# Patient Record
Sex: FEMALE | Race: WHITE | Employment: OTHER | URBAN - METROPOLITAN AREA
[De-identification: names, ages, dates, MRNs, and addresses within clinical notes are randomized per-mention and may not be internally consistent; named-entity substitution may affect disease eponyms.]

---

## 2017-02-08 ENCOUNTER — HOSPITAL ENCOUNTER (OUTPATIENT)
Dept: WOMENS IMAGING | Age: 67
Discharge: HOME | End: 2017-02-08
Attending: FAMILY MEDICINE | Admitting: FAMILY MEDICINE

## 2017-02-08 DIAGNOSIS — N95.1 POSTMENOPAUSAL DISORDER: ICD-10-CM

## 2017-02-08 DIAGNOSIS — Z12.31 SCREENING MAMMOGRAM, ENCOUNTER FOR: ICD-10-CM

## 2017-02-08 DIAGNOSIS — N95.9 MENOPAUSAL AND PERIMENOPAUSAL DISORDER: ICD-10-CM

## 2018-02-14 ENCOUNTER — HOSPITAL ENCOUNTER (OUTPATIENT)
Dept: WOMENS IMAGING | Age: 68
Discharge: HOME | End: 2018-02-14
Attending: FAMILY MEDICINE | Admitting: FAMILY MEDICINE

## 2018-02-14 DIAGNOSIS — Z12.31 VISIT FOR SCREENING MAMMOGRAM: ICD-10-CM

## 2018-04-26 PROBLEM — J18.9 PNEUMONIA: Status: ACTIVE | Noted: 2018-04-26

## 2018-05-14 ENCOUNTER — HOSPITAL ENCOUNTER (OUTPATIENT)
Dept: GENERAL RADIOLOGY | Age: 68
Discharge: HOME | End: 2018-05-14
Attending: FAMILY MEDICINE | Admitting: FAMILY MEDICINE

## 2018-05-14 DIAGNOSIS — J18.9 PNEUMONIA SYMPTOMS: ICD-10-CM

## 2018-10-03 RX ORDER — CHLORAL HYDRATE 500 MG
3000 CAPSULE ORAL 2 TIMES DAILY
COMMUNITY

## 2018-10-03 RX ORDER — M-VIT,TX,IRON,MINS/CALC/FOLIC 27MG-0.4MG
1 TABLET ORAL EVERY MORNING
COMMUNITY

## 2018-10-03 NOTE — PROGRESS NOTES
copy.             11 Please bring picture ID,  insurance card, paperwork from the doctors office    (H & P, Consent, & card for implantable devices).

## 2018-10-05 ENCOUNTER — HOSPITAL ENCOUNTER (OUTPATIENT)
Age: 68
Setting detail: OUTPATIENT SURGERY
Discharge: HOME | End: 2018-10-05
Attending: SPECIALIST | Admitting: SPECIALIST
Payer: MEDICARE

## 2018-10-05 ENCOUNTER — ANESTHESIA EVENT (OUTPATIENT)
Dept: OPERATING ROOM | Age: 68
End: 2018-10-05
Payer: MEDICARE

## 2018-10-05 ENCOUNTER — ANESTHESIA (OUTPATIENT)
Dept: OPERATING ROOM | Age: 68
End: 2018-10-05
Payer: MEDICARE

## 2018-10-05 VITALS
DIASTOLIC BLOOD PRESSURE: 58 MMHG | BODY MASS INDEX: 31.76 KG/M2 | RESPIRATION RATE: 16 BRPM | SYSTOLIC BLOOD PRESSURE: 119 MMHG | OXYGEN SATURATION: 98 % | HEART RATE: 67 BPM | WEIGHT: 186 LBS | TEMPERATURE: 97 F | HEIGHT: 64 IN

## 2018-10-05 VITALS — SYSTOLIC BLOOD PRESSURE: 102 MMHG | DIASTOLIC BLOOD PRESSURE: 51 MMHG | OXYGEN SATURATION: 96 %

## 2018-10-05 PROCEDURE — 7100000010 HC PHASE II RECOVERY - FIRST 15 MIN: Performed by: SPECIALIST

## 2018-10-05 PROCEDURE — 2580000003 HC RX 258: Performed by: SPECIALIST

## 2018-10-05 PROCEDURE — 6360000002 HC RX W HCPCS: Performed by: NURSE ANESTHETIST, CERTIFIED REGISTERED

## 2018-10-05 PROCEDURE — 2500000003 HC RX 250 WO HCPCS: Performed by: NURSE ANESTHETIST, CERTIFIED REGISTERED

## 2018-10-05 PROCEDURE — 7100000011 HC PHASE II RECOVERY - ADDTL 15 MIN: Performed by: SPECIALIST

## 2018-10-05 PROCEDURE — 3700000001 HC ADD 15 MINUTES (ANESTHESIA): Performed by: SPECIALIST

## 2018-10-05 PROCEDURE — 3700000000 HC ANESTHESIA ATTENDED CARE: Performed by: SPECIALIST

## 2018-10-05 PROCEDURE — 2709999900 HC NON-CHARGEABLE SUPPLY: Performed by: SPECIALIST

## 2018-10-05 PROCEDURE — 3609009500 HC COLONOSCOPY DIAGNOSTIC OR SCREENING: Performed by: SPECIALIST

## 2018-10-05 RX ORDER — SODIUM CHLORIDE, SODIUM LACTATE, POTASSIUM CHLORIDE, CALCIUM CHLORIDE 600; 310; 30; 20 MG/100ML; MG/100ML; MG/100ML; MG/100ML
INJECTION, SOLUTION INTRAVENOUS CONTINUOUS
Status: DISCONTINUED | OUTPATIENT
Start: 2018-10-05 | End: 2018-10-05 | Stop reason: HOSPADM

## 2018-10-05 RX ORDER — DEXAMETHASONE SODIUM PHOSPHATE 4 MG/ML
INJECTION, SOLUTION INTRA-ARTICULAR; INTRALESIONAL; INTRAMUSCULAR; INTRAVENOUS; SOFT TISSUE PRN
Status: DISCONTINUED | OUTPATIENT
Start: 2018-10-05 | End: 2018-10-05 | Stop reason: SDUPTHER

## 2018-10-05 RX ORDER — ONDANSETRON 2 MG/ML
INJECTION INTRAMUSCULAR; INTRAVENOUS PRN
Status: DISCONTINUED | OUTPATIENT
Start: 2018-10-05 | End: 2018-10-05 | Stop reason: SDUPTHER

## 2018-10-05 RX ORDER — LIDOCAINE HYDROCHLORIDE 20 MG/ML
INJECTION, SOLUTION INFILTRATION; PERINEURAL PRN
Status: DISCONTINUED | OUTPATIENT
Start: 2018-10-05 | End: 2018-10-05 | Stop reason: SDUPTHER

## 2018-10-05 RX ORDER — PROPOFOL 10 MG/ML
INJECTION, EMULSION INTRAVENOUS PRN
Status: DISCONTINUED | OUTPATIENT
Start: 2018-10-05 | End: 2018-10-05 | Stop reason: SDUPTHER

## 2018-10-05 RX ADMIN — PROPOFOL 50 MG: 10 INJECTION, EMULSION INTRAVENOUS at 11:53

## 2018-10-05 RX ADMIN — PROPOFOL 50 MG: 10 INJECTION, EMULSION INTRAVENOUS at 11:55

## 2018-10-05 RX ADMIN — LIDOCAINE HYDROCHLORIDE 100 MG: 20 INJECTION, SOLUTION INFILTRATION; PERINEURAL at 11:52

## 2018-10-05 RX ADMIN — PROPOFOL 20 MG: 10 INJECTION, EMULSION INTRAVENOUS at 12:01

## 2018-10-05 RX ADMIN — PROPOFOL 50 MG: 10 INJECTION, EMULSION INTRAVENOUS at 11:52

## 2018-10-05 RX ADMIN — SODIUM CHLORIDE, POTASSIUM CHLORIDE, SODIUM LACTATE AND CALCIUM CHLORIDE: 600; 310; 30; 20 INJECTION, SOLUTION INTRAVENOUS at 10:46

## 2018-10-05 RX ADMIN — PROPOFOL 20 MG: 10 INJECTION, EMULSION INTRAVENOUS at 11:59

## 2018-10-05 RX ADMIN — Medication 4 MG: at 11:53

## 2018-10-05 RX ADMIN — PROPOFOL 50 MG: 10 INJECTION, EMULSION INTRAVENOUS at 11:57

## 2018-10-05 RX ADMIN — DEXAMETHASONE SODIUM PHOSPHATE 8 MG: 4 INJECTION, SOLUTION INTRAMUSCULAR; INTRAVENOUS at 11:53

## 2018-10-05 ASSESSMENT — PAIN SCALES - GENERAL
PAINLEVEL_OUTOF10: 0
PAINLEVEL_OUTOF10: 0

## 2018-10-05 ASSESSMENT — PAIN - FUNCTIONAL ASSESSMENT: PAIN_FUNCTIONAL_ASSESSMENT: 0-10

## 2018-10-05 NOTE — ANESTHESIA PRE PROCEDURE
Weight: 186 lb (84.4 kg) 186 lb (84.4 kg)   Height: 5' 4\" (1.626 m) 5' 4\" (1.626 m)                                              BP Readings from Last 3 Encounters:   10/05/18 (!) 149/65   04/29/18 (!) 152/70   03/12/15 160/66       NPO Status: Time of last liquid consumption: 0800                        Time of last solid consumption: 0600 (pudding & yogurt)                        Date of last liquid consumption: 10/05/18                        Date of last solid food consumption: 08/25/18    BMI:   Wt Readings from Last 3 Encounters:   10/05/18 186 lb (84.4 kg)   04/28/18 210 lb 1.6 oz (95.3 kg)   03/12/15 177 lb (80.3 kg)     Body mass index is 31.93 kg/m². CBC:   Lab Results   Component Value Date    WBC 7.6 04/27/2018    RBC 2.54 04/27/2018    HGB 8.4 04/27/2018    HCT 27.3 04/27/2018    .5 04/27/2018    RDW 14.9 04/27/2018     04/27/2018       CMP:   Lab Results   Component Value Date     04/27/2018    K 3.9 04/27/2018     04/27/2018    CO2 20 04/27/2018    BUN 11 04/27/2018    CREATININE 0.5 04/27/2018    GFRAA >60 04/27/2018    LABGLOM >60 04/27/2018    GLUCOSE 134 04/27/2018    PROT 8.2 04/27/2018    PROT 6.7 04/11/2011    CALCIUM 7.4 04/27/2018    BILITOT 0.6 04/27/2018    ALKPHOS 144 04/27/2018    AST 59 04/27/2018    ALT 63 04/27/2018       POC Tests: No results for input(s): POCGLU, POCNA, POCK, POCCL, POCBUN, POCHEMO, POCHCT in the last 72 hours.     Coags:   Lab Results   Component Value Date    PROTIME 12.7 04/27/2018    INR 1.12 04/27/2018       HCG (If Applicable): No results found for: PREGTESTUR, PREGSERUM, HCG, HCGQUANT     ABGs: No results found for: PHART, PO2ART, SIW9AKN, BVG2BJX, BEART, W6JEGCOG     Type & Screen (If Applicable):  No results found for: [facility]    Anesthesia Evaluation  Patient summary reviewed  Airway: Mallampati: II  TM distance: >3 FB   Neck ROM: full  Mouth opening: > = 3 FB Dental: normal exam         Pulmonary:Negative Pulmonary ROS and normal exam                               Cardiovascular:  Exercise tolerance: good (>4 METS),   (+) hypertension:,         Rhythm: regular  Rate: normal           Beta Blocker:  Not on Beta Blocker         Neuro/Psych:   (+) psychiatric history:            GI/Hepatic/Renal:   (+) GERD:, bowel prep,           Endo/Other: Negative Endo/Other ROS                    Abdominal:           Vascular: negative vascular ROS. Anesthesia Plan      MAC and TIVA     ASA 2       Induction: intravenous. Anesthetic plan and risks discussed with patient. SHANNON Salter CRNA   10/5/2018      Pre Anesthesia Evaluation complete. Anesthesia plan, risks, benefits, alternatives, and personnel discussed with patient and/or legal guardian. Patient and/or legal guardian verbalized an understanding and agreed to proceed. Anesthesia plan discussed with care team members and agreed upon.   SHANNON Salter CRNA  10/5/2018

## 2018-11-21 ENCOUNTER — HOSPITAL ENCOUNTER (OUTPATIENT)
Dept: GENERAL RADIOLOGY | Age: 68
Discharge: HOME | End: 2018-11-21
Payer: MEDICARE

## 2018-11-21 ENCOUNTER — HOSPITAL ENCOUNTER (OUTPATIENT)
Age: 68
Discharge: HOME | End: 2018-11-21
Payer: MEDICARE

## 2018-11-21 DIAGNOSIS — R06.02 SOB (SHORTNESS OF BREATH): ICD-10-CM

## 2018-11-21 DIAGNOSIS — R06.09 DOE (DYSPNEA ON EXERTION): ICD-10-CM

## 2018-11-21 PROCEDURE — 71046 X-RAY EXAM CHEST 2 VIEWS: CPT

## 2018-12-17 ENCOUNTER — HOSPITAL ENCOUNTER (OUTPATIENT)
Dept: CARDIAC REHAB | Age: 68
Discharge: HOME | End: 2018-12-17
Payer: MEDICARE

## 2018-12-17 PROCEDURE — 94727 GAS DIL/WSHOT DETER LNG VOL: CPT

## 2018-12-17 PROCEDURE — 6360000002 HC RX W HCPCS

## 2018-12-17 PROCEDURE — 94060 EVALUATION OF WHEEZING: CPT

## 2018-12-17 PROCEDURE — 94729 DIFFUSING CAPACITY: CPT

## 2018-12-17 PROCEDURE — 94640 AIRWAY INHALATION TREATMENT: CPT

## 2018-12-21 ENCOUNTER — APPOINTMENT (OUTPATIENT)
Dept: CT IMAGING | Age: 68
DRG: 844 | End: 2018-12-21
Payer: MEDICARE

## 2018-12-21 ENCOUNTER — HOSPITAL ENCOUNTER (INPATIENT)
Age: 68
LOS: 2 days | Discharge: HOME | DRG: 844 | End: 2018-12-23
Attending: EMERGENCY MEDICINE | Admitting: INTERNAL MEDICINE
Payer: MEDICARE

## 2018-12-21 DIAGNOSIS — D64.9 ANEMIA, UNSPECIFIED TYPE: Primary | ICD-10-CM

## 2018-12-21 DIAGNOSIS — R06.02 SHORTNESS OF BREATH: ICD-10-CM

## 2018-12-21 DIAGNOSIS — J90 PLEURAL EFFUSION: ICD-10-CM

## 2018-12-21 LAB
ALBUMIN SERPL-MCNC: 2.2 GM/DL (ref 3.4–5)
ALP BLD-CCNC: 82 IU/L (ref 40–129)
ALT SERPL-CCNC: 36 U/L (ref 10–40)
ANION GAP SERPL CALCULATED.3IONS-SCNC: 13 MMOL/L (ref 4–16)
ANISOCYTOSIS: ABNORMAL
APTT: 39 SECONDS (ref 21.2–33)
AST SERPL-CCNC: 45 IU/L (ref 15–37)
AVERAGE GLUCOSE: NORMAL
BANDED NEUTROPHILS ABSOLUTE COUNT: 0.21 K/CU MM
BANDED NEUTROPHILS RELATIVE PERCENT: 7 % (ref 5–11)
BILIRUB SERPL-MCNC: 0.6 MG/DL (ref 0–1)
BUN BLDV-MCNC: 25 MG/DL (ref 6–23)
CALCIUM SERPL-MCNC: 8.9 MG/DL (ref 8.3–10.6)
CHLORIDE BLD-SCNC: 103 MMOL/L (ref 99–110)
CO2: 20 MMOL/L (ref 21–32)
CREAT SERPL-MCNC: 0.7 MG/DL (ref 0.6–1.1)
DIFFERENTIAL TYPE: ABNORMAL
EOSINOPHILS ABSOLUTE: 0.1 K/CU MM
EOSINOPHILS RELATIVE PERCENT: 3 % (ref 0–3)
GFR AFRICAN AMERICAN: >60 ML/MIN/1.73M2
GFR NON-AFRICAN AMERICAN: >60 ML/MIN/1.73M2
GLUCOSE BLD-MCNC: 102 MG/DL (ref 70–99)
HBA1C MFR BLD: 5.8 %
HCT VFR BLD CALC: 19.5 % (ref 37–47)
HEMOGLOBIN: 5.7 GM/DL (ref 12.5–16)
INR BLD: 1.31 INDEX
LYMPHOCYTES ABSOLUTE: 1.2 K/CU MM
LYMPHOCYTES RELATIVE PERCENT: 40 % (ref 24–44)
MCH RBC QN AUTO: 33.1 PG (ref 27–31)
MCHC RBC AUTO-ENTMCNC: 29.2 % (ref 32–36)
MCV RBC AUTO: 113.4 FL (ref 78–100)
MONOCYTES ABSOLUTE: 0.2 K/CU MM
MONOCYTES RELATIVE PERCENT: 7 % (ref 0–4)
MYELOCYTE PERCENT: 2 %
MYELOCYTES ABSOLUTE COUNT: 0.06 K/CU MM
NUCLEATED RED BLOOD CELLS: 1
PDW BLD-RTO: 17.1 % (ref 11.7–14.9)
PLATELET # BLD: 132 K/CU MM (ref 140–440)
PMV BLD AUTO: 9.1 FL (ref 7.5–11.1)
POTASSIUM SERPL-SCNC: 4 MMOL/L (ref 3.5–5.1)
PRO-BNP: 1347 PG/ML
PROTHROMBIN TIME: 14.9 SECONDS (ref 9.12–12.5)
RBC # BLD: 1.72 M/CU MM (ref 4.2–5.4)
RBC # BLD: ABNORMAL 10*6/UL
SEGMENTED NEUTROPHILS ABSOLUTE COUNT: 1.2 K/CU MM
SEGMENTED NEUTROPHILS RELATIVE PERCENT: 40 % (ref 36–66)
SODIUM BLD-SCNC: 136 MMOL/L (ref 135–145)
TOTAL PROTEIN: 11.9 GM/DL (ref 6.4–8.2)
UNDIFFERENTIATED CELL: 1 %
WBC # BLD: 3 K/CU MM (ref 4–10.5)
WBC # BLD: ABNORMAL 10*3/UL

## 2018-12-21 PROCEDURE — 6370000000 HC RX 637 (ALT 250 FOR IP): Performed by: INTERNAL MEDICINE

## 2018-12-21 PROCEDURE — 80053 COMPREHEN METABOLIC PANEL: CPT

## 2018-12-21 PROCEDURE — 83880 ASSAY OF NATRIURETIC PEPTIDE: CPT

## 2018-12-21 PROCEDURE — 1200000000 HC SEMI PRIVATE

## 2018-12-21 PROCEDURE — 86922 COMPATIBILITY TEST ANTIGLOB: CPT

## 2018-12-21 PROCEDURE — 99285 EMERGENCY DEPT VISIT HI MDM: CPT

## 2018-12-21 PROCEDURE — 36430 TRANSFUSION BLD/BLD COMPNT: CPT

## 2018-12-21 PROCEDURE — 85730 THROMBOPLASTIN TIME PARTIAL: CPT

## 2018-12-21 PROCEDURE — 85027 COMPLETE CBC AUTOMATED: CPT

## 2018-12-21 PROCEDURE — 85007 BL SMEAR W/DIFF WBC COUNT: CPT

## 2018-12-21 PROCEDURE — 2580000003 HC RX 258: Performed by: EMERGENCY MEDICINE

## 2018-12-21 PROCEDURE — 6360000004 HC RX CONTRAST MEDICATION: Performed by: EMERGENCY MEDICINE

## 2018-12-21 PROCEDURE — 71275 CT ANGIOGRAPHY CHEST: CPT

## 2018-12-21 PROCEDURE — 85610 PROTHROMBIN TIME: CPT

## 2018-12-21 PROCEDURE — P9016 RBC LEUKOCYTES REDUCED: HCPCS

## 2018-12-21 PROCEDURE — 93005 ELECTROCARDIOGRAM TRACING: CPT | Performed by: EMERGENCY MEDICINE

## 2018-12-21 PROCEDURE — 86850 RBC ANTIBODY SCREEN: CPT

## 2018-12-21 PROCEDURE — 36415 COLL VENOUS BLD VENIPUNCTURE: CPT

## 2018-12-21 PROCEDURE — 86901 BLOOD TYPING SEROLOGIC RH(D): CPT

## 2018-12-21 PROCEDURE — 86900 BLOOD TYPING SEROLOGIC ABO: CPT

## 2018-12-21 RX ORDER — AMLODIPINE BESYLATE 5 MG/1
5 TABLET ORAL EVERY MORNING
Status: DISCONTINUED | OUTPATIENT
Start: 2018-12-22 | End: 2018-12-23 | Stop reason: HOSPADM

## 2018-12-21 RX ORDER — POTASSIUM CHLORIDE 20MEQ/15ML
40 LIQUID (ML) ORAL PRN
Status: DISCONTINUED | OUTPATIENT
Start: 2018-12-21 | End: 2018-12-23 | Stop reason: HOSPADM

## 2018-12-21 RX ORDER — SODIUM CHLORIDE 0.9 % (FLUSH) 0.9 %
10 SYRINGE (ML) INJECTION EVERY 12 HOURS SCHEDULED
Status: DISCONTINUED | OUTPATIENT
Start: 2018-12-21 | End: 2018-12-23 | Stop reason: HOSPADM

## 2018-12-21 RX ORDER — ALENDRONATE SODIUM 70 MG/1
70 TABLET ORAL WEEKLY
Status: DISCONTINUED | OUTPATIENT
Start: 2018-12-21 | End: 2018-12-21 | Stop reason: RX

## 2018-12-21 RX ORDER — ONDANSETRON 2 MG/ML
4 INJECTION INTRAMUSCULAR; INTRAVENOUS EVERY 6 HOURS PRN
Status: DISCONTINUED | OUTPATIENT
Start: 2018-12-21 | End: 2018-12-23 | Stop reason: HOSPADM

## 2018-12-21 RX ORDER — LORATADINE 10 MG/1
10 TABLET ORAL DAILY
COMMUNITY
End: 2019-10-24

## 2018-12-21 RX ORDER — POTASSIUM CHLORIDE 7.45 MG/ML
10 INJECTION INTRAVENOUS PRN
Status: DISCONTINUED | OUTPATIENT
Start: 2018-12-21 | End: 2018-12-23 | Stop reason: HOSPADM

## 2018-12-21 RX ORDER — 0.9 % SODIUM CHLORIDE 0.9 %
10 VIAL (ML) INJECTION
Status: COMPLETED | OUTPATIENT
Start: 2018-12-21 | End: 2018-12-21

## 2018-12-21 RX ORDER — ALBUTEROL SULFATE 90 UG/1
2 AEROSOL, METERED RESPIRATORY (INHALATION) EVERY 6 HOURS PRN
Status: DISCONTINUED | OUTPATIENT
Start: 2018-12-21 | End: 2018-12-23 | Stop reason: HOSPADM

## 2018-12-21 RX ORDER — POTASSIUM CHLORIDE 20 MEQ/1
40 TABLET, EXTENDED RELEASE ORAL PRN
Status: DISCONTINUED | OUTPATIENT
Start: 2018-12-21 | End: 2018-12-23 | Stop reason: HOSPADM

## 2018-12-21 RX ORDER — 0.9 % SODIUM CHLORIDE 0.9 %
250 INTRAVENOUS SOLUTION INTRAVENOUS ONCE
Status: COMPLETED | OUTPATIENT
Start: 2018-12-21 | End: 2018-12-22

## 2018-12-21 RX ORDER — SODIUM CHLORIDE 0.9 % (FLUSH) 0.9 %
10 SYRINGE (ML) INJECTION PRN
Status: DISCONTINUED | OUTPATIENT
Start: 2018-12-21 | End: 2018-12-23 | Stop reason: HOSPADM

## 2018-12-21 RX ORDER — LOSARTAN POTASSIUM 50 MG/1
50 TABLET ORAL EVERY MORNING
Status: DISCONTINUED | OUTPATIENT
Start: 2018-12-22 | End: 2018-12-23 | Stop reason: HOSPADM

## 2018-12-21 RX ORDER — FAMOTIDINE 20 MG/1
20 TABLET, FILM COATED ORAL 2 TIMES DAILY
Status: DISCONTINUED | OUTPATIENT
Start: 2018-12-21 | End: 2018-12-23 | Stop reason: HOSPADM

## 2018-12-21 RX ORDER — ALBUTEROL SULFATE 90 UG/1
2 AEROSOL, METERED RESPIRATORY (INHALATION) EVERY 6 HOURS PRN
COMMUNITY
End: 2020-09-15

## 2018-12-21 RX ORDER — OMEGA-3-ACID ETHYL ESTERS 1 G/1
2000 CAPSULE, LIQUID FILLED ORAL 2 TIMES DAILY
Status: DISCONTINUED | OUTPATIENT
Start: 2018-12-21 | End: 2018-12-23 | Stop reason: HOSPADM

## 2018-12-21 RX ADMIN — SODIUM CHLORIDE, PRESERVATIVE FREE 10 ML: 5 INJECTION INTRAVENOUS at 12:12

## 2018-12-21 RX ADMIN — IOPAMIDOL 75 ML: 755 INJECTION, SOLUTION INTRAVENOUS at 12:12

## 2018-12-21 RX ADMIN — OMEGA-3-ACID ETHYL ESTERS 2000 MG: 1 CAPSULE, LIQUID FILLED ORAL at 19:51

## 2018-12-21 RX ADMIN — SODIUM CHLORIDE 250 ML: 9 INJECTION, SOLUTION INTRAVENOUS at 13:57

## 2018-12-21 RX ADMIN — FAMOTIDINE 20 MG: 20 TABLET ORAL at 19:51

## 2018-12-21 ASSESSMENT — PAIN SCALES - GENERAL: PAINLEVEL_OUTOF10: 0

## 2018-12-21 NOTE — H&P
[facility]    History and Physical      Name:  Diann Carmichael /Age/Sex: 1950  (79 y.o. female)   MRN & CSN:  [id number] & [id number] Admission Date/Time: 2018 10:37 AM   Location:  ED22/ED-22 PCP: Lashae Pina MD       Hospital Day: 1    Assessment and Plan:   Diann Carmichael is a 79 y.o.  female with past medical history of Hypertension, depression, anemia who presents with low hemoglobin level based on the test performed in her PMD office    · Severe anemia-symptomatic, presented with fatigue, palpitations, shortness of breath- this is likely aggravated by recent epistaxis on her existing anemia, Hg baseline 8.4 -9.4 was 13 in 2017 on arrival 5.7, transfusion started in ER, iron studies were not performed before transfusion- we'll consult GI and hematology    · Pancytopenia-consulted hematology    · Epistaxis- bleeding from the right nostril, which stopped on arrival to ED - do not remove packing to avoid further bleeding, consult ENT    · Hypertension -continue home medication     Diet     DVT Prophylaxis [] Lovenox, []  Heparin, [x] SCDs, [] No VTE prophylaxis, patient ambulating   GI Prophylaxis [] PPI, [x] H2 Blocker, [] No GI prophylaxis, patient is receiving diet/Tube Feeds   Code Status Prior   Disposition Patient requires continued admission due to Severe anemia    MDM [] Low, [x] Moderate,[]  High  Patient's risk as above due to      History of Present Illness:     Chief Complaint: Hemoglobin, shortness of breath    Diann Carmichael is a 79 y.o.  female  with past medical history of Hypertension, depression, anemia who presents with low hemoglobin level based on the test performed in her PMD office. Patient went to her PMD office 2 days ago due to shortness of breath, fatigue, and lack of energy. She was called and told to go to ER this morning because of low blood hemoglobin level. She has recently started to have epistaxis.   This has been going on intermittently for the last 3 5-24-13); eye surgery (Left, 1-17-14); [place] tooth extraction (Early 1970's); Dental surgery; Endoscopy, colon, diagnostic (\"Once\" 1990's); Hysterectomy, vaginal (1985); bladder suspension (1985); Tonsillectomy (1970's); lymph node biopsy (Last Done In 2000); Breast surgery (Right, 1980's); Foot surgery (Left, [facility]); Cholecystectomy, laparoscopic ([id number]); Hysterectomy; Colonoscopy (\"Two\" Last Done 2000's); Colonoscopy (10/05/2018); and pr colonoscopy flx dx w/collj spec when pfrmd (N/A, 10/5/2018). Allergies: Allergies   Allergen Reactions    Other      \"Allergic To Poinsetta Holley Causing Nasal Congestion\"    Prinzide [Lisinopril-Hydrochlorothiazide] Swelling    Sulfa Antibiotics      \"Flu Like Symptoms\"    Tape [Adhesive Tape]      \"Tears Skin , Paper Tape Is OK To Use\"       FAM HX: family history includes Arthritis in her brother and mother; Cancer in her brother and brother; Dementia in her father; Depression in her brother; Diabetes in her brother and mother; Early Death (age of onset: 61) in her brother; Heart Disease in her mother; High Blood Pressure in her mother; Kidney Disease in her son; Other in her son. Soc HX:   Social History     Social History    Marital status:      Spouse name: N/A    Number of children: N/A    Years of education: N/A     Social History Main Topics    Smoking status: Never Smoker    Smokeless tobacco: Never Used    Alcohol use Yes      Comment: \"Occ. Maybe Once A Week\"    Drug use: No    Sexual activity: Yes     Partners: Male     Other Topics Concern    None     Social History Narrative    None       Medications:   Medications:    sodium chloride  250 mL Intravenous Once      Infusions:   PRN Meds:    Current home medications   Prior to Admission medications    Medication Sig Start Date End Date Taking?  Authorizing Provider   albuterol sulfate  (90 Base) MCG/ACT inhaler Inhale 2 puffs into the lungs every 6 hours as needed for

## 2018-12-21 NOTE — ED PROVIDER NOTES
eMERGENCY dEPARTMENT eNCOUnter      CHIEF COMPLAINT:   Anemia  Shortness of breath    CRITICAL CARE NOTE:  There was a high probability of clinically significant life-threatening deterioration of the patient's condition requiring my urgent intervention. Total critical care time is 37 minutes  This includes vital sign monitoring, pulse oximetry monitoring, telemetry monitoring, clinical response to the IV medications, reviewing the nursing notes, consultation time, dictation/documetation time. (This time excludes time spent performing procedures). HPI: Tessy Jordan is a 79 y.o. female who presents to the emergency department for evaluation of anemia. The patient states that she has had shortness of breath for the past 4 months. The shortness of breath is constant. It is worse with exertion and better with rest, however it does not resolve with rest. She states that she saw her primary care physician earlier this week and outpatient labs were done. She was called today and told that her hemoglobin was 6.2 and that she needed to come to the emergency department. The patient denies a known history of anemia. She has been having frequent nosebleeds that can last up to 2 hours at a time. She denies other bleeding. She denies hematemesis. She denies bloody stools. She is not on blood thinners. She denies any associated cough or chest pain. She has had bilateral leg swelling. She has felt fatigued. She denies fevers, chills, abdominal pain, nausea, vomiting, numbness, tingling, weakness or any other complaints.     REVIEW OF SYSTEMS:   Constitutional:  Denies fever or chills, complains of fatigue  Eyes:  Denies change in visual acuity  HENT:  Denies nasal congestion or sore throat, complains of nose bleeds  Respiratory:  Denies cough, complains of shortness of breath  Cardiovascular:  Denies chest pain or edema  GI:  Denies abdominal pain, nausea, vomiting, bloody stools or diarrhea  :  Denies dysuria  Musculoskeletal:  Denies back pain or joint pain, complains of leg swelling  Integument:  Denies rash  Neurologic:  Denies headache, focal weakness or sensory changes  \"Remaining review of systems reviewed and negative. I have reviewed the nursing triage documentation and agree unless otherwise noted below. \"      PAST MEDICAL HISTORY:   Past Medical History:   Diagnosis Date    Anemia     \"With Childbirth\"    Arthritis     \"Hands, Knees And Hips\"    CCC (chronic calculous cholecystitis)     s/p cholecystectomy 2015    Colitis Dx 2000's    Depression     \"In Mid 1990's\"    GERD (gastroesophageal reflux disease)     Hypertension     Motion sickness     LILIAN (stress urinary incontinence, female)     Thyroid disease 1990's    \"Took Part Of Thyroid Out \"       CURRENT MEDICATIONS:   Home medications reviewed.     SURGICAL HISTORY:   Past Surgical History:   Procedure Laterality Date    BLADDER SUSPENSION  1985    Done During Vaginal Hysterectomy    BREAST SURGERY Right 1980's    Benign Area Removed Nipple Area Right Breast   Luis Langston  [id number]    COLONOSCOPY  \"Two\" Last Done 2000's    COLONOSCOPY  10/05/2018    Sigmoid diverticulosis, Grade 1 Internal hemorrhoids    DENTAL SURGERY      Teeth Extracted In Past    ENDOSCOPY, COLON, DIAGNOSTIC  \"Once\" 1990's    EYE SURGERY Right 5-24-13    Cataract With Lens Implant    EYE SURGERY Left 1-17-14    Cataract With Lens Implant    FOOT SURGERY Left 1962 Or 1963    I & D Left Foot After Stepping On A Nail    HYSTERECTOMY      HYSTERECTOMY, VAGINAL  1985    Bladder Suspension Also Done    LYMPH NODE BIOPSY  Last Done In 2000    \"Had Five Lymph Node Biopsies Done, Arm Pit Areas\", Benign    IA COLONOSCOPY FLX DX W/COLLJ SPEC WHEN PFRMD N/A 10/5/2018    COLONOSCOPY DIAGNOSTIC OR SCREENING performed by Cj Beckman MD at [facility]  1990's    \"Took Part Of The Thyroid Out\"    TONSILLECTOMY  1970's    WISDOM TOOTH EXTRACTION  Early 18's    All Four [place] Teeth Extracted       FAMILY HISTORY:   Family History   Problem Relation Age of Onset    Heart Disease Mother     Arthritis Mother     Diabetes Mother     High Blood Pressure Mother     Dementia Father     Cancer Brother         Prostate Cancer    Arthritis Brother     Early Death Brother 61        Bladder And Brain Cancer    Diabetes Brother     Depression Brother     Cancer Brother         Bladder And Brain Cancer    Kidney Disease Son         Kidney Stones    Other Son         \"Charcot Swathi Tooth, Neuromuscular Disease\"       SOCIAL HISTORY:   Social History     Social History    Marital status:      Spouse name: N/A    Number of children: N/A    Years of education: N/A     Occupational History    Not on file. Social History Main Topics    Smoking status: Never Smoker    Smokeless tobacco: Never Used    Alcohol use Yes      Comment: \"Occ. Maybe Once A Week\"    Drug use: No    Sexual activity: Yes     Partners: Male     Other Topics Concern    Not on file     Social History Narrative    No narrative on file       ALLERGIES: Other; Prinzide [lisinopril-hydrochlorothiazide]; Sulfa antibiotics; and Tape [adhesive tape]    PHYSICAL EXAM:  VITAL SIGNS:   ED Triage Vitals [12/21/18 1037]   Enc Vitals Group      BP (!) 145/61      Pulse 89      Resp 16      Temp 97.9 °F (36.6 °C)      Temp Source Oral      SpO2 100 %      Weight 186 lb (84.4 kg)      Height 5' 4\" (1.626 m)      Head Circumference       Peak Flow       Pain Score       Pain Loc       Pain Edu? Excl. in HOS[facility]? Constitutional:  Non-toxic appearance, Pale  HENT: Normocephalic, Atraumatic, Bilateral external ears normal, Oropharynx moist, No oral exudates, Nose normal.  Eyes:  PERRL, EOMI, Conjunctiva normal, No discharge. Neck: Normal range of motion, No tenderness, Supple, No stridor, No lymphadenopathy.   Cardiovascular:  Normal heart rate, Normal rhythm  Pulmonary/Chest:  Normal breath sounds, No respiratory distress, No wheezing, Dyspneic  Abdomen: Bowel sounds normal, Soft, No tenderness, No masses, No pulsatile masses  Back:  No tenderness, No CVA tenderness  Extremities:  Normal range of motion, Intact distal pulses, No edema, No tenderness  Neurologic:  Alert & oriented x 3, Normal motor function, Sensation intact to light touch throughout, No focal deficits  Skin:  Warm, Dry, No erythema, No rash      EKG Interpretation  Interpreted by me  Compared to 4/26/18  Rhythm: normal sinus  Rate: normal 76  Axis: normal  Ectopy: none  Conduction: normal  ST Segments: no acute change  T Waves: no acute change  Q Waves: none  Clinical Impression: normal sinus rhythm, no acute changes    Cardiac Monitor Strip Interpretation  Interpreted by me  Monitor strip interpreted for greater than 10 seconds  Rhythm: normal sinus  Rate: normal  Ectopy: none  ST Segments: normal      Radiology / Procedures:  CTA PULMONARY W CONTRAST (Final result)   Result time 12/21/18 12:34:32   Final result by Olga Dixon MD (12/21/18 12:34:32)                Impression:    1. Exam is negative for acute pulmonary embolism  2. Small right pleural effusion  3. Otherwise, no acute abnormalities seen in the chest.  Previously noted  bilateral airspace opacification has resolved            Narrative:    EXAMINATION:  CTA OF THE CHEST 12/21/2018 12:21 pm    TECHNIQUE:  CTA of the chest was performed after the administration of intravenous  contrast.  Multiplanar reformatted images are provided for review.  MIP  images are provided for review. Dose modulation, iterative reconstruction,  and/or weight based adjustment of the mA/kV was utilized to reduce the  radiation dose to as low as reasonably achievable.     COMPARISON:  04/26/2018    HISTORY:  ORDERING SYSTEM PROVIDED HISTORY: Shortness of breath  TECHNOLOGIST PROVIDED HISTORY:  Ordering Physician Provided Reason for Exam: shortness of breath  Acuity: Acute  Type of Exam: Initial  Additional signs and symptoms: 75ml isovue 370  Relevant Medical/Surgical History: hx thyroid surg, hyster, breast surg, david    FINDINGS:  Pulmonary Arteries: Pulmonary arteries are adequately opacified for  evaluation.  No evidence of intraluminal filling defect to suggest pulmonary  embolism.  Main pulmonary artery is normal in caliber. Mediastinum: No evidence of mediastinal lymphadenopathy.  The heart and  pericardium demonstrate no acute abnormality.  There is no acute abnormality  of the thoracic aorta. Lungs/pleura: The lungs are without acute process.  No focal consolidation or  pulmonary edema.  No pneumothorax.  Right-sided pleural effusion. Upper Abdomen: Limited images of the upper abdomen are unremarkable. Soft Tissues/Bones: No acute bone or soft tissue abnormality. Labs Reviewed   CBC WITH AUTO DIFFERENTIAL - Abnormal; Notable for the following:        Result Value    WBC 3.0 (*)     RBC 1.72 (*)     Hemoglobin 5.7 (*)     Hematocrit 19.5 (*)     .4 (*)     MCH 33.1 (*)     MCHC 29.2 (*)     RDW 17.1 (*)     Platelets 348 (*)     UNDIFFERENTIATED CELL 1.0 (*)     Myelocytes Relative 2 (*)     Monocytes % 7.0 (*)     All other components within normal limits    Narrative:     CALLED TO DR MOREAU RESULTS READ BACK @ [address]  POLYCHROMASIA  ATYPICAL LYMPHS   COMPREHENSIVE METABOLIC PANEL - Abnormal; Notable for the following:     CO2 20 (*)     BUN 25 (*)     Glucose 102 (*)     Alb 2.2 (*)     Total Protein 11.9 (*)     AST 45 (*)     All other components within normal limits   PROTIME/INR & PTT - Abnormal; Notable for the following:     Protime 14.9 (*)     aPTT 39.0 (*)     All other components within normal limits    Narrative:     Protime seconds can vary  due to reagent sensitivity. Please use INR to monitor  oral anticoagulants.   (NOTE)  Therapeutic Range                                              Indications: INR  Most (DVT, PE, Atrial Fibrillation, Bioprosthetic         2.0-3.0  valve, [place] bicuspid aortic valve)    Most mechanical valves, recurrent thrombosis              2.5-3.5    EFFECTIVE 07/07/2017  THERAPEUTIC RANGE FOR ROUTINE  HEPARIN USE IS 52.0-85.0 SECONDS. BRAIN NATRIURETIC PEPTIDE - Abnormal; Notable for the following:     Pro-BNP 1,347 (*)     All other components within normal limits    Narrative:     (NOTE)  WE HAVE CONVERTED FROM BNP TO NT-proBNP. Our reference range to \"RULE OUT\" acute CHF is <300pg/ml. To \"RULE IN\" acute CHF please use the following reference ranges  derived from the PRIDE study. <50 yrs  >450pg/ml  50-75 yrs    >900pg/ml  >75 yrs  >1800pg/ml     CBC WITH AUTO DIFFERENTIAL   TYPE AND SCREEN    Narrative:     ABO/RH(D)                 O  POSITIVE  ANTIBODY SCREEN           NEGATIVE  UNIT NUMBER               [id number]  BLOOD COMPONENT TYPE      LEUKO-POOR RED CELLS  UNIT DIVISION             00  STATUS OF UNIT            ISSUED  TRANSFUSION STATUS        OK TO TRANSFUSE  CROSSMATCH RESULT         COMPATIBLE  UNIT NUMBER               [id number]  BLOOD COMPONENT TYPE      LEUKO-POOR RED CELLS  UNIT DIVISION             00  STATUS OF UNIT            ISSUED  TRANSFUSION STATUS        OK TO TRANSFUSE  CROSSMATCH RESULT         COMPATIBLE    Performed at [address] [facility], [facility] [address], [facility], [address] [facility]  Medical Director: Ivin Cheadle MD   [facility])       ED COURSE & MEDICAL DECISION MAKING:  Pertinent Labs & Imaging studies reviewed. (See chart for details)  On exam, the patient is afebrile and nontoxic appearing. She is hemodynamically stable and neurologically intact. EKG shows a normal sinus rhythm with no ST elevation or depression. Labs are obtained and are significant for leukopenia, anemia and thrombocytopenia.  CT of the chest was obtained and is negative for acute

## 2018-12-21 NOTE — PLAN OF CARE
Problem: Falls - Risk of:  Goal: Will remain free from falls  Will remain free from falls  Outcome: Ongoing    Goal: Absence of physical injury  Absence of physical injury  Outcome: Ongoing      Problem: Bleeding:  Goal: Will show no signs and symptoms of excessive bleeding  Will show no signs and symptoms of excessive bleeding  Outcome: Ongoing

## 2018-12-21 NOTE — ED NOTES
Report called to NATE Salazar on 4N. Nurse will be down in approximately 10 min to transport patient to room 4120. No changes noted to previous patient assessment.  No distress noted     Maria Egrayson Matta RN  12/21/18 [id number]

## 2018-12-22 LAB
ANISOCYTOSIS: ABNORMAL
BANDED NEUTROPHILS ABSOLUTE COUNT: 0.43 K/CU MM
BANDED NEUTROPHILS RELATIVE PERCENT: 12 % (ref 5–11)
BASOPHILS ABSOLUTE: 0 K/CU MM
BASOPHILS RELATIVE PERCENT: 1 % (ref 0–1)
DIFFERENTIAL TYPE: ABNORMAL
EOSINOPHILS ABSOLUTE: 0 K/CU MM
EOSINOPHILS RELATIVE PERCENT: 1 % (ref 0–3)
ERYTHROCYTE SEDIMENTATION RATE: >120 MM/HR (ref 0–30)
FERRITIN: 357 NG/ML (ref 15–150)
FOLATE: >20 NG/ML (ref 3.1–17.5)
HCT VFR BLD CALC: 23.9 % (ref 37–47)
HEMOGLOBIN: 7.6 GM/DL (ref 12.5–16)
HIGH SENSITIVE C-REACTIVE PROTEIN: 3 MG/L
IRON: 47 UG/DL (ref 37–145)
LACTATE DEHYDROGENASE: 249 IU/L (ref 120–246)
LYMPHOCYTES ABSOLUTE: 0.9 K/CU MM
LYMPHOCYTES RELATIVE PERCENT: 26 % (ref 24–44)
MCH RBC QN AUTO: 31.7 PG (ref 27–31)
MCHC RBC AUTO-ENTMCNC: 31.8 % (ref 32–36)
MCV RBC AUTO: 99.6 FL (ref 78–100)
METAMYELOCYTES ABSOLUTE COUNT: 0.07 K/CU MM
METAMYELOCYTES PERCENT: 2 %
MONOCYTES ABSOLUTE: 0.3 K/CU MM
MONOCYTES RELATIVE PERCENT: 8 % (ref 0–4)
OTHER CELL MORPHOLOGY: ABNORMAL
PCT TRANSFERRIN: 24 % (ref 10–44)
PDW BLD-RTO: 22.5 % (ref 11.7–14.9)
PLATELET # BLD: 132 K/CU MM (ref 140–440)
PMV BLD AUTO: 9.2 FL (ref 7.5–11.1)
RBC # BLD: 2.4 M/CU MM (ref 4.2–5.4)
RBC # BLD: ABNORMAL 10*6/UL
RETICULOCYTE COUNT PCT: 1.4 % (ref 0.2–2.2)
SEGMENTED NEUTROPHILS ABSOLUTE COUNT: 1.8 K/CU MM
SEGMENTED NEUTROPHILS RELATIVE PERCENT: 49 % (ref 36–66)
TOTAL IRON BINDING CAPACITY: 193 UG/DL (ref 250–450)
TSH HIGH SENSITIVITY: 4.54 UIU/ML (ref 0.27–4.2)
UNDIFFERENTIATED CELL: 1 %
UNSATURATED IRON BINDING CAPACITY: 146 UG/DL (ref 110–370)
VITAMIN B-12: 954.6 PG/ML (ref 211–911)
WBC # BLD: 3.6 K/CU MM (ref 4–10.5)

## 2018-12-22 PROCEDURE — 83540 ASSAY OF IRON: CPT

## 2018-12-22 PROCEDURE — 85007 BL SMEAR W/DIFF WBC COUNT: CPT

## 2018-12-22 PROCEDURE — 88184 FLOWCYTOMETRY/ TC 1 MARKER: CPT

## 2018-12-22 PROCEDURE — 84443 ASSAY THYROID STIM HORMONE: CPT

## 2018-12-22 PROCEDURE — 86320 SERUM IMMUNOELECTROPHORESIS: CPT

## 2018-12-22 PROCEDURE — 85027 COMPLETE CBC AUTOMATED: CPT

## 2018-12-22 PROCEDURE — 83883 ASSAY NEPHELOMETRY NOT SPEC: CPT

## 2018-12-22 PROCEDURE — 86141 C-REACTIVE PROTEIN HS: CPT

## 2018-12-22 PROCEDURE — 84165 PROTEIN E-PHORESIS SERUM: CPT

## 2018-12-22 PROCEDURE — 83615 LACTATE (LD) (LDH) ENZYME: CPT

## 2018-12-22 PROCEDURE — 1200000000 HC SEMI PRIVATE

## 2018-12-22 PROCEDURE — 85045 AUTOMATED RETICULOCYTE COUNT: CPT

## 2018-12-22 PROCEDURE — 82607 VITAMIN B-12: CPT

## 2018-12-22 PROCEDURE — 82728 ASSAY OF FERRITIN: CPT

## 2018-12-22 PROCEDURE — 88185 FLOWCYTOMETRY/TC ADD-ON: CPT

## 2018-12-22 PROCEDURE — 82232 ASSAY OF BETA-2 PROTEIN: CPT

## 2018-12-22 PROCEDURE — 83550 IRON BINDING TEST: CPT

## 2018-12-22 PROCEDURE — 88182 CELL MARKER STUDY: CPT

## 2018-12-22 PROCEDURE — 36415 COLL VENOUS BLD VENIPUNCTURE: CPT

## 2018-12-22 PROCEDURE — 82746 ASSAY OF FOLIC ACID SERUM: CPT

## 2018-12-22 PROCEDURE — 85652 RBC SED RATE AUTOMATED: CPT

## 2018-12-22 PROCEDURE — 6370000000 HC RX 637 (ALT 250 FOR IP): Performed by: INTERNAL MEDICINE

## 2018-12-22 PROCEDURE — 83010 ASSAY OF HAPTOGLOBIN QUANT: CPT

## 2018-12-22 PROCEDURE — 6370000000 HC RX 637 (ALT 250 FOR IP): Performed by: NURSE PRACTITIONER

## 2018-12-22 PROCEDURE — 2580000003 HC RX 258: Performed by: INTERNAL MEDICINE

## 2018-12-22 PROCEDURE — 93010 ELECTROCARDIOGRAM REPORT: CPT | Performed by: INTERNAL MEDICINE

## 2018-12-22 RX ORDER — LANOLIN ALCOHOL/MO/W.PET/CERES
3 CREAM (GRAM) TOPICAL NIGHTLY PRN
Status: DISCONTINUED | OUTPATIENT
Start: 2018-12-22 | End: 2018-12-23 | Stop reason: HOSPADM

## 2018-12-22 RX ADMIN — OMEGA-3-ACID ETHYL ESTERS 2000 MG: 1 CAPSULE, LIQUID FILLED ORAL at 20:57

## 2018-12-22 RX ADMIN — LOSARTAN POTASSIUM 50 MG: 50 TABLET, FILM COATED ORAL at 08:45

## 2018-12-22 RX ADMIN — SODIUM CHLORIDE, PRESERVATIVE FREE 10 ML: 5 INJECTION INTRAVENOUS at 11:07

## 2018-12-22 RX ADMIN — FAMOTIDINE 20 MG: 20 TABLET ORAL at 20:57

## 2018-12-22 RX ADMIN — AMLODIPINE BESYLATE 5 MG: 5 TABLET ORAL at 08:45

## 2018-12-22 RX ADMIN — FAMOTIDINE 20 MG: 20 TABLET ORAL at 08:45

## 2018-12-22 RX ADMIN — MELATONIN TAB 3 MG 3 MG: 3 TAB at 20:57

## 2018-12-22 RX ADMIN — OMEGA-3-ACID ETHYL ESTERS 2000 MG: 1 CAPSULE, LIQUID FILLED ORAL at 08:45

## 2018-12-22 NOTE — CONSULTS
Left 1-17-14    Cataract With Lens Implant    FOOT SURGERY Left 1962 Or 1963    I & D Left Foot After Stepping On A Nail    HYSTERECTOMY      HYSTERECTOMY, VAGINAL  1985    Bladder Suspension Also Done    LYMPH NODE BIOPSY  Last Done In 2000    \"Had Five Lymph Node Biopsies Done, Arm Pit Areas\", Benign    NM COLONOSCOPY FLX DX W/COLLJ SPEC WHEN PFRMD N/A 10/5/2018    COLONOSCOPY DIAGNOSTIC OR SCREENING performed by Eleni Patterson MD at [address]  1990's    \"Took Part Of The Thyroid Out\"    TONSILLECTOMY  1970's    WISDOM TOOTH EXTRACTION  Early 18's    All Four [place] Teeth Extracted       FAMILY HISTORY    Family History   Problem Relation Age of Onset    Heart Disease Mother     Arthritis Mother     Diabetes Mother     High Blood Pressure Mother     Dementia Father     Cancer Brother         Prostate Cancer    Arthritis Brother     Early Death Brother 61        Bladder And Brain Cancer    Diabetes Brother     Depression Brother     Cancer Brother         Bladder And Brain Cancer    Kidney Disease Son         Kidney Stones    Other Son         \"Charcot Swathi Tooth, Neuromuscular Disease\"       SOCIAL HISTORY    Social History     Social History    Marital status:      Spouse name: N/A    Number of children: N/A    Years of education: N/A     Social History Main Topics    Smoking status: Never Smoker    Smokeless tobacco: Never Used    Alcohol use Yes      Comment: \"Occ. Maybe Once A Week\"    Drug use: No    Sexual activity: Yes     Partners: Male     Other Topics Concern    None     Social History Narrative    None       REVIEW OF SYSTEMS    Constitutional:  Denies fever, chills, loss of appetite, weight loss.  Has tiredness, fatigue and mild weakness   HEENT:  Denies swelling of neck glands  Respiratory:  Denies cough, shortness of breath or hemoptysis  Cardiovascular:  Denies chest pain, palpitations or swelling   GI:  Denies abdominal pain, nausea,

## 2018-12-22 NOTE — CONSULTS
Consults   Department of Internal Medicine  Gastroenterology Consult Note  Presley Dance. Yelitza LEON      Reason for Consult:  anemia    Primary Care Physician:  Pilar Briseno MD    History Obtained From:  patient, spouse    HISTORY OF PRESENT ILLNESS:              The patient is a 79 y.o.  female known to me from a negative screening colonoscopy 2 months ago. She is admitted now with fatigue and severe anemia with pancytopenia and marked hyperglobulinemia (>9 grams).  She had a recent nosebleed but denies melena, hematochezia, abd pain, nausea, vomiting, diarrhea, constipation  Past Medical History:        Diagnosis Date    Anemia     \"With Childbirth\"    Arthritis     \"Hands, Knees And Hips\"    CCC (chronic calculous cholecystitis)     s/p cholecystectomy 2015    Colitis Dx 2000's    Depression     \"In Mid 1990's\"    GERD (gastroesophageal reflux disease)     Hypertension     Motion sickness     LILIAN (stress urinary incontinence, female)     Thyroid disease 1990's    \"Took Part Of Thyroid Out \"       Past Surgical History:        Procedure Laterality Date    BLADDER SUSPENSION  1985    Done During Vaginal Hysterectomy    BREAST SURGERY Right 1980's    Benign Area Removed Nipple Area Right Breast   [address] [place], LAPAROSCOPIC  [id number]    COLONOSCOPY  \"Two\" Last Done 2000's    COLONOSCOPY  10/05/2018    Sigmoid diverticulosis, Grade 1 Internal hemorrhoids    DENTAL SURGERY      Teeth Extracted In Past    ENDOSCOPY, COLON, DIAGNOSTIC  \"Once\" 1990's    EYE SURGERY Right 5-24-13    Cataract With Lens Implant    EYE SURGERY Left 1-17-14    Cataract With Lens Implant    FOOT SURGERY Left 1962 Or 1963    I & D Left Foot After Stepping On A Nail    HYSTERECTOMY      HYSTERECTOMY, VAGINAL  1985    Bladder Suspension Also Done    LYMPH NODE BIOPSY  Last Done In 2000    \"Had Five Lymph Node Biopsies Done, Arm Pit Areas\", Benign    SC COLONOSCOPY FLX DX W/COLLJ SPEC WHEN PFRMD N/A 10/5/2018 Heart Disease Mother     Arthritis Mother     Diabetes Mother     High Blood Pressure Mother     Dementia Father     Cancer Brother         Prostate Cancer    Arthritis Brother     Early Death Brother 61        Bladder And Brain Cancer    Diabetes Brother     Depression Brother     Cancer Brother         Bladder And Brain Cancer    Kidney Disease Son         Kidney Stones    Other Son         \"Charcot Swathi Tooth, Neuromuscular Disease\"       REVIEW OF SYSTEMS: (POSITIVES WILL BE UNDERLINED)  CONSTITUTIONAL:    Weight change,fatigue, fever, chills  EYES:  Diplopia, change in vision  EARS:  hearing loss, tinnitus, vertigo  NOSE:   epistaxis  MOUTH/THROAT:     hoarseness, sore throat. RESPIRATORY:  SOB,  cough, sputum, hemoptysis  CARDIOVASCULAR : chest pain,palpitations, dyspnea exertion, orthopnea, paroxysmal nocturnal dyspnea, pedal edema. GASTROINTESTINAL:  See HPI  GENITOURINARY:   dysuria, hematuria, . HEMATOLOGIC/LYMPHATIC:   Anemia, bleeding tendencies. MUSCULOSKELETAL:    myalgias,  joint pain  NEUROLOGICAL:   Loss of Consciousness, paresthesias, anesthesias, focal weakness  SKIN :   History of dermatitis, rashes  PSYCHIATRIC:  depression, , anxiety past psychosis  ENDOCRINE:  History of diabetes, thyroid disease  ALL/IMM : allergies, rashes    PHYSICAL EXAM:    Vitals:  BP (!) 121/59   Pulse 69   Temp 97.7 °F (36.5 °C) (Oral)   Resp 16   Ht 5' 4\" (1.626 m)   Wt 191 lb 14.4 oz (87 kg)   SpO2 98%   BMI 32.94 kg/m²   CONSTITUTIONAL: alert, cooperative, no apparent distress,   EYES:  pupils equal, round and reactive to light and sclera clear  ENT:  normocepalic, without obvious abnormality  NECK:  supple, symmetrical, trachea midline  HEMATOLOGIC/LYMPHATICS:  no cervical lymphadenopathy and no supraclavicular lymphadenopathy  LUNGS:  clear to auscultation  CARDIOVASCULAR:  regular rate and rhythm and no murmur noted  ABDOMEN:  Soft, non-tender with normal bowel sounds.  No organomegaly or

## 2018-12-23 VITALS
OXYGEN SATURATION: 96 % | HEIGHT: 64 IN | DIASTOLIC BLOOD PRESSURE: 63 MMHG | RESPIRATION RATE: 18 BRPM | TEMPERATURE: 97.8 F | HEART RATE: 70 BPM | SYSTOLIC BLOOD PRESSURE: 138 MMHG | BODY MASS INDEX: 32.76 KG/M2 | WEIGHT: 191.9 LBS

## 2018-12-23 LAB
BANDED NEUTROPHILS ABSOLUTE COUNT: 0.07 K/CU MM
BANDED NEUTROPHILS RELATIVE PERCENT: 2 % (ref 5–11)
DIFFERENTIAL TYPE: ABNORMAL
EOSINOPHILS ABSOLUTE: 0.1 K/CU MM
EOSINOPHILS RELATIVE PERCENT: 2 % (ref 0–3)
HCT VFR BLD CALC: 24.5 % (ref 37–47)
HEMOGLOBIN: 7.6 GM/DL (ref 12.5–16)
LYMPHOCYTES ABSOLUTE: 1.2 K/CU MM
LYMPHOCYTES RELATIVE PERCENT: 32 % (ref 24–44)
MACROCYTES: ABNORMAL
MCH RBC QN AUTO: 31.3 PG (ref 27–31)
MCHC RBC AUTO-ENTMCNC: 31 % (ref 32–36)
MCV RBC AUTO: 100.8 FL (ref 78–100)
MONOCYTES ABSOLUTE: 0.1 K/CU MM
MONOCYTES RELATIVE PERCENT: 3 % (ref 0–4)
MYELOCYTE PERCENT: 1 %
MYELOCYTES ABSOLUTE COUNT: 0.04 K/CU MM
PDW BLD-RTO: 21.4 % (ref 11.7–14.9)
PLATELET # BLD: 143 K/CU MM (ref 140–440)
PLT MORPHOLOGY: ABNORMAL
PMV BLD AUTO: 9.6 FL (ref 7.5–11.1)
POLYCHROMASIA: ABNORMAL
RBC # BLD: 2.43 M/CU MM (ref 4.2–5.4)
ROULEAUX: PRESENT
SEGMENTED NEUTROPHILS ABSOLUTE COUNT: 2.2 K/CU MM
SEGMENTED NEUTROPHILS RELATIVE PERCENT: 60 % (ref 36–66)
WBC # BLD: 3.7 K/CU MM (ref 4–10.5)

## 2018-12-23 PROCEDURE — 85027 COMPLETE CBC AUTOMATED: CPT

## 2018-12-23 PROCEDURE — 85007 BL SMEAR W/DIFF WBC COUNT: CPT

## 2018-12-23 PROCEDURE — 36415 COLL VENOUS BLD VENIPUNCTURE: CPT

## 2018-12-23 PROCEDURE — 6370000000 HC RX 637 (ALT 250 FOR IP): Performed by: NURSE PRACTITIONER

## 2018-12-23 PROCEDURE — 2580000003 HC RX 258: Performed by: INTERNAL MEDICINE

## 2018-12-23 PROCEDURE — 6370000000 HC RX 637 (ALT 250 FOR IP): Performed by: INTERNAL MEDICINE

## 2018-12-23 RX ORDER — OXYMETAZOLINE HYDROCHLORIDE 0.05 G/100ML
1 SPRAY NASAL 2 TIMES DAILY
Qty: 1 BOTTLE | Refills: 1 | Status: SHIPPED | OUTPATIENT
Start: 2018-12-23 | End: 2019-01-07

## 2018-12-23 RX ADMIN — MELATONIN TAB 3 MG 3 MG: 3 TAB at 08:45

## 2018-12-23 RX ADMIN — LOSARTAN POTASSIUM 50 MG: 50 TABLET, FILM COATED ORAL at 08:44

## 2018-12-23 RX ADMIN — OMEGA-3-ACID ETHYL ESTERS 2000 MG: 1 CAPSULE, LIQUID FILLED ORAL at 08:44

## 2018-12-23 RX ADMIN — POTASSIUM CHLORIDE 40 MEQ: 40 SOLUTION ORAL at 08:44

## 2018-12-23 RX ADMIN — AMLODIPINE BESYLATE 5 MG: 5 TABLET ORAL at 08:44

## 2018-12-23 RX ADMIN — SODIUM CHLORIDE, PRESERVATIVE FREE 10 ML: 5 INJECTION INTRAVENOUS at 08:45

## 2018-12-23 RX ADMIN — FAMOTIDINE 20 MG: 20 TABLET ORAL at 08:45

## 2018-12-24 LAB
ALBUMIN ELP: 3 GM/DL (ref 3.2–5.6)
ALPHA-1-GLOBULIN: 0.2 GM/DL (ref 0.1–0.4)
ALPHA-2-GLOBULIN: 0.5 GM/DL (ref 0.4–1.2)
BETA GLOBULIN: 8.1 GM/DL (ref 0.5–1.3)
GAMMA GLOBULIN: 0.1 GM/DL (ref 0.5–1.6)
TOTAL PROTEIN: 11.9 GM/DL (ref 6.4–8.2)

## 2018-12-26 LAB
BETA-2 MICROGLOBULIN: 7.5
HAPTOGLOBIN: 58

## 2018-12-27 ENCOUNTER — HOSPITAL ENCOUNTER (OUTPATIENT)
Age: 68
Setting detail: SPECIMEN
Discharge: HOME | End: 2018-12-27
Payer: MEDICARE

## 2018-12-27 LAB
ALBUMIN SERPL-MCNC: 2.4 GM/DL (ref 3.4–5)
ALP BLD-CCNC: 71 IU/L (ref 40–129)
ALT SERPL-CCNC: 21 U/L (ref 10–40)
ANION GAP SERPL CALCULATED.3IONS-SCNC: 10 MMOL/L (ref 4–16)
AST SERPL-CCNC: 26 IU/L (ref 15–37)
BANDED NEUTROPHILS ABSOLUTE COUNT: 0.09 K/CU MM
BANDED NEUTROPHILS RELATIVE PERCENT: 2 % (ref 5–11)
BILIRUB SERPL-MCNC: 0.7 MG/DL (ref 0–1)
BUN BLDV-MCNC: 18 MG/DL (ref 6–23)
CALCIUM SERPL-MCNC: 8.9 MG/DL (ref 8.3–10.6)
CHLORIDE BLD-SCNC: 99 MMOL/L (ref 99–110)
CO2: 25 MMOL/L (ref 21–32)
CREAT SERPL-MCNC: 0.7 MG/DL (ref 0.6–1.1)
DIFFERENTIAL TYPE: ABNORMAL
EOSINOPHILS ABSOLUTE: 0.1 K/CU MM
EOSINOPHILS RELATIVE PERCENT: 2 % (ref 0–3)
GFR AFRICAN AMERICAN: >60 ML/MIN/1.73M2
GFR NON-AFRICAN AMERICAN: >60 ML/MIN/1.73M2
GLUCOSE BLD-MCNC: 97 MG/DL (ref 70–99)
HCT VFR BLD CALC: 27.4 % (ref 37–47)
HEMOGLOBIN: 8.6 GM/DL (ref 12.5–16)
KAPPA QUANT FREE LIGHT CHAINS: 9.34
KAPPA/LAMBDA FREE LIGHT CHAIN RATIO: 49.16
LAMBDA FREE LIGHT CHAINS URINE/ VOL: 0.19
LYMPHOCYTES ABSOLUTE: 0.9 K/CU MM
LYMPHOCYTES RELATIVE PERCENT: 22 % (ref 24–44)
MCH RBC QN AUTO: 31.2 PG (ref 27–31)
MCHC RBC AUTO-ENTMCNC: 31.4 % (ref 32–36)
MCV RBC AUTO: 99.3 FL (ref 78–100)
MONOCYTES ABSOLUTE: 0.2 K/CU MM
MONOCYTES RELATIVE PERCENT: 4 % (ref 0–4)
PDW BLD-RTO: 18.3 % (ref 11.7–14.9)
PLATELET # BLD: 163 K/CU MM (ref 140–440)
PMV BLD AUTO: 10.4 FL (ref 7.5–11.1)
POTASSIUM SERPL-SCNC: 4.4 MMOL/L (ref 3.5–5.1)
RBC # BLD: 2.76 M/CU MM (ref 4.2–5.4)
RETICULOCYTE COUNT PCT: 1.5 % (ref 0.2–2.2)
ROULEAUX: PRESENT
SEGMENTED NEUTROPHILS ABSOLUTE COUNT: 3 K/CU MM
SEGMENTED NEUTROPHILS RELATIVE PERCENT: 70 % (ref 36–66)
SODIUM BLD-SCNC: 134 MMOL/L (ref 135–145)
TOTAL PROTEIN: 13.8 GM/DL (ref 6.4–8.2)
WBC # BLD: 4.3 K/CU MM (ref 4–10.5)

## 2018-12-27 PROCEDURE — 88342 IMHCHEM/IMCYTCHM 1ST ANTB: CPT

## 2018-12-27 PROCEDURE — 88311 DECALCIFY TISSUE: CPT

## 2018-12-27 PROCEDURE — 80053 COMPREHEN METABOLIC PANEL: CPT

## 2018-12-27 PROCEDURE — 85007 BL SMEAR W/DIFF WBC COUNT: CPT

## 2018-12-27 PROCEDURE — 85045 AUTOMATED RETICULOCYTE COUNT: CPT

## 2018-12-27 PROCEDURE — 88182 CELL MARKER STUDY: CPT

## 2018-12-27 PROCEDURE — 88237 TISSUE CULTURE BONE MARROW: CPT

## 2018-12-27 PROCEDURE — 85027 COMPLETE CBC AUTOMATED: CPT

## 2018-12-27 PROCEDURE — 88275 CYTOGENETICS 100-300: CPT

## 2018-12-27 PROCEDURE — 88271 CYTOGENETICS DNA PROBE: CPT

## 2018-12-27 PROCEDURE — 88184 FLOWCYTOMETRY/ TC 1 MARKER: CPT

## 2018-12-27 PROCEDURE — 88313 SPECIAL STAINS GROUP 2: CPT

## 2018-12-27 PROCEDURE — 88305 TISSUE EXAM BY PATHOLOGIST: CPT

## 2018-12-27 PROCEDURE — 88185 FLOWCYTOMETRY/TC ADD-ON: CPT

## 2018-12-27 PROCEDURE — 88364 INSITU HYBRIDIZATION (FISH): CPT

## 2018-12-27 PROCEDURE — 88365 INSITU HYBRIDIZATION (FISH): CPT

## 2018-12-27 PROCEDURE — 88262 CHROMOSOME ANALYSIS 15-20: CPT

## 2018-12-28 LAB
EKG ATRIAL RATE: 76 BPM
EKG DIAGNOSIS: NORMAL
EKG P AXIS: 66 DEGREES
EKG P-R INTERVAL: 130 MS
EKG Q-T INTERVAL: 408 MS
EKG QRS DURATION: 84 MS
EKG QTC CALCULATION (BAZETT): 459 MS
EKG R AXIS: 17 DEGREES
EKG T AXIS: 26 DEGREES
EKG VENTRICULAR RATE: 76 BPM

## 2019-01-07 LAB
CHROMOSOME, BONE MARROW: NORMAL
GDT REPLACEMENT: NORMAL

## 2019-01-08 ENCOUNTER — HOSPITAL ENCOUNTER (OUTPATIENT)
Age: 69
Discharge: HOME | End: 2019-01-08
Payer: MEDICARE

## 2019-01-08 ENCOUNTER — HOSPITAL ENCOUNTER (OUTPATIENT)
Dept: GENERAL RADIOLOGY | Age: 69
Discharge: HOME | End: 2019-01-08
Payer: MEDICARE

## 2019-01-08 ENCOUNTER — HOSPITAL ENCOUNTER (OUTPATIENT)
Age: 69
Setting detail: SPECIMEN
Discharge: HOME | End: 2019-01-08
Payer: MEDICARE

## 2019-01-08 DIAGNOSIS — D47.2 MONOCLONAL GAMMOPATHY: ICD-10-CM

## 2019-01-08 LAB
ALBUMIN SERPL-MCNC: 2.7 GM/DL (ref 3.4–5)
ALP BLD-CCNC: 137 IU/L (ref 40–129)
ALT SERPL-CCNC: 20 U/L (ref 10–40)
ANION GAP SERPL CALCULATED.3IONS-SCNC: 9 MMOL/L (ref 4–16)
AST SERPL-CCNC: 16 IU/L (ref 15–37)
BILIRUB SERPL-MCNC: 0.5 MG/DL (ref 0–1)
BUN BLDV-MCNC: 19 MG/DL (ref 6–23)
CALCIUM SERPL-MCNC: 8.6 MG/DL (ref 8.3–10.6)
CHLORIDE BLD-SCNC: 102 MMOL/L (ref 99–110)
CO2: 28 MMOL/L (ref 21–32)
CREAT SERPL-MCNC: 0.7 MG/DL (ref 0.6–1.1)
ERYTHROCYTE SEDIMENTATION RATE: 88 MM/HR (ref 0–30)
GFR AFRICAN AMERICAN: >60 ML/MIN/1.73M2
GFR NON-AFRICAN AMERICAN: >60 ML/MIN/1.73M2
GLUCOSE BLD-MCNC: 93 MG/DL (ref 70–99)
LACTATE DEHYDROGENASE: 173 IU/L (ref 120–246)
POTASSIUM SERPL-SCNC: 5.4 MMOL/L (ref 3.5–5.1)
SODIUM BLD-SCNC: 139 MMOL/L (ref 135–145)
TOTAL PROTEIN: 9.4 GM/DL (ref 6.4–8.2)

## 2019-01-08 PROCEDURE — 85652 RBC SED RATE AUTOMATED: CPT

## 2019-01-08 PROCEDURE — 77075 RADEX OSSEOUS SURVEY COMPL: CPT

## 2019-01-08 PROCEDURE — 80053 COMPREHEN METABOLIC PANEL: CPT

## 2019-01-08 PROCEDURE — 83615 LACTATE (LD) (LDH) ENZYME: CPT

## 2019-01-16 ENCOUNTER — APPOINTMENT (OUTPATIENT)
Dept: GENERAL RADIOLOGY | Age: 69
End: 2019-01-16
Payer: OTHER MISCELLANEOUS

## 2019-01-16 ENCOUNTER — APPOINTMENT (OUTPATIENT)
Dept: CT IMAGING | Age: 69
End: 2019-01-16
Payer: OTHER MISCELLANEOUS

## 2019-01-16 ENCOUNTER — HOSPITAL ENCOUNTER (EMERGENCY)
Age: 69
Discharge: HOME | End: 2019-01-16
Attending: EMERGENCY MEDICINE
Payer: OTHER MISCELLANEOUS

## 2019-01-16 VITALS
HEIGHT: 64 IN | BODY MASS INDEX: 30.22 KG/M2 | SYSTOLIC BLOOD PRESSURE: 153 MMHG | RESPIRATION RATE: 14 BRPM | OXYGEN SATURATION: 100 % | HEART RATE: 82 BPM | TEMPERATURE: 98.4 F | DIASTOLIC BLOOD PRESSURE: 71 MMHG | WEIGHT: 177 LBS
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
## 2019-01-16 DIAGNOSIS — S16.1XXA ACUTE STRAIN OF NECK MUSCLE, INITIAL ENCOUNTER: ICD-10-CM

## 2019-01-16 DIAGNOSIS — Z85.79 HISTORY OF MULTIPLE MYELOMA: ICD-10-CM

## 2019-01-16 PROCEDURE — 73030 X-RAY EXAM OF SHOULDER: CPT

## 2019-01-16 PROCEDURE — 73060 X-RAY EXAM OF HUMERUS: CPT

## 2019-01-16 PROCEDURE — 99284 EMERGENCY DEPT VISIT MOD MDM: CPT

## 2019-01-16 PROCEDURE — 72125 CT NECK SPINE W/O DYE: CPT

## 2019-01-16 RX ORDER — CYCLOBENZAPRINE HCL 10 MG
10 TABLET ORAL 3 TIMES DAILY PRN
Qty: 30 TABLET | Refills: 0 | Status: SHIPPED | OUTPATIENT
Start: 2019-01-16 | End: 2019-01-26

## 2019-01-16 RX ORDER — NAPROXEN 500 MG/1
500 TABLET ORAL 2 TIMES DAILY
Qty: 20 TABLET | Refills: 0 | Status: SHIPPED | OUTPATIENT
Start: 2019-01-16 | End: 2019-01-16

## 2019-01-16 RX ORDER — CYCLOBENZAPRINE HCL 10 MG
10 TABLET ORAL 3 TIMES DAILY PRN
Qty: 30 TABLET | Refills: 0 | Status: SHIPPED | OUTPATIENT
Start: 2019-01-16 | End: 2019-01-16

## 2019-01-16 RX ORDER — NAPROXEN 500 MG/1
500 TABLET ORAL 2 TIMES DAILY
Qty: 20 TABLET | Refills: 0 | Status: SHIPPED | OUTPATIENT
Start: 2019-01-16 | End: 2019-10-24

## 2019-01-16 ASSESSMENT — PAIN SCALES - GENERAL: PAINLEVEL_OUTOF10: 3

## 2019-01-16 ASSESSMENT — PAIN DESCRIPTION - DESCRIPTORS: DESCRIPTORS: ACHING

## 2019-01-16 ASSESSMENT — PAIN DESCRIPTION - FREQUENCY: FREQUENCY: INTERMITTENT

## 2019-01-16 ASSESSMENT — PAIN DESCRIPTION - ORIENTATION: ORIENTATION: LEFT

## 2019-01-16 ASSESSMENT — PAIN DESCRIPTION - PAIN TYPE: TYPE: ACUTE PAIN

## 2019-01-16 ASSESSMENT — PAIN DESCRIPTION - LOCATION: LOCATION: NECK

## 2019-01-18 ENCOUNTER — HOSPITAL ENCOUNTER (OUTPATIENT)
Age: 69
Setting detail: SPECIMEN
Discharge: HOME | End: 2019-01-18
Payer: MEDICARE

## 2019-01-18 PROCEDURE — 82232 ASSAY OF BETA-2 PROTEIN: CPT

## 2019-01-22 LAB — BETA-2 MICROGLOBULIN: 2.2

## 2019-01-29 NOTE — DISCHARGE SUMMARY
Rehab, []Hospice   Condition on discharge: Stable    Discharge Medications:      Brandi, [address] Medication Instructions BFO:[id number]    Printed on:12/23/18 6891   Medication Information                      Acetaminophen (TYLENOL PO)  Take 1,000 mg by mouth 2 times daily              albuterol sulfate  (90 Base) MCG/ACT inhaler  Inhale 2 puffs into the lungs every 6 hours as needed for Wheezing             alendronate (FOSAMAX) 70 MG tablet  Take 70 mg by mouth once a week 04/26/18 takes on Sundays             amLODIPine (NORVASC) 5 MG tablet  Take 5 mg by mouth every morning              aspirin 81 MG tablet  Take 81 mg by mouth nightly              Cholecalciferol (VITAMIN D) 2000 units CAPS capsule  Take 2,000 Units by mouth every morning              loratadine (CLARITIN) 10 MG tablet  Take 10 mg by mouth daily             losartan (COZAAR) 50 MG tablet  Take 50 mg by mouth every morning              Multiple Vitamins-Minerals (THERAPEUTIC MULTIVITAMIN-MINERALS) tablet  Take 1 tablet by mouth every morning             Omega-3 Fatty Acids (FISH OIL) 1000 MG CAPS  Take 3,000 mg by mouth 2 times daily             oxymetazoline (12 HOUR NASAL SPRAY) 0.05 % nasal spray  1 spray by Nasal route 2 times daily for 15 days                 Subjective _ patient resting in her chair- no nasal bleeding, no bleeding from any source     Objective Findings at Discharge:   /63   Pulse 70   Temp 97.8 °F (36.6 °C) (Oral)   Resp 18   Ht 5' 4\" (1.626 m)   Wt 191 lb 14.4 oz (87 kg)   SpO2 96%   BMI 32.94 kg/m²            PHYSICAL EXAM   GEN Awake female, resting in bed in no apparent distress. Appears given age. HEENT Mucous membranes are moist.  RESP Clear to auscultation, no wheezes, rales or rhonchi. CARDIO/VAS - S1/S2 auscultated. Regular rate without appreciable murmurs, rubs, or gallops. Peripheral pulses equal bilaterally and palpable. No peripheral edema.   GI Abdomen is soft without I have personally evaluated and examined the patient. The Attending was available to me as a supervising provider if needed.

## 2019-02-08 ENCOUNTER — HOSPITAL ENCOUNTER (OUTPATIENT)
Age: 69
Setting detail: SPECIMEN
Discharge: HOME | End: 2019-02-08
Payer: MEDICARE

## 2019-02-08 LAB
ALBUMIN SERPL-MCNC: 3.9 GM/DL (ref 3.4–5)
ALP BLD-CCNC: 224 IU/L (ref 40–129)
ALT SERPL-CCNC: 31 U/L (ref 10–40)
ANION GAP SERPL CALCULATED.3IONS-SCNC: 14 MMOL/L (ref 4–16)
AST SERPL-CCNC: 27 IU/L (ref 15–37)
BILIRUB SERPL-MCNC: 0.9 MG/DL (ref 0–1)
BUN BLDV-MCNC: 15 MG/DL (ref 6–23)
CALCIUM SERPL-MCNC: 8.6 MG/DL (ref 8.3–10.6)
CHLORIDE BLD-SCNC: 102 MMOL/L (ref 99–110)
CO2: 25 MMOL/L (ref 21–32)
CREAT SERPL-MCNC: 0.8 MG/DL (ref 0.6–1.1)
GFR AFRICAN AMERICAN: >60 ML/MIN/1.73M2
GFR NON-AFRICAN AMERICAN: >60 ML/MIN/1.73M2
GLUCOSE BLD-MCNC: 213 MG/DL (ref 70–99)
IGA: 143 MG/DL (ref 69–382)
IGG,SERUM: 1548 MG/DL (ref 723–1685)
IGM,SERUM: 32 MG/DL (ref 62–277)
POTASSIUM SERPL-SCNC: 4 MMOL/L (ref 3.5–5.1)
SODIUM BLD-SCNC: 141 MMOL/L (ref 135–145)
TOTAL PROTEIN: 7.6 GM/DL (ref 6.4–8.2)

## 2019-02-08 PROCEDURE — 80053 COMPREHEN METABOLIC PANEL: CPT

## 2019-02-08 PROCEDURE — 83883 ASSAY NEPHELOMETRY NOT SPEC: CPT

## 2019-02-08 PROCEDURE — 86320 SERUM IMMUNOELECTROPHORESIS: CPT

## 2019-02-08 PROCEDURE — 82232 ASSAY OF BETA-2 PROTEIN: CPT

## 2019-02-08 PROCEDURE — 82784 ASSAY IGA/IGD/IGG/IGM EACH: CPT

## 2019-02-08 PROCEDURE — 84165 PROTEIN E-PHORESIS SERUM: CPT

## 2019-02-10 LAB — BETA-2 MICROGLOBULIN: 3.1

## 2019-02-11 LAB
ALBUMIN ELP: 3.6 GM/DL (ref 3.2–5.6)
ALPHA-1-GLOBULIN: 0.4 GM/DL (ref 0.1–0.4)
ALPHA-2-GLOBULIN: 0.6 GM/DL (ref 0.4–1.2)
BETA GLOBULIN: 2.6 GM/DL (ref 0.5–1.3)
GAMMA GLOBULIN: 0.4 GM/DL (ref 0.5–1.6)
KAPPA QUANT FREE LIGHT CHAINS: 1.53
KAPPA/LAMBDA FREE LIGHT CHAIN RATIO: 1.24
LAMBDA FREE LIGHT CHAINS URINE/ VOL: 1.23
TOTAL PROTEIN: 7.6 GM/DL (ref 6.4–8.2)

## 2019-02-25 ENCOUNTER — HOSPITAL ENCOUNTER (OUTPATIENT)
Dept: WOMENS IMAGING | Age: 69
Discharge: HOME | End: 2019-02-25
Payer: MEDICARE

## 2019-02-25 DIAGNOSIS — Z12.31 SCREENING MAMMOGRAM, ENCOUNTER FOR: ICD-10-CM

## 2019-02-25 PROCEDURE — 77067 SCR MAMMO BI INCL CAD: CPT

## 2019-03-01 ENCOUNTER — HOSPITAL ENCOUNTER (OUTPATIENT)
Age: 69
Setting detail: SPECIMEN
Discharge: HOME | End: 2019-03-01
Payer: MEDICARE

## 2019-03-01 LAB
ALBUMIN SERPL-MCNC: 3.9 GM/DL (ref 3.4–5)
ALP BLD-CCNC: 159 IU/L (ref 40–128)
ALT SERPL-CCNC: 25 U/L (ref 10–40)
ANION GAP SERPL CALCULATED.3IONS-SCNC: 9 MMOL/L (ref 4–16)
AST SERPL-CCNC: 24 IU/L (ref 15–37)
BILIRUB SERPL-MCNC: 0.6 MG/DL (ref 0–1)
BUN BLDV-MCNC: 9 MG/DL (ref 6–23)
CALCIUM SERPL-MCNC: 8.4 MG/DL (ref 8.3–10.6)
CHLORIDE BLD-SCNC: 103 MMOL/L (ref 99–110)
CO2: 28 MMOL/L (ref 21–32)
CREAT SERPL-MCNC: 0.6 MG/DL (ref 0.6–1.1)
GFR AFRICAN AMERICAN: >60 ML/MIN/1.73M2
GFR NON-AFRICAN AMERICAN: >60 ML/MIN/1.73M2
GLUCOSE BLD-MCNC: 125 MG/DL (ref 70–99)
IGA: 56 MG/DL (ref 69–382)
IGG,SERUM: 892 MG/DL (ref 723–1685)
IGM,SERUM: 31 MG/DL (ref 62–277)
POTASSIUM SERPL-SCNC: 4.4 MMOL/L (ref 3.5–5.1)
SODIUM BLD-SCNC: 140 MMOL/L (ref 135–145)
TOTAL PROTEIN: 6.4 GM/DL (ref 6.4–8.2)

## 2019-03-01 PROCEDURE — 83883 ASSAY NEPHELOMETRY NOT SPEC: CPT

## 2019-03-01 PROCEDURE — 82232 ASSAY OF BETA-2 PROTEIN: CPT

## 2019-03-01 PROCEDURE — 84165 PROTEIN E-PHORESIS SERUM: CPT

## 2019-03-01 PROCEDURE — 86320 SERUM IMMUNOELECTROPHORESIS: CPT

## 2019-03-01 PROCEDURE — 80053 COMPREHEN METABOLIC PANEL: CPT

## 2019-03-01 PROCEDURE — 82784 ASSAY IGA/IGD/IGG/IGM EACH: CPT

## 2019-03-03 LAB
BETA-2 MICROGLOBULIN: 2.1
KAPPA QUANT FREE LIGHT CHAINS: 0.86
KAPPA/LAMBDA FREE LIGHT CHAIN RATIO: 1.3
LAMBDA FREE LIGHT CHAINS URINE/ VOL: 0.66

## 2019-03-04 LAB
ALBUMIN ELP: 3.5 GM/DL (ref 3.2–5.6)
ALPHA-1-GLOBULIN: 0.3 GM/DL (ref 0.1–0.4)
ALPHA-2-GLOBULIN: 0.7 GM/DL (ref 0.4–1.2)
BETA GLOBULIN: 1.6 GM/DL (ref 0.5–1.3)
GAMMA GLOBULIN: 0.3 GM/DL (ref 0.5–1.6)
TOTAL PROTEIN: 6.4 GM/DL (ref 6.4–8.2)

## 2019-03-08 ENCOUNTER — HOSPITAL ENCOUNTER (OUTPATIENT)
Age: 69
Setting detail: SPECIMEN
Discharge: HOME | End: 2019-03-08
Payer: MEDICARE

## 2019-03-08 LAB
ALBUMIN SERPL-MCNC: 4.3 GM/DL (ref 3.4–5)
ALP BLD-CCNC: 160 IU/L (ref 40–128)
ALT SERPL-CCNC: 22 U/L (ref 10–40)
ANION GAP SERPL CALCULATED.3IONS-SCNC: 12 MMOL/L (ref 4–16)
AST SERPL-CCNC: 23 IU/L (ref 15–37)
BILIRUB SERPL-MCNC: 0.4 MG/DL (ref 0–1)
BUN BLDV-MCNC: 12 MG/DL (ref 6–23)
CALCIUM SERPL-MCNC: 8.9 MG/DL (ref 8.3–10.6)
CHLORIDE BLD-SCNC: 101 MMOL/L (ref 99–110)
CO2: 26 MMOL/L (ref 21–32)
CREAT SERPL-MCNC: 0.6 MG/DL (ref 0.6–1.1)
GFR AFRICAN AMERICAN: >60 ML/MIN/1.73M2
GFR NON-AFRICAN AMERICAN: >60 ML/MIN/1.73M2
GLUCOSE BLD-MCNC: 178 MG/DL (ref 70–99)
POTASSIUM SERPL-SCNC: 4.5 MMOL/L (ref 3.5–5.1)
SODIUM BLD-SCNC: 139 MMOL/L (ref 135–145)
TOTAL PROTEIN: 6.6 GM/DL (ref 6.4–8.2)

## 2019-03-08 PROCEDURE — 80053 COMPREHEN METABOLIC PANEL: CPT

## 2019-03-22 ENCOUNTER — HOSPITAL ENCOUNTER (OUTPATIENT)
Age: 69
Setting detail: SPECIMEN
Discharge: HOME | End: 2019-03-22
Payer: MEDICARE

## 2019-03-22 LAB
ALBUMIN SERPL-MCNC: 4.1 GM/DL (ref 3.4–5)
ALP BLD-CCNC: 135 IU/L (ref 40–128)
ALT SERPL-CCNC: 22 U/L (ref 10–40)
ANION GAP SERPL CALCULATED.3IONS-SCNC: 13 MMOL/L (ref 4–16)
AST SERPL-CCNC: 24 IU/L (ref 15–37)
BILIRUB SERPL-MCNC: 0.4 MG/DL (ref 0–1)
BUN BLDV-MCNC: 11 MG/DL (ref 6–23)
CALCIUM SERPL-MCNC: 8.8 MG/DL (ref 8.3–10.6)
CHLORIDE BLD-SCNC: 100 MMOL/L (ref 99–110)
CO2: 25 MMOL/L (ref 21–32)
CREAT SERPL-MCNC: 0.5 MG/DL (ref 0.6–1.1)
ERYTHROCYTE SEDIMENTATION RATE: 25 MM/HR (ref 0–30)
GFR AFRICAN AMERICAN: >60 ML/MIN/1.73M2
GFR NON-AFRICAN AMERICAN: >60 ML/MIN/1.73M2
GLUCOSE BLD-MCNC: 221 MG/DL (ref 70–99)
IGA: 80 MG/DL (ref 69–382)
IGG,SERUM: 786 MG/DL (ref 723–1685)
IGM,SERUM: 27 MG/DL (ref 62–277)
LACTATE DEHYDROGENASE: 212 IU/L (ref 120–246)
POTASSIUM SERPL-SCNC: 4.4 MMOL/L (ref 3.5–5.1)
SODIUM BLD-SCNC: 138 MMOL/L (ref 135–145)
TOTAL PROTEIN: 6.4 GM/DL (ref 6.4–8.2)

## 2019-03-22 PROCEDURE — 83615 LACTATE (LD) (LDH) ENZYME: CPT

## 2019-03-22 PROCEDURE — 84165 PROTEIN E-PHORESIS SERUM: CPT

## 2019-03-22 PROCEDURE — 80053 COMPREHEN METABOLIC PANEL: CPT

## 2019-03-22 PROCEDURE — 82232 ASSAY OF BETA-2 PROTEIN: CPT

## 2019-03-22 PROCEDURE — 86320 SERUM IMMUNOELECTROPHORESIS: CPT

## 2019-03-22 PROCEDURE — 82784 ASSAY IGA/IGD/IGG/IGM EACH: CPT

## 2019-03-22 PROCEDURE — 83883 ASSAY NEPHELOMETRY NOT SPEC: CPT

## 2019-03-22 PROCEDURE — 85652 RBC SED RATE AUTOMATED: CPT

## 2019-03-25 LAB
BETA-2 MICROGLOBULIN: 2.2 MG/L (ref 1.1–2.4)
BETA-2 MICROGLOBULIN: NORMAL MG/L (ref 1.1–2.4)

## 2019-03-26 LAB
KAPPA QUANT FREE LIGHT CHAINS: 1.11 MG/DL (ref 0.33–1.94)
KAPPA/LAMBDA FREE LIGHT CHAIN RATIO: 1 (ref 0.26–1.65)
KAPPA/LAMBDA FREE LIGHT CHAIN RATIO: NORMAL (ref 0.26–1.65)
LAMBDA FREE LIGHT CHAINS URINE/ VOL: 1.11 MG/DL (ref 0.57–2.63)

## 2019-03-27 LAB
ALBUMIN ELP: 3.5 GM/DL (ref 3.2–5.6)
ALPHA-1-GLOBULIN: 0.3 GM/DL (ref 0.1–0.4)
ALPHA-2-GLOBULIN: 0.8 GM/DL (ref 0.4–1.2)
BETA GLOBULIN: 1.5 GM/DL (ref 0.5–1.3)
GAMMA GLOBULIN: 0.4 GM/DL (ref 0.5–1.6)
SPEP INTERPRETATION: ABNORMAL
SPEP INTERPRETATION: NORMAL
TOTAL PROTEIN: 6.4 GM/DL (ref 6.4–8.2)

## 2019-04-01 ENCOUNTER — HOSPITAL ENCOUNTER (OUTPATIENT)
Age: 69
Setting detail: SPECIMEN
Discharge: HOME | End: 2019-04-01
Payer: MEDICARE

## 2019-04-01 LAB
ALBUMIN SERPL-MCNC: 3.8 GM/DL (ref 3.4–5)
ALP BLD-CCNC: 116 IU/L (ref 40–128)
ALT SERPL-CCNC: 37 U/L (ref 10–40)
ANION GAP SERPL CALCULATED.3IONS-SCNC: 10 MMOL/L (ref 4–16)
AST SERPL-CCNC: 27 IU/L (ref 15–37)
BILIRUB SERPL-MCNC: 0.5 MG/DL (ref 0–1)
BUN BLDV-MCNC: 10 MG/DL (ref 6–23)
CALCIUM SERPL-MCNC: 8.5 MG/DL (ref 8.3–10.6)
CHLORIDE BLD-SCNC: 99 MMOL/L (ref 99–110)
CO2: 27 MMOL/L (ref 21–32)
CREAT SERPL-MCNC: 0.6 MG/DL (ref 0.6–1.1)
GFR AFRICAN AMERICAN: >60 ML/MIN/1.73M2
GFR NON-AFRICAN AMERICAN: >60 ML/MIN/1.73M2
GLUCOSE BLD-MCNC: 89 MG/DL (ref 70–99)
POTASSIUM SERPL-SCNC: 4.5 MMOL/L (ref 3.5–5.1)
SODIUM BLD-SCNC: 136 MMOL/L (ref 135–145)
TOTAL PROTEIN: 5.5 GM/DL (ref 6.4–8.2)

## 2019-04-01 PROCEDURE — 80053 COMPREHEN METABOLIC PANEL: CPT

## 2019-04-26 ENCOUNTER — HOSPITAL ENCOUNTER (OUTPATIENT)
Age: 69
Setting detail: SPECIMEN
Discharge: HOME | End: 2019-04-26
Payer: MEDICARE

## 2019-04-26 LAB
ALBUMIN SERPL-MCNC: 4.1 GM/DL (ref 3.4–5)
ALP BLD-CCNC: 161 IU/L (ref 40–128)
ALT SERPL-CCNC: 23 U/L (ref 10–40)
ANION GAP SERPL CALCULATED.3IONS-SCNC: 13 MMOL/L (ref 4–16)
AST SERPL-CCNC: 21 IU/L (ref 15–37)
BILIRUB SERPL-MCNC: 0.4 MG/DL (ref 0–1)
BUN BLDV-MCNC: 14 MG/DL (ref 6–23)
CALCIUM SERPL-MCNC: 8.9 MG/DL (ref 8.3–10.6)
CHLORIDE BLD-SCNC: 101 MMOL/L (ref 99–110)
CO2: 24 MMOL/L (ref 21–32)
CREAT SERPL-MCNC: 0.6 MG/DL (ref 0.6–1.1)
GFR AFRICAN AMERICAN: >60 ML/MIN/1.73M2
GFR NON-AFRICAN AMERICAN: >60 ML/MIN/1.73M2
GLUCOSE BLD-MCNC: 223 MG/DL (ref 70–99)
IGA: 88 MG/DL (ref 69–382)
IGG,SERUM: 636 MG/DL (ref 723–1685)
IGM,SERUM: 26 MG/DL (ref 62–277)
POTASSIUM SERPL-SCNC: 4.3 MMOL/L (ref 3.5–5.1)
SODIUM BLD-SCNC: 138 MMOL/L (ref 135–145)
TOTAL PROTEIN: 5.9 GM/DL (ref 6.4–8.2)

## 2019-04-26 PROCEDURE — 82784 ASSAY IGA/IGD/IGG/IGM EACH: CPT

## 2019-04-26 PROCEDURE — 86320 SERUM IMMUNOELECTROPHORESIS: CPT

## 2019-04-26 PROCEDURE — 80053 COMPREHEN METABOLIC PANEL: CPT

## 2019-04-26 PROCEDURE — 83883 ASSAY NEPHELOMETRY NOT SPEC: CPT

## 2019-04-26 PROCEDURE — 84165 PROTEIN E-PHORESIS SERUM: CPT

## 2019-04-29 LAB
ALBUMIN ELP: 3.4 GM/DL (ref 3.2–5.6)
ALPHA-1-GLOBULIN: 0.2 GM/DL (ref 0.1–0.4)
ALPHA-2-GLOBULIN: 0.6 GM/DL (ref 0.4–1.2)
BETA GLOBULIN: 1.2 GM/DL (ref 0.5–1.3)
GAMMA GLOBULIN: 0.3 GM/DL (ref 0.5–1.6)
KAPPA QUANT FREE LIGHT CHAINS: 0.85 MG/DL (ref 0.33–1.94)
KAPPA/LAMBDA FREE LIGHT CHAIN RATIO: 1.13 (ref 0.26–1.65)
KAPPA/LAMBDA FREE LIGHT CHAIN RATIO: NORMAL (ref 0.26–1.65)
LAMBDA FREE LIGHT CHAINS URINE/ VOL: 0.75 MG/DL (ref 0.57–2.63)
SPEP INTERPRETATION: ABNORMAL
SPEP INTERPRETATION: NORMAL
TOTAL PROTEIN: 5.9 GM/DL (ref 6.4–8.2)

## 2019-05-13 ENCOUNTER — TELEPHONE (OUTPATIENT)
Dept: FAMILY MEDICINE CLINIC | Age: 69
End: 2019-05-13

## 2019-05-13 RX ORDER — ALENDRONATE SODIUM 70 MG/1
70 TABLET ORAL WEEKLY
Qty: 12 TABLET | Refills: 1 | Status: SHIPPED | OUTPATIENT
Start: 2019-05-13 | End: 2019-05-13 | Stop reason: SDUPTHER

## 2019-05-13 RX ORDER — ALENDRONATE SODIUM 70 MG/1
70 TABLET ORAL WEEKLY
Qty: 12 TABLET | Refills: 1 | Status: SHIPPED | OUTPATIENT
Start: 2019-05-13 | End: 2019-10-24 | Stop reason: SDUPTHER

## 2019-05-13 NOTE — TELEPHONE ENCOUNTER
----- Message from Dom Haque MA sent at 5/13/2019  2:32 PM EDT -----  Contact: [facility] PER PT REQ.    ----- Message -----  From: Miguelangel Cardenas  Sent: 5/13/2019   1:39 PM  To: Dom Haque MA    CALLED THIS AM FOR ALENDRONATE. DOES NOT HAVE COVERAGE THROUGH [employer] ANYMORE. ALENDRONATE WAS CALLED TO [employer] T SHOULD HAVE BEEN SENT TO [facility]. PLEASE SEND IN CORRECTLY.  JOSE MIGUEL[facility]S #[phone]

## 2019-05-13 NOTE — TELEPHONE ENCOUNTER
PER PT RQST, SENT [facility] ([facility])  Electronically signed by Phil Velasco MA on 5/13/2019 at 9:50 AM

## 2019-05-13 NOTE — TELEPHONE ENCOUNTER
PER NOTE BELOW, CALLED  PHARMACY AND CANCELLED RX FOR ALENDRONATE. SHE VOICED UNDERSTANDING. RESENT TO [facility] PER RQST. LM FOR PT ADVISING THIS.  Electronically signed by Haroldo Cates MA on 5/13/2019 at 2:47 PM

## 2019-08-14 RX ORDER — ZOLEDRONIC ACID 4 MG/5ML
4 INJECTION INTRAVENOUS ONCE
COMMUNITY
End: 2021-10-21

## 2019-08-14 RX ORDER — ALLOPURINOL 300 MG/1
300 TABLET ORAL DAILY
COMMUNITY
End: 2019-10-24

## 2019-08-14 RX ORDER — DEXAMETHASONE 4 MG/1
12 TABLET ORAL
COMMUNITY
End: 2019-10-24

## 2019-08-14 RX ORDER — ONDANSETRON 4 MG/1
1 TABLET, FILM COATED ORAL 4 TIMES DAILY
COMMUNITY
End: 2019-10-24

## 2019-08-14 RX ORDER — FUROSEMIDE 20 MG/1
60 TABLET ORAL PRN
COMMUNITY
End: 2019-10-24

## 2019-08-14 RX ORDER — ACYCLOVIR 400 MG/1
400 TABLET ORAL 2 TIMES DAILY
COMMUNITY
End: 2020-09-15

## 2019-08-14 RX ORDER — LENALIDOMIDE 10 MG/1
15 CAPSULE ORAL DAILY
COMMUNITY
End: 2020-08-10 | Stop reason: SDUPTHER

## 2019-08-14 RX ORDER — TRAMADOL HYDROCHLORIDE 50 MG/1
50 TABLET ORAL PRN
COMMUNITY
End: 2019-10-24

## 2019-08-19 ENCOUNTER — HOSPITAL ENCOUNTER (OUTPATIENT)
Age: 69
Setting detail: SPECIMEN
Discharge: HOME | End: 2019-08-19
Payer: MEDICARE

## 2019-08-19 LAB
ALBUMIN SERPL-MCNC: 4.3 GM/DL (ref 3.4–5)
ALP BLD-CCNC: 133 IU/L (ref 40–128)
ALT SERPL-CCNC: 22 U/L (ref 10–40)
ANION GAP SERPL CALCULATED.3IONS-SCNC: 13 MMOL/L (ref 4–16)
AST SERPL-CCNC: 23 IU/L (ref 15–37)
BILIRUB SERPL-MCNC: 0.4 MG/DL (ref 0–1)
BUN BLDV-MCNC: 13 MG/DL (ref 6–23)
CALCIUM SERPL-MCNC: 9.7 MG/DL (ref 8.3–10.6)
CHLORIDE BLD-SCNC: 105 MMOL/L (ref 99–110)
CO2: 27 MMOL/L (ref 21–32)
CREAT SERPL-MCNC: 0.7 MG/DL (ref 0.6–1.1)
ERYTHROCYTE SEDIMENTATION RATE: 20 MM/HR (ref 0–30)
GFR AFRICAN AMERICAN: >60 ML/MIN/1.73M2
GFR NON-AFRICAN AMERICAN: >60 ML/MIN/1.73M2
GLUCOSE BLD-MCNC: 116 MG/DL (ref 70–99)
IGA: 52 MG/DL (ref 69–382)
IGG,SERUM: 703 MG/DL (ref 723–1685)
IGM,SERUM: 31 MG/DL (ref 62–277)
LACTATE DEHYDROGENASE: 171 IU/L (ref 120–246)
POTASSIUM SERPL-SCNC: 4.5 MMOL/L (ref 3.5–5.1)
SODIUM BLD-SCNC: 145 MMOL/L (ref 135–145)
TOTAL PROTEIN: 6.2 GM/DL (ref 6.4–8.2)

## 2019-08-19 PROCEDURE — 82784 ASSAY IGA/IGD/IGG/IGM EACH: CPT

## 2019-08-19 PROCEDURE — 83615 LACTATE (LD) (LDH) ENZYME: CPT

## 2019-08-19 PROCEDURE — 86320 SERUM IMMUNOELECTROPHORESIS: CPT

## 2019-08-19 PROCEDURE — 80053 COMPREHEN METABOLIC PANEL: CPT

## 2019-08-19 PROCEDURE — 83883 ASSAY NEPHELOMETRY NOT SPEC: CPT

## 2019-08-19 PROCEDURE — 84165 PROTEIN E-PHORESIS SERUM: CPT

## 2019-08-19 PROCEDURE — 82232 ASSAY OF BETA-2 PROTEIN: CPT

## 2019-08-19 PROCEDURE — 85652 RBC SED RATE AUTOMATED: CPT

## 2019-08-21 LAB
BETA-2 MICROGLOBULIN: 2.4 MG/L (ref 1.1–2.4)
BETA-2 MICROGLOBULIN: NORMAL MG/L (ref 1.1–2.4)

## 2019-08-22 LAB
ALBUMIN ELP: 3.8 GM/DL (ref 3.2–5.6)
ALPHA-1-GLOBULIN: 0.2 GM/DL (ref 0.1–0.4)
ALPHA-2-GLOBULIN: 0.6 GM/DL (ref 0.4–1.2)
BETA GLOBULIN: 0.9 GM/DL (ref 0.5–1.3)
GAMMA GLOBULIN: 0.6 GM/DL (ref 0.5–1.6)
KAPPA QUANT FREE LIGHT CHAINS: 0.85 MG/DL (ref 0.33–1.94)
KAPPA/LAMBDA FREE LIGHT CHAIN RATIO: 1.12 (ref 0.26–1.65)
KAPPA/LAMBDA FREE LIGHT CHAIN RATIO: NORMAL (ref 0.26–1.65)
LAMBDA FREE LIGHT CHAINS URINE/ VOL: 0.76 MG/DL (ref 0.57–2.63)
SPEP INTERPRETATION: ABNORMAL
SPEP INTERPRETATION: NORMAL
TOTAL PROTEIN: 6.2 GM/DL (ref 6.4–8.2)

## 2019-09-17 ENCOUNTER — HOSPITAL ENCOUNTER (OUTPATIENT)
Age: 69
Setting detail: SPECIMEN
Discharge: HOME | End: 2019-09-17
Payer: MEDICARE

## 2019-09-17 LAB
ALBUMIN SERPL-MCNC: 3.5 GM/DL (ref 3.4–5)
ALP BLD-CCNC: 143 IU/L (ref 40–129)
ALT SERPL-CCNC: 33 U/L (ref 10–40)
ANION GAP SERPL CALCULATED.3IONS-SCNC: 10 MMOL/L (ref 4–16)
AST SERPL-CCNC: 28 IU/L (ref 15–37)
BILIRUB SERPL-MCNC: 0.8 MG/DL (ref 0–1)
BUN BLDV-MCNC: 7 MG/DL (ref 6–23)
CALCIUM SERPL-MCNC: 8.5 MG/DL (ref 8.3–10.6)
CHLORIDE BLD-SCNC: 96 MMOL/L (ref 99–110)
CO2: 26 MMOL/L (ref 21–32)
CREAT SERPL-MCNC: 0.7 MG/DL (ref 0.6–1.1)
FERRITIN: 439 NG/ML (ref 15–150)
FOLATE: >20 NG/ML (ref 3.1–17.5)
GFR AFRICAN AMERICAN: >60 ML/MIN/1.73M2
GFR NON-AFRICAN AMERICAN: >60 ML/MIN/1.73M2
GLUCOSE BLD-MCNC: 124 MG/DL (ref 70–99)
IRON: 35 UG/DL (ref 37–145)
LACTATE DEHYDROGENASE: 210 IU/L (ref 120–246)
POTASSIUM SERPL-SCNC: 4 MMOL/L (ref 3.5–5.1)
SODIUM BLD-SCNC: 132 MMOL/L (ref 135–145)
TOTAL PROTEIN: 5.9 GM/DL (ref 6.4–8.2)

## 2019-09-17 PROCEDURE — 80053 COMPREHEN METABOLIC PANEL: CPT

## 2019-09-17 PROCEDURE — 83540 ASSAY OF IRON: CPT

## 2019-09-17 PROCEDURE — 82728 ASSAY OF FERRITIN: CPT

## 2019-09-17 PROCEDURE — 82746 ASSAY OF FOLIC ACID SERUM: CPT

## 2019-09-17 PROCEDURE — 83615 LACTATE (LD) (LDH) ENZYME: CPT

## 2019-10-22 ENCOUNTER — HOSPITAL ENCOUNTER (OUTPATIENT)
Age: 69
Setting detail: SPECIMEN
Discharge: HOME | End: 2019-10-22
Payer: MEDICARE

## 2019-10-22 LAB
ALBUMIN ELP: NORMAL GM/DL (ref 3.2–5.6)
ALBUMIN SERPL-MCNC: 4 GM/DL (ref 3.4–5)
ALP BLD-CCNC: 126 IU/L (ref 40–128)
ALPHA-1-GLOBULIN: NORMAL GM/DL (ref 0.1–0.4)
ALPHA-2-GLOBULIN: NORMAL GM/DL (ref 0.4–1.2)
ALT SERPL-CCNC: 21 U/L (ref 10–40)
ANION GAP SERPL CALCULATED.3IONS-SCNC: 10 MMOL/L (ref 4–16)
AST SERPL-CCNC: 24 IU/L (ref 15–37)
BETA GLOBULIN: NORMAL GM/DL (ref 0.5–1.3)
BILIRUB SERPL-MCNC: 0.5 MG/DL (ref 0–1)
BUN BLDV-MCNC: 11 MG/DL (ref 6–23)
CALCIUM SERPL-MCNC: 8.7 MG/DL (ref 8.3–10.6)
CHLORIDE BLD-SCNC: 104 MMOL/L (ref 99–110)
CO2: 26 MMOL/L (ref 21–32)
CREAT SERPL-MCNC: 0.7 MG/DL (ref 0.6–1.1)
ERYTHROCYTE SEDIMENTATION RATE: 34 MM/HR (ref 0–30)
GAMMA GLOBULIN: NORMAL GM/DL (ref 0.5–1.6)
GFR AFRICAN AMERICAN: >60 ML/MIN/1.73M2
GFR NON-AFRICAN AMERICAN: >60 ML/MIN/1.73M2
GLUCOSE BLD-MCNC: 107 MG/DL (ref 70–99)
IGA: 224 MG/DL (ref 69–382)
IGG,SERUM: 1154 MG/DL (ref 723–1685)
IGM,SERUM: 65 MG/DL (ref 62–277)
LACTATE DEHYDROGENASE: 177 IU/L (ref 120–246)
POTASSIUM SERPL-SCNC: 4.2 MMOL/L (ref 3.5–5.1)
SODIUM BLD-SCNC: 140 MMOL/L (ref 135–145)
SPEP INTERPRETATION: NORMAL
TOTAL PROTEIN: 6.7 GM/DL (ref 6.4–8.2)
TOTAL PROTEIN: 6.7 GM/DL (ref 6.4–8.2)

## 2019-10-22 PROCEDURE — 85652 RBC SED RATE AUTOMATED: CPT

## 2019-10-22 PROCEDURE — 86334 IMMUNOFIX E-PHORESIS SERUM: CPT

## 2019-10-22 PROCEDURE — 84165 PROTEIN E-PHORESIS SERUM: CPT

## 2019-10-22 PROCEDURE — 82784 ASSAY IGA/IGD/IGG/IGM EACH: CPT

## 2019-10-22 PROCEDURE — 80053 COMPREHEN METABOLIC PANEL: CPT

## 2019-10-22 PROCEDURE — 83615 LACTATE (LD) (LDH) ENZYME: CPT

## 2019-10-22 PROCEDURE — 83883 ASSAY NEPHELOMETRY NOT SPEC: CPT

## 2019-10-22 PROCEDURE — 82232 ASSAY OF BETA-2 PROTEIN: CPT

## 2019-10-24 ENCOUNTER — OFFICE VISIT (OUTPATIENT)
Dept: FAMILY MEDICINE CLINIC | Age: 69
End: 2019-10-24
Payer: MEDICARE

## 2019-10-24 VITALS
BODY MASS INDEX: 32.32 KG/M2 | HEART RATE: 80 BPM | DIASTOLIC BLOOD PRESSURE: 72 MMHG | WEIGHT: 182.4 LBS | HEIGHT: 63 IN | SYSTOLIC BLOOD PRESSURE: 120 MMHG

## 2019-10-24 DIAGNOSIS — I10 ESSENTIAL HYPERTENSION: Chronic | ICD-10-CM

## 2019-10-24 DIAGNOSIS — L40.9 PSORIASIS: ICD-10-CM

## 2019-10-24 DIAGNOSIS — Z12.39 SCREENING FOR BREAST CANCER: ICD-10-CM

## 2019-10-24 DIAGNOSIS — C90.01 MULTIPLE MYELOMA IN REMISSION (HCC): Chronic | ICD-10-CM

## 2019-10-24 DIAGNOSIS — E78.2 MIXED HYPERLIPIDEMIA: ICD-10-CM

## 2019-10-24 DIAGNOSIS — Z23 NEED FOR PROPHYLACTIC VACCINATION AND INOCULATION AGAINST VARICELLA: Primary | ICD-10-CM

## 2019-10-24 DIAGNOSIS — M81.0 AGE-RELATED OSTEOPOROSIS WITHOUT CURRENT PATHOLOGICAL FRACTURE: ICD-10-CM

## 2019-10-24 LAB
BETA-2 MICROGLOBULIN: 2.4 MG/L (ref 1.1–2.4)
BETA-2 MICROGLOBULIN: NORMAL MG/L (ref 1.1–2.4)

## 2019-10-24 PROCEDURE — G8484 FLU IMMUNIZE NO ADMIN: HCPCS | Performed by: FAMILY MEDICINE

## 2019-10-24 PROCEDURE — G8417 CALC BMI ABV UP PARAM F/U: HCPCS | Performed by: FAMILY MEDICINE

## 2019-10-24 PROCEDURE — 99214 OFFICE O/P EST MOD 30 MIN: CPT | Performed by: FAMILY MEDICINE

## 2019-10-24 PROCEDURE — 1090F PRES/ABSN URINE INCON ASSESS: CPT | Performed by: FAMILY MEDICINE

## 2019-10-24 PROCEDURE — 1123F ACP DISCUSS/DSCN MKR DOCD: CPT | Performed by: FAMILY MEDICINE

## 2019-10-24 PROCEDURE — 1036F TOBACCO NON-USER: CPT | Performed by: FAMILY MEDICINE

## 2019-10-24 PROCEDURE — G9899 SCRN MAM PERF RSLTS DOC: HCPCS | Performed by: FAMILY MEDICINE

## 2019-10-24 PROCEDURE — G8427 DOCREV CUR MEDS BY ELIG CLIN: HCPCS | Performed by: FAMILY MEDICINE

## 2019-10-24 PROCEDURE — 3017F COLORECTAL CA SCREEN DOC REV: CPT | Performed by: FAMILY MEDICINE

## 2019-10-24 PROCEDURE — 4040F PNEUMOC VAC/ADMIN/RCVD: CPT | Performed by: FAMILY MEDICINE

## 2019-10-24 PROCEDURE — G8399 PT W/DXA RESULTS DOCUMENT: HCPCS | Performed by: FAMILY MEDICINE

## 2019-10-24 RX ORDER — AMLODIPINE BESYLATE 5 MG/1
5 TABLET ORAL EVERY MORNING
Qty: 90 TABLET | Refills: 1 | Status: SHIPPED | OUTPATIENT
Start: 2019-10-24 | End: 2019-10-25 | Stop reason: SDUPTHER

## 2019-10-24 RX ORDER — LOSARTAN POTASSIUM 50 MG/1
50 TABLET ORAL EVERY MORNING
Qty: 90 TABLET | Refills: 1 | Status: SHIPPED | OUTPATIENT
Start: 2019-10-24 | End: 2019-10-25 | Stop reason: SDUPTHER

## 2019-10-24 RX ORDER — ALENDRONATE SODIUM 70 MG/1
70 TABLET ORAL WEEKLY
Qty: 12 TABLET | Refills: 1 | Status: SHIPPED | OUTPATIENT
Start: 2019-10-24 | End: 2019-10-25 | Stop reason: SDUPTHER

## 2019-10-24 RX ORDER — AMLODIPINE BESYLATE 5 MG/1
5 TABLET ORAL EVERY MORNING
Qty: 90 TABLET | Refills: 1 | Status: SHIPPED | OUTPATIENT
Start: 2019-10-24 | End: 2019-10-24

## 2019-10-24 RX ORDER — ALENDRONATE SODIUM 70 MG/1
70 TABLET ORAL WEEKLY
Qty: 12 TABLET | Refills: 1 | Status: SHIPPED | OUTPATIENT
Start: 2019-10-24 | End: 2019-10-24

## 2019-10-24 RX ORDER — LOSARTAN POTASSIUM 50 MG/1
50 TABLET ORAL EVERY MORNING
Qty: 90 TABLET | Refills: 1 | Status: SHIPPED | OUTPATIENT
Start: 2019-10-24 | End: 2019-10-24

## 2019-10-24 ASSESSMENT — PATIENT HEALTH QUESTIONNAIRE - PHQ9
SUM OF ALL RESPONSES TO PHQ QUESTIONS 1-9: 0
1. LITTLE INTEREST OR PLEASURE IN DOING THINGS: 0
SUM OF ALL RESPONSES TO PHQ QUESTIONS 1-9: 0
2. FEELING DOWN, DEPRESSED OR HOPELESS: 0
SUM OF ALL RESPONSES TO PHQ9 QUESTIONS 1 & 2: 0

## 2019-10-24 ASSESSMENT — ENCOUNTER SYMPTOMS
CHEST TIGHTNESS: 0
WHEEZING: 0
SHORTNESS OF BREATH: 0
ABDOMINAL PAIN: 0
EYES NEGATIVE: 1
COUGH: 0
RESPIRATORY NEGATIVE: 1

## 2019-10-25 LAB
KAPPA QUANT FREE LIGHT CHAINS: 2.46 MG/DL (ref 0.33–1.94)
KAPPA/LAMBDA FREE LIGHT CHAIN RATIO: 1.01 (ref 0.26–1.65)
KAPPA/LAMBDA FREE LIGHT CHAIN RATIO: ABNORMAL (ref 0.26–1.65)
LAMBDA FREE LIGHT CHAINS URINE/ VOL: 2.43 MG/DL (ref 0.57–2.63)
Lab: NORMAL
TEST NAME: NORMAL
TEST NAME: NORMAL

## 2019-10-25 RX ORDER — ALENDRONATE SODIUM 70 MG/1
70 TABLET ORAL WEEKLY
Qty: 12 TABLET | Refills: 1 | Status: SHIPPED | OUTPATIENT
Start: 2019-10-25 | End: 2020-04-23 | Stop reason: SDUPTHER

## 2019-10-25 RX ORDER — AMLODIPINE BESYLATE 5 MG/1
5 TABLET ORAL EVERY MORNING
Qty: 90 TABLET | Refills: 1 | Status: SHIPPED | OUTPATIENT
Start: 2019-10-25 | End: 2020-04-23 | Stop reason: SDUPTHER

## 2019-10-25 RX ORDER — LOSARTAN POTASSIUM 50 MG/1
50 TABLET ORAL EVERY MORNING
Qty: 90 TABLET | Refills: 1 | Status: SHIPPED | OUTPATIENT
Start: 2019-10-25 | End: 2020-04-23 | Stop reason: SDUPTHER

## 2020-01-07 ENCOUNTER — HOSPITAL ENCOUNTER (OUTPATIENT)
Dept: INFUSION THERAPY | Age: 70
Discharge: HOME | End: 2020-01-07
Payer: MEDICARE

## 2020-01-07 LAB
ALBUMIN SERPL-MCNC: 4.3 GM/DL (ref 3.4–5)
ALP BLD-CCNC: 105 IU/L (ref 40–128)
ALT SERPL-CCNC: 30 U/L (ref 10–40)
ANION GAP SERPL CALCULATED.3IONS-SCNC: 11 MMOL/L (ref 4–16)
AST SERPL-CCNC: 25 IU/L (ref 15–37)
BASOPHILS ABSOLUTE: 0 K/CU MM
BASOPHILS RELATIVE PERCENT: 1.5 % (ref 0–1)
BILIRUB SERPL-MCNC: 1.1 MG/DL (ref 0–1)
BUN BLDV-MCNC: 11 MG/DL (ref 6–23)
CALCIUM SERPL-MCNC: 8.5 MG/DL (ref 8.3–10.6)
CHLORIDE BLD-SCNC: 103 MMOL/L (ref 99–110)
CO2: 27 MMOL/L (ref 21–32)
CREAT SERPL-MCNC: 0.7 MG/DL (ref 0.6–1.1)
DIFFERENTIAL TYPE: ABNORMAL
EOSINOPHILS ABSOLUTE: 0.2 K/CU MM
EOSINOPHILS RELATIVE PERCENT: 7 % (ref 0–3)
ERYTHROCYTE SEDIMENTATION RATE: 19 MM/HR (ref 0–30)
GFR AFRICAN AMERICAN: >60 ML/MIN/1.73M2
GFR NON-AFRICAN AMERICAN: >60 ML/MIN/1.73M2
GLUCOSE BLD-MCNC: 111 MG/DL (ref 70–99)
HCT VFR BLD CALC: 36.7 % (ref 37–47)
HEMOGLOBIN: 12.4 GM/DL (ref 12.5–16)
IGA: 199 MG/DL (ref 69–382)
IGG,SERUM: 1030 MG/DL (ref 723–1685)
IGM,SERUM: <25 MG/DL (ref 62–277)
LACTATE DEHYDROGENASE: 153 IU/L (ref 120–246)
LYMPHOCYTES ABSOLUTE: 0.6 K/CU MM
LYMPHOCYTES RELATIVE PERCENT: 23.1 % (ref 24–44)
MCH RBC QN AUTO: 30.6 PG (ref 27–31)
MCHC RBC AUTO-ENTMCNC: 33.8 % (ref 32–36)
MCV RBC AUTO: 90.6 FL (ref 78–100)
MONOCYTES ABSOLUTE: 0.4 K/CU MM
MONOCYTES RELATIVE PERCENT: 15.4 % (ref 0–4)
PDW BLD-RTO: 16.1 % (ref 11.7–14.9)
PLATELET # BLD: 163 K/CU MM (ref 140–440)
PMV BLD AUTO: 9 FL (ref 7.5–11.1)
POTASSIUM SERPL-SCNC: 4.4 MMOL/L (ref 3.5–5.1)
RBC # BLD: 4.05 M/CU MM (ref 4.2–5.4)
SEGMENTED NEUTROPHILS ABSOLUTE COUNT: 1.5 K/CU MM
SEGMENTED NEUTROPHILS RELATIVE PERCENT: 53 % (ref 36–66)
SODIUM BLD-SCNC: 141 MMOL/L (ref 135–145)
TOTAL PROTEIN: 6.5 GM/DL (ref 6.4–8.2)
WBC # BLD: 2.7 K/CU MM (ref 4–10.5)

## 2020-01-07 PROCEDURE — 82232 ASSAY OF BETA-2 PROTEIN: CPT

## 2020-01-07 PROCEDURE — 84165 PROTEIN E-PHORESIS SERUM: CPT

## 2020-01-07 PROCEDURE — 83615 LACTATE (LD) (LDH) ENZYME: CPT

## 2020-01-07 PROCEDURE — 86320 SERUM IMMUNOELECTROPHORESIS: CPT

## 2020-01-07 PROCEDURE — 85652 RBC SED RATE AUTOMATED: CPT

## 2020-01-07 PROCEDURE — 85025 COMPLETE CBC W/AUTO DIFF WBC: CPT

## 2020-01-07 PROCEDURE — 36415 COLL VENOUS BLD VENIPUNCTURE: CPT

## 2020-01-07 PROCEDURE — 80053 COMPREHEN METABOLIC PANEL: CPT

## 2020-01-07 PROCEDURE — 83883 ASSAY NEPHELOMETRY NOT SPEC: CPT

## 2020-01-07 PROCEDURE — 82784 ASSAY IGA/IGD/IGG/IGM EACH: CPT

## 2020-01-09 LAB
ALBUMIN ELP: 3.7 GM/DL (ref 3.2–5.6)
ALPHA-1-GLOBULIN: 0.2 GM/DL (ref 0.1–0.4)
ALPHA-2-GLOBULIN: 0.7 GM/DL (ref 0.4–1.2)
BETA GLOBULIN: 1 GM/DL (ref 0.5–1.3)
BETA-2 MICROGLOBULIN: 2.2 MG/L (ref 1.1–2.4)
BETA-2 MICROGLOBULIN: NORMAL MG/L (ref 1.1–2.4)
GAMMA GLOBULIN: 0.9 GM/DL (ref 0.5–1.6)
KAPPA QUANT FREE LIGHT CHAINS: 1.76 MG/DL (ref 0.33–1.94)
KAPPA/LAMBDA FREE LIGHT CHAIN RATIO: 1.11 (ref 0.26–1.65)
KAPPA/LAMBDA FREE LIGHT CHAIN RATIO: NORMAL (ref 0.26–1.65)
LAMBDA FREE LIGHT CHAINS URINE/ VOL: 1.59 MG/DL (ref 0.57–2.63)
SPEP INTERPRETATION: NORMAL
SPEP INTERPRETATION: NORMAL
TOTAL PROTEIN: 6.5 GM/DL (ref 6.4–8.2)

## 2020-03-06 ENCOUNTER — HOSPITAL ENCOUNTER (OUTPATIENT)
Dept: INFUSION THERAPY | Age: 70
Discharge: HOME | End: 2020-03-06
Payer: MEDICARE

## 2020-03-06 LAB
ALBUMIN SERPL-MCNC: 4.2 GM/DL (ref 3.4–5)
ALP BLD-CCNC: 115 IU/L (ref 40–129)
ALT SERPL-CCNC: 30 U/L (ref 10–40)
ANION GAP SERPL CALCULATED.3IONS-SCNC: 13 MMOL/L (ref 4–16)
AST SERPL-CCNC: 26 IU/L (ref 15–37)
BASOPHILS ABSOLUTE: 0 K/CU MM
BASOPHILS RELATIVE PERCENT: 1.3 % (ref 0–1)
BILIRUB SERPL-MCNC: 1 MG/DL (ref 0–1)
BUN BLDV-MCNC: 12 MG/DL (ref 6–23)
CALCIUM SERPL-MCNC: 8.5 MG/DL (ref 8.3–10.6)
CHLORIDE BLD-SCNC: 102 MMOL/L (ref 99–110)
CO2: 26 MMOL/L (ref 21–32)
CREAT SERPL-MCNC: 0.7 MG/DL (ref 0.6–1.1)
DIFFERENTIAL TYPE: ABNORMAL
EOSINOPHILS ABSOLUTE: 0.2 K/CU MM
EOSINOPHILS RELATIVE PERCENT: 6.3 % (ref 0–3)
ERYTHROCYTE SEDIMENTATION RATE: 18 MM/HR (ref 0–30)
GFR AFRICAN AMERICAN: >60 ML/MIN/1.73M2
GFR AFRICAN AMERICAN: >60 ML/MIN/1.73M2
GFR NON-AFRICAN AMERICAN: >60 ML/MIN/1.73M2
GFR NON-AFRICAN AMERICAN: >60 ML/MIN/1.73M2
GLUCOSE BLD-MCNC: 140 MG/DL (ref 70–99)
GLUCOSE BLD-MCNC: 158 MG/DL (ref 70–99)
HCT VFR BLD CALC: 35.7 % (ref 37–47)
HEMOGLOBIN: 12.2 GM/DL (ref 12.5–16)
IGA: 238 MG/DL (ref 69–382)
IGG,SERUM: 926 MG/DL (ref 723–1685)
IGM,SERUM: 25 MG/DL (ref 62–277)
LACTATE DEHYDROGENASE: 149 IU/L (ref 120–246)
LYMPHOCYTES ABSOLUTE: 0.5 K/CU MM
LYMPHOCYTES RELATIVE PERCENT: 17 % (ref 24–44)
MCH RBC QN AUTO: 32 PG (ref 27–31)
MCHC RBC AUTO-ENTMCNC: 34.2 % (ref 32–36)
MCV RBC AUTO: 93.7 FL (ref 78–100)
MONOCYTES ABSOLUTE: 0.3 K/CU MM
MONOCYTES RELATIVE PERCENT: 11 % (ref 0–4)
PDW BLD-RTO: 15.9 % (ref 11.7–14.9)
PLATELET # BLD: 186 K/CU MM (ref 140–440)
PMV BLD AUTO: 9.2 FL (ref 7.5–11.1)
POC CALCIUM: 1.08 MMOL/L (ref 1.12–1.32)
POC CHLORIDE: 107 MMOL/L (ref 98–109)
POC CREATININE: 0.8 MG/DL (ref 0.6–1.1)
POTASSIUM SERPL-SCNC: 3.8 MMOL/L (ref 3.6–5.1)
POTASSIUM SERPL-SCNC: 4.1 MMOL/L (ref 3.5–5.1)
RBC # BLD: 3.81 M/CU MM (ref 4.2–5.4)
SEGMENTED NEUTROPHILS ABSOLUTE COUNT: 1.9 K/CU MM
SEGMENTED NEUTROPHILS RELATIVE PERCENT: 64.4 % (ref 36–66)
SODIUM BLD-SCNC: 141 MMOL/L (ref 135–145)
SODIUM BLD-SCNC: 142 MMOL/L (ref 136–145)
TOTAL PROTEIN: 6.6 GM/DL (ref 6.4–8.2)
WBC # BLD: 3 K/CU MM (ref 4–10.5)

## 2020-03-06 PROCEDURE — 84165 PROTEIN E-PHORESIS SERUM: CPT

## 2020-03-06 PROCEDURE — 82784 ASSAY IGA/IGD/IGG/IGM EACH: CPT

## 2020-03-06 PROCEDURE — 85025 COMPLETE CBC W/AUTO DIFF WBC: CPT

## 2020-03-06 PROCEDURE — 86320 SERUM IMMUNOELECTROPHORESIS: CPT

## 2020-03-06 PROCEDURE — 2580000003 HC RX 258: Performed by: INTERNAL MEDICINE

## 2020-03-06 PROCEDURE — 83615 LACTATE (LD) (LDH) ENZYME: CPT

## 2020-03-06 PROCEDURE — 80053 COMPREHEN METABOLIC PANEL: CPT

## 2020-03-06 PROCEDURE — 83883 ASSAY NEPHELOMETRY NOT SPEC: CPT

## 2020-03-06 PROCEDURE — 82232 ASSAY OF BETA-2 PROTEIN: CPT

## 2020-03-06 PROCEDURE — 36415 COLL VENOUS BLD VENIPUNCTURE: CPT

## 2020-03-06 PROCEDURE — 96365 THER/PROPH/DIAG IV INF INIT: CPT

## 2020-03-06 PROCEDURE — 6360000002 HC RX W HCPCS: Performed by: INTERNAL MEDICINE

## 2020-03-06 PROCEDURE — 85652 RBC SED RATE AUTOMATED: CPT

## 2020-03-08 LAB
BETA-2 MICROGLOBULIN: 2.2 MG/L (ref 1.1–2.4)
BETA-2 MICROGLOBULIN: NORMAL MG/L (ref 1.1–2.4)
KAPPA QUANT FREE LIGHT CHAINS: 23.63 MG/L (ref 3.3–19.4)
KAPPA/LAMBDA FREE LIGHT CHAIN RATIO: 1.24 (ref 0.26–1.65)
KAPPA/LAMBDA FREE LIGHT CHAIN RATIO: ABNORMAL (ref 0.26–1.65)
LAMBDA FREE LIGHT CHAINS URINE/ VOL: 19.08 MG/L (ref 5.71–26.3)

## 2020-03-09 ENCOUNTER — HOSPITAL ENCOUNTER (OUTPATIENT)
Dept: WOMENS IMAGING | Age: 70
Discharge: HOME | End: 2020-03-09
Payer: MEDICARE

## 2020-03-09 LAB
ALBUMIN ELP: 3.6 GM/DL (ref 3.2–5.6)
ALPHA-1-GLOBULIN: 0.3 GM/DL (ref 0.1–0.4)
ALPHA-2-GLOBULIN: 0.7 GM/DL (ref 0.4–1.2)
BETA GLOBULIN: 1.2 GM/DL (ref 0.5–1.3)
GAMMA GLOBULIN: 0.9 GM/DL (ref 0.5–1.6)
SPEP INTERPRETATION: NORMAL
SPEP INTERPRETATION: NORMAL
TOTAL PROTEIN: 6.6 GM/DL (ref 6.4–8.2)

## 2020-03-09 PROCEDURE — 77067 SCR MAMMO BI INCL CAD: CPT

## 2020-03-09 PROCEDURE — 77080 DXA BONE DENSITY AXIAL: CPT

## 2020-04-08 PROBLEM — C90.00 MULTIPLE MYELOMA NOT HAVING ACHIEVED REMISSION (HCC): Chronic | Status: ACTIVE | Noted: 2019-10-24

## 2020-04-08 PROBLEM — D47.2 MONOCLONAL GAMMOPATHY: Status: ACTIVE | Noted: 2020-04-08

## 2020-04-08 PROBLEM — G62.0 DRUG RELATED POLYNEUROPATHY (HCC): Status: ACTIVE | Noted: 2020-04-08

## 2020-04-23 ENCOUNTER — TELEMEDICINE (OUTPATIENT)
Dept: FAMILY MEDICINE CLINIC | Age: 70
End: 2020-04-23
Payer: MEDICARE

## 2020-04-23 VITALS
TEMPERATURE: 98.3 F | WEIGHT: 182 LBS | HEIGHT: 64 IN | BODY MASS INDEX: 31.07 KG/M2 | DIASTOLIC BLOOD PRESSURE: 67 MMHG | SYSTOLIC BLOOD PRESSURE: 127 MMHG

## 2020-04-23 PROBLEM — M85.89 OSTEOPENIA OF MULTIPLE SITES: Chronic | Status: ACTIVE | Noted: 2020-04-23

## 2020-04-23 PROBLEM — J18.9 PNEUMONIA: Status: RESOLVED | Noted: 2018-04-26 | Resolved: 2020-04-23

## 2020-04-23 PROCEDURE — 4040F PNEUMOC VAC/ADMIN/RCVD: CPT | Performed by: FAMILY MEDICINE

## 2020-04-23 PROCEDURE — 1036F TOBACCO NON-USER: CPT | Performed by: FAMILY MEDICINE

## 2020-04-23 PROCEDURE — G8417 CALC BMI ABV UP PARAM F/U: HCPCS | Performed by: FAMILY MEDICINE

## 2020-04-23 PROCEDURE — 3017F COLORECTAL CA SCREEN DOC REV: CPT | Performed by: FAMILY MEDICINE

## 2020-04-23 PROCEDURE — G8399 PT W/DXA RESULTS DOCUMENT: HCPCS | Performed by: FAMILY MEDICINE

## 2020-04-23 PROCEDURE — 99441 PR PHYS/QHP TELEPHONE EVALUATION 5-10 MIN: CPT | Performed by: FAMILY MEDICINE

## 2020-04-23 PROCEDURE — G8427 DOCREV CUR MEDS BY ELIG CLIN: HCPCS | Performed by: FAMILY MEDICINE

## 2020-04-23 PROCEDURE — 1123F ACP DISCUSS/DSCN MKR DOCD: CPT | Performed by: FAMILY MEDICINE

## 2020-04-23 PROCEDURE — 1090F PRES/ABSN URINE INCON ASSESS: CPT | Performed by: FAMILY MEDICINE

## 2020-04-23 RX ORDER — ALENDRONATE SODIUM 70 MG/1
70 TABLET ORAL WEEKLY
Qty: 12 TABLET | Refills: 1 | Status: SHIPPED | OUTPATIENT
Start: 2020-04-23 | End: 2020-10-23 | Stop reason: SDUPTHER

## 2020-04-23 RX ORDER — LOSARTAN POTASSIUM 50 MG/1
50 TABLET ORAL EVERY MORNING
Qty: 90 TABLET | Refills: 1 | Status: SHIPPED | OUTPATIENT
Start: 2020-04-23 | End: 2020-10-23 | Stop reason: SDUPTHER

## 2020-04-23 RX ORDER — AMLODIPINE BESYLATE 5 MG/1
5 TABLET ORAL EVERY MORNING
Qty: 90 TABLET | Refills: 1 | Status: SHIPPED | OUTPATIENT
Start: 2020-04-23 | End: 2020-10-23 | Stop reason: SDUPTHER

## 2020-04-23 ASSESSMENT — PATIENT HEALTH QUESTIONNAIRE - PHQ9
SUM OF ALL RESPONSES TO PHQ QUESTIONS 1-9: 0
1. LITTLE INTEREST OR PLEASURE IN DOING THINGS: 0
SUM OF ALL RESPONSES TO PHQ9 QUESTIONS 1 & 2: 0
SUM OF ALL RESPONSES TO PHQ QUESTIONS 1-9: 0
2. FEELING DOWN, DEPRESSED OR HOPELESS: 0

## 2020-04-23 NOTE — PROGRESS NOTES
Eda Chacko is a 78 y.o. female evaluated via telephone on 4/23/2020. Consent:  She and/or health care decision maker is aware that that she may receive a bill for this telephone service, depending on her insurance coverage, and has provided verbal consent to proceed: Yes      Documentation:  I communicated with the patient and/or health care decision maker about 6-month follow-up  Details of this discussion including any medical advice provided:   She is generally been doing well  She is on active chemotherapy for multiple myeloma and seems to be responding to the treatment and tolerating it so far  Course she is immunocompromised and is taking all COVID-19 precautions  She is on treatment for hypertension hyperlipidemia  She is doing well on the medicine  She has a prescription for albuterol HFA but rarely uses this  Blood pressure reporting 127/67 weight is 182  Most recent labs are reviewed including a CBC from hematology  Her white count is low at 3.0 but hemoglobin is come up to 12.2  A- stable  p- same meds and f/u      I affirm this is a Patient Initiated Episode with an Established Patient who has not had a related appointment within my department in the past 7 days or scheduled within the next 24 hours.     Patient identification was verified at the start of the visit: Yes    Total Time: minutes: 5-10 minutes    Note: not billable if this call serves to triage the patient into an appointment for the relevant concern      Gabriela Muir

## 2020-05-06 ENCOUNTER — HOSPITAL ENCOUNTER (OUTPATIENT)
Dept: INFUSION THERAPY | Age: 70
Discharge: HOME | End: 2020-05-06
Payer: MEDICARE

## 2020-05-06 LAB
ALBUMIN SERPL-MCNC: 4 GM/DL (ref 3.4–5)
ALP BLD-CCNC: 128 IU/L (ref 40–128)
ALT SERPL-CCNC: 30 U/L (ref 10–40)
ANION GAP SERPL CALCULATED.3IONS-SCNC: 11 MMOL/L (ref 4–16)
AST SERPL-CCNC: 23 IU/L (ref 15–37)
BASOPHILS ABSOLUTE: 0.1 K/CU MM
BASOPHILS RELATIVE PERCENT: 1.7 % (ref 0–1)
BILIRUB SERPL-MCNC: 0.9 MG/DL (ref 0–1)
BUN BLDV-MCNC: 11 MG/DL (ref 6–23)
CALCIUM SERPL-MCNC: 8.8 MG/DL (ref 8.3–10.6)
CHLORIDE BLD-SCNC: 101 MMOL/L (ref 99–110)
CO2: 26 MMOL/L (ref 21–32)
CREAT SERPL-MCNC: 0.7 MG/DL (ref 0.6–1.1)
DIFFERENTIAL TYPE: ABNORMAL
EOSINOPHILS ABSOLUTE: 0.1 K/CU MM
EOSINOPHILS RELATIVE PERCENT: 4.1 % (ref 0–3)
ERYTHROCYTE SEDIMENTATION RATE: 28 MM/HR (ref 0–30)
GFR AFRICAN AMERICAN: >60 ML/MIN/1.73M2
GFR NON-AFRICAN AMERICAN: >60 ML/MIN/1.73M2
GLUCOSE BLD-MCNC: 102 MG/DL (ref 70–99)
HCT VFR BLD CALC: 34.2 % (ref 37–47)
HEMOGLOBIN: 11.8 GM/DL (ref 12.5–16)
IGA: 323 MG/DL (ref 69–382)
IGG,SERUM: 945 MG/DL (ref 723–1685)
IGM,SERUM: <25 MG/DL (ref 62–277)
LACTATE DEHYDROGENASE: 173 IU/L (ref 120–246)
LYMPHOCYTES ABSOLUTE: 0.5 K/CU MM
LYMPHOCYTES RELATIVE PERCENT: 18 % (ref 24–44)
MCH RBC QN AUTO: 32.2 PG (ref 27–31)
MCHC RBC AUTO-ENTMCNC: 34.5 % (ref 32–36)
MCV RBC AUTO: 93.2 FL (ref 78–100)
MONOCYTES ABSOLUTE: 0.4 K/CU MM
MONOCYTES RELATIVE PERCENT: 13.3 % (ref 0–4)
PDW BLD-RTO: 14.9 % (ref 11.7–14.9)
PLATELET # BLD: 178 K/CU MM (ref 140–440)
PMV BLD AUTO: 9 FL (ref 7.5–11.1)
POTASSIUM SERPL-SCNC: 3.7 MMOL/L (ref 3.5–5.1)
RBC # BLD: 3.67 M/CU MM (ref 4.2–5.4)
SEGMENTED NEUTROPHILS ABSOLUTE COUNT: 1.9 K/CU MM
SEGMENTED NEUTROPHILS RELATIVE PERCENT: 62.9 % (ref 36–66)
SODIUM BLD-SCNC: 138 MMOL/L (ref 135–145)
TOTAL PROTEIN: 6.4 GM/DL (ref 6.4–8.2)
WBC # BLD: 2.9 K/CU MM (ref 4–10.5)

## 2020-05-06 PROCEDURE — 86320 SERUM IMMUNOELECTROPHORESIS: CPT

## 2020-05-06 PROCEDURE — 36415 COLL VENOUS BLD VENIPUNCTURE: CPT

## 2020-05-06 PROCEDURE — 82784 ASSAY IGA/IGD/IGG/IGM EACH: CPT

## 2020-05-06 PROCEDURE — 80053 COMPREHEN METABOLIC PANEL: CPT

## 2020-05-06 PROCEDURE — 84165 PROTEIN E-PHORESIS SERUM: CPT

## 2020-05-06 PROCEDURE — 82232 ASSAY OF BETA-2 PROTEIN: CPT

## 2020-05-06 PROCEDURE — 83883 ASSAY NEPHELOMETRY NOT SPEC: CPT

## 2020-05-06 PROCEDURE — 83615 LACTATE (LD) (LDH) ENZYME: CPT

## 2020-05-06 PROCEDURE — 85025 COMPLETE CBC W/AUTO DIFF WBC: CPT

## 2020-05-06 PROCEDURE — 85652 RBC SED RATE AUTOMATED: CPT

## 2020-05-07 LAB — BETA-2 MICROGLOBULIN: 2.3 MG/L (ref 1.1–2.4)

## 2020-05-08 LAB
ALBUMIN ELP: 3.5 GM/DL (ref 3.2–5.6)
ALPHA-1-GLOBULIN: 0.3 GM/DL (ref 0.1–0.4)
ALPHA-2-GLOBULIN: 0.6 GM/DL (ref 0.4–1.2)
BETA GLOBULIN: 1.1 GM/DL (ref 0.5–1.3)
GAMMA GLOBULIN: 0.9 GM/DL (ref 0.5–1.6)
KAPPA QUANT FREE LIGHT CHAINS: 27.41 MG/L (ref 3.3–19.4)
KAPPA/LAMBDA FREE LIGHT CHAIN RATIO: 1.22 (ref 0.26–1.65)
LAMBDA FREE LIGHT CHAINS URINE/ VOL: 22.4 MG/L (ref 5.71–26.3)
SPEP INTERPRETATION: NORMAL
SPEP INTERPRETATION: NORMAL
TOTAL PROTEIN: 6.4 GM/DL (ref 6.4–8.2)

## 2020-05-28 ENCOUNTER — HOSPITAL ENCOUNTER (OUTPATIENT)
Dept: INFUSION THERAPY | Age: 70
Discharge: HOME | End: 2020-05-28
Payer: MEDICARE

## 2020-05-28 PROCEDURE — 99211 OFF/OP EST MAY X REQ PHY/QHP: CPT

## 2020-05-29 ENCOUNTER — HOSPITAL ENCOUNTER (OUTPATIENT)
Dept: INFUSION THERAPY | Age: 70
Discharge: HOME | End: 2020-05-29
Payer: MEDICARE

## 2020-05-29 LAB
ALBUMIN SERPL-MCNC: 3.9 GM/DL (ref 3.4–5)
ALP BLD-CCNC: 132 IU/L (ref 40–128)
ALT SERPL-CCNC: 42 U/L (ref 10–40)
ANION GAP SERPL CALCULATED.3IONS-SCNC: 13 MMOL/L (ref 4–16)
AST SERPL-CCNC: 29 IU/L (ref 15–37)
BASOPHILS ABSOLUTE: 0 K/CU MM
BASOPHILS RELATIVE PERCENT: 0.7 % (ref 0–1)
BILIRUB SERPL-MCNC: 1 MG/DL (ref 0–1)
BUN BLDV-MCNC: 8 MG/DL (ref 6–23)
CALCIUM SERPL-MCNC: 8.4 MG/DL (ref 8.3–10.6)
CHLORIDE BLD-SCNC: 106 MMOL/L (ref 99–110)
CO2: 23 MMOL/L (ref 21–32)
CREAT SERPL-MCNC: 0.7 MG/DL (ref 0.6–1.1)
DIFFERENTIAL TYPE: ABNORMAL
EOSINOPHILS ABSOLUTE: 0.1 K/CU MM
EOSINOPHILS RELATIVE PERCENT: 4.7 % (ref 0–3)
GFR AFRICAN AMERICAN: >60 ML/MIN/1.73M2
GFR AFRICAN AMERICAN: >60 ML/MIN/1.73M2
GFR NON-AFRICAN AMERICAN: >60 ML/MIN/1.73M2
GFR NON-AFRICAN AMERICAN: >60 ML/MIN/1.73M2
GLUCOSE BLD-MCNC: 116 MG/DL (ref 70–99)
GLUCOSE BLD-MCNC: 116 MG/DL (ref 70–99)
HCT VFR BLD CALC: 33.3 % (ref 37–47)
HEMOGLOBIN: 11.3 GM/DL (ref 12.5–16)
LYMPHOCYTES ABSOLUTE: 0.4 K/CU MM
LYMPHOCYTES RELATIVE PERCENT: 15.1 % (ref 24–44)
MCH RBC QN AUTO: 32 PG (ref 27–31)
MCHC RBC AUTO-ENTMCNC: 33.9 % (ref 32–36)
MCV RBC AUTO: 94.3 FL (ref 78–100)
MONOCYTES ABSOLUTE: 0.3 K/CU MM
MONOCYTES RELATIVE PERCENT: 11.1 % (ref 0–4)
PDW BLD-RTO: 15.4 % (ref 11.7–14.9)
PLATELET # BLD: 173 K/CU MM (ref 140–440)
PMV BLD AUTO: 9.2 FL (ref 7.5–11.1)
POC CALCIUM: 1.06 MMOL/L (ref 1.12–1.32)
POC CHLORIDE: 107 MMOL/L (ref 98–109)
POC CREATININE: 0.8 MG/DL (ref 0.6–1.1)
POTASSIUM SERPL-SCNC: 3.6 MMOL/L (ref 3.6–5.1)
POTASSIUM SERPL-SCNC: 3.9 MMOL/L (ref 3.5–5.1)
RBC # BLD: 3.53 M/CU MM (ref 4.2–5.4)
SEGMENTED NEUTROPHILS ABSOLUTE COUNT: 1.9 K/CU MM
SEGMENTED NEUTROPHILS RELATIVE PERCENT: 68.4 % (ref 36–66)
SODIUM BLD-SCNC: 142 MMOL/L (ref 135–145)
SODIUM BLD-SCNC: 143 MMOL/L (ref 136–145)
TOTAL PROTEIN: 6.2 GM/DL (ref 6.4–8.2)
WBC # BLD: 2.8 K/CU MM (ref 4–10.5)

## 2020-05-29 PROCEDURE — 85025 COMPLETE CBC W/AUTO DIFF WBC: CPT

## 2020-05-29 PROCEDURE — 36415 COLL VENOUS BLD VENIPUNCTURE: CPT

## 2020-05-29 PROCEDURE — 96365 THER/PROPH/DIAG IV INF INIT: CPT

## 2020-05-29 PROCEDURE — 6370000000 HC RX 637 (ALT 250 FOR IP)

## 2020-05-29 PROCEDURE — 80053 COMPREHEN METABOLIC PANEL: CPT

## 2020-05-29 PROCEDURE — 2580000003 HC RX 258: Performed by: INTERNAL MEDICINE

## 2020-05-29 PROCEDURE — 6360000002 HC RX W HCPCS: Performed by: INTERNAL MEDICINE

## 2020-05-29 PROCEDURE — 6360000002 HC RX W HCPCS

## 2020-05-29 RX ORDER — DIPHENHYDRAMINE HYDROCHLORIDE 50 MG/ML
INJECTION INTRAMUSCULAR; INTRAVENOUS
Status: DISCONTINUED
Start: 2020-05-29 | End: 2020-05-29 | Stop reason: WASHOUT

## 2020-05-29 RX ORDER — ACETAMINOPHEN 325 MG/1
TABLET ORAL
Status: DISPENSED
Start: 2020-05-29 | End: 2020-05-29

## 2020-07-21 ENCOUNTER — HOSPITAL ENCOUNTER (OUTPATIENT)
Dept: INFUSION THERAPY | Age: 70
Discharge: HOME | End: 2020-07-21
Payer: MEDICARE

## 2020-07-21 LAB
ALBUMIN SERPL-MCNC: 4.2 GM/DL (ref 3.4–5)
ALP BLD-CCNC: 108 IU/L (ref 40–128)
ALT SERPL-CCNC: 33 U/L (ref 10–40)
ANION GAP SERPL CALCULATED.3IONS-SCNC: 11 MMOL/L (ref 4–16)
AST SERPL-CCNC: 23 IU/L (ref 15–37)
BASOPHILS ABSOLUTE: 0.1 K/CU MM
BASOPHILS RELATIVE PERCENT: 1.6 % (ref 0–1)
BILIRUB SERPL-MCNC: 0.9 MG/DL (ref 0–1)
BUN BLDV-MCNC: 10 MG/DL (ref 6–23)
CALCIUM SERPL-MCNC: 9 MG/DL (ref 8.3–10.6)
CHLORIDE BLD-SCNC: 101 MMOL/L (ref 99–110)
CO2: 28 MMOL/L (ref 21–32)
CREAT SERPL-MCNC: 0.8 MG/DL (ref 0.6–1.1)
DIFFERENTIAL TYPE: ABNORMAL
EOSINOPHILS ABSOLUTE: 0.3 K/CU MM
EOSINOPHILS RELATIVE PERCENT: 9 % (ref 0–3)
ERYTHROCYTE SEDIMENTATION RATE: 21 MM/HR (ref 0–30)
GFR AFRICAN AMERICAN: >60 ML/MIN/1.73M2
GFR NON-AFRICAN AMERICAN: >60 ML/MIN/1.73M2
GLUCOSE BLD-MCNC: 117 MG/DL (ref 70–99)
HCT VFR BLD CALC: 35.6 % (ref 37–47)
HEMOGLOBIN: 12.1 GM/DL (ref 12.5–16)
IGA: 381 MG/DL (ref 69–382)
IGG,SERUM: 967 MG/DL (ref 723–1685)
IGM,SERUM: <25 MG/DL (ref 62–277)
LACTATE DEHYDROGENASE: 144 IU/L (ref 120–246)
LYMPHOCYTES ABSOLUTE: 0.6 K/CU MM
LYMPHOCYTES RELATIVE PERCENT: 18 % (ref 24–44)
MCH RBC QN AUTO: 32 PG (ref 27–31)
MCHC RBC AUTO-ENTMCNC: 34 % (ref 32–36)
MCV RBC AUTO: 94.2 FL (ref 78–100)
MONOCYTES ABSOLUTE: 0.3 K/CU MM
MONOCYTES RELATIVE PERCENT: 10 % (ref 0–4)
PDW BLD-RTO: 15.3 % (ref 11.7–14.9)
PLATELET # BLD: 172 K/CU MM (ref 140–440)
PMV BLD AUTO: 9.4 FL (ref 7.5–11.1)
POTASSIUM SERPL-SCNC: 4.2 MMOL/L (ref 3.5–5.1)
RBC # BLD: 3.78 M/CU MM (ref 4.2–5.4)
SEGMENTED NEUTROPHILS ABSOLUTE COUNT: 1.9 K/CU MM
SEGMENTED NEUTROPHILS RELATIVE PERCENT: 61.4 % (ref 36–66)
SODIUM BLD-SCNC: 140 MMOL/L (ref 135–145)
TOTAL PROTEIN: 6.7 GM/DL (ref 6.4–8.2)
WBC # BLD: 3.1 K/CU MM (ref 4–10.5)

## 2020-07-21 PROCEDURE — 85652 RBC SED RATE AUTOMATED: CPT

## 2020-07-21 PROCEDURE — 86320 SERUM IMMUNOELECTROPHORESIS: CPT

## 2020-07-21 PROCEDURE — 85025 COMPLETE CBC W/AUTO DIFF WBC: CPT

## 2020-07-21 PROCEDURE — 36415 COLL VENOUS BLD VENIPUNCTURE: CPT

## 2020-07-21 PROCEDURE — 82232 ASSAY OF BETA-2 PROTEIN: CPT

## 2020-07-21 PROCEDURE — 83883 ASSAY NEPHELOMETRY NOT SPEC: CPT

## 2020-07-21 PROCEDURE — 83615 LACTATE (LD) (LDH) ENZYME: CPT

## 2020-07-21 PROCEDURE — 80053 COMPREHEN METABOLIC PANEL: CPT

## 2020-07-21 PROCEDURE — 82784 ASSAY IGA/IGD/IGG/IGM EACH: CPT

## 2020-07-21 PROCEDURE — 84165 PROTEIN E-PHORESIS SERUM: CPT

## 2020-07-22 LAB
ALBUMIN ELP: 3.8 GM/DL (ref 3.2–5.6)
ALPHA-1-GLOBULIN: 0.2 GM/DL (ref 0.1–0.4)
ALPHA-2-GLOBULIN: 0.6 GM/DL (ref 0.4–1.2)
BETA GLOBULIN: 1 GM/DL (ref 0.5–1.3)
BETA-2 MICROGLOBULIN: 2.4 MG/L (ref 1.1–2.4)
GAMMA GLOBULIN: 1 GM/DL (ref 0.5–1.6)
SPEP INTERPRETATION: NORMAL
SPEP INTERPRETATION: NORMAL
TOTAL PROTEIN: 6.7 GM/DL (ref 6.4–8.2)

## 2020-07-23 LAB
KAPPA QUANT FREE LIGHT CHAINS: 32.07 MG/L (ref 3.3–19.4)
KAPPA/LAMBDA FREE LIGHT CHAIN RATIO: 1.24 (ref 0.26–1.65)
LAMBDA FREE LIGHT CHAINS URINE/ VOL: 25.93 MG/L (ref 5.71–26.3)

## 2020-07-28 ENCOUNTER — HOSPITAL ENCOUNTER (OUTPATIENT)
Dept: INFUSION THERAPY | Age: 70
Discharge: HOME | End: 2020-07-28
Payer: MEDICARE

## 2020-07-28 PROCEDURE — 99211 OFF/OP EST MAY X REQ PHY/QHP: CPT

## 2020-08-10 NOTE — TELEPHONE ENCOUNTER
[facility] called to for verification. Please contact them at [phone].     Thank you,    Hossein Feliz

## 2020-08-11 RX ORDER — LENALIDOMIDE 10 MG/1
CAPSULE ORAL
Qty: 28 CAPSULE | Refills: 0 | Status: SHIPPED | OUTPATIENT
Start: 2020-08-11 | End: 2020-10-20 | Stop reason: SDUPTHER

## 2020-08-12 RX ORDER — LENALIDOMIDE 10 MG/1
15 CAPSULE ORAL DAILY
Qty: 28 CAPSULE | Refills: 0 | Status: SHIPPED | OUTPATIENT
Start: 2020-08-12 | End: 2020-09-18 | Stop reason: SDUPTHER

## 2020-08-21 ENCOUNTER — HOSPITAL ENCOUNTER (OUTPATIENT)
Dept: INFUSION THERAPY | Age: 70
Discharge: HOME | End: 2020-08-21
Payer: MEDICARE

## 2020-08-21 VITALS
TEMPERATURE: 96.3 F | SYSTOLIC BLOOD PRESSURE: 146 MMHG | DIASTOLIC BLOOD PRESSURE: 66 MMHG | RESPIRATION RATE: 16 BRPM | WEIGHT: 195 LBS | HEART RATE: 68 BPM | HEIGHT: 64 IN | BODY MASS INDEX: 33.29 KG/M2 | OXYGEN SATURATION: 97 %

## 2020-08-21 DIAGNOSIS — M85.89 OSTEOPENIA OF MULTIPLE SITES: ICD-10-CM

## 2020-08-21 DIAGNOSIS — C90.00 MULTIPLE MYELOMA NOT HAVING ACHIEVED REMISSION (HCC): Primary | Chronic | ICD-10-CM

## 2020-08-21 LAB
BASOPHILS ABSOLUTE: 0 K/CU MM
BASOPHILS RELATIVE PERCENT: 1.1 % (ref 0–1)
DIFFERENTIAL TYPE: ABNORMAL
EOSINOPHILS ABSOLUTE: 0.2 K/CU MM
EOSINOPHILS RELATIVE PERCENT: 5.6 % (ref 0–3)
GFR AFRICAN AMERICAN: >60 ML/MIN/1.73M2
GFR NON-AFRICAN AMERICAN: >60 ML/MIN/1.73M2
GLUCOSE BLD-MCNC: 129 MG/DL (ref 70–99)
HCT VFR BLD CALC: 34.8 % (ref 37–47)
HEMOGLOBIN: 12.1 GM/DL (ref 12.5–16)
LYMPHOCYTES ABSOLUTE: 0.5 K/CU MM
LYMPHOCYTES RELATIVE PERCENT: 19 % (ref 24–44)
MCH RBC QN AUTO: 32.9 PG (ref 27–31)
MCHC RBC AUTO-ENTMCNC: 34.8 % (ref 32–36)
MCV RBC AUTO: 94.6 FL (ref 78–100)
MONOCYTES ABSOLUTE: 0.3 K/CU MM
MONOCYTES RELATIVE PERCENT: 10 % (ref 0–4)
PDW BLD-RTO: 16.1 % (ref 11.7–14.9)
PLATELET # BLD: 200 K/CU MM (ref 140–440)
PMV BLD AUTO: 10.2 FL (ref 7.5–11.1)
POC CALCIUM: 1.09 MMOL/L (ref 1.12–1.32)
POC CHLORIDE: 107 MMOL/L (ref 98–109)
POC CREATININE: 0.8 MG/DL (ref 0.6–1.1)
POTASSIUM SERPL-SCNC: 4.6 MMOL/L (ref 3.6–5.1)
RBC # BLD: 3.68 M/CU MM (ref 4.2–5.4)
REASON FOR REJECTION: NORMAL
REJECTED TEST: NORMAL
SEGMENTED NEUTROPHILS ABSOLUTE COUNT: 1.7 K/CU MM
SEGMENTED NEUTROPHILS RELATIVE PERCENT: 64.3 % (ref 36–66)
SODIUM BLD-SCNC: 142 MMOL/L (ref 136–145)
WBC # BLD: 2.7 K/CU MM (ref 4–10.5)

## 2020-08-21 PROCEDURE — 80048 BASIC METABOLIC PNL TOTAL CA: CPT

## 2020-08-21 PROCEDURE — 2580000003 HC RX 258: Performed by: INTERNAL MEDICINE

## 2020-08-21 PROCEDURE — 96365 THER/PROPH/DIAG IV INF INIT: CPT

## 2020-08-21 PROCEDURE — 85025 COMPLETE CBC W/AUTO DIFF WBC: CPT

## 2020-08-21 PROCEDURE — 6360000002 HC RX W HCPCS: Performed by: INTERNAL MEDICINE

## 2020-08-21 PROCEDURE — 80053 COMPREHEN METABOLIC PANEL: CPT

## 2020-08-21 RX ORDER — SODIUM CHLORIDE 9 MG/ML
INJECTION, SOLUTION INTRAVENOUS CONTINUOUS
Status: CANCELLED | OUTPATIENT
Start: 2020-08-21

## 2020-08-21 RX ORDER — HEPARIN SODIUM (PORCINE) LOCK FLUSH IV SOLN 100 UNIT/ML 100 UNIT/ML
500 SOLUTION INTRAVENOUS PRN
Status: CANCELLED | OUTPATIENT
Start: 2020-08-21

## 2020-08-21 RX ORDER — SODIUM CHLORIDE 0.9 % (FLUSH) 0.9 %
10 SYRINGE (ML) INJECTION PRN
Status: CANCELLED | OUTPATIENT
Start: 2020-08-21

## 2020-08-21 RX ORDER — DIPHENHYDRAMINE HYDROCHLORIDE 50 MG/ML
50 INJECTION INTRAMUSCULAR; INTRAVENOUS ONCE
Status: CANCELLED | OUTPATIENT
Start: 2020-08-21

## 2020-08-21 RX ORDER — METHYLPREDNISOLONE SODIUM SUCCINATE 125 MG/2ML
125 INJECTION, POWDER, LYOPHILIZED, FOR SOLUTION INTRAMUSCULAR; INTRAVENOUS ONCE
Status: CANCELLED | OUTPATIENT
Start: 2020-08-21

## 2020-08-21 RX ORDER — EPINEPHRINE 1 MG/ML
0.3 INJECTION, SOLUTION, CONCENTRATE INTRAVENOUS PRN
Status: CANCELLED | OUTPATIENT
Start: 2020-08-21

## 2020-08-21 RX ORDER — SODIUM CHLORIDE 0.9 % (FLUSH) 0.9 %
5 SYRINGE (ML) INJECTION PRN
Status: CANCELLED | OUTPATIENT
Start: 2020-08-21

## 2020-08-21 RX ORDER — HEPARIN SODIUM (PORCINE) LOCK FLUSH IV SOLN 100 UNIT/ML 100 UNIT/ML
500 SOLUTION INTRAVENOUS PRN
Status: DISCONTINUED | OUTPATIENT
Start: 2020-08-21 | End: 2020-08-22 | Stop reason: HOSPADM

## 2020-08-21 RX ORDER — SODIUM CHLORIDE 0.9 % (FLUSH) 0.9 %
10 SYRINGE (ML) INJECTION PRN
Status: DISCONTINUED | OUTPATIENT
Start: 2020-08-21 | End: 2020-08-22 | Stop reason: HOSPADM

## 2020-08-21 RX ORDER — SODIUM CHLORIDE 9 MG/ML
20 INJECTION, SOLUTION INTRAVENOUS ONCE
Status: DISCONTINUED | OUTPATIENT
Start: 2020-08-21 | End: 2020-08-22 | Stop reason: HOSPADM

## 2020-08-21 RX ORDER — SODIUM CHLORIDE 9 MG/ML
20 INJECTION, SOLUTION INTRAVENOUS ONCE
Status: CANCELLED | OUTPATIENT
Start: 2020-08-21

## 2020-08-21 RX ADMIN — SODIUM CHLORIDE 20 ML/HR: 9 INJECTION, SOLUTION INTRAVENOUS at 10:05

## 2020-08-21 RX ADMIN — SODIUM CHLORIDE, PRESERVATIVE FREE 10 ML: 5 INJECTION INTRAVENOUS at 10:21

## 2020-08-21 RX ADMIN — ZOLEDRONIC ACID 4 MG: 0.8 INJECTION, SOLUTION, CONCENTRATE INTRAVENOUS at 10:05

## 2020-08-21 RX ADMIN — HEPARIN SODIUM (PORCINE) LOCK FLUSH IV SOLN 100 UNIT/ML 500 UNITS: 100 SOLUTION at 10:21

## 2020-08-21 NOTE — ONCOLOGY
RN Assessment: Pt here for labs/Zometa. Stat CMP added for today since pt was receiving Zometa. Results WNL    Pt with no new issues to report today. #24 PIV placed in right AC, + blood return, labs drawn as ordered. Dressing to site. Pt received Zometa as ordered. Flushed with NS post administration. Dressing/coband to site. Pt given return Zometa/lab appt. Pt in no distress upon leaving today    Patient's status assessed and documented appropriately. All labs and required results were also reviewed today. Treatment parameters have been reviewed. Today's treatment has been approved by the provider. Treatment orders and medication sequencing (when applicable) was verified by 2 registered nurses. The treatment plan was confirmed with the patient prior to administration, and the patient understands the need to report any treatment-related symptoms. Prior to administration, when applicable, the following 8 elements of medication administration were reviewed with 2nd Registered Nurse prior to dosing: drug name, drug dose, infusion volume when prepared in a syringe, rate of administration, expiration dates and/or times, appearance and integrity of drug(s), and rate of pump for infusion. The 5 rights of medication administration have been verified.

## 2020-08-29 ENCOUNTER — APPOINTMENT (OUTPATIENT)
Dept: GENERAL RADIOLOGY | Age: 70
DRG: 194 | End: 2020-08-29
Payer: MEDICARE

## 2020-08-29 ENCOUNTER — HOSPITAL ENCOUNTER (INPATIENT)
Age: 70
LOS: 4 days | Discharge: HOME | DRG: 194 | End: 2020-09-02
Attending: EMERGENCY MEDICINE | Admitting: INTERNAL MEDICINE
Payer: MEDICARE

## 2020-08-29 PROBLEM — R68.89 FLU-LIKE SYMPTOMS: Status: ACTIVE | Noted: 2020-08-29

## 2020-08-29 PROBLEM — J18.9 LEFT LOWER LOBE PNEUMONIA: Status: ACTIVE | Noted: 2020-08-29

## 2020-08-29 PROBLEM — Z20.822 SUSPECTED COVID-19 VIRUS INFECTION: Status: ACTIVE | Noted: 2020-08-29

## 2020-08-29 LAB
ALBUMIN SERPL-MCNC: 3.7 GM/DL (ref 3.4–5)
ALP BLD-CCNC: 172 IU/L (ref 40–129)
ALT SERPL-CCNC: 82 U/L (ref 10–40)
ANION GAP SERPL CALCULATED.3IONS-SCNC: 13 MMOL/L (ref 4–16)
AST SERPL-CCNC: 42 IU/L (ref 15–37)
BACTERIA: NEGATIVE /HPF
BASOPHILS ABSOLUTE: 0 K/CU MM
BASOPHILS RELATIVE PERCENT: 0.2 % (ref 0–1)
BILIRUB SERPL-MCNC: 2.2 MG/DL (ref 0–1)
BILIRUBIN URINE: NEGATIVE MG/DL
BLOOD, URINE: ABNORMAL
BUN BLDV-MCNC: 12 MG/DL (ref 6–23)
CALCIUM SERPL-MCNC: 8.6 MG/DL (ref 8.3–10.6)
CELLULAR CASTS: 2 /LPF
CHLORIDE BLD-SCNC: 96 MMOL/L (ref 99–110)
CLARITY: ABNORMAL
CO2: 23 MMOL/L (ref 21–32)
COLOR: ABNORMAL
CREAT SERPL-MCNC: 1 MG/DL (ref 0.6–1.1)
DIFFERENTIAL TYPE: ABNORMAL
EOSINOPHILS ABSOLUTE: 0 K/CU MM
EOSINOPHILS RELATIVE PERCENT: 0 % (ref 0–3)
GFR AFRICAN AMERICAN: >60 ML/MIN/1.73M2
GFR NON-AFRICAN AMERICAN: 55 ML/MIN/1.73M2
GLUCOSE BLD-MCNC: 168 MG/DL (ref 70–99)
GLUCOSE, URINE: 50 MG/DL
GRANULAR CASTS: 28 /LPF
HCT VFR BLD CALC: 31.6 % (ref 37–47)
HEMOGLOBIN: 10.6 GM/DL (ref 12.5–16)
HIGH SENSITIVE C-REACTIVE PROTEIN: 250.1 MG/L
HYALINE CASTS: 2 /LPF
IMMATURE NEUTROPHIL %: 1.2 % (ref 0–0.43)
KETONES, URINE: ABNORMAL MG/DL
LACTATE: 1.1 MMOL/L (ref 0.4–2)
LEUKOCYTE ESTERASE, URINE: NEGATIVE
LYMPHOCYTES ABSOLUTE: 0.4 K/CU MM
LYMPHOCYTES RELATIVE PERCENT: 10.2 % (ref 24–44)
MCH RBC QN AUTO: 32.5 PG (ref 27–31)
MCHC RBC AUTO-ENTMCNC: 33.5 % (ref 32–36)
MCV RBC AUTO: 96.9 FL (ref 78–100)
MONOCYTES ABSOLUTE: 0.6 K/CU MM
MONOCYTES RELATIVE PERCENT: 13.7 % (ref 0–4)
MUCUS: ABNORMAL HPF
NITRITE URINE, QUANTITATIVE: NEGATIVE
NUCLEATED RBC %: 0 %
PDW BLD-RTO: 15.2 % (ref 11.7–14.9)
PH, URINE: 6 (ref 5–8)
PLATELET # BLD: 148 K/CU MM (ref 140–440)
PMV BLD AUTO: 9.6 FL (ref 7.5–11.1)
POTASSIUM SERPL-SCNC: 3.6 MMOL/L (ref 3.5–5.1)
PROCALCITONIN: 0.63
PROTEIN UA: >500 MG/DL
RBC # BLD: 3.26 M/CU MM (ref 4.2–5.4)
RBC URINE: 1 /HPF (ref 0–6)
SEGMENTED NEUTROPHILS ABSOLUTE COUNT: 3.1 K/CU MM
SEGMENTED NEUTROPHILS RELATIVE PERCENT: 74.7 % (ref 36–66)
SODIUM BLD-SCNC: 132 MMOL/L (ref 135–145)
SPECIFIC GRAVITY UA: 1.01 (ref 1–1.03)
SQUAMOUS EPITHELIAL: 1 /HPF
TOTAL IMMATURE NEUTOROPHIL: 0.05 K/CU MM
TOTAL NUCLEATED RBC: 0 K/CU MM
TOTAL PROTEIN: 7 GM/DL (ref 6.4–8.2)
TRANSITIONAL EPITHELIAL: <1 /HPF
TROPONIN T: <0.01 NG/ML
UROBILINOGEN, URINE: <2 MG/DL (ref 0.2–1)
WBC # BLD: 4.1 K/CU MM (ref 4–10.5)
WBC UA: 1 /HPF (ref 0–5)

## 2020-08-29 PROCEDURE — 2580000003 HC RX 258: Performed by: EMERGENCY MEDICINE

## 2020-08-29 PROCEDURE — 96365 THER/PROPH/DIAG IV INF INIT: CPT

## 2020-08-29 PROCEDURE — 2580000003 HC RX 258: Performed by: NURSE PRACTITIONER

## 2020-08-29 PROCEDURE — 85025 COMPLETE CBC W/AUTO DIFF WBC: CPT

## 2020-08-29 PROCEDURE — U0002 COVID-19 LAB TEST NON-CDC: HCPCS

## 2020-08-29 PROCEDURE — 6360000002 HC RX W HCPCS: Performed by: EMERGENCY MEDICINE

## 2020-08-29 PROCEDURE — 85379 FIBRIN DEGRADATION QUANT: CPT

## 2020-08-29 PROCEDURE — 84145 PROCALCITONIN (PCT): CPT

## 2020-08-29 PROCEDURE — 80053 COMPREHEN METABOLIC PANEL: CPT

## 2020-08-29 PROCEDURE — 71046 X-RAY EXAM CHEST 2 VIEWS: CPT

## 2020-08-29 PROCEDURE — 83605 ASSAY OF LACTIC ACID: CPT

## 2020-08-29 PROCEDURE — 36415 COLL VENOUS BLD VENIPUNCTURE: CPT

## 2020-08-29 PROCEDURE — 6370000000 HC RX 637 (ALT 250 FOR IP): Performed by: EMERGENCY MEDICINE

## 2020-08-29 PROCEDURE — 2060000000 HC ICU INTERMEDIATE R&B

## 2020-08-29 PROCEDURE — 87040 BLOOD CULTURE FOR BACTERIA: CPT

## 2020-08-29 PROCEDURE — 99284 EMERGENCY DEPT VISIT MOD MDM: CPT

## 2020-08-29 PROCEDURE — 81001 URINALYSIS AUTO W/SCOPE: CPT

## 2020-08-29 PROCEDURE — 84484 ASSAY OF TROPONIN QUANT: CPT

## 2020-08-29 PROCEDURE — 86141 C-REACTIVE PROTEIN HS: CPT

## 2020-08-29 PROCEDURE — 6370000000 HC RX 637 (ALT 250 FOR IP): Performed by: NURSE PRACTITIONER

## 2020-08-29 PROCEDURE — 87899 AGENT NOS ASSAY W/OPTIC: CPT

## 2020-08-29 RX ORDER — ALENDRONATE SODIUM 70 MG/1
70 TABLET ORAL WEEKLY
Status: DISCONTINUED | OUTPATIENT
Start: 2020-08-29 | End: 2020-08-29

## 2020-08-29 RX ORDER — LOSARTAN POTASSIUM 50 MG/1
50 TABLET ORAL EVERY MORNING
Status: DISCONTINUED | OUTPATIENT
Start: 2020-08-30 | End: 2020-09-02 | Stop reason: HOSPADM

## 2020-08-29 RX ORDER — 0.9 % SODIUM CHLORIDE 0.9 %
1000 INTRAVENOUS SOLUTION INTRAVENOUS ONCE
Status: COMPLETED | OUTPATIENT
Start: 2020-08-29 | End: 2020-08-29

## 2020-08-29 RX ORDER — ACETAMINOPHEN 650 MG/1
650 SUPPOSITORY RECTAL EVERY 6 HOURS PRN
Status: DISCONTINUED | OUTPATIENT
Start: 2020-08-29 | End: 2020-09-02 | Stop reason: HOSPADM

## 2020-08-29 RX ORDER — SODIUM CHLORIDE 0.9 % (FLUSH) 0.9 %
10 SYRINGE (ML) INJECTION EVERY 12 HOURS SCHEDULED
Status: DISCONTINUED | OUTPATIENT
Start: 2020-08-29 | End: 2020-09-02 | Stop reason: HOSPADM

## 2020-08-29 RX ORDER — ASPIRIN 81 MG/1
81 TABLET, CHEWABLE ORAL NIGHTLY
Status: DISCONTINUED | OUTPATIENT
Start: 2020-08-29 | End: 2020-09-02 | Stop reason: HOSPADM

## 2020-08-29 RX ORDER — POLYETHYLENE GLYCOL 3350 17 G/17G
17 POWDER, FOR SOLUTION ORAL DAILY PRN
Status: DISCONTINUED | OUTPATIENT
Start: 2020-08-29 | End: 2020-09-02 | Stop reason: HOSPADM

## 2020-08-29 RX ORDER — PANTOPRAZOLE SODIUM 40 MG/10ML
40 INJECTION, POWDER, LYOPHILIZED, FOR SOLUTION INTRAVENOUS DAILY
Status: DISCONTINUED | OUTPATIENT
Start: 2020-08-30 | End: 2020-09-02 | Stop reason: HOSPADM

## 2020-08-29 RX ORDER — ACETAMINOPHEN 325 MG/1
650 TABLET ORAL EVERY 6 HOURS PRN
Status: DISCONTINUED | OUTPATIENT
Start: 2020-08-29 | End: 2020-09-02 | Stop reason: HOSPADM

## 2020-08-29 RX ORDER — ACETAMINOPHEN 325 MG/1
650 TABLET ORAL ONCE
Status: COMPLETED | OUTPATIENT
Start: 2020-08-29 | End: 2020-08-29

## 2020-08-29 RX ORDER — GUAIFENESIN 600 MG/1
600 TABLET, EXTENDED RELEASE ORAL 2 TIMES DAILY
Status: DISCONTINUED | OUTPATIENT
Start: 2020-08-29 | End: 2020-09-02 | Stop reason: HOSPADM

## 2020-08-29 RX ORDER — AMLODIPINE BESYLATE 5 MG/1
5 TABLET ORAL EVERY MORNING
Status: DISCONTINUED | OUTPATIENT
Start: 2020-08-30 | End: 2020-09-02 | Stop reason: HOSPADM

## 2020-08-29 RX ORDER — PROMETHAZINE HYDROCHLORIDE 25 MG/1
12.5 TABLET ORAL EVERY 6 HOURS PRN
Status: DISCONTINUED | OUTPATIENT
Start: 2020-08-29 | End: 2020-09-02 | Stop reason: HOSPADM

## 2020-08-29 RX ORDER — M-VIT,TX,IRON,MINS/CALC/FOLIC 27MG-0.4MG
1 TABLET ORAL EVERY MORNING
Status: DISCONTINUED | OUTPATIENT
Start: 2020-08-30 | End: 2020-09-02 | Stop reason: HOSPADM

## 2020-08-29 RX ORDER — SODIUM CHLORIDE 0.9 % (FLUSH) 0.9 %
10 SYRINGE (ML) INJECTION PRN
Status: DISCONTINUED | OUTPATIENT
Start: 2020-08-29 | End: 2020-09-02 | Stop reason: HOSPADM

## 2020-08-29 RX ORDER — ONDANSETRON 2 MG/ML
4 INJECTION INTRAMUSCULAR; INTRAVENOUS EVERY 6 HOURS PRN
Status: DISCONTINUED | OUTPATIENT
Start: 2020-08-29 | End: 2020-09-02 | Stop reason: HOSPADM

## 2020-08-29 RX ORDER — ALBUTEROL SULFATE 90 UG/1
2 AEROSOL, METERED RESPIRATORY (INHALATION) EVERY 6 HOURS PRN
Status: DISCONTINUED | OUTPATIENT
Start: 2020-08-29 | End: 2020-09-02 | Stop reason: HOSPADM

## 2020-08-29 RX ADMIN — VANCOMYCIN HYDROCHLORIDE 1500 MG: 5 INJECTION, POWDER, LYOPHILIZED, FOR SOLUTION INTRAVENOUS at 23:43

## 2020-08-29 RX ADMIN — CEFEPIME HYDROCHLORIDE 2 G: 2 INJECTION, POWDER, FOR SOLUTION INTRAVENOUS at 20:30

## 2020-08-29 RX ADMIN — ACETAMINOPHEN 650 MG: 325 TABLET ORAL at 18:47

## 2020-08-29 RX ADMIN — GUAIFENESIN 600 MG: 600 TABLET, EXTENDED RELEASE ORAL at 22:36

## 2020-08-29 RX ADMIN — AZITHROMYCIN MONOHYDRATE 500 MG: 500 INJECTION, POWDER, LYOPHILIZED, FOR SOLUTION INTRAVENOUS at 22:36

## 2020-08-29 RX ADMIN — SODIUM CHLORIDE, PRESERVATIVE FREE 10 ML: 5 INJECTION INTRAVENOUS at 22:36

## 2020-08-29 RX ADMIN — ASPIRIN 81 MG CHEWABLE TABLET 81 MG: 81 TABLET CHEWABLE at 22:36

## 2020-08-29 RX ADMIN — SODIUM CHLORIDE 1000 ML: 9 INJECTION, SOLUTION INTRAVENOUS at 20:30

## 2020-08-29 ASSESSMENT — PAIN SCALES - GENERAL
PAINLEVEL_OUTOF10: 8
PAINLEVEL_OUTOF10: 0
PAINLEVEL_OUTOF10: 8

## 2020-08-29 ASSESSMENT — PAIN DESCRIPTION - DESCRIPTORS: DESCRIPTORS: ACHING

## 2020-08-29 ASSESSMENT — PAIN DESCRIPTION - PAIN TYPE: TYPE: ACUTE PAIN

## 2020-08-29 ASSESSMENT — PAIN DESCRIPTION - LOCATION: LOCATION: ABDOMEN

## 2020-08-29 NOTE — H&P
History and Physical  SHANNON Carter-BC   Internal Medicine Hospitalist      Name:  Fabienne Pandey /Age/Sex: 1950  (78 y.o. female)   MRN & CSN:  [id number] & [id number] Admission Date/Time: 2020  5:33 PM   Location:  ED16/ED-16 PCP: Marcin Posada MD       Hospital Day: 1      Supervising Physician: Dr. Federico Way      Chief Complaint: Fever     Assessment and Plan:   Fabienne Pandey is a 78 y.o. female who presents with Flu-like symptoms     Flu-like symptoms: body ache, feeling sick, weak.  Left lower lobe pneumonia - as per CXR.  Suspected COVID-19 virus infection - symptoms: worsening dry cough, pna, fever (101).  Left-side lower rib pain on coughing - likely r/t pna.         - admit inpatient, telemetry monitoring          - empiric antibiotics: cefepime + Vanc (pahrm to dose)         - on albuterol inhaler, Mucinex         - start droplet plus isolation                  - pulse ox monitoring         - check lab works in AM         - pending cx, respiratory/COVID panel test including procalcitonin, CRP, D-dimer, troponin         - consider trending trop if elevated     H/o Multiple myeloma - on oral chemotherapy. - f/u with oncologist, Dr. Daiana Leigh - consulted in ED         - Dr. Agnieszka Abreu (oncall for Dr. Daiana Leigh) recommended to hold Revlimid         - pt already took her Zometa which is once every 3 months      Chronic Illnesses:          - osteoporosis/arthritis         - anemia         - GERD         - hypertension - c/w Norvasc, Losartan         - thyroid disease         - depression     Current diagnosis and plan of management discussed with the patient at the time of admission in lay language who agree to the above plan and disposition of admission for further care. All concerns and questions addressed.       Patient assessment and plan in conjunction with supervising physician - Dr. Oscar Perez    DVT Prophylaxis [x] Lovenox, []  Heparin, [] SCDs, [] case with ED physician prior to admission. ROS: Ten point ROS reviewed and negative, unless as noted above per HPI. Objective:   No intake or output data in the 24 hours ending 08/29/20 1938     Vitals:   Vitals:    08/29/20 1521 08/29/20 1525 08/29/20 1742 08/29/20 1803   BP:  129/60 (!) 146/55 121/70   Pulse:  93     Resp:  16     Temp:  101 °F (38.3 °C)     TempSrc:  Oral     SpO2:  96% 97% 97%   Weight: 186 lb (84.4 kg) 186 lb (84.4 kg)     Height: 5' 5\" (1.651 m) 5' 5\" (1.651 m)       Physical Exam: 08/29/20    GEN  -Awake, alert, appearing female, sitting upright in bed, cooperative, able to give adequate history. Appears given age. EYES -Pupils are equally round. No vision changes. No scleral erythema, discharge, or conjunctivitis. HENT -MM are moist. Oral pharynx without exudates, no evidence of thrush. NECK -Supple, no apparent thyromegaly or masses. RESP -LS C/D equal bilat, no wheezes, rales or rhonchi. Symmetric chest movement. No respiratory distress noted. C/V  -S1/S2 auscultated. RRR without appreciable M/R/G. No JVD or carotid bruits. Peripheral pulses equal bilaterally and palpable. Peripheral pulses equal bilaterally and palpable. No peripheral edema. No reproducible chest wall tenderness. GI  -Abdomen is soft, round, non-distended, no significant tenderness. No masses or guarding. + BS in all quadrants. Rectal exam deferred.   -Johns catheter is not present. LYMPH  -No palpable cervical lymphadenopathy and no hepatosplenomegaly. No petechiae or ecchymoses. MS  -B/L extremities strong muscles strength. Full movements. No gross joint deformities. No swelling, intact sensation symmetrical.   SKIN  -Normal coloration, warm, dry. No open wounds or ulcers. NEURO  -CN 2-12 appear grossly intact, normal speech, no lateralizing weakness. PSYC  -Awake, alert, oriented x 4. Appropriate affect.      Past Medical History:      Past Medical History:   Diagnosis Date    Anemia     \"With Childbirth\"    Arthritis     \"Hands, Knees And Hips\"    CCC (chronic calculous cholecystitis)     s/p cholecystectomy 2015    Colitis Dx 2000's    Depression     \"In Mid 1990's\"    Essential hypertension     GERD (gastroesophageal reflux disease)     Hyperlipidemia     Motion sickness     Multiple myeloma (HCC)     Osteoporosis     LILIAN (stress urinary incontinence, female)     Thyroid disease 1990's    \"Took Part Of Thyroid Out \"     Past Surgery History:  Patient  has a past surgical history that includes Thyroid surgery (6044'P); eye surgery (Right, 5-24-13); eye surgery (Left, 1-17-14); Smoot tooth extraction (Early 1970's); Dental surgery; Endoscopy, colon, diagnostic (\"Once\" 1990's); Hysterectomy, vaginal (1985); bladder suspension (1985); Tonsillectomy (1970's); lymph node biopsy (Last Done In 2000); Breast surgery (Right, 1980's); Foot surgery (Left, [place] [id number]); Cholecystectomy, laparoscopic ([id number]); Hysterectomy; Colonoscopy (\"Two\" Last Done 2000's); Colonoscopy (10/05/2018); and pr colonoscopy flx dx w/collj spec when pfrmd (N/A, 10/5/2018). Social History:    FAM HX: Assessed: family history includes Arthritis in her brother and mother; Cancer in her brother and brother; Dementia in her father; Depression in her brother; Diabetes in her brother and mother; Early Death (age of onset: 61) in her brother; Heart Disease in her mother; High Blood Pressure in her mother; Kidney Disease in her son; Other in her son.   Soc HX:   Social History     Socioeconomic History    Marital status:      Spouse name: None    Number of children: None    Years of education: None    Highest education level: None   Occupational History    None   Social Needs    Financial resource strain: None    Food insecurity     Worry: None     Inability: None    Transportation needs     Medical: None     Non-medical: None   Tobacco Use    Smoking status: Never Smoker    Smokeless tobacco: Never Used Substance and Sexual Activity    Alcohol use: Yes     Comment: \"Occ. Maybe Once A Week\"    Drug use: No    Sexual activity: Yes     Partners: Male   Lifestyle    Physical activity     Days per week: None     Minutes per session: None    Stress: None   Relationships    Social connections     Talks on phone: None     Gets together: None     Attends [employer] service: None     Active member of club or organization: None     Attends meetings of clubs or organizations: None     Relationship status: None    Intimate partner violence     Fear of current or ex partner: None     Emotionally abused: None     Physically abused: None     Forced sexual activity: None   Other Topics Concern    None   Social History Narrative    None     TOBACCO:   reports that she has never smoked. She has never used smokeless tobacco.  ETOH:   reports current alcohol use. Drugs:  reports no history of drug use. Allergies: Allergies   Allergen Reactions    Latex Rash and Swelling    Ace Inhibitors Swelling     Face and lips      Adhesive Tape Rash     \"Tears Skin , Paper Tape Is OK To Use\"  Skin breakdown       Lisinopril-Hydrochlorothiazide Swelling    Other      \"Allergic To Poinsetta Holley Causing Nasal Congestion\"    Sulfa Antibiotics Other (See Comments)     \"Flu Like Symptoms\"  Flu-like symptoms      Hydrochlorothiazide W-Triamterene     Naproxen      Medications:   Medications:    vancomycin  1,500 mg Intravenous Once    azithromycin  500 mg Intravenous Once    cefepime  2 g Intravenous Once    sodium chloride  1,000 mL Intravenous Once      Infusions:   PRN Meds:    Prior to Admission Meds:  Prior to Admission medications    Medication Sig Start Date End Date Taking? Authorizing Provider   lenalidomide (REVLIMID) 10 MG chemo capsule Take 2 capsules by mouth daily Take 1 every 14 days of 21 day cycle 8/12/20   Gay Haque MD   lenalidomide (REVLIMID) 10 MG chemo capsule Take 1 every day with no breaks. 8/11/20   Caroline Truong MD   losartan (COZAAR) 50 MG tablet Take 1 tablet by mouth every morning 4/23/20   Marcus Dhillon MD   amLODIPine (NORVASC) 5 MG tablet Take 1 tablet by mouth every morning 4/23/20   Marcus Dhillon MD   alendronate (FOSAMAX) 70 MG tablet Take 1 tablet by mouth once a week 04/26/18 takes on Sundays 4/23/20   Marcus Dhillon MD   acyclovir (ZOVIRAX) 400 MG tablet Take 400 mg by mouth 2 times daily    Historical Provider, MD   UNABLE TO FIND Valcade injection, take 4 days of 21 day cycle    Historical Provider, MD   zoledronic acid (ZOMETA) 4 MG/5ML injection Infuse 4 mg intravenously once 1 per day of a 28 day cycle    Historical Provider, MD   albuterol sulfate  (90 Base) MCG/ACT inhaler Inhale 2 puffs into the lungs every 6 hours as needed for Wheezing    Historical Provider, MD   Omega-3 Fatty Acids (FISH OIL) 1000 MG CAPS Take 3,000 mg by mouth 2 times daily    Historical Provider, MD   Multiple Vitamins-Minerals (THERAPEUTIC MULTIVITAMIN-MINERALS) tablet Take 1 tablet by mouth every morning    Historical Provider, MD   Cholecalciferol (VITAMIN D) 2000 units CAPS capsule Take 2,000 Units by mouth every morning     Historical Provider, MD   aspirin 81 MG tablet Take 81 mg by mouth nightly     Historical Provider, MD     Data:     Laboratory this visit:  Reviewed  Recent Labs     08/29/20  1551   WBC 4.1   HGB 10.6*   HCT 31.6*         Recent Labs     08/29/20  1551   *   K 3.6   CL 96*   CO2 23   BUN 12   CREATININE 1.0     Recent Labs     08/29/20  1551   AST 42*   ALT 82*   BILITOT 2.2*   ALKPHOS 172*     No results for input(s): INR in the last 72 hours. No results for input(s): CKTOTAL, CKMB, CKMBINDEX in the last 72 hours. Invalid input(s): Sundeep Bingham input(s): PRO-BNP    Radiology this visit:  Reviewed.     Xr Chest (2 Vw)    Result Date: 8/29/2020  EXAMINATION: TWO XRAY VIEWS OF THE CHEST 8/29/2020 3:50 pm COMPARISON: 11/21/2018 HISTORY:

## 2020-08-29 NOTE — ACP (ADVANCE CARE PLANNING)
Advance Care Planning     Advance Care Planning Activator (Inpatient)  Conversation Note      Date of ACP Conversation: 8/29/2020    Conversation Conducted with: Patient with Decision Making Capacity    ACP Activator: [address] makes decisions on behalf of the incapacitated patient: Decision Maker is asked to consider and make decisions based on patient values, known preferences, or best interests. Health Care Decision Maker:     Current Designated Health Care Decision Maker:   Primary Decision Maker: Derrell Yanes - [phone]  (If there is a [address] named in the [facility] Makers\" box in the ACP activity, but it is not visible above, be sure to open that field and then select the health care decision maker relationship (ie \"primary\") in the blank space to the right of the name.) Validate  this information as still accurate & up-to-date; edit IPTEGO 8 field as needed.)    Note: Assess and validate information in current ACP documents, as indicated. If no Decision Maker listed above or available through scanned documents, then:    If no Authorized Decision Maker has previously been identified, then patient chooses [address]:  \"Who would you like to name as your primary health care decision-maker? \"               Name: Ysabel Mcgee        Relationship:           Phone number: [phone]  Frederick Evener this person be reached easily? \" Yes  \"Who would you like to name as your back-up decision maker? \"   Name: na        Relationship: na         Phone number: na  \"Can this person be reached easily? \" No    Note: If the relationship of these Decision-Makers to the patient does NOT follow your state's Next of Kin hierarchy, recommend that patient complete ACP document that meets state-specific requirements to allow them to act on the patient's behalf when appropriate. Care Preferences    Ventilation:   \"If you were in your present state of health and suddenly became very ill and were unable to breathe on your own, what would your preference be about the use of a ventilator (breathing machine) if it were available to you? \"      Would the patient desire the use of ventilator (breathing machine)?: yes    \"If your health worsens and it becomes clear that your chance of recovery is unlikely, what would your preference be about the use of a ventilator (breathing machine) if it were available to you? \"     Would the patient desire the use of ventilator (breathing machine)?: No      Resuscitation  \"CPR works best to restart the heart when there is a sudden event, like a heart attack, in someone who is otherwise healthy. Unfortunately, CPR does not typically restart the heart for people who have serious health conditions or who are very sick. \"    \"In the event your heart stopped as a result of an underlying serious health condition, would you want attempts to be made to restart your heart (answer \"yes\" for attempt to resuscitate) or would you prefer a natural death (answer \"no\" for do not attempt to resuscitate)? \" yes      NOTE: If the patient has a valid advance directive AND now provides care preference(s) that are inconsistent with that prior directive, advise the patient to consider either: creating a new advance directive that complies with state-specific requirements; or, if that is not possible, orally revoking that prior directive in accordance with state-specific requirements, which must be documented in the EHR. [] Yes   [x] No   Educated Patient / Sonido Mildred regarding differences between Advance Directives and portable DNR orders.     Length of ACP Conversation in minutes: 15 min     Conversation Outcomes:  [x] ACP discussion completed  [] Existing advance directive reviewed with patient; no changes to patient's previously recorded wishes  [] New Advance Directive completed  [] Portable Do Not Rescitate prepared for

## 2020-08-29 NOTE — ED PROVIDER NOTES
Triage Chief Complaint:   Fever    [place]:  Sreedhar Simmons is a 78 y.o. female that presents with fever and cough. Patient was in baseline state of health until yesterday when the above started. Patient reports objective fevers and chills and a dry cough at home. Some associated body aches and generalized weakness and fatigue. Some decreased oral intake. Patient reports \"I just do not feel good\". No known sick contacts. Of note, patient is with multiple myeloma not in remission and is on Revlimid daily as well as zometa infusions. No recent hospitalizations.     ROS:  General:  + fevers, no chills, + generalized weakness  Eyes:  No recent vison changes, no discharge  ENT:  No sore throat, no nasal congestion, no hearing changes  Cardiovascular:  No chest pain, no palpitations  Respiratory:  No shortness of breath, + cough, no wheezing  Gastrointestinal:  No pain, no nausea, no vomiting, no diarrhea  Musculoskeletal:  + muscle pain/body aches, no joint pain  Skin:  No rash, no pruritis, no easy bruising  Neurologic:  No speech problems, no headache, no extremity numbness, no extremity tingling, no extremity weakness  Psychiatric:  No anxiety  Genitourinary:  No dysuria, no hematuria  Endocrine:  No unexpected weight gain, no unexpected weight loss  Extremities:  no edema, no pain    Past Medical History:   Diagnosis Date    Anemia     \"With Childbirth\"    Arthritis     \"Hands, Knees And Hips\"    CCC (chronic calculous cholecystitis)     s/p cholecystectomy 2015    Colitis Dx 2000's    Depression     \"In Mid 1990's\"    Essential hypertension     GERD (gastroesophageal reflux disease)     Hyperlipidemia     Motion sickness     Multiple myeloma (HCC)     Osteoporosis     LILIAN (stress urinary incontinence, female)     Thyroid disease 1990's    \"Took Part Of Thyroid Out \"     Past Surgical History:   Procedure Laterality Date    BLADDER SUSPENSION  1985    Done During Vaginal Hysterectomy    BREAST SURGERY Right 1980's    Benign Area Removed Nipple Area Right Breast   Salvador Saldana  [id number]    COLONOSCOPY  \"Two\" Last Done 2000's    COLONOSCOPY  10/05/2018    Sigmoid diverticulosis, Grade 1 Internal hemorrhoids    DENTAL SURGERY      Teeth Extracted In Past    ENDOSCOPY, COLON, DIAGNOSTIC  \"Once\" 1990's    EYE SURGERY Right 5-24-13    Cataract With Lens Implant    EYE SURGERY Left 1-17-14    Cataract With Lens Implant    FOOT SURGERY Left 1962 Or 1963    I & D Left Foot After Stepping On A Nail    HYSTERECTOMY      HYSTERECTOMY, VAGINAL  1985    Bladder Suspension Also Done    LYMPH NODE BIOPSY  Last Done In 2000    \"Had Five Lymph Node Biopsies Done, Arm Pit Areas\", Benign    [place] COLONOSCOPY FLX DX W/COLLJ SPEC WHEN PFRMD N/A 10/5/2018    COLONOSCOPY DIAGNOSTIC OR SCREENING performed by Mauricio Pham MD at [address]  1990's    \"Took Part Of The Thyroid Out\"    TONSILLECTOMY  1970's    WISDOM TOOTH EXTRACTION  Early 18's    All Four [place] Teeth Extracted     Family History   Problem Relation Age of Onset    Heart Disease Mother     Arthritis Mother     Diabetes Mother     High Blood Pressure Mother     Dementia Father     Cancer Brother         Prostate Cancer    Arthritis Brother     Early Death Brother 61        Bladder And Brain Cancer    Diabetes Brother     Depression Brother     Cancer Brother         Bladder And Brain Cancer    Kidney Disease Son         Kidney Stones    Other Son         \"Charcot Swathi Tooth, Neuromuscular Disease\"     Social History     Socioeconomic History    Marital status:      Spouse name: Not on file    Number of children: Not on file    Years of education: Not on file    Highest education level: Not on file   Occupational History    Not on file   Social Needs    Financial resource strain: Not on file    Food insecurity     Worry: Not on file     Inability: Not on file   [employer] needs Medical: Not on file     Non-medical: Not on file   Tobacco Use    Smoking status: Never Smoker    Smokeless tobacco: Never Used   Substance and Sexual Activity    Alcohol use: Yes     Comment: \"Occ. Maybe Once A Week\"    Drug use: No    Sexual activity: Yes     Partners: Male   Lifestyle    Physical activity     Days per week: Not on file     Minutes per session: Not on file    Stress: Not on file   Relationships    Social connections     Talks on phone: Not on file     Gets together: Not on file     Attends Alevism service: Not on file     Active member of club or organization: Not on file     Attends meetings of clubs or organizations: Not on file     Relationship status: Not on file    Intimate partner violence     Fear of current or ex partner: Not on file     Emotionally abused: Not on file     Physically abused: Not on file     Forced sexual activity: Not on file   Other Topics Concern    Not on file   Social History Narrative    Not on file     Current Facility-Administered Medications   Medication Dose Route Frequency Provider Last Rate Last Dose    vancomycin (VANCOCIN) 1,500 mg in dextrose 5 % 500 mL IVPB  1,500 mg Intravenous Once Ulises Andres MD        azithromycin (ZITHROMAX) 500 mg in dextrose 5 % 250 mL IVPB  500 mg Intravenous Once Ulises Andres MD        cefepime (MAXIPIME) 2 g IVPB minibag  2 g Intravenous Once Ulises Andres MD         Current Outpatient Medications   Medication Sig Dispense Refill    lenalidomide (REVLIMID) 10 MG chemo capsule Take 2 capsules by mouth daily Take 1 every 14 days of 21 day cycle 28 capsule 0    lenalidomide (REVLIMID) 10 MG chemo capsule Take 1 every day with no breaks.  28 capsule 0    losartan (COZAAR) 50 MG tablet Take 1 tablet by mouth every morning 90 tablet 1    amLODIPine (NORVASC) 5 MG tablet Take 1 tablet by mouth every morning 90 tablet 1    alendronate (FOSAMAX) 70 MG tablet Take 1 tablet by mouth once a week 04/26/18 takes on Sundays 12 tablet 1    acyclovir (ZOVIRAX) 400 MG tablet Take 400 mg by mouth 2 times daily      UNABLE TO FIND Valcade injection, take 4 days of 21 day cycle      zoledronic acid (ZOMETA) 4 MG/5ML injection Infuse 4 mg intravenously once 1 per day of a 28 day cycle      albuterol sulfate  (90 Base) MCG/ACT inhaler Inhale 2 puffs into the lungs every 6 hours as needed for Wheezing      Omega-3 Fatty Acids (FISH OIL) 1000 MG CAPS Take 3,000 mg by mouth 2 times daily      Multiple Vitamins-Minerals (THERAPEUTIC MULTIVITAMIN-MINERALS) tablet Take 1 tablet by mouth every morning      Cholecalciferol (VITAMIN D) 2000 units CAPS capsule Take 2,000 Units by mouth every morning       aspirin 81 MG tablet Take 81 mg by mouth nightly        Allergies   Allergen Reactions    Latex Rash and Swelling    Ace Inhibitors Swelling     Face and lips      Adhesive Tape Rash     \"Tears Skin , Paper Tape Is OK To Use\"  Skin breakdown       Lisinopril-Hydrochlorothiazide Swelling    Other      \"Allergic To Poinsetta Holley Causing Nasal Congestion\"    Sulfa Antibiotics Other (See Comments)     \"Flu Like Symptoms\"  Flu-like symptoms      Hydrochlorothiazide W-Triamterene     Naproxen        Nursing Notes Reviewed    Physical Exam:  ED Triage Vitals   Enc Vitals Group      BP 08/29/20 1525 129/60      Pulse 08/29/20 1525 93      Resp 08/29/20 1525 16      Temp 08/29/20 1525 101 °F (38.3 °C)      Temp Source 08/29/20 1525 Oral      SpO2 08/29/20 1525 96 %      Weight 08/29/20 1521 186 lb (84.4 kg)      Height 08/29/20 1521 5' 5\" (1.651 m)      Head Circumference --       Peak Flow --       Pain Score --       Pain Loc --       Pain Edu? --       Excl. in [address]? --        My pulse ox interpretation is - normal    General appearance:  No acute distress. Appears overall nontoxic. Well dressed. Very pleasant. Skin:  Warm. Dry. No diaphoresis. No rash to exposed skin. Eye:  Extraocular movements intact.      Ears, nose, mouth and throat:  Oral mucosa moist   Neck:  Trachea midline. Extremity:  No swelling. Normal ROM     Heart:  Regular rate and rhythm, normal S1 & S2, no extra heart sounds. Perfusion:  Intact. Respiratory: Coarse breath sounds bilateral bases with left worse than right. Respirations nonlabored. No respiratory distress. Speaking clearly in full sentences. Abdominal:  Normal bowel sounds. Soft. Nontender. Non distended. Back:  No CVA tenderness to palpation     Neurological:  Alert and oriented times 3. No focal neuro deficits.              Psychiatric:  Appropriate    I have reviewed and interpreted all of the currently available lab results from this visit (if applicable):  Results for orders placed or performed during the hospital encounter of 08/29/20   CBC Auto Differential   Result Value Ref Range    WBC 4.1 4.0 - 10.5 K/CU MM    RBC 3.26 (L) 4.2 - 5.4 M/CU MM    Hemoglobin 10.6 (L) 12.5 - 16.0 GM/DL    Hematocrit 31.6 (L) 37 - 47 %    MCV 96.9 78 - 100 FL    MCH 32.5 (H) 27 - 31 PG    MCHC 33.5 32.0 - 36.0 %    RDW 15.2 (H) 11.7 - 14.9 %    Platelets 869 376 - 196 K/CU MM    MPV 9.6 7.5 - 11.1 FL    Differential Type AUTOMATED DIFFERENTIAL     Segs Relative 74.7 (H) 36 - 66 %    Lymphocytes % 10.2 (L) 24 - 44 %    Monocytes % 13.7 (H) 0 - 4 %    Eosinophils % 0.0 0 - 3 %    Basophils % 0.2 0 - 1 %    Segs Absolute 3.1 K/CU MM    Lymphocytes Absolute 0.4 K/CU MM    Monocytes Absolute 0.6 K/CU MM    Eosinophils Absolute 0.0 K/CU MM    Basophils Absolute 0.0 K/CU MM    Nucleated RBC % 0.0 %    Total Nucleated RBC 0.0 K/CU MM    Total Immature Neutrophil 0.05 K/CU MM    Immature Neutrophil % 1.2 (H) 0 - 0.43 %   CMP   Result Value Ref Range    Sodium 132 (L) 135 - 145 MMOL/L    Potassium 3.6 3.5 - 5.1 MMOL/L    Chloride 96 (L) 99 - 110 mMol/L    CO2 23 21 - 32 MMOL/L    BUN 12 6 - 23 MG/DL    CREATININE 1.0 0.6 - 1.1 MG/DL    Glucose 168 (H) 70 - 99 MG/DL    Calcium 8.6 8.3 - 10.6 MG/DL    Alb 3.7 3.4 - 5.0 GM/DL    Total Protein 7.0 6.4 - 8.2 GM/DL    Total Bilirubin 2.2 (H) 0.0 - 1.0 MG/DL    ALT 82 (H) 10 - 40 U/L    AST 42 (H) 15 - 37 IU/L    Alkaline Phosphatase 172 (H) 40 - 129 IU/L    GFR Non- 55 (L) >60 mL/min/1.73m2    GFR African American >60 >60 mL/min/1.73m2    Anion Gap 13 4 - 16   Lactic Acid, Plasma   Result Value Ref Range    Lactate 1.1 0.4 - 2.0 mMOL/L      Radiographs (if obtained):  [] The following radiograph was interpreted by myself in the absence of a radiologist:   [x] Radiologist's Report Reviewed:  XR CHEST (2 VW)   Final Result   Focal left lower lobe consolidation highly suspicious for left lower lobe   pneumonia. Recommend follow-up to resolution. EKG (if obtained): (All EKG's are interpreted by myself in the absence of a cardiologist)    Chart review shows recent radiographs:  Xr Chest (2 Vw)    Result Date: 8/29/2020  EXAMINATION: TWO XRAY VIEWS OF THE CHEST 8/29/2020 3:50 pm COMPARISON: 11/21/2018 HISTORY: ORDERING SYSTEM PROVIDED HISTORY: Fever TECHNOLOGIST PROVIDED HISTORY: Reason for exam:->Fever FINDINGS: There is left lower lobe airspace consolidation likely representing left lower lobar pneumonia. Right lung is clear. No evidence of pleural effusion or pneumothorax. No pulmonary edema. Cardiomediastinal silhouette and bony thorax are unchanged. Focal left lower lobe consolidation highly suspicious for left lower lobe pneumonia. Recommend follow-up to resolution. MDM:  Pt presents as above. Emergent conditions considered. Presentation prompted initial labs, EKG and imaging. Chest x-ray does demonstrate a focal left lower lobe consolidation that is consistent with pneumonia given patient's overall clinical symptoms. CBC with chronic anemia and no significant leukopenia. CMP with mild transaminitis and mild hyperglycemia without acidosis. Lactate is not suggestive of significant tissue hypoperfusion.   Blood culture sent.    Given patient's active chemotherapy I will cover patient broadly for healthcare associated pneumonia with cefepime, vancomycin and azithromycin IV. I did call and speak with patient's oncology team.  I spoke with Dr. Giovani Jones and updated her regarding patient's history as well as my plan for admission for patient's left lower lobe pneumonia. She agrees with this plan and agrees with COVID rule out. Patient will need to hold her Revlimid at this time. Patient is discussed with hospitalist team and patient is admitted to medicine. Questions sought and answered with the patient. They voice understanding and agree with plan. Clinical Impression:  1. Pneumonia of left lower lobe due to infectious organism [facility])      Disposition referral (if applicable):  No follow-up provider specified. Disposition medications (if applicable):  New Prescriptions    No medications on file       Comment: Please note this report has been produced using speech recognition software and may contain errors related to that system including errors in grammar, punctuation, and spelling, as well as words and phrases that may be inappropriate. If there are any questions or concerns please feel free to contact the dictating provider for clarification.         Elena Mora MD  08/30/20 6026

## 2020-08-29 NOTE — PROGRESS NOTES
1 x er vanco IV consult:    [facility] is a 78 y.o. female started on vancomycin for fever, still to be determined. Pharmacy consulted by ED provider: Wei Escobar MD, to order a dose of vancomycin in the emergency department. Other antimicrobials: Azithromycin    Ht Readings from Last 1 Encounters:   08/29/20 5' 5\" (1.651 m)     Wt Readings from Last 3 Encounters:   08/29/20 186 lb (84.4 kg)   08/21/20 195 lb (88.5 kg)   04/23/20 182 lb (82.6 kg)        Pertinent Laboratory Values:   Temp Readings from Last 3 Encounters:   08/29/20 101 °F (38.3 °C) (Oral)   08/21/20 96.3 °F (35.7 °C) (Infrared)   04/23/20 98.3 °F (36.8 °C) (Temporal)     Recent Labs     08/29/20  1551   WBC 4.1   LACTATE 1.1     Recent Labs     08/29/20  1551   BUN 12   CREATININE 1.0     Estimated Creatinine Clearance: 58 mL/min (based on SCr of 1 mg/dL). No intake or output data in the 24 hours ending 08/29/20 1743    Assessment/Plan:  Pharmacy will order vancomycin 1500mg  iv, x 1 (17.9 mg/kg). Please note, pharmacy will order a one-time dose of vancomycin for the Emergency Department. The consult will need to be re-ordered if vancomycin is to continue upon admission. Thank you for the consult.   Eren Kumar RPh  8/29/2020 5:43 PM

## 2020-08-30 LAB
ALBUMIN SERPL-MCNC: 3.3 GM/DL (ref 3.4–5)
ALP BLD-CCNC: 150 IU/L (ref 40–129)
ALT SERPL-CCNC: 64 U/L (ref 10–40)
ANION GAP SERPL CALCULATED.3IONS-SCNC: 12 MMOL/L (ref 4–16)
APTT: 37.1 SECONDS (ref 25.1–37.1)
AST SERPL-CCNC: 32 IU/L (ref 15–37)
BASOPHILS ABSOLUTE: 0 K/CU MM
BASOPHILS RELATIVE PERCENT: 0.4 % (ref 0–1)
BILIRUB SERPL-MCNC: 1.7 MG/DL (ref 0–1)
BUN BLDV-MCNC: 13 MG/DL (ref 6–23)
CALCIUM SERPL-MCNC: 7.6 MG/DL (ref 8.3–10.6)
CHLORIDE BLD-SCNC: 101 MMOL/L (ref 99–110)
CO2: 23 MMOL/L (ref 21–32)
CREAT SERPL-MCNC: 1 MG/DL (ref 0.6–1.1)
D DIMER: 852 NG/ML(DDU)
DIFFERENTIAL TYPE: ABNORMAL
EOSINOPHILS ABSOLUTE: 0 K/CU MM
EOSINOPHILS RELATIVE PERCENT: 0.4 % (ref 0–3)
FERRITIN: 784 NG/ML (ref 15–150)
FIBRINOGEN LEVEL: 813 MG/DL (ref 196.9–442.1)
GFR AFRICAN AMERICAN: >60 ML/MIN/1.73M2
GFR NON-AFRICAN AMERICAN: 55 ML/MIN/1.73M2
GLUCOSE BLD-MCNC: 126 MG/DL (ref 70–99)
HCT VFR BLD CALC: 29.1 % (ref 37–47)
HEMOGLOBIN: 10 GM/DL (ref 12.5–16)
IMMATURE NEUTROPHIL %: 0.4 % (ref 0–0.43)
INR BLD: 1.2 INDEX
LACTATE DEHYDROGENASE: 142 IU/L (ref 120–246)
LEGIONELLA URINARY AG: NEGATIVE
LYMPHOCYTES ABSOLUTE: 0.2 K/CU MM
LYMPHOCYTES RELATIVE PERCENT: 8.8 % (ref 24–44)
MCH RBC QN AUTO: 33 PG (ref 27–31)
MCHC RBC AUTO-ENTMCNC: 34.4 % (ref 32–36)
MCV RBC AUTO: 96 FL (ref 78–100)
MONOCYTES ABSOLUTE: 0.2 K/CU MM
MONOCYTES RELATIVE PERCENT: 8.8 % (ref 0–4)
PDW BLD-RTO: 15.3 % (ref 11.7–14.9)
PLATELET # BLD: 139 K/CU MM (ref 140–440)
PMV BLD AUTO: 9.8 FL (ref 7.5–11.1)
POTASSIUM SERPL-SCNC: 3.6 MMOL/L (ref 3.5–5.1)
PROTHROMBIN TIME: 14.6 SECONDS (ref 11.7–14.5)
RBC # BLD: 3.03 M/CU MM (ref 4.2–5.4)
SEGMENTED NEUTROPHILS ABSOLUTE COUNT: 2.2 K/CU MM
SEGMENTED NEUTROPHILS RELATIVE PERCENT: 81.2 % (ref 36–66)
SODIUM BLD-SCNC: 136 MMOL/L (ref 135–145)
TOTAL IMMATURE NEUTOROPHIL: 0.01 K/CU MM
TOTAL PROTEIN: 5.6 GM/DL (ref 6.4–8.2)
WBC # BLD: 2.7 K/CU MM (ref 4–10.5)

## 2020-08-30 PROCEDURE — 2060000000 HC ICU INTERMEDIATE R&B

## 2020-08-30 PROCEDURE — 85730 THROMBOPLASTIN TIME PARTIAL: CPT

## 2020-08-30 PROCEDURE — 85384 FIBRINOGEN ACTIVITY: CPT

## 2020-08-30 PROCEDURE — 6370000000 HC RX 637 (ALT 250 FOR IP): Performed by: NURSE PRACTITIONER

## 2020-08-30 PROCEDURE — 83615 LACTATE (LD) (LDH) ENZYME: CPT

## 2020-08-30 PROCEDURE — 2580000003 HC RX 258: Performed by: NURSE PRACTITIONER

## 2020-08-30 PROCEDURE — 82728 ASSAY OF FERRITIN: CPT

## 2020-08-30 PROCEDURE — C9113 INJ PANTOPRAZOLE SODIUM, VIA: HCPCS | Performed by: NURSE PRACTITIONER

## 2020-08-30 PROCEDURE — 6360000002 HC RX W HCPCS: Performed by: NURSE PRACTITIONER

## 2020-08-30 PROCEDURE — 85025 COMPLETE CBC W/AUTO DIFF WBC: CPT

## 2020-08-30 PROCEDURE — 94761 N-INVAS EAR/PLS OXIMETRY MLT: CPT

## 2020-08-30 PROCEDURE — 80053 COMPREHEN METABOLIC PANEL: CPT

## 2020-08-30 PROCEDURE — 85610 PROTHROMBIN TIME: CPT

## 2020-08-30 PROCEDURE — 87449 NOS EACH ORGANISM AG IA: CPT

## 2020-08-30 PROCEDURE — 85379 FIBRIN DEGRADATION QUANT: CPT

## 2020-08-30 PROCEDURE — 99222 1ST HOSP IP/OBS MODERATE 55: CPT | Performed by: INTERNAL MEDICINE

## 2020-08-30 PROCEDURE — 1200000000 HC SEMI PRIVATE

## 2020-08-30 RX ADMIN — GUAIFENESIN 600 MG: 600 TABLET, EXTENDED RELEASE ORAL at 21:04

## 2020-08-30 RX ADMIN — CEFEPIME HYDROCHLORIDE 2 G: 2 INJECTION, POWDER, FOR SOLUTION INTRAVENOUS at 10:35

## 2020-08-30 RX ADMIN — SODIUM CHLORIDE, PRESERVATIVE FREE 10 ML: 5 INJECTION INTRAVENOUS at 10:53

## 2020-08-30 RX ADMIN — ENOXAPARIN SODIUM 40 MG: 100 INJECTION SUBCUTANEOUS at 10:35

## 2020-08-30 RX ADMIN — GUAIFENESIN 600 MG: 600 TABLET, EXTENDED RELEASE ORAL at 10:35

## 2020-08-30 RX ADMIN — ACETAMINOPHEN 650 MG: 325 TABLET ORAL at 05:39

## 2020-08-30 RX ADMIN — Medication 2000 UNITS: at 10:35

## 2020-08-30 RX ADMIN — ASPIRIN 81 MG CHEWABLE TABLET 81 MG: 81 TABLET CHEWABLE at 21:04

## 2020-08-30 RX ADMIN — VANCOMYCIN HYDROCHLORIDE 1250 MG: 5 INJECTION, POWDER, LYOPHILIZED, FOR SOLUTION INTRAVENOUS at 16:30

## 2020-08-30 RX ADMIN — SODIUM CHLORIDE, PRESERVATIVE FREE 10 ML: 5 INJECTION INTRAVENOUS at 21:05

## 2020-08-30 RX ADMIN — PANTOPRAZOLE SODIUM 40 MG: 40 INJECTION, POWDER, FOR SOLUTION INTRAVENOUS at 10:35

## 2020-08-30 RX ADMIN — ACETAMINOPHEN 650 MG: 325 TABLET ORAL at 16:15

## 2020-08-30 RX ADMIN — AMLODIPINE BESYLATE 5 MG: 5 TABLET ORAL at 10:35

## 2020-08-30 RX ADMIN — CEFEPIME HYDROCHLORIDE 2 G: 2 INJECTION, POWDER, FOR SOLUTION INTRAVENOUS at 21:04

## 2020-08-30 RX ADMIN — LOSARTAN POTASSIUM 50 MG: 50 TABLET, FILM COATED ORAL at 10:35

## 2020-08-30 RX ADMIN — MULTIPLE VITAMINS W/ MINERALS TAB 1 TABLET: TAB at 10:35

## 2020-08-30 ASSESSMENT — PAIN SCALES - GENERAL
PAINLEVEL_OUTOF10: 0

## 2020-08-30 NOTE — CONSULTS
ONCOLOGY HEMATOLOGY CARE ([facility])  CONSULTATION REPORT    2020  1:20 PM    Patient:    Severa Leash  : 1950   78 y.o. MRN: [id number]  Admitted: 2020  5:33 PM ATT: Sherice Watts MD   -A  AdmitDx: Flu-like symptoms [R68.89]  Pneumonia of left lower lobe due to infectious organism ([address] [J18.1]  PCP: Anival Do MD    Reason for Consult:h/o MM     Requesting Physician:  Sherice Watts MD      History Obtained From:  Review of all records      CHIEF COMPLAINT    Chief Complaint   Patient presents with    Fever       HISTORY OF PRESENT ILLNESS   Severa Leash is a 78 y.o. female who presented with flu like sx which started last monday  Fever 102  Dry cough  Generalized  body pains  Fatigue  Nausea  On admission his CBC revealed a WBC of 4.1 ANC 3000    BACKGROUND ONCOLOGY HISTORY:  H/O Stage III IgG kappa MM received RVd from 20 to 2019. ABMT 20. Has been on maintenance revlimid and dexamethasone since.  Also on zometa    PAST MEDICAL HISTORY    Past Medical History:   Diagnosis Date    Anemia     \"With Childbirth\"    Arthritis     \"Hands, Knees And Hips\"    CCC (chronic calculous cholecystitis)     s/p cholecystectomy 2015    Colitis Dx     Depression     \"In Mid s\"    Essential hypertension     GERD (gastroesophageal reflux disease)     Hyperlipidemia     Motion sickness     Multiple myeloma (HCC)     Osteoporosis     LILIAN (stress urinary incontinence, female)     Thyroid disease     \"Took Part Of Thyroid Out \"       SURGICAL HISTORY    Past Surgical History:   Procedure Laterality Date    BLADDER SUSPENSION      Done During Vaginal Hysterectomy    BREAST SURGERY Right     Benign Area Removed Nipple Area Right Breast   Wanna Dues  [id number]    COLONOSCOPY  \"Two\" Last Done 's    COLONOSCOPY  10/05/2018    Sigmoid diverticulosis, Grade 1 Internal hemorrhoids    DENTAL SURGERY      Teeth Extracted In Past    ENDOSCOPY, COLON, DIAGNOSTIC  \"Once\" 1990's    EYE SURGERY Right 5-24-13    Cataract With Lens Implant    EYE SURGERY Left 1-17-14    Cataract With Lens Implant    FOOT SURGERY Left 1962 Or 1963    I & D Left Foot After Stepping On A Nail    HYSTERECTOMY      HYSTERECTOMY, VAGINAL  1985    Bladder Suspension Also Done    LYMPH NODE BIOPSY  Last Done In 2000    \"Had Five Lymph Node Biopsies Done, Arm Pit Areas\", Benign    [place] COLONOSCOPY FLX DX W/COLLJ SPEC WHEN PFRMD N/A 10/5/2018    COLONOSCOPY DIAGNOSTIC OR SCREENING performed by Arvind Cohen MD at [address]  1990's    \"Took Part Of The Thyroid Out\"    TONSILLECTOMY  1970's    WISDOM TOOTH EXTRACTION  Early 1970's    All Four [place] Teeth Extracted       Current Medications:    Medications    Scheduled Medications:    amLODIPine  5 mg Oral QAM    aspirin  81 mg Oral Nightly    vitamin D  2,000 Units Oral QAM    losartan  50 mg Oral QAM    therapeutic multivitamin-minerals  1 tablet Oral QAM    sodium chloride flush  10 mL Intravenous 2 times per day    enoxaparin  40 mg Subcutaneous Daily    pantoprazole  40 mg Intravenous Daily    cefepime  2 g Intravenous Q12H    vancomycin  15 mg/kg Intravenous Q18H    guaiFENesin  600 mg Oral BID     PRN Medications: albuterol sulfate HFA, acetaminophen **OR** acetaminophen, sodium chloride flush, polyethylene glycol, promethazine **OR** ondansetron    Allergies:  Latex; Ace inhibitors; Adhesive tape; Lisinopril-hydrochlorothiazide; Other; Sulfa antibiotics;  Hydrochlorothiazide w-triamterene; and Naproxen    FAMILY HISTORY    Family History   Problem Relation Age of Onset    Heart Disease Mother     Arthritis Mother     Diabetes Mother     High Blood Pressure Mother     Dementia Father     Cancer Brother         Prostate Cancer    Arthritis Brother     Early Death Brother 61        Bladder And Brain Cancer    Diabetes Brother     Depression Brother  Cancer Brother         Bladder And Brain Cancer    Kidney Disease Son         Kidney Stones    Other Son         \"Charcot Swathi Tooth, Neuromuscular Disease\"       SOCIAL HISTORY    Social History     Socioeconomic History    Marital status:      Spouse name: None    Number of children: None    Years of education: None    Highest education level: None   Occupational History    None   Social Needs    Financial resource strain: None    Food insecurity     Worry: None     Inability: None    Transportation needs     Medical: None     Non-medical: None   Tobacco Use    Smoking status: Never Smoker    Smokeless tobacco: Never Used   Substance and Sexual Activity    Alcohol use: Yes     Comment: \"Occ.  Maybe Once A Week\"    Drug use: No    Sexual activity: Yes     Partners: Male   Lifestyle    Physical activity     Days per week: None     Minutes per session: None    Stress: None   Relationships    Social connections     Talks on phone: None     Gets together: None     Attends Sikh service: None     Active member of club or organization: None     Attends meetings of clubs or organizations: None     Relationship status: None    Intimate partner violence     Fear of current or ex partner: None     Emotionally abused: None     Physically abused: None     Forced sexual activity: None   Other Topics Concern    None   Social History Narrative    None       REVIEW OF SYSTEMS    Unobtainable    PHYSICAL EXAM      Vitals: BP (!) 126/51   Pulse 77   Temp 100.1 °F (37.8 °C) (Oral)   Resp 14   Ht 5' 5\" (1.651 m)   Wt 186 lb (84.4 kg)   SpO2 94%   BMI 30.95 kg/m²     Please refer to Hospitalists exam      LABORATORY RESULTS  CBC:   Recent Labs     08/29/20  1551 08/30/20  0600   WBC 4.1 2.7*   HGB 10.6* 10.0*    139*     BMP:    Recent Labs     08/29/20  1551 08/30/20  0600   * 136   K 3.6 3.6   CL 96* 101   CO2 23 23   BUN 12 13   CREATININE 1.0 1.0   GLUCOSE 168* 126*

## 2020-08-30 NOTE — PROGRESS NOTES
Patient arrived to unit from er per cart. Patient able to transfer self from cart to bed in room. Alert and orient times 4. No skin issues noted at this time. Skin is warm and clammy patient states \"its my chemo medications that does that to me\". [place] to room, call light, staff, meals, lights, tv and no questions at this time.

## 2020-08-30 NOTE — PROGRESS NOTES
[facility]  consulted by Dr. Emani Benavides for monitoring and adjustment. Indication for treatment: Pneumonia, COVID-19 R/O  Goal trough: 15 mcg/mL     Pertinent Laboratory Values:   Temp Readings from Last 3 Encounters:   08/29/20 98.6 °F (37 °C) (Oral)   08/21/20 96.3 °F (35.7 °C) (Infrared)   04/23/20 98.3 °F (36.8 °C) (Temporal)     Recent Labs     08/29/20  1551   WBC 4.1   LACTATE 1.1     Recent Labs     08/29/20  1551   BUN 12   CREATININE 1.0     Estimated Creatinine Clearance: 58 mL/min (based on SCr of 1 mg/dL). No intake or output data in the 24 hours ending 08/29/20 2248    Pertinent Cultures:  Date    Source    Results  08/29   Blood    Pending  08/29   COVID-19   Pending  08/29                          Legionella                               Pending  08/29                          Strep Pneumo                        Pending  0829                           RESP Disease PCR               Pending  08/29                          Sputum                                   Pending  08/29                          Nasal MRSA Screen               Pending    Assessment:  WBC (4.1) WNL; Tmax = 101 F  SCr, BUN: WNL; No I/O data  PMH significant for multiple myeloma (has been on oral chemotherapy: Revlimid)  Day(s) of therapy: 1  Vancomycin level: Scheduled    Plan:  Vancomycin 1,500 mg IV initial dose; follow with Vancomycin 1,250 mg IV Q 18 Hours  Check Vancomycin trough prior to fourth dose  Pharmacy will continue to monitor patient and adjust therapy as indicated    VANCOMYCIN TROUGH SCHEDULED FOR 09/01/2020 @ 3:30 AM    Thank you for the consult.   Yury Jarrell  8/29/2020 10:48 PM

## 2020-08-30 NOTE — PLAN OF CARE
Problem: Falls - Risk of:  Goal: Will remain free from falls  Description: Will remain free from falls  Outcome: Ongoing     Problem: Falls - Risk of:  Goal: Absence of physical injury  Description: Absence of physical injury  Outcome: Ongoing     Problem: Gas Exchange - Impaired  Goal: Absence of hypoxia  Outcome: Ongoing     Problem: Gas Exchange - Impaired  Goal: Promote optimal lung function  Outcome: Ongoing     Problem: Breathing Pattern - Ineffective  Goal: Ability to achieve and maintain a regular respiratory rate  Outcome: Ongoing     Problem: Body Temperature -  Risk of, Imbalanced  Goal: Ability to maintain a body temperature within defined limits  Outcome: Ongoing     Problem: Body Temperature -  Risk of, Imbalanced  Goal: Will regain or maintain usual level of consciousness  Outcome: Ongoing     Problem:  Body Temperature -  Risk of, Imbalanced  Goal: Complications related to the disease process, condition or treatment will be avoided or minimized  Outcome: Ongoing

## 2020-08-30 NOTE — PROGRESS NOTES
[address]    Hospitalist Progress Note      Name:  Tashia Lawler /Age/Sex: 1950  (78 y.o. female)   MRN & CSN:  [id number] & [id number] Admission Date/Time: 2020  5:33 PM   Location:  -A PCP: Alvina Serra MD         Hospital Day: 2    Assessment and Plan:   Tashia Lawler is a 78 y.o.  female  who presents with Flu-like symptoms    Acute viral syndrome    Rule out COVID 23  Presented with generalized body ache, weakness, fever  SARS-CoV-2 PCR pending  Maintain appropriate PPE and droplet plus isolation    Left lower lobe pneumonia    Due to immunocompromised situation will continue broad-spectrum IV antibiotics  Not requiring supplemental oxygen  Monitor blood cultures, check MRSA culture,   strep and Legionella antigens negative  Continue with IV cefepime and vancomycin, with intent to de-escalate    Hypertension -on amlodipine and losartan    Multiple myeloma -holding Revlimid per oncology, monitor CBC and BMP closely    May transfer to MedSur unit if COVID test is negative    Diet DIET GENERAL;   DVT Prophylaxis [] Lovenox, []  Heparin, [] SCDs, []No VTE prophylaxis, patient ambulating   GI Prophylaxis [] PPI, [] H2 Blocker, [] No GI prophylaxis, patient is receiving diet/Tube Feeds   Code Status Full Code   Disposition Patient requires continued admission due to pneumonia   MDM [] Low, [x] Moderate,[]  High     History of Present Illness: Subjective     Patient Seen & Examined at the bedside      Patient is resting in bed with no distress while on room air  Denies any fever or shortness of breath or chest pain at this time  Flulike symptoms have improved    Ten point ROS reviewed negative, unless as noted above    Objective:        Intake/Output Summary (Last 24 hours) at 2020 1058  Last data filed at 2020 0750  Gross per 24 hour   Intake 120 ml   Output 600 ml   Net -480 ml      Vitals:   Vitals:    20 0651   BP:    Pulse: 88   Resp: 22   Temp: 99.1 °F (37.3 °C)   SpO2: 95% Physical Exam:    GEN Awake female, resting in bed in no apparent distress. Appears given age. HENT Mucous membranes are moist.   RESP Clear to auscultation, no wheezes, rales or rhonchi. CARDIO/VASC -S1/S2 auscultated. Regular rate without appreciable murmurs, rubs, or gallops. Peripheral pulses equal bilaterally and palpable. No peripheral edema. GI Abdomen is soft without significant tenderness, masses, or guarding. Bowel sounds are normoactive. Rectal exam deferred.      Medications:   Medications:    amLODIPine  5 mg Oral QAM    aspirin  81 mg Oral Nightly    vitamin D  2,000 Units Oral QAM    losartan  50 mg Oral QAM    therapeutic multivitamin-minerals  1 tablet Oral QAM    sodium chloride flush  10 mL Intravenous 2 times per day    enoxaparin  40 mg Subcutaneous Daily    pantoprazole  40 mg Intravenous Daily    cefepime  2 g Intravenous Q12H    vancomycin  15 mg/kg Intravenous Q18H    guaiFENesin  600 mg Oral BID      Infusions:   PRN Meds: albuterol sulfate HFA, 2 puff, Q6H PRN  acetaminophen, 650 mg, Q6H PRN    Or  acetaminophen, 650 mg, Q6H PRN  sodium chloride flush, 10 mL, PRN  polyethylene glycol, 17 g, Daily PRN  promethazine, 12.5 mg, Q6H PRN    Or  ondansetron, 4 mg, Q6H PRN          Electronically signed by Demetris Cazares MD on 8/30/2020 at 10:58 AM

## 2020-08-30 NOTE — PROGRESS NOTES
[address] [facility]  consulted by Dr. Abigail Pablo for monitoring and adjustment. Indication for treatment: Pneumonia, COVID-19 R/O  Goal trough: 15 mcg/mL     Pertinent Laboratory Values:   Temp Readings from Last 3 Encounters:   08/30/20 103.1 °F (39.5 °C) (Oral)   08/21/20 96.3 °F (35.7 °C) (Infrared)   04/23/20 98.3 °F (36.8 °C) (Temporal)     Recent Labs     08/29/20  1551 08/30/20  0600   WBC 4.1 2.7*   LACTATE 1.1  --      Recent Labs     08/29/20  1551 08/30/20  0600   BUN 12 13   CREATININE 1.0 1.0     Estimated Creatinine Clearance: 58 mL/min (based on SCr of 1 mg/dL). Intake/Output Summary (Last 24 hours) at 8/30/2020 1652  Last data filed at 8/30/2020 0750  Gross per 24 hour   Intake 120 ml   Output 600 ml   Net -480 ml       Pertinent Cultures:  Date    Source    Results  08/29   Blood    Pending  08/29   COVID-19   Pending  08/29                          Legionella                               Negative  08/29                          Strep Pneumo                        Pending  0829                           RESP Disease PCR               Pending  08/29                          Sputum                                   Pending  08/29                          Nasal MRSA Screen               Pending    Assessment:  · WBC now low @2.7, pt with a fever of 103.1  · SCr, BUN: SCR remains normal, output appears ok  · PMH significant for multiple myeloma (has been on oral chemotherapy: Revlimid)  · Day(s) of therapy: 2  · Vancomycin level: Scheduled for 9/1 @03:30    Plan:  · Renal trends remain normal  · Continue Vancomycin 1,250 mg IV Q 18 Hours  · Check Vancomycin trough prior to fourth dose early tomorrow morning  · Pharmacy will continue to monitor patient and adjust therapy as indicated    VANCOMYCIN TROUGH SCHEDULED FOR 09/01/2020 @ 3:30 AM    Thank you for the consult. Brittany Hinton  8/30/2020 4:52 PM

## 2020-08-31 LAB
ADENOVIRUS DETECTION BY PCR: NOT DETECTED
ALBUMIN SERPL-MCNC: 3.4 GM/DL (ref 3.4–5)
ALP BLD-CCNC: 176 IU/L (ref 40–128)
ALT SERPL-CCNC: 73 U/L (ref 10–40)
ANION GAP SERPL CALCULATED.3IONS-SCNC: 11 MMOL/L (ref 4–16)
APTT: 33.9 SECONDS (ref 25.1–37.1)
AST SERPL-CCNC: 49 IU/L (ref 15–37)
BASOPHILS ABSOLUTE: 0 K/CU MM
BASOPHILS RELATIVE PERCENT: 0.7 % (ref 0–1)
BILIRUB SERPL-MCNC: 1.1 MG/DL (ref 0–1)
BORDETELLA PARAPERTUSSIS BY PCR: NOT DETECTED
BORDETELLA PERTUSSIS PCR: NOT DETECTED
BUN BLDV-MCNC: 12 MG/DL (ref 6–23)
CALCIUM SERPL-MCNC: 7.5 MG/DL (ref 8.3–10.6)
CHLAMYDOPHILA PNEUMONIA PCR: NOT DETECTED
CHLORIDE BLD-SCNC: 102 MMOL/L (ref 99–110)
CO2: 24 MMOL/L (ref 21–32)
CORONAVIRUS 229E PCR: NOT DETECTED
CORONAVIRUS HKU1 PCR: NOT DETECTED
CORONAVIRUS NL63 PCR: NOT DETECTED
CORONAVIRUS OC43 PCR: NOT DETECTED
CREAT SERPL-MCNC: 1.1 MG/DL (ref 0.6–1.1)
D DIMER: 2100 NG/ML(DDU)
DIFFERENTIAL TYPE: ABNORMAL
EOSINOPHILS ABSOLUTE: 0 K/CU MM
EOSINOPHILS RELATIVE PERCENT: 0.7 % (ref 0–3)
FIBRINOGEN LEVEL: 986 MG/DL (ref 196.9–442.1)
GFR AFRICAN AMERICAN: 60 ML/MIN/1.73M2
GFR NON-AFRICAN AMERICAN: 49 ML/MIN/1.73M2
GLUCOSE BLD-MCNC: 136 MG/DL (ref 70–99)
HCT VFR BLD CALC: 30.7 % (ref 37–47)
HEMOGLOBIN: 10.2 GM/DL (ref 12.5–16)
HUMAN METAPNEUMOVIRUS PCR: NOT DETECTED
IMMATURE NEUTROPHIL %: 0.7 % (ref 0–0.43)
INFLUENZA A BY PCR: NOT DETECTED
INFLUENZA A H1 (2009) PCR: NOT DETECTED
INFLUENZA A H1 PANDEMIC PCR: NOT DETECTED
INFLUENZA A H3 PCR: NOT DETECTED
INFLUENZA B BY PCR: NOT DETECTED
INR BLD: 1.11 INDEX
LYMPHOCYTES ABSOLUTE: 0.4 K/CU MM
LYMPHOCYTES RELATIVE PERCENT: 13.1 % (ref 24–44)
MAGNESIUM: 2 MG/DL (ref 1.8–2.4)
MCH RBC QN AUTO: 32 PG (ref 27–31)
MCHC RBC AUTO-ENTMCNC: 33.2 % (ref 32–36)
MCV RBC AUTO: 96.2 FL (ref 78–100)
MONOCYTES ABSOLUTE: 0.3 K/CU MM
MONOCYTES RELATIVE PERCENT: 10.5 % (ref 0–4)
MYCOPLASMA PNEUMONIAE PCR: NOT DETECTED
NUCLEATED RBC %: 0 %
PARAINFLUENZA 1 PCR: NOT DETECTED
PARAINFLUENZA 2 PCR: NOT DETECTED
PARAINFLUENZA 3 PCR: NOT DETECTED
PARAINFLUENZA 4 PCR: NOT DETECTED
PDW BLD-RTO: 15.2 % (ref 11.7–14.9)
PLATELET # BLD: 166 K/CU MM (ref 140–440)
PMV BLD AUTO: 9.6 FL (ref 7.5–11.1)
POTASSIUM SERPL-SCNC: 3 MMOL/L (ref 3.5–5.1)
PROTHROMBIN TIME: 13.5 SECONDS (ref 11.7–14.5)
RBC # BLD: 3.19 M/CU MM (ref 4.2–5.4)
RHINOVIRUS ENTEROVIRUS PCR: NOT DETECTED
RSV PCR: NOT DETECTED
SEGMENTED NEUTROPHILS ABSOLUTE COUNT: 2.3 K/CU MM
SEGMENTED NEUTROPHILS RELATIVE PERCENT: 74.3 % (ref 36–66)
SODIUM BLD-SCNC: 137 MMOL/L (ref 135–145)
STREP PNEUMONIAE ANTIGEN: NORMAL
TOTAL IMMATURE NEUTOROPHIL: 0.02 K/CU MM
TOTAL NUCLEATED RBC: 0 K/CU MM
TOTAL PROTEIN: 6.1 GM/DL (ref 6.4–8.2)
WBC # BLD: 3.1 K/CU MM (ref 4–10.5)

## 2020-08-31 PROCEDURE — C9113 INJ PANTOPRAZOLE SODIUM, VIA: HCPCS | Performed by: NURSE PRACTITIONER

## 2020-08-31 PROCEDURE — 6370000000 HC RX 637 (ALT 250 FOR IP): Performed by: NURSE PRACTITIONER

## 2020-08-31 PROCEDURE — 83735 ASSAY OF MAGNESIUM: CPT

## 2020-08-31 PROCEDURE — 6360000002 HC RX W HCPCS: Performed by: NURSE PRACTITIONER

## 2020-08-31 PROCEDURE — 85379 FIBRIN DEGRADATION QUANT: CPT

## 2020-08-31 PROCEDURE — 87081 CULTURE SCREEN ONLY: CPT

## 2020-08-31 PROCEDURE — 6370000000 HC RX 637 (ALT 250 FOR IP): Performed by: INTERNAL MEDICINE

## 2020-08-31 PROCEDURE — 85384 FIBRINOGEN ACTIVITY: CPT

## 2020-08-31 PROCEDURE — 1200000000 HC SEMI PRIVATE

## 2020-08-31 PROCEDURE — 85025 COMPLETE CBC W/AUTO DIFF WBC: CPT

## 2020-08-31 PROCEDURE — 85730 THROMBOPLASTIN TIME PARTIAL: CPT

## 2020-08-31 PROCEDURE — 2580000003 HC RX 258: Performed by: NURSE PRACTITIONER

## 2020-08-31 PROCEDURE — 80053 COMPREHEN METABOLIC PANEL: CPT

## 2020-08-31 PROCEDURE — 87581 M.PNEUMON DNA AMP PROBE: CPT

## 2020-08-31 PROCEDURE — 87798 DETECT AGENT NOS DNA AMP: CPT

## 2020-08-31 PROCEDURE — 87633 RESP VIRUS 12-25 TARGETS: CPT

## 2020-08-31 PROCEDURE — 94761 N-INVAS EAR/PLS OXIMETRY MLT: CPT

## 2020-08-31 PROCEDURE — 85610 PROTHROMBIN TIME: CPT

## 2020-08-31 PROCEDURE — 87486 CHLMYD PNEUM DNA AMP PROBE: CPT

## 2020-08-31 RX ORDER — POTASSIUM CHLORIDE 20 MEQ/1
40 TABLET, EXTENDED RELEASE ORAL ONCE
Status: COMPLETED | OUTPATIENT
Start: 2020-08-31 | End: 2020-08-31

## 2020-08-31 RX ORDER — POTASSIUM CHLORIDE 7.45 MG/ML
10 INJECTION INTRAVENOUS PRN
Status: DISCONTINUED | OUTPATIENT
Start: 2020-08-31 | End: 2020-09-02 | Stop reason: HOSPADM

## 2020-08-31 RX ADMIN — POTASSIUM CHLORIDE 10 MEQ: 7.46 INJECTION, SOLUTION INTRAVENOUS at 09:21

## 2020-08-31 RX ADMIN — SODIUM CHLORIDE, PRESERVATIVE FREE 10 ML: 5 INJECTION INTRAVENOUS at 11:16

## 2020-08-31 RX ADMIN — GUAIFENESIN 600 MG: 600 TABLET, EXTENDED RELEASE ORAL at 21:51

## 2020-08-31 RX ADMIN — ACETAMINOPHEN 650 MG: 325 TABLET ORAL at 17:17

## 2020-08-31 RX ADMIN — VANCOMYCIN HYDROCHLORIDE 1250 MG: 5 INJECTION, POWDER, LYOPHILIZED, FOR SOLUTION INTRAVENOUS at 11:15

## 2020-08-31 RX ADMIN — ACETAMINOPHEN 650 MG: 325 TABLET ORAL at 04:45

## 2020-08-31 RX ADMIN — PANTOPRAZOLE SODIUM 40 MG: 40 INJECTION, POWDER, FOR SOLUTION INTRAVENOUS at 09:21

## 2020-08-31 RX ADMIN — ASPIRIN 81 MG CHEWABLE TABLET 81 MG: 81 TABLET CHEWABLE at 21:51

## 2020-08-31 RX ADMIN — POTASSIUM CHLORIDE 40 MEQ: 1500 TABLET, EXTENDED RELEASE ORAL at 11:15

## 2020-08-31 RX ADMIN — CEFEPIME HYDROCHLORIDE 2 G: 2 INJECTION, POWDER, FOR SOLUTION INTRAVENOUS at 09:21

## 2020-08-31 RX ADMIN — POTASSIUM CHLORIDE 10 MEQ: 7.46 INJECTION, SOLUTION INTRAVENOUS at 06:57

## 2020-08-31 RX ADMIN — ENOXAPARIN SODIUM 40 MG: 100 INJECTION SUBCUTANEOUS at 09:22

## 2020-08-31 RX ADMIN — GUAIFENESIN 600 MG: 600 TABLET, EXTENDED RELEASE ORAL at 09:21

## 2020-08-31 RX ADMIN — MULTIPLE VITAMINS W/ MINERALS TAB 1 TABLET: TAB at 09:21

## 2020-08-31 RX ADMIN — SODIUM CHLORIDE, PRESERVATIVE FREE 10 ML: 5 INJECTION INTRAVENOUS at 09:22

## 2020-08-31 RX ADMIN — SODIUM CHLORIDE, PRESERVATIVE FREE 10 ML: 5 INJECTION INTRAVENOUS at 21:51

## 2020-08-31 RX ADMIN — Medication 2000 UNITS: at 09:21

## 2020-08-31 RX ADMIN — CEFEPIME HYDROCHLORIDE 2 G: 2 INJECTION, POWDER, FOR SOLUTION INTRAVENOUS at 21:51

## 2020-08-31 ASSESSMENT — PAIN SCALES - GENERAL
PAINLEVEL_OUTOF10: 0
PAINLEVEL_OUTOF10: 0

## 2020-08-31 NOTE — PROGRESS NOTES
[address]    Hospitalist Progress Note      Name:  Greta Nowak /Age/Sex: 1950  (78 y.o. female)   MRN & CSN:  [id number] & [id number] Admission Date/Time: 2020  5:33 PM   Location:  -A PCP: Tanmay Frederick MD         Hospital Day: 3    Assessment and Plan:   Greta Nowak is a 78 y.o.  female  who presents with Flu-like symptoms    Left lower lobe pneumonia    Due to immunocompromised situation will continue broad-spectrum IV antibiotics  Not requiring supplemental oxygen  Monitor blood cultures, check MRSA culture,   strep and Legionella antigens negative  Continue with IV cefepime and vancomycin, with intent to de-escalate    Acute viral syndrome - improving     Rule out COVID 19  Presented with generalized body ache, weakness, fever  SARS-CoV-2 PCR pending  Maintain appropriate PPE and droplet plus isolation    Hypokalemia - replace per protocol and recheck in am     Hypertension -on amlodipine and losartan    Multiple myeloma -holding Revlimid per oncology, monitor CBC and BMP closely    May transfer to MedSurg unit if COVID test is negative    Diet DIET GENERAL;   DVT Prophylaxis [] Lovenox, []  Heparin, [] SCDs, []No VTE prophylaxis, patient ambulating   GI Prophylaxis [] PPI, [] H2 Blocker, [] No GI prophylaxis, patient is receiving diet/Tube Feeds   Code Status Full Code   Disposition Patient requires continued admission due to pneumonia   MDM [] Low, [x] Moderate,[]  High     History of Present Illness: Subjective     Patient Seen & Examined at the bedside      Patient is resting in bed with no distress while on room air  Was feeling nauseous earlier - resolved now   No chest pain or shortness of breath - remains afebrile     Ten point ROS reviewed negative, unless as noted above    Objective:        Intake/Output Summary (Last 24 hours) at 2020 1045  Last data filed at 2020 1032  Gross per 24 hour   Intake 130 ml   Output 300 ml   Net -170 ml      Vitals:   Vitals:    20 1032   BP:    Pulse: 54   Resp: 14   Temp:    SpO2: 97%     Physical Exam:    GEN Awake female, resting in bed in no apparent distress. Appears given age. HENT Mucous membranes are moist.   RESP Clear to auscultation, no wheezes, rales or rhonchi. CARDIO/VASC -S1/S2 auscultated. Regular rate without appreciable murmurs, rubs, or gallops. Peripheral pulses equal bilaterally and palpable. No peripheral edema. GI Abdomen is soft without significant tenderness, masses, or guarding. Bowel sounds are normoactive. Rectal exam deferred.      Medications:   Medications:    amLODIPine  5 mg Oral QAM    aspirin  81 mg Oral Nightly    vitamin D  2,000 Units Oral QAM    losartan  50 mg Oral QAM    therapeutic multivitamin-minerals  1 tablet Oral QAM    sodium chloride flush  10 mL Intravenous 2 times per day    enoxaparin  40 mg Subcutaneous Daily    pantoprazole  40 mg Intravenous Daily    cefepime  2 g Intravenous Q12H    vancomycin  15 mg/kg Intravenous Q18H    guaiFENesin  600 mg Oral BID      Infusions:   PRN Meds: potassium chloride, 10 mEq, PRN  albuterol sulfate HFA, 2 puff, Q6H PRN  acetaminophen, 650 mg, Q6H PRN    Or  acetaminophen, 650 mg, Q6H PRN  sodium chloride flush, 10 mL, PRN  polyethylene glycol, 17 g, Daily PRN  promethazine, 12.5 mg, Q6H PRN    Or  ondansetron, 4 mg, Q6H PRN          Electronically signed by Odalys Truong MD on 8/31/2020 at 10:45 AM

## 2020-08-31 NOTE — PROGRESS NOTES
[address] [facility]  consulted by Dr. Lashae Magaña for monitoring and adjustment. Indication for treatment: Pneumonia, COVID-19 R/O  Goal trough: 15 mcg/mL     Pertinent Laboratory Values:   Temp Readings from Last 3 Encounters:   08/31/20 98.4 °F (36.9 °C) (Oral)   08/21/20 96.3 °F (35.7 °C) (Infrared)   04/23/20 98.3 °F (36.8 °C) (Temporal)     Recent Labs     08/29/20  1551 08/30/20  0600 08/31/20  0500   WBC 4.1 2.7* 3.1*   LACTATE 1.1  --   --      Recent Labs     08/29/20  1551 08/30/20  0600 08/31/20  0500   BUN 12 13 12   CREATININE 1.0 1.0 1.1     Estimated Creatinine Clearance: 53 mL/min (based on SCr of 1.1 mg/dL). Intake/Output Summary (Last 24 hours) at 8/31/2020 1435  Last data filed at 8/31/2020 1304  Gross per 24 hour   Intake 140 ml   Output 1300 ml   Net -1160 ml       Pertinent Cultures:  Date    Source    Results  08/29   Blood    Sent  08/29   COVID-19   Pending  08/29                          Legionella                                 Negative  08/29                          Strep Pneumo                          Negative  0829                           RESP Disease PCR                Negative  08/29                          Sputum                                   Ordered  08/29                          Nasal MRSA Screen               Sent    Assessment:  · Neutropenic/afebrile  · SCr, BUN: stable  · PMH significant for multiple myeloma (has been on oral chemotherapy: Revlimid)  · Day(s) of therapy: 3  · Vancomycin level: Scheduled for 9/1 @03:30    Plan:  · Continue Vancomycin 1,250 mg IV Q 18 Hours  · Check Vancomycin trough prior to fourth dose early tomorrow morning  · Pharmacy will continue to monitor patient and adjust therapy as indicated    VANCOMYCIN TROUGH SCHEDULED FOR 09/01/2020 @ 3:30 AM    Thank you for the consult.   Starla Sebastian RPh  8/31/2020 2:35 PM

## 2020-08-31 NOTE — CARE COORDINATION
Patient admitted to [facility] unit. Per medical record review patient is from home with her  and is independent. Patient has PCP and insurance to assist with RX coverage. No discharge needs identified at this time. Case Management to follow in case any needs would arise.

## 2020-08-31 NOTE — PLAN OF CARE
Problem: Falls - Risk of:  Goal: Will remain free from falls  Description: Will remain free from falls  8/31/2020 1201 by Laura Crane RN  Outcome: Ongoing  8/31/2020 1201 by Laura Crane RN  Outcome: Ongoing  Goal: Absence of physical injury  Description: Absence of physical injury  8/31/2020 1201 by Laura Crane RN  Outcome: Ongoing  8/31/2020 1201 by Laura Crane RN  Outcome: Ongoing     Problem: Airway Clearance - Ineffective  Goal: Achieve or maintain patent airway  8/31/2020 1201 by Laura Crane RN  Outcome: Ongoing  8/31/2020 1201 by Laura Crane RN  Outcome: Ongoing     Problem: Gas Exchange - Impaired  Goal: Absence of hypoxia  8/31/2020 1201 by Laura Crane RN  Outcome: Ongoing  8/31/2020 1201 by Laura Crane RN  Outcome: Ongoing  Goal: Promote optimal lung function  8/31/2020 1201 by Laura Crane RN  Outcome: Ongoing  8/31/2020 1201 by Laura Crane RN  Outcome: Ongoing     Problem: Breathing Pattern - Ineffective  Goal: Ability to achieve and maintain a regular respiratory rate  8/31/2020 1201 by Laura Crane RN  Outcome: Ongoing  8/31/2020 1201 by Laura Crane RN  Outcome: Ongoing     Problem:  Body Temperature -  Risk of, Imbalanced  Goal: Ability to maintain a body temperature within defined limits  8/31/2020 1201 by Laura Crane RN  Outcome: Ongoing  8/31/2020 1201 by Laura Crane RN  Outcome: Ongoing  Goal: Will regain or maintain usual level of consciousness  8/31/2020 1201 by Laura Crane RN  Outcome: Ongoing  8/31/2020 1201 by Laura Crane RN  Outcome: Ongoing  Goal: Complications related to the disease process, condition or treatment will be avoided or minimized  8/31/2020 1201 by Laura Crane RN  Outcome: Ongoing  8/31/2020 1201 by Laura Crane RN  Outcome: Ongoing     Problem: Isolation Precautions - Risk of Spread of Infection  Goal: Prevent transmission of infection  8/31/2020 1201 by Anthony Aguilar RN  Outcome: Ongoing  8/31/2020 1201 by Anthony Aguilar RN  Outcome: Ongoing     Problem: Nutrition Deficits  Goal: Optimize nutrtional status  8/31/2020 1201 by Anthony Aguilar RN  Outcome: Ongoing  8/31/2020 1201 by Anthony Aguilar RN  Outcome: Ongoing     Problem: Risk for Fluid Volume Deficit  Goal: Maintain normal heart rhythm  8/31/2020 1201 by Anthony Aguilar RN  Outcome: Ongoing  8/31/2020 1201 by Anthony Aguilar RN  Outcome: Ongoing  Goal: Maintain absence of muscle cramping  8/31/2020 1201 by Anthony Aguilar RN  Outcome: Ongoing  8/31/2020 1201 by Anthony Aguilar RN  Outcome: Ongoing  Goal: Maintain normal serum potassium, sodium, calcium, phosphorus, and pH  8/31/2020 1201 by Anthony Aguilar RN  Outcome: Ongoing  8/31/2020 1201 by Anthony Aguilar RN  Outcome: Ongoing     Problem: Loneliness or Risk for Loneliness  Goal: Demonstrate positive use of time alone when socialization is not possible  8/31/2020 1201 by Anthony Aguilar RN  Outcome: Ongoing  8/31/2020 1201 by Anthony Aguilar RN  Outcome: Ongoing     Problem: Fatigue  Goal: Verbalize increase energy and improved vitality  8/31/2020 1201 by Anthony Aguilar RN  Outcome: Ongoing  8/31/2020 1201 by Anthony Aguilar RN  Outcome: Ongoing     Problem: Patient Education: Go to Patient Education Activity  Goal: Patient/Family Education  8/31/2020 1201 by Anthony Aguilar RN  Outcome: Ongoing  8/31/2020 1201 by Anthony Aguilar RN  Outcome: Ongoing

## 2020-09-01 LAB
ALBUMIN SERPL-MCNC: 3.1 GM/DL (ref 3.4–5)
ALP BLD-CCNC: 180 IU/L (ref 40–128)
ALT SERPL-CCNC: 79 U/L (ref 10–40)
ANION GAP SERPL CALCULATED.3IONS-SCNC: 11 MMOL/L (ref 4–16)
APTT: 19.7 SECONDS (ref 25.1–37.1)
AST SERPL-CCNC: 58 IU/L (ref 15–37)
BASOPHILS ABSOLUTE: 0 K/CU MM
BASOPHILS RELATIVE PERCENT: 0.4 % (ref 0–1)
BILIRUB SERPL-MCNC: 0.6 MG/DL (ref 0–1)
BUN BLDV-MCNC: 15 MG/DL (ref 6–23)
CALCIUM SERPL-MCNC: 6.9 MG/DL (ref 8.3–10.6)
CHLORIDE BLD-SCNC: 102 MMOL/L (ref 99–110)
CO2: 23 MMOL/L (ref 21–32)
CREAT SERPL-MCNC: 1.2 MG/DL (ref 0.6–1.1)
D DIMER: 1804 NG/ML(DDU)
DIFFERENTIAL TYPE: ABNORMAL
DOSE AMOUNT: ABNORMAL
DOSE TIME: ABNORMAL
EOSINOPHILS ABSOLUTE: 0.1 K/CU MM
EOSINOPHILS RELATIVE PERCENT: 2 % (ref 0–3)
FIBRINOGEN LEVEL: 783 MG/DL (ref 196.9–442.1)
GFR AFRICAN AMERICAN: 54 ML/MIN/1.73M2
GFR NON-AFRICAN AMERICAN: 45 ML/MIN/1.73M2
GLUCOSE BLD-MCNC: 188 MG/DL (ref 70–99)
HCT VFR BLD CALC: 25.9 % (ref 37–47)
HEMOGLOBIN: 8.7 GM/DL (ref 12.5–16)
HIGH SENSITIVE C-REACTIVE PROTEIN: 204.3 MG/L
IMMATURE NEUTROPHIL %: 0.8 % (ref 0–0.43)
INR BLD: 1.07 INDEX
LYMPHOCYTES ABSOLUTE: 0.4 K/CU MM
LYMPHOCYTES RELATIVE PERCENT: 13.7 % (ref 24–44)
MAGNESIUM: 2 MG/DL (ref 1.8–2.4)
MCH RBC QN AUTO: 32 PG (ref 27–31)
MCHC RBC AUTO-ENTMCNC: 33.6 % (ref 32–36)
MCV RBC AUTO: 95.2 FL (ref 78–100)
MONOCYTES ABSOLUTE: 0.3 K/CU MM
MONOCYTES RELATIVE PERCENT: 10.6 % (ref 0–4)
NUCLEATED RBC %: 0 %
PDW BLD-RTO: 15.1 % (ref 11.7–14.9)
PLATELET # BLD: 156 K/CU MM (ref 140–440)
PMV BLD AUTO: 10 FL (ref 7.5–11.1)
POTASSIUM SERPL-SCNC: 3.2 MMOL/L (ref 3.5–5.1)
PROCALCITONIN: 0.61
PROTHROMBIN TIME: 13 SECONDS (ref 11.7–14.5)
RBC # BLD: 2.72 M/CU MM (ref 4.2–5.4)
SARS-COV-2: NOT DETECTED
SEGMENTED NEUTROPHILS ABSOLUTE COUNT: 1.9 K/CU MM
SEGMENTED NEUTROPHILS RELATIVE PERCENT: 72.5 % (ref 36–66)
SODIUM BLD-SCNC: 136 MMOL/L (ref 135–145)
SOURCE: NORMAL
TOTAL IMMATURE NEUTOROPHIL: 0.02 K/CU MM
TOTAL NUCLEATED RBC: 0 K/CU MM
TOTAL PROTEIN: 5.5 GM/DL (ref 6.4–8.2)
VANCOMYCIN TROUGH: 6.5 UG/ML (ref 10–20)
WBC # BLD: 2.6 K/CU MM (ref 4–10.5)

## 2020-09-01 PROCEDURE — 85730 THROMBOPLASTIN TIME PARTIAL: CPT

## 2020-09-01 PROCEDURE — 6370000000 HC RX 637 (ALT 250 FOR IP): Performed by: INTERNAL MEDICINE

## 2020-09-01 PROCEDURE — 80053 COMPREHEN METABOLIC PANEL: CPT

## 2020-09-01 PROCEDURE — 6360000002 HC RX W HCPCS: Performed by: INTERNAL MEDICINE

## 2020-09-01 PROCEDURE — 6370000000 HC RX 637 (ALT 250 FOR IP): Performed by: NURSE PRACTITIONER

## 2020-09-01 PROCEDURE — 86141 C-REACTIVE PROTEIN HS: CPT

## 2020-09-01 PROCEDURE — 85379 FIBRIN DEGRADATION QUANT: CPT

## 2020-09-01 PROCEDURE — 6360000002 HC RX W HCPCS: Performed by: NURSE PRACTITIONER

## 2020-09-01 PROCEDURE — 2580000003 HC RX 258: Performed by: INTERNAL MEDICINE

## 2020-09-01 PROCEDURE — 2580000003 HC RX 258: Performed by: NURSE PRACTITIONER

## 2020-09-01 PROCEDURE — 1200000000 HC SEMI PRIVATE

## 2020-09-01 PROCEDURE — 85610 PROTHROMBIN TIME: CPT

## 2020-09-01 PROCEDURE — 85384 FIBRINOGEN ACTIVITY: CPT

## 2020-09-01 PROCEDURE — 84145 PROCALCITONIN (PCT): CPT

## 2020-09-01 PROCEDURE — 94761 N-INVAS EAR/PLS OXIMETRY MLT: CPT

## 2020-09-01 PROCEDURE — 85025 COMPLETE CBC W/AUTO DIFF WBC: CPT

## 2020-09-01 PROCEDURE — 83735 ASSAY OF MAGNESIUM: CPT

## 2020-09-01 PROCEDURE — C9113 INJ PANTOPRAZOLE SODIUM, VIA: HCPCS | Performed by: NURSE PRACTITIONER

## 2020-09-01 PROCEDURE — 80202 ASSAY OF VANCOMYCIN: CPT

## 2020-09-01 RX ORDER — POTASSIUM CHLORIDE 20 MEQ/1
40 TABLET, EXTENDED RELEASE ORAL ONCE
Status: COMPLETED | OUTPATIENT
Start: 2020-09-01 | End: 2020-09-01

## 2020-09-01 RX ADMIN — ENOXAPARIN SODIUM 40 MG: 100 INJECTION SUBCUTANEOUS at 10:23

## 2020-09-01 RX ADMIN — LOSARTAN POTASSIUM 50 MG: 50 TABLET, FILM COATED ORAL at 10:22

## 2020-09-01 RX ADMIN — ASPIRIN 81 MG CHEWABLE TABLET 81 MG: 81 TABLET CHEWABLE at 20:49

## 2020-09-01 RX ADMIN — GUAIFENESIN 600 MG: 600 TABLET, EXTENDED RELEASE ORAL at 20:49

## 2020-09-01 RX ADMIN — CALCIUM GLUCONATE 1 G: 98 INJECTION, SOLUTION INTRAVENOUS at 05:51

## 2020-09-01 RX ADMIN — CEFEPIME HYDROCHLORIDE 2 G: 2 INJECTION, POWDER, FOR SOLUTION INTRAVENOUS at 10:24

## 2020-09-01 RX ADMIN — GUAIFENESIN 600 MG: 600 TABLET, EXTENDED RELEASE ORAL at 10:22

## 2020-09-01 RX ADMIN — MULTIPLE VITAMINS W/ MINERALS TAB 1 TABLET: TAB at 10:22

## 2020-09-01 RX ADMIN — Medication 2000 UNITS: at 10:22

## 2020-09-01 RX ADMIN — CEFEPIME HYDROCHLORIDE 2 G: 2 INJECTION, POWDER, FOR SOLUTION INTRAVENOUS at 20:49

## 2020-09-01 RX ADMIN — AMLODIPINE BESYLATE 5 MG: 5 TABLET ORAL at 10:22

## 2020-09-01 RX ADMIN — VANCOMYCIN HYDROCHLORIDE 1250 MG: 5 INJECTION, POWDER, LYOPHILIZED, FOR SOLUTION INTRAVENOUS at 04:42

## 2020-09-01 RX ADMIN — PANTOPRAZOLE SODIUM 40 MG: 40 INJECTION, POWDER, FOR SOLUTION INTRAVENOUS at 10:23

## 2020-09-01 RX ADMIN — SODIUM CHLORIDE, PRESERVATIVE FREE 10 ML: 5 INJECTION INTRAVENOUS at 10:23

## 2020-09-01 RX ADMIN — VANCOMYCIN HYDROCHLORIDE 1000 MG: 1 INJECTION, POWDER, LYOPHILIZED, FOR SOLUTION INTRAVENOUS at 11:57

## 2020-09-01 RX ADMIN — POTASSIUM CHLORIDE 40 MEQ: 1500 TABLET, EXTENDED RELEASE ORAL at 11:10

## 2020-09-01 RX ADMIN — SODIUM CHLORIDE, PRESERVATIVE FREE 10 ML: 5 INJECTION INTRAVENOUS at 20:49

## 2020-09-01 RX ADMIN — VANCOMYCIN HYDROCHLORIDE 1000 MG: 1 INJECTION, POWDER, LYOPHILIZED, FOR SOLUTION INTRAVENOUS at 23:07

## 2020-09-01 ASSESSMENT — PAIN SCALES - GENERAL
PAINLEVEL_OUTOF10: 0

## 2020-09-01 NOTE — PLAN OF CARE
Problem: Falls - Risk of:  Goal: Will remain free from falls  Description: Will remain free from falls  9/1/2020 0004 by Adal Alejandra RN  Outcome: Ongoing  8/31/2020 1201 by Edilia Scheuermann, RN  Outcome: Ongoing  Goal: Absence of physical injury  Description: Absence of physical injury  9/1/2020 0004 by Adal Alejandra RN  Outcome: Ongoing  8/31/2020 1201 by Edilia Scheuermann, RN  Outcome: Ongoing     Problem: Airway Clearance - Ineffective  Goal: Achieve or maintain patent airway  9/1/2020 0004 by Adal Alejandra RN  Outcome: Ongoing  8/31/2020 1201 by Edilia Scheuermann, RN  Outcome: Ongoing     Problem: Gas Exchange - Impaired  Goal: Absence of hypoxia  9/1/2020 0004 by Adal Alejandra RN  Outcome: Ongoing  8/31/2020 1201 by Edilia Scheuermann, RN  Outcome: Ongoing  Goal: Promote optimal lung function  9/1/2020 0004 by Adal Alejandra RN  Outcome: Ongoing  8/31/2020 1201 by Edilia Scheuermann, RN  Outcome: Ongoing     Problem: Breathing Pattern - Ineffective  Goal: Ability to achieve and maintain a regular respiratory rate  9/1/2020 0004 by Adal Alejandra RN  Outcome: Ongoing  8/31/2020 1201 by Edilia Scheuermann, RN  Outcome: Ongoing     Problem:  Body Temperature -  Risk of, Imbalanced  Goal: Ability to maintain a body temperature within defined limits  9/1/2020 0004 by Adal Alejandra RN  Outcome: Ongoing  8/31/2020 1201 by Edilia Scheuermann, RN  Outcome: Ongoing  Goal: Will regain or maintain usual level of consciousness  9/1/2020 0004 by Adal Alejandra RN  Outcome: Ongoing  8/31/2020 1201 by Edilia Scheuermann, RN  Outcome: Ongoing  Goal: Complications related to the disease process, condition or treatment will be avoided or minimized  9/1/2020 0004 by Adal Alejandra RN  Outcome: Ongoing  8/31/2020 1201 by Edilia Scheuermann, RN  Outcome: Ongoing     Problem: Isolation Precautions - Risk of Spread of Infection  Goal: Prevent transmission of infection  9/1/2020 0004 by [facility] Deisy Watts RN  Outcome: Ongoing  8/31/2020 1201 by Jermaine Chapman RN  Outcome: Ongoing     Problem: Nutrition Deficits  Goal: Optimize nutrtional status  9/1/2020 0004 by Roz Murillo RN  Outcome: Ongoing  8/31/2020 1201 by Jermaine Chapman RN  Outcome: Ongoing     Problem: Risk for Fluid Volume Deficit  Goal: Maintain normal heart rhythm  9/1/2020 0004 by Roz Murillo RN  Outcome: Ongoing  8/31/2020 1201 by Jermaine Chapman RN  Outcome: Ongoing  Goal: Maintain absence of muscle cramping  9/1/2020 0004 by Roz Murillo RN  Outcome: Ongoing  8/31/2020 1201 by Jermaine Chapman RN  Outcome: Ongoing  Goal: Maintain normal serum potassium, sodium, calcium, phosphorus, and pH  9/1/2020 0004 by Roz Murillo RN  Outcome: Ongoing  8/31/2020 1201 by Jermaine Chapman RN  Outcome: Ongoing     Problem: Loneliness or Risk for Loneliness  Goal: Demonstrate positive use of time alone when socialization is not possible  9/1/2020 0004 by Roz Murillo RN  Outcome: Ongoing  8/31/2020 1201 by Jermaine Chapman RN  Outcome: Ongoing     Problem: Fatigue  Goal: Verbalize increase energy and improved vitality  9/1/2020 0004 by Roz Murillo RN  Outcome: Ongoing  8/31/2020 1201 by Jermaine Chapman RN  Outcome: Ongoing     Problem: Patient Education: Go to Patient Education Activity  Goal: Patient/Family Education  9/1/2020 0004 by Roz Murillo RN  Outcome: Ongoing  8/31/2020 1201 by Jermaine Chapman RN  Outcome: Ongoing

## 2020-09-01 NOTE — PROGRESS NOTES
Pt transferred to unit via wheelchair. Ambulated to bathroom and back to bed with steady gait. Denies pain at this time.

## 2020-09-01 NOTE — PROGRESS NOTES
This patient was transferred to this unit at 1300. She was brought with wheelchair. Alert and oriented with no compalin and put in a chair.

## 2020-09-01 NOTE — PLAN OF CARE
Problem: Falls - Risk of:  Goal: Will remain free from falls  Description: Will remain free from falls  Outcome: Ongoing  Goal: Absence of physical injury  Description: Absence of physical injury  Outcome: Ongoing     Problem: Airway Clearance - Ineffective  Goal: Achieve or maintain patent airway  Outcome: Ongoing     Problem: Gas Exchange - Impaired  Goal: Absence of hypoxia  Outcome: Ongoing  Goal: Promote optimal lung function  Outcome: Ongoing     Problem: Breathing Pattern - Ineffective  Goal: Ability to achieve and maintain a regular respiratory rate  Outcome: Ongoing     Problem:  Body Temperature -  Risk of, Imbalanced  Goal: Ability to maintain a body temperature within defined limits  Outcome: Ongoing  Goal: Will regain or maintain usual level of consciousness  Outcome: Ongoing  Goal: Complications related to the disease process, condition or treatment will be avoided or minimized  Outcome: Ongoing     Problem: Isolation Precautions - Risk of Spread of Infection  Goal: Prevent transmission of infection  Outcome: Ongoing     Problem: Nutrition Deficits  Goal: Optimize nutrtional status  Outcome: Ongoing     Problem: Risk for Fluid Volume Deficit  Goal: Maintain normal heart rhythm  Outcome: Ongoing  Goal: Maintain absence of muscle cramping  Outcome: Ongoing  Goal: Maintain normal serum potassium, sodium, calcium, phosphorus, and pH  Outcome: Ongoing     Problem: Loneliness or Risk for Loneliness  Goal: Demonstrate positive use of time alone when socialization is not possible  Outcome: Ongoing     Problem: Fatigue  Goal: Verbalize increase energy and improved vitality  Outcome: Ongoing     Problem: Patient Education: Go to Patient Education Activity  Goal: Patient/Family Education  Outcome: Ongoing

## 2020-09-01 NOTE — CARE COORDINATION
Reviewed chart and attempted to call pt in room but not answer. Called pt  at home and discussed discharge needs/plans. Pt lives with , PTA pt totally independent.  can assist her as needed. She has a PCP and insurance that covers medications, and she gets assistance with Chemo medications d/t high cost.  Plan is home with , they are open to [facility] if needed. CM will continue to follow.

## 2020-09-01 NOTE — PROGRESS NOTES
Hospitalist Progress Note      Name:  Kyleigh Goodwin /Age/Sex: 1950  (78 y.o. female)   MRN & CSN:  [id number] & [id number] Admission Date/Time: 2020  5:33 PM   Location:  [address] [place] [address] PCP: Boston Sultana MD         Hospital Day: 4    Assessment and Plan:   Kyleigh Goodwin is a 78 y.o.  female  who presents with Flu-like symptoms     1) Probable Gram positive PNA; Lt lower lobe  -CXR: LLL consolidation   -Background immunosuppression (hx of MM)  -CRP and Procal elevated; will cycle  -COVID 19 negative  -Follow MRSA screen; if negative will discharge on levaquin  -Continue broad spectrum IV Vanc and Cefepime        2) Hypokalemia - replace per protocol and recheck in am      3) Essential Hypertension -on amlodipine and losartan     4) Multiple myeloma -holding Revlimid per oncology       Diet DIET GENERAL;   DVT Prophylaxis [] Lovenox, []  Heparin, [] SCDs, [] Ambulation   GI Prophylaxis [] PPI,  [] H2 Blocker,  [] Carafate,  [] Diet/Tube Feeds   Code Status Full Code   Disposition Home   MDM      History of Present Illness:     Patient was seen and examined  Denied any worsening SOB  Feeling better today  No chest pain or palpitations  No fever, chills, N/V/D    Ten point ROS reviewed negative, unless as noted above    Objective: Intake/Output Summary (Last 24 hours) at 2020 1035  Last data filed at 2020 0941  Gross per 24 hour   Intake 910 ml   Output 1000 ml   Net -90 ml      Vitals:   Vitals:    20 0939   BP: (!) 127/55   Pulse: 72   Resp: 14   Temp: 99.2 °F (37.3 °C)   SpO2:      Physical Exam:   GEN Awake female, sitting upright in bed in no apparent distress. Appears given age. EYES Pupils are equally round. No scleral erythema, discharge, or conjunctivitis. HENT Mucous membranes are moist. Oral pharynx without exudates, no evidence of thrush. NECK Supple, no apparent thyromegaly or masses. RESP Clear to auscultation, no wheezes, rales or rhonchi.   Symmetric chest movement while on room air. CARDIO/VASC S1/S2 auscultated. Regular rate without appreciable murmurs, rubs, or gallops. No JVD or carotid bruits. Peripheral pulses equal bilaterally and palpable. No peripheral edema. GI Abdomen is soft without significant tenderness, masses, or guarding. Bowel sounds are normoactive. Rectal exam deferred.  No costovertebral angle tenderness. Normal appearing external genitalia. Johns catheter is not present. HEME/LYMPH No palpable cervical lymphadenopathy and no hepatosplenomegaly. No petechiae or ecchymoses. MSK No gross joint deformities. SKIN Normal coloration, warm, dry. NEURO Cranial nerves appear grossly intact, normal speech, no lateralizing weakness. PSYCH Awake, alert, oriented x 4. Affect appropriate.     Medications:   Medications:    vancomycin  1,000 mg Intravenous Q12H    amLODIPine  5 mg Oral QAM    aspirin  81 mg Oral Nightly    vitamin D  2,000 Units Oral QAM    losartan  50 mg Oral QAM    therapeutic multivitamin-minerals  1 tablet Oral QAM    sodium chloride flush  10 mL Intravenous 2 times per day    enoxaparin  40 mg Subcutaneous Daily    pantoprazole  40 mg Intravenous Daily    cefepime  2 g Intravenous Q12H    guaiFENesin  600 mg Oral BID      Infusions:   PRN Meds: potassium chloride, 10 mEq, PRN  albuterol sulfate HFA, 2 puff, Q6H PRN  acetaminophen, 650 mg, Q6H PRN    Or  acetaminophen, 650 mg, Q6H PRN  sodium chloride flush, 10 mL, PRN  polyethylene glycol, 17 g, Daily PRN  promethazine, 12.5 mg, Q6H PRN    Or  ondansetron, 4 mg, Q6H PRN          Electronically signed by Cheyenne Craig MD on 9/1/2020 at 10:35 AM

## 2020-09-02 ENCOUNTER — TELEPHONE (OUTPATIENT)
Dept: FAMILY MEDICINE CLINIC | Age: 70
End: 2020-09-02

## 2020-09-02 VITALS
DIASTOLIC BLOOD PRESSURE: 63 MMHG | WEIGHT: 186 LBS | HEIGHT: 65 IN | RESPIRATION RATE: 16 BRPM | OXYGEN SATURATION: 95 % | HEART RATE: 73 BPM | SYSTOLIC BLOOD PRESSURE: 130 MMHG | BODY MASS INDEX: 30.99 KG/M2 | TEMPERATURE: 97.3 F

## 2020-09-02 LAB
ALBUMIN SERPL-MCNC: 3 GM/DL (ref 3.4–5)
ALP BLD-CCNC: 177 IU/L (ref 40–128)
ALT SERPL-CCNC: 71 U/L (ref 10–40)
ANION GAP SERPL CALCULATED.3IONS-SCNC: 9 MMOL/L (ref 4–16)
APTT: 27.9 SECONDS (ref 25.1–37.1)
AST SERPL-CCNC: 40 IU/L (ref 15–37)
BANDED NEUTROPHILS ABSOLUTE COUNT: 0.08 K/CU MM
BANDED NEUTROPHILS RELATIVE PERCENT: 4 % (ref 5–11)
BILIRUB SERPL-MCNC: 0.5 MG/DL (ref 0–1)
BUN BLDV-MCNC: 12 MG/DL (ref 6–23)
CALCIUM SERPL-MCNC: 7.4 MG/DL (ref 8.3–10.6)
CHLORIDE BLD-SCNC: 107 MMOL/L (ref 99–110)
CO2: 26 MMOL/L (ref 21–32)
CREAT SERPL-MCNC: 0.9 MG/DL (ref 0.6–1.1)
CULTURE: NORMAL
D DIMER: 2430 NG/ML(DDU)
DIFFERENTIAL TYPE: ABNORMAL
DOHLE BODIES: PRESENT
EOSINOPHILS ABSOLUTE: 0 K/CU MM
EOSINOPHILS RELATIVE PERCENT: 2 % (ref 0–3)
FIBRINOGEN LEVEL: 806 MG/DL (ref 196.9–442.1)
GFR AFRICAN AMERICAN: >60 ML/MIN/1.73M2
GFR NON-AFRICAN AMERICAN: >60 ML/MIN/1.73M2
GLUCOSE BLD-MCNC: 128 MG/DL (ref 70–99)
HCT VFR BLD CALC: 27.1 % (ref 37–47)
HEMOGLOBIN: 9.1 GM/DL (ref 12.5–16)
INR BLD: 1.01 INDEX
LYMPHOCYTES ABSOLUTE: 0.6 K/CU MM
LYMPHOCYTES RELATIVE PERCENT: 27 % (ref 24–44)
Lab: NORMAL
MAGNESIUM: 2.2 MG/DL (ref 1.8–2.4)
MCH RBC QN AUTO: 32 PG (ref 27–31)
MCHC RBC AUTO-ENTMCNC: 33.6 % (ref 32–36)
MCV RBC AUTO: 95.4 FL (ref 78–100)
METAMYELOCYTES ABSOLUTE COUNT: 0.02 K/CU MM
METAMYELOCYTES PERCENT: 1 %
MONOCYTES ABSOLUTE: 0.3 K/CU MM
MONOCYTES RELATIVE PERCENT: 15 % (ref 0–4)
PDW BLD-RTO: 15.3 % (ref 11.7–14.9)
PLATELET # BLD: 172 K/CU MM (ref 140–440)
PMV BLD AUTO: 9.5 FL (ref 7.5–11.1)
POTASSIUM SERPL-SCNC: 3.5 MMOL/L (ref 3.5–5.1)
PROTHROMBIN TIME: 12.2 SECONDS (ref 11.7–14.5)
RBC # BLD: 2.84 M/CU MM (ref 4.2–5.4)
SEGMENTED NEUTROPHILS ABSOLUTE COUNT: 1.1 K/CU MM
SEGMENTED NEUTROPHILS RELATIVE PERCENT: 51 % (ref 36–66)
SODIUM BLD-SCNC: 142 MMOL/L (ref 135–145)
SPECIMEN: NORMAL
TOTAL PROTEIN: 5.4 GM/DL (ref 6.4–8.2)
TOXIC GRANULATION: PRESENT
WBC # BLD: 2.1 K/CU MM (ref 4–10.5)
WBC # BLD: ABNORMAL 10*3/UL

## 2020-09-02 PROCEDURE — 2580000003 HC RX 258: Performed by: NURSE PRACTITIONER

## 2020-09-02 PROCEDURE — 85384 FIBRINOGEN ACTIVITY: CPT

## 2020-09-02 PROCEDURE — 6360000002 HC RX W HCPCS: Performed by: INTERNAL MEDICINE

## 2020-09-02 PROCEDURE — 85379 FIBRIN DEGRADATION QUANT: CPT

## 2020-09-02 PROCEDURE — 6360000002 HC RX W HCPCS: Performed by: NURSE PRACTITIONER

## 2020-09-02 PROCEDURE — 85027 COMPLETE CBC AUTOMATED: CPT

## 2020-09-02 PROCEDURE — 6370000000 HC RX 637 (ALT 250 FOR IP): Performed by: NURSE PRACTITIONER

## 2020-09-02 PROCEDURE — 99232 SBSQ HOSP IP/OBS MODERATE 35: CPT | Performed by: INTERNAL MEDICINE

## 2020-09-02 PROCEDURE — 83735 ASSAY OF MAGNESIUM: CPT

## 2020-09-02 PROCEDURE — 2580000003 HC RX 258: Performed by: INTERNAL MEDICINE

## 2020-09-02 PROCEDURE — 85610 PROTHROMBIN TIME: CPT

## 2020-09-02 PROCEDURE — 94761 N-INVAS EAR/PLS OXIMETRY MLT: CPT

## 2020-09-02 PROCEDURE — C9113 INJ PANTOPRAZOLE SODIUM, VIA: HCPCS | Performed by: NURSE PRACTITIONER

## 2020-09-02 PROCEDURE — 85730 THROMBOPLASTIN TIME PARTIAL: CPT

## 2020-09-02 PROCEDURE — 80053 COMPREHEN METABOLIC PANEL: CPT

## 2020-09-02 PROCEDURE — 85007 BL SMEAR W/DIFF WBC COUNT: CPT

## 2020-09-02 PROCEDURE — 36415 COLL VENOUS BLD VENIPUNCTURE: CPT

## 2020-09-02 RX ORDER — LEVOFLOXACIN 750 MG/1
750 TABLET ORAL DAILY
Qty: 5 TABLET | Refills: 0 | Status: SHIPPED | OUTPATIENT
Start: 2020-09-02 | End: 2020-09-07

## 2020-09-02 RX ADMIN — TBO-FILGRASTIM 300 MCG: 300 INJECTION, SOLUTION SUBCUTANEOUS at 11:12

## 2020-09-02 RX ADMIN — CEFEPIME HYDROCHLORIDE 2 G: 2 INJECTION, POWDER, FOR SOLUTION INTRAVENOUS at 08:58

## 2020-09-02 RX ADMIN — Medication 2000 UNITS: at 08:57

## 2020-09-02 RX ADMIN — VANCOMYCIN HYDROCHLORIDE 1000 MG: 1 INJECTION, POWDER, LYOPHILIZED, FOR SOLUTION INTRAVENOUS at 11:03

## 2020-09-02 RX ADMIN — ENOXAPARIN SODIUM 40 MG: 100 INJECTION SUBCUTANEOUS at 08:56

## 2020-09-02 RX ADMIN — AMLODIPINE BESYLATE 5 MG: 5 TABLET ORAL at 08:59

## 2020-09-02 RX ADMIN — SODIUM CHLORIDE, PRESERVATIVE FREE 10 ML: 5 INJECTION INTRAVENOUS at 08:58

## 2020-09-02 RX ADMIN — PANTOPRAZOLE SODIUM 40 MG: 40 INJECTION, POWDER, FOR SOLUTION INTRAVENOUS at 08:59

## 2020-09-02 RX ADMIN — MULTIPLE VITAMINS W/ MINERALS TAB 1 TABLET: TAB at 08:59

## 2020-09-02 RX ADMIN — GUAIFENESIN 600 MG: 600 TABLET, EXTENDED RELEASE ORAL at 09:00

## 2020-09-02 RX ADMIN — LOSARTAN POTASSIUM 50 MG: 50 TABLET, FILM COATED ORAL at 09:00

## 2020-09-02 ASSESSMENT — PAIN SCALES - GENERAL
PAINLEVEL_OUTOF10: 0
PAINLEVEL_OUTOF10: 0

## 2020-09-02 NOTE — PROGRESS NOTES
ONCOLOGY HEMATOLOGY CARE ([facility])  PROGRESS NOTE      Patient was seen and examined today. Not in any acute distress and no overnight events. She is getting better today. Has neutropenia on today blood test. No new complaints. PHYSICAL EXAM    Vitals: /63   Pulse 73   Temp 97.3 °F (36.3 °C) (Oral)   Resp 16   Ht 5' 5\" (1.651 m)   Wt 186 lb (84.4 kg)   SpO2 95%   BMI 30.95 kg/m²   CONSTITUTIONAL: awake, alert, cooperative, no apparent distress   EYES: pupils equal, round and reactive to light, sclera clear and conjunctiva normal  ENT: Normocephalic, without obvious abnormality, atraumatic  NECK: supple, symmetrical, no jugular venous distension and no carotid bruits   HEMATOLOGIC/LYMPHATIC: no cervical, supraclavicular or axillary lymphadenopathy   LUNGS: no increased work of breathing and clear to auscultation   CARDIOVASCULAR: regular rate and rhythm, normal S1 and S2, no murmur noted  ABDOMEN: normal bowel sounds x 4, soft, non-distended, non-tender, no masses palpated, no hepatosplenomgaly   MUSCULOSKELETAL: full range of motion noted, tone is normal  NEUROLOGIC: awake, alert, oriented to name, place and time. Motor skills grossly intact. SKIN: Normal skin color, texture, turgor and no jaundice.  appears intact   EXTREMITIES: no LE edema     LABORATORY RESULTS  CBC:   Recent Labs     08/31/20  0500 09/01/20 0300 09/02/20  0458   WBC 3.1* 2.6* 2.1*   HGB 10.2* 8.7* 9.1*    156 172     BMP:    Recent Labs     08/31/20  0500 09/01/20 0300 09/02/20  0458    136 142   K 3.0* 3.2* 3.5    102 107   CO2 24 23 26   BUN 12 15 12   CREATININE 1.1 1.2* 0.9   GLUCOSE 136* 188* 128*     Hepatic:   Recent Labs     08/31/20  0500 09/01/20 0300 09/02/20  0458   AST 49* 58* 40*   ALT 73* 79* 71*   BILITOT 1.1* 0.6 0.5   ALKPHOS 176* 180* 177*     INR:   Recent Labs     08/31/20  0500 09/01/20 0300 09/02/20  0458   INR 1.11 1.07 1.01     ASSESSMENT  Multiple myeloma - on maintenance chemo with revlimid (revlimid was held since admission)  LLL pneumonia    RECOMMENDATION  Please continue with current antibiotics for now. I will give granix today for her neutropenia. She can be discharged home from oncology stand points. Recommend her to resume back on Revlimid after completion of antibiotics. We will continue to follow the patient. Thank you.

## 2020-09-02 NOTE — DISCHARGE SUMMARY
1 tablet by mouth every morning             aspirin 81 MG tablet  Take 81 mg by mouth nightly              Cholecalciferol (VITAMIN D) 2000 units CAPS capsule  Take 2,000 Units by mouth every morning              lenalidomide (REVLIMID) 10 MG chemo capsule  Take 1 every day with no breaks. lenalidomide (REVLIMID) 10 MG chemo capsule  Take 2 capsules by mouth daily Take 1 every 14 days of 21 day cycle             levoFLOXacin (LEVAQUIN) 750 MG tablet  Take 1 tablet by mouth daily for 5 days             losartan (COZAAR) 50 MG tablet  Take 1 tablet by mouth every morning             Multiple Vitamins-Minerals (THERAPEUTIC MULTIVITAMIN-MINERALS) tablet  Take 1 tablet by mouth every morning             Omega-3 Fatty Acids (FISH OIL) 1000 MG CAPS  Take 3,000 mg by mouth 2 times daily             UNABLE TO FIND  Valcade injection, take 4 days of 21 day cycle             zoledronic acid (ZOMETA) 4 MG/5ML injection  Infuse 4 mg intravenously once 1 per day of a 28 day cycle                 Objective Findings at Discharge:   /63   Pulse 73   Temp 97.3 °F (36.3 °C) (Oral)   Resp 16   Ht 5' 5\" (1.651 m)   Wt 186 lb (84.4 kg)   SpO2 95%   BMI 30.95 kg/m²            PHYSICAL EXAM   GEN Awake female, sitting upright in bed in no apparent distress. Appears given age. EYES Pupils are equally round. No scleral erythema, discharge, or conjunctivitis. HENT Mucous membranes are moist. Oral pharynx without exudates, no evidence of thrush. NECK Supple, no apparent thyromegaly or masses. RESP Clear to auscultation, no wheezes, rales or rhonchi. Symmetric chest movement while on room air. CARDIO/VASC S1/S2 auscultated. Regular rate without appreciable murmurs, rubs, or gallops. No JVD or carotid bruits. Peripheral pulses equal bilaterally and palpable. No peripheral edema. GI Abdomen is soft without significant tenderness, masses, or guarding. Bowel sounds are normoactive. Rectal exam deferred.     No

## 2020-09-03 ENCOUNTER — CARE COORDINATION (OUTPATIENT)
Dept: CASE MANAGEMENT | Age: 70
End: 2020-09-03

## 2020-09-03 LAB
CULTURE: NORMAL
CULTURE: NORMAL
Lab: NORMAL
Lab: NORMAL
SPECIMEN: NORMAL
SPECIMEN: NORMAL

## 2020-09-03 NOTE — CARE COORDINATION
Amy 45 Transitions Initial Follow Up Call    Call within 2 business days of discharge: Yes    Patient: Mireya  Patient : 1950   MRN: [id number]  Reason for Admission:   PNA of LLL  Discharge Date: 20 RARS: Readmission Risk Score: 15      Last Discharge [facility]       Complaint Diagnosis Description Type Department Provider    20 Fever Pneumonia of left lower lobe due to infectious organism [facility]) ED to Hosp-Admission (Discharged) (ADMITTED) [facility] 4N Marnie Salas MD; Michael Elena. .. Spoke with:   Patient spouse/ patient    Facility:  [facility]    Non-face-to-face services provided:  Education of patient/family/caregiver/guardian to support self-management-1    Care Transitions 24 Hour Call    Do you have a copy of your discharge instructions?:  Yes  Do you have all of your prescriptions and are they filled?:  Yes  Have you been contacted by a Mark Muller?:  No  Have you scheduled your follow up appointment?:  Yes  How are you going to get to your appointment?:  Car - family or friend to transport  Were you discharged with any Home Care or Post Acute Services:  No  Do you feel like you have everything you need to keep you well at home?:  Yes  Care Transitions Interventions         Follow Up:  COVID-19 Risk Monitoring:    COVID-19:  Not detected    Patient contacted regarding recent discharge and COVID-19 risk. Spoke with patient spouse briefly prior to phone being forwarded to patient. Care Transition Nurse contacted the patient by telephone to perform post discharge assessment. Verified name and  with patient as identifiers. Patient reports that she is feeling better. Reports that she is very tired as she did not sleep well in the hospital.  Patient denies increased SOB, cough, fever, chills, body aches, N/V/D or COVID related symptoms. Instructed on COVID risk and prevention measures. Discussed worsening s/s to report to MD/Anastasiya.     Discussed the recommendation for Provider follow up/ virtual visit/ tele-health options. Patient confirms PCP appointment 9/10. Denies transportation barriers. Discussed the benefits of Arash Cortez for safety/ disease management. Patient reports that her spouse\" guards her like a hawk\" and she does not feel additional support is needed. ACP:  Patient has no ACP documents on file. Patient has following risk factors of: PNA/ Multiple myeloma. CTN reviewed discharge instructions, medical action plan and red flags related to discharge diagnosis. Reviewed and educated them on any new and changed medications related to discharge diagnosis. Advised obtaining a 90-day supply of all daily and as-needed medications. Patient confirmed that she has Levaquin and is taking as directed. Reviewed dosage and encouraged patient to complete entire course of antibiotic. Education provided regarding infection prevention, and signs and symptoms of COVID-19 and when to seek medical attention with patient who verbalized understanding. Discussed exposure protocols and quarantine from [address] you at higher risk for severe illness 2019 and given an opportunity for questions and concerns. The patient agrees to contact the COVID-19 hotline [phone] or PCP office for questions related to their healthcare. CTN  provided contact information for future reference. Reviewed with patient:    From CDC: Are you at higher risk for severe illness?  Wash your hands often.  Avoid close contact (6 feet, which is about two arm lengths) with people who are sick.  Put distance between yourself and other people if COVID-19 is spreading in your community.  Clean and disinfect frequently touched surfaces.  Avoid all cruise travel and non-essential air travel.  Call your healthcare professional if you have concerns about COVID-19 and your underlying condition or if you are sick.     For more information on steps you can take to protect yourself, see CDC's How to Protect Yourself    Patient is agreeable to LOOP monitoring:  Email: [email or web address]        Phone:   [phone]  Enrolled per CTN. Patient denies any questions, equipment or resource needs. Patient will be further monitored by Giovanni Trejo team based on severity of symptoms and risk factors.   Future Appointments   Date Time Provider Bentley Laws   9/10/2020  3:00 PM [facility]   9/22/2020  9:00 AM SCHEDULE, [facility] MED ONC LAB [facility] MED ONC [place]   9/29/2020  9:30 AM MARCOS, MED ONC NURSE [facility] MED ONC [place]   9/29/2020  9:45 AM Mery Hopper MD [facility] MED ONC [facility]   10/23/2020  9:00 AM Tanmay [facility]   11/24/2020  9:00 AM Felicia Rowan, [facility] MED ONC TREATMENT [facility] MED ONC [place]   2/24/2021  9:00 AM SCHEDULE, [address] [facility] [place]       Liliana Renee RN

## 2020-09-03 NOTE — TELEPHONE ENCOUNTER
Amy 45 Transitions Initial Follow Up Call    Outreach made within 2 business days of discharge: Yes    Patient: Antione Barlow Patient : 1950   MRN: [id number]  Reason for Admission: There are no discharge diagnoses documented for the most recent discharge. Discharge Date: 20       Spoke with: patient     Discharge department/facility: [facility]    TCM Interactive Patient Contact:  Was patient able to fill all prescriptions: Yes  Was patient instructed to bring all medications to the follow-up visit: Yes  Is patient taking all medications as directed in the discharge summary?  Yes  Does patient understand their discharge instructions: Yes  Does patient have questions or concerns that need addressed prior to 7-14 day follow up office visit: no    Scheduled appointment with PCP within 7-14 days    Follow Up  Future Appointments   Date Time Provider Bentley Laws   9/10/2020  3:00 PM [facility]   2020  9:00 AM SCHEDULE, [facility] MED ONC LAB [facility] MED ONC [place]   2020  9:30 AM MARCOS, MED ONC NURSE [facility] MED ONC [place]   2020  9:45 AM Sadie Palacio MD [address] [place] [address]   10/23/2020  9:00 AM Rodríguez Velez MD [facility]   2020  9:00 AM Nonda Bence, [address] [place] [address] TREATMENT [facility] MED ONC [place]   2021  9:00 AM SCHEDULE, [facility] TREATMENT  [address] Jesus MA

## 2020-09-10 ENCOUNTER — OFFICE VISIT (OUTPATIENT)
Dept: FAMILY MEDICINE CLINIC | Age: 70
End: 2020-09-10
Payer: MEDICARE

## 2020-09-10 VITALS
TEMPERATURE: 97.2 F | SYSTOLIC BLOOD PRESSURE: 120 MMHG | HEART RATE: 64 BPM | WEIGHT: 191.8 LBS | BODY MASS INDEX: 31.96 KG/M2 | DIASTOLIC BLOOD PRESSURE: 74 MMHG | HEIGHT: 65 IN | OXYGEN SATURATION: 96 %

## 2020-09-10 DIAGNOSIS — C90.00 MULTIPLE MYELOMA NOT HAVING ACHIEVED REMISSION (HCC): ICD-10-CM

## 2020-09-10 DIAGNOSIS — E87.6 HYPOKALEMIA: ICD-10-CM

## 2020-09-10 LAB
A/G RATIO: 1.2 (ref 1.1–2.2)
ALBUMIN SERPL-MCNC: 3.6 G/DL (ref 3.4–5)
ALP BLD-CCNC: 130 U/L (ref 40–129)
ALT SERPL-CCNC: 18 U/L (ref 10–40)
ANION GAP SERPL CALCULATED.3IONS-SCNC: 11 MMOL/L (ref 3–16)
AST SERPL-CCNC: 23 U/L (ref 15–37)
BASOPHILS ABSOLUTE: 0 K/UL (ref 0–0.2)
BASOPHILS RELATIVE PERCENT: 1.3 %
BILIRUB SERPL-MCNC: 0.8 MG/DL (ref 0–1)
BUN BLDV-MCNC: 10 MG/DL (ref 7–20)
CALCIUM SERPL-MCNC: 9.1 MG/DL (ref 8.3–10.6)
CHLORIDE BLD-SCNC: 105 MMOL/L (ref 99–110)
CO2: 27 MMOL/L (ref 21–32)
CREAT SERPL-MCNC: 0.6 MG/DL (ref 0.6–1.2)
EOSINOPHILS ABSOLUTE: 0.1 K/UL (ref 0–0.6)
EOSINOPHILS RELATIVE PERCENT: 1.6 %
GFR AFRICAN AMERICAN: >60
GFR NON-AFRICAN AMERICAN: >60
GLOBULIN: 2.9 G/DL
GLUCOSE BLD-MCNC: 109 MG/DL (ref 70–99)
HCT VFR BLD CALC: 30.5 % (ref 36–48)
HEMOGLOBIN: 10.5 G/DL (ref 12–16)
LYMPHOCYTES ABSOLUTE: 0.8 K/UL (ref 1–5.1)
LYMPHOCYTES RELATIVE PERCENT: 25.7 %
MCH RBC QN AUTO: 31.6 PG (ref 26–34)
MCHC RBC AUTO-ENTMCNC: 34.4 G/DL (ref 31–36)
MCV RBC AUTO: 92 FL (ref 80–100)
MONOCYTES ABSOLUTE: 0.1 K/UL (ref 0–1.3)
MONOCYTES RELATIVE PERCENT: 4.7 %
NEUTROPHILS ABSOLUTE: 2.1 K/UL (ref 1.7–7.7)
NEUTROPHILS RELATIVE PERCENT: 66.7 %
PDW BLD-RTO: 15.9 % (ref 12.4–15.4)
PLATELET # BLD: 265 K/UL (ref 135–450)
PMV BLD AUTO: 7.3 FL (ref 5–10.5)
POTASSIUM SERPL-SCNC: 3.5 MMOL/L (ref 3.5–5.1)
RBC # BLD: 3.32 M/UL (ref 4–5.2)
SODIUM BLD-SCNC: 143 MMOL/L (ref 136–145)
TOTAL PROTEIN: 6.5 G/DL (ref 6.4–8.2)
WBC # BLD: 3.1 K/UL (ref 4–11)

## 2020-09-10 PROCEDURE — 99495 TRANSJ CARE MGMT MOD F2F 14D: CPT | Performed by: PHYSICIAN ASSISTANT

## 2020-09-10 PROCEDURE — 1111F DSCHRG MED/CURRENT MED MERGE: CPT | Performed by: PHYSICIAN ASSISTANT

## 2020-09-10 ASSESSMENT — ENCOUNTER SYMPTOMS
SHORTNESS OF BREATH: 0
ABDOMINAL PAIN: 0
COUGH: 0
SORE THROAT: 0
RHINORRHEA: 0
NAUSEA: 0
DIARRHEA: 0
CONSTIPATION: 0
VOMITING: 0
EYE PAIN: 0

## 2020-09-10 NOTE — PROGRESS NOTES
9/10/2020    Tashia Lawler    Chief Complaint   Patient presents with    Follow-Up from Hospital     pneumonia        HPI  History obtained from the patient. Major Sep is a 78 y.o. female who presents today for hospital follow up. The patient was admitted to the hospital for pneumonia 8/29/2020 through 9/2/2020. She was discharged on Levaquin. \"I feel so much better. I haven't really coughed much. \" She finished her course of Levaquin. She has a follow up appointment with Dr. Bhavana Johnson, her oncologist, next Tuesday. She is a never smoker. Patient's potassium level was low when she was admitted, but this resolved by the time of discharge. REVIEW OF SYMPTOMS  Review of Systems   Constitutional: Negative for chills and fever. HENT: Negative for ear pain, rhinorrhea and sore throat. Eyes: Negative for pain and visual disturbance. Respiratory: Negative for cough and shortness of breath. Cardiovascular: Negative for chest pain and palpitations. Gastrointestinal: Negative for abdominal pain, constipation, diarrhea, nausea and vomiting. Genitourinary: Negative for dysuria, frequency and urgency. Skin: Negative for rash. Neurological: Negative for dizziness, syncope and light-headedness. Psychiatric/Behavioral: Negative for suicidal ideas. The patient is not nervous/anxious.         PAST MEDICAL HISTORY  Past Medical History:   Diagnosis Date    Anemia     \"With Childbirth\"    Arthritis     \"Hands, Knees And Hips\"   Shakila Hewitt CCC (chronic calculous cholecystitis)     s/p cholecystectomy 2015    Colitis Dx 2000's    Depression     \"In Mid 1990's\"    Essential hypertension     GERD (gastroesophageal reflux disease)     Hyperlipidemia     Motion sickness     Multiple myeloma (HCC)     Osteoporosis     LILIAN (stress urinary incontinence, female)     Thyroid disease 1990's    \"Took Part Of Thyroid Out \"       FAMILY HISTORY  Family History   Problem Relation Age of Onset    Heart Disease Mother     Arthritis Mother     Diabetes Mother     High Blood Pressure Mother     Dementia Father     Cancer Brother         Prostate Cancer    Arthritis Brother     Early Death Brother 61        Bladder And Brain Cancer    Diabetes Brother     Depression Brother     Cancer Brother         Bladder And Brain Cancer    Kidney Disease Son         Kidney Stones    Other Son         \"Charcot Swathi Tooth, Neuromuscular Disease\"       SOCIAL HISTORY  Social History     Socioeconomic History    Marital status:      Spouse name: Not on file    Number of children: Not on file    Years of education: Not on file    Highest education level: Not on file   Occupational History    Not on file   Social Needs    Financial resource strain: Not on file    Food insecurity     Worry: Not on file     Inability: Not on file   [employer] needs     Medical: Not on file     Non-medical: Not on file   Tobacco Use    Smoking status: Never Smoker    Smokeless tobacco: Never Used   Substance and Sexual Activity    Alcohol use: Yes     Comment: \"Occ.  Maybe Once A Week\"    Drug use: No    Sexual activity: Yes     Partners: Male   Lifestyle    Physical activity     Days per week: Not on file     Minutes per session: Not on file    Stress: Not on file   Relationships    Social connections     Talks on phone: Not on file     Gets together: Not on file     Attends [employer] service: Not on file     Active member of club or organization: Not on file     Attends meetings of clubs or organizations: Not on file     Relationship status: Not on file    Intimate partner violence     Fear of current or ex partner: Not on file     Emotionally abused: Not on file     Physically abused: Not on file     Forced sexual activity: Not on file   Other Topics Concern    Not on file   Social History Narrative    Not on file        SURGICAL HISTORY  Past Surgical History:   Procedure Laterality Date   [address] During Vaginal Hysterectomy    BREAST SURGERY Right 1980's    Benign Area Removed Nipple Area Right Breast   [address], LAPAROSCOPIC  [id number]    COLONOSCOPY  \"Two\" Last Done 2000's    COLONOSCOPY  10/05/2018    Sigmoid diverticulosis, Grade 1 Internal hemorrhoids    DENTAL SURGERY      Teeth Extracted In Past    ENDOSCOPY, COLON, DIAGNOSTIC  \"Once\" 1990's    EYE SURGERY Right 5-24-13    Cataract With Lens Implant    EYE SURGERY Left 1-17-14    Cataract With Lens Implant    FOOT SURGERY Left 1962 Or 1963    I & D Left Foot After Stepping On A Nail    HYSTERECTOMY      HYSTERECTOMY, VAGINAL  1985    Bladder Suspension Also Done    LYMPH NODE BIOPSY  Last Done In 2000    \"Had Five Lymph Node Biopsies Done, Arm Pit Areas\", Benign    UT COLONOSCOPY FLX DX W/COLLJ SPEC WHEN PFRMD N/A 10/5/2018    COLONOSCOPY DIAGNOSTIC OR SCREENING performed by Juliette Headley MD at [address]  1990's    \"Took Part Of The Thyroid Out\"    TONSILLECTOMY  1970's    WISDOM TOOTH EXTRACTION  Early 1970's    All [address] Teeth Extracted       CURRENT MEDICATIONS  Current Outpatient Medications   Medication Sig Dispense Refill    lenalidomide (REVLIMID) 10 MG chemo capsule Take 2 capsules by mouth daily Take 1 every 14 days of 21 day cycle 28 capsule 0    lenalidomide (REVLIMID) 10 MG chemo capsule Take 1 every day with no breaks.  28 capsule 0    losartan (COZAAR) 50 MG tablet Take 1 tablet by mouth every morning 90 tablet 1    amLODIPine (NORVASC) 5 MG tablet Take 1 tablet by mouth every morning 90 tablet 1    alendronate (FOSAMAX) 70 MG tablet Take 1 tablet by mouth once a week 04/26/18 takes on Sundays 12 tablet 1    UNABLE TO FIND Valcade injection, take 4 days of 21 day cycle      zoledronic acid (ZOMETA) 4 MG/5ML injection Infuse 4 mg intravenously once 1 per day of a 28 day cycle      albuterol sulfate  (90 Base) MCG/ACT inhaler Inhale 2 puffs into the lungs every 6 hours as needed for Wheezing      Omega-3 Fatty Acids (FISH OIL) 1000 MG CAPS Take 3,000 mg by mouth 2 times daily      Multiple Vitamins-Minerals (THERAPEUTIC MULTIVITAMIN-MINERALS) tablet Take 1 tablet by mouth every morning      Cholecalciferol (VITAMIN D) 2000 units CAPS capsule Take 2,000 Units by mouth every morning       aspirin 81 MG tablet Take 81 mg by mouth nightly       acyclovir (ZOVIRAX) 400 MG tablet Take 400 mg by mouth 2 times daily       No current facility-administered medications for this visit. ALLERGIES  Allergies   Allergen Reactions    Latex Rash and Swelling    Ace Inhibitors Swelling     Face and lips      Adhesive Tape Rash     \"Tears Skin , Paper Tape Is OK To Use\"  Skin breakdown       Lisinopril-Hydrochlorothiazide Swelling    Other      \"Allergic To Poinsetta Holley Causing Nasal Congestion\"    Sulfa Antibiotics Other (See Comments)     \"Flu Like Symptoms\"  Flu-like symptoms      Hydrochlorothiazide W-Triamterene     Naproxen        RECENT LABS    Lab Results   Component Value Date    LABA1C 5.8 12/20/2018     No results found for: EAG    Lab Results   Component Value Date    CHOL 166 10/19/2015    CHOL 172 04/18/2015    CHOL 162 11/01/2014     No results found for: [address] [facility]    Lab Results   Component Value Date    WBC 2.1 (L) 09/02/2020    HGB 9.1 (L) 09/02/2020    HCT 27.1 (L) 09/02/2020    MCV 95.4 09/02/2020     09/02/2020       PHYSICAL EXAM  /74 (Site: Right Upper Arm, Position: Sitting, Cuff Size: Large Adult)   Pulse 64   Temp 97.2 °F (36.2 °C)   Ht 5' 5\" (1.651 m)   Wt 191 lb 12.8 oz (87 kg)   SpO2 96%   BMI 31.92 kg/m²     Physical Exam  Constitutional:       Appearance: Normal appearance. HENT:      Head: Normocephalic and atraumatic. Eyes:      Comments: EOM grossly intact. Cardiovascular:      Rate and Rhythm: Normal rate and regular rhythm. Heart sounds: No murmur. No friction rub. No gallop.     Pulmonary:      Effort: Pulmonary effort is normal.      Breath sounds: Normal breath sounds. No wheezing, rhonchi or rales. Skin:     General: Skin is warm and dry. Neurological:      Mental Status: She is alert and oriented to person, place, and time. Comments: Cranial nerves II-XII grossly intact   Psychiatric:         Mood and Affect: Mood normal.         Behavior: Behavior normal.         ASSESSMENT & PLAN  1. Pneumonia due to infectious organism, unspecified laterality, unspecified part of lung  Patient clinically improved. She completed her course of Levaquin.   - SC DISCHARGE MEDS RECONCILED W/ CURRENT OUTPATIENT MED LIST    2. Hypokalemia  Will recheck potassium level since this was low while she was admitted. - Comprehensive Metabolic Panel; Future    3. Multiple myeloma not having achieved remission ([facility] [address]  Will repeat CBC as well. Patient has a follow up appointment with Dr. Dorcas Kussmaul scheduled. - CBC Auto Differential; Future          No follow-ups on file.             Electronically signed by Jackie Ahmadi PA-C on 9/10/2020

## 2020-09-13 NOTE — PROGRESS NOTES
Patient Name: Anshul Reynoso  Patient : 1950  Patient MRN: [id number]     Primary Oncologist: Wellington Styles MD  Referring Provider: Chet Frey MD     Date of Service: 9/15/2020      Chief Complaint:   Chief Complaint   Patient presents with    Follow-up     Patient Active Problem List:     Severe anemia     Multiple myeloma not having achieved remission      Drug related polyneuropathy      Osteopenia of multiple sites    HPI:   Anshul Reynoso is a 72-year-old very pleasnat female with medical history significant for hypertension, GERD, depression, anemia, initially presented to me on 2018 to follow up with her monocloal gammopathy. She initially presented to [facility] on 18 with low hemoglobin. She stated that she had colonoscopy in  by Dr. Kt Henderson. She has been tired and fatigue since 2018. She denies weight loss. She was found to have severe anemia on blood test done in her primary care physician office and she was asked to come to ER. Her base line hemoglobin was 8.4 -9.4 gram since . It was 13 grams in  and on arrival to hospital on 18 was 5.7 grams. She received 2 units of PRBC. I was called to evaluate her anemia at that time. I recognized that she has worsening macrocytic anemia. She also has mild leukopenia and thrombocytopenia. Her total protein level was significantly elevated (11.9 grams), but she has normal calcium and serum creatinine. I requested work ups to rule out plasma cell dyscrasias and she was found to have 7900 mg/dl IgG kappa monoclonal gammopathy on serum protein electrophoresis and serum immunofixation. Her beta 2 microglobulin was 7.5 mg./dl, serum kappa 9.34 mg/dl, lambda 0.19 mg/dl, ratio was 49.16. ESR > 120, CRP 3 and .     Bone marrow biopsy was done on 2018 and final pathology showed hypocellular bone marrow [85%], with partially preserved trilineage hematopoiesis and involvement by plasma cell neoplasm [85% of marrow cellularity is comprised of neoplastic plasma cells]. Cytogenetic reveals normal myocardial type [46XX]. Fish panel revealed gain of chromosome 1q21, monosomy 13, and aneuploidy [gain of chromosome 7, 9, 15 and FGFR3/4p]. Bone survey done on 1/8/19 showed multiple lytic lesions noted to the calvarium bilaterally as well as involving the left humerus. With the clinical history findings are concerning for multiple myeloma. No definite additional bony involvement identified. Multilevel degenerative changes to the cervical, thoracic and lumbar spine. Diffuse bone demineralization. First line chemotherapy with Velcade, Revlimid and Dexamethasone was started on January 18, 2019 and she completed her fifth cycle on 4/29/19. Her monoclonal protein has 7900mg/dl before therapy. It decreased to 1900mg/dl (2/8/19), 900mg/dl (3/4/19) and 400 mg/dl (4/26/19). She had bone marrow biopsy on 4/2/19 at [facility] and it showed no morphologic or immunophenotypic evidence of persistent/recurrent plasma cell neoplasm. She received autologus stem cell transplant on 5/16/19. Maintenance chemotherapy with Revlimid was started since August 27, 2019. On September 15, 2020, she presented to me for follow-up, after she is released from hospital. She was admitted with pneumonia and she required to hold revlimid from 8/28/20 until now. She is going to have flu shot tomorrow and I recommend her to resume back on Revlimid starting from 9/23/20. I have been following her for stage III IgG kappa multiple myeloma and she is status post first line chemotherapy with Velcade, Revlimid and Dexamethasone (received total 4 cycles) and autologous stem cell transplantation. She has been on maintenance chemotherapy with revlimid since August 27, 2019. She is tolerating maintenance chemotherapy, Revlimid well and she doesn't encounter any major side effects from Revlimid.      We change Revlimid to three weeks on one week off, since she has been having increasing SOB and fatigue. Since her symptoms are resolved now, we resumed back on daily basis since January 14, 2020. I recognized that her monoclonal protein is still undetectable (too small to measure on SARABJIT) on recent serum protein electrophoresis and immunofixation done on July 21, 2020. I believe her multiple myeloma is controlled very well with current maintenance chemotherapy and I recommended to continue with that for now. Since her revlimid induced diarrhea has been controlled well with lomotil, I recommend her to continue with it for now. I will repeat all the blood test again in 2 months, including SPEP, serum immunofixation, serum light chain assays and rations, beta 2 microglobulin and immunoglobulin panels. I recommend her to continue with aspirin 81 mg daily to prevent revlimid induced thromboembolic episodes. I also recommend her to continue with zometa every three month intervals for total two years. I recommend her to continue with cymbalta 60 mg daily since it controlled her neuropathy very well. I asked her to take tramadol prn for neuropathic pain. She is on acyclovir 400 mg BID for prophylaxis. She will be seen in [facility] in 2 weeks and they will most likely stop acyclovir at that time. I reviewed with her findings on screening mammogram and DEXA scan, done on 3/9/2020. She doesn't have any significant symptoms on today visit. Past Medical History  Significant for  1. Hypertension  2. Gastroesophageal reflux disease  3. Depression  4. Anemia    Surgical History  Significant for  1. Cholecystectomy  2. Cataract surgery  3. Hysterectomy in 1985  4. Partial thyroidectomy  5. Tonsillectomy  6. Bladder suspension surgery    Allergies  Adhesive tape  Sulfamide  lisinopril    Social History  She denies smoking and illicit drug abuse. She socially drink alcohol.   She is currently 07/21/2020    LABBETA 1.0 07/21/2020    GAMGLOB 1.0 07/21/2020     Lab Results   Component Value Date    KAPPAUVOL 32.07 (H) 07/21/2020    LAMBDAUVOL 25.93 07/21/2020    KLFLCR 1.24 07/21/2020     Lab Results   Component Value Date    B2M 2.4 07/21/2020     Coagulation Panel:  Lab Results   Component Value Date    PROTIME 12.2 09/02/2020    INR 1.01 09/02/2020    APTT 27.9 09/02/2020    DDIMER 2430 (H) 09/02/2020     Anemia Panel:  Lab Results   Component Value Date    YXPOVWDQ68 954.6 (H) 12/22/2018    FOLATE >20.0 (H) 09/17/2019     Tumor Markers:  No results found for: , CEA, , LABCA2, PSA  Observations:  PHQ-9 Total Score: 1 (9/15/2020  2:54 PM)        Assessment & Plan:   Stage III IgG kappa multiple myeloma    PLAN  Ms. Teresa Mo is a 82-year-old very pleasant female who was found to have significantly high monoclonal gammopathy (7300 mg/dl) when she presented with symptomatic anemia. Furhter work ups with bone marrow biopsy confirmed that she has stage III IgG kappa multiple myeloma (high risk disease). Since she has symptomatic multiple myeloma, we started first-line chemotherapy with Velcade, Revlimid and dexamethasone on January 18, 2019 and she completed her fifth cycle on 4/29/19. Her monoclonal protein has 7900mg/dl before therapy. It decreased to 1900mg/dl (2/8/19), 900mg/dl (3/4/19) and 400 mg/dl (4/26/19). She had bone marrow biopsy on 4/2/19 at [facility] and it showed no morphologic or immunophenotypic evidence of persistent/recurrent plasma cell neoplasm. She received autologus stem cell transplant on 5/16/19. She has been on maintenance chemotherapy with revlimid since August 27, 2019. On September 15, 2020, she presented to me for follow-up, after she is released from hospital. She was admitted with pneumonia and she required to hold revlimid from 8/28/20 until now. She is going to have flu shot tomorrow and I recommend her to resume back on Revlimid starting from 9/23/20. I have been following her for stage III IgG kappa multiple myeloma and she is status post first line chemotherapy with Velcade, Revlimid and Dexamethasone (received total 4 cycles) and autologous stem cell transplantation. She has been on maintenance chemotherapy with revlimid since August 27, 2019. She is tolerating maintenance chemotherapy, Revlimid well and she doesn't encounter any major side effects from Revlimid. We change Revlimid to three weeks on one week off, since she has been having increasing SOB and fatigue. Since her symptoms are resolved now, we resumed back on daily basis since January 14, 2020. I recognized that her monoclonal protein is still undetectable (too small to measure on SARAJBIT) on recent serum protein electrophoresis and immunofixation done on July 21, 2020. I believe her multiple myeloma is controlled very well with current maintenance chemotherapy and I recommended to continue with that for now. Since her revlimid induced diarrhea has been controlled well with lomotil, I recommend her to continue with it for now. I will repeat all the blood test again in 2 months, including SPEP, serum immunofixation, serum light chain assays and rations, beta 2 microglobulin and immunoglobulin panels. I recommend her to continue with aspirin 81 mg daily to prevent revlimid induced thromboembolic episodes. I also recommend her to continue with zometa every three month intervals for total two years. I recommend her to continue with cymbalta 60 mg daily since it controlled her neuropathy very well. I asked her to take tramadol prn for neuropathic pain. She is on acyclovir 400 mg BID for prophylaxis. She will be seen in [facility] in 2 weeks and they will most likely stop acyclovir at that time. I have reviewed all these plans with her and she is in agreement with the plans. I answered all her questions and concerns for today.  I recommend her to contact me if she has any medical issues during chemotherapy. I encouraged her to stay active and eat healthy balanced diet. I asked her to follow-up with primary care physician, Dr. Jeannine Padron on regular basis. I will continue to keep you updated on her progress. Thank you for allowing me to participate in the care of this very pleasant patient. Recent imaging and labs were reviewed and discussed with the patient.       Electronically signed by Mel Reeves MD on 9/13/20 at 1:54 PM EDT

## 2020-09-15 ENCOUNTER — OFFICE VISIT (OUTPATIENT)
Dept: ONCOLOGY | Age: 70
End: 2020-09-15
Payer: MEDICARE

## 2020-09-15 ENCOUNTER — HOSPITAL ENCOUNTER (OUTPATIENT)
Dept: INFUSION THERAPY | Age: 70
Discharge: HOME | End: 2020-09-15
Payer: MEDICARE

## 2020-09-15 VITALS
SYSTOLIC BLOOD PRESSURE: 146 MMHG | HEIGHT: 65 IN | BODY MASS INDEX: 31.75 KG/M2 | HEART RATE: 66 BPM | OXYGEN SATURATION: 96 % | TEMPERATURE: 97.4 F | WEIGHT: 190.6 LBS | DIASTOLIC BLOOD PRESSURE: 82 MMHG

## 2020-09-15 DIAGNOSIS — C90.00 MULTIPLE MYELOMA NOT HAVING ACHIEVED REMISSION (HCC): ICD-10-CM

## 2020-09-15 LAB
ALBUMIN SERPL-MCNC: 3.8 GM/DL (ref 3.4–5)
ALP BLD-CCNC: 129 IU/L (ref 40–128)
ALT SERPL-CCNC: 14 U/L (ref 10–40)
ANION GAP SERPL CALCULATED.3IONS-SCNC: 9 MMOL/L (ref 4–16)
AST SERPL-CCNC: 16 IU/L (ref 15–37)
BASOPHILS ABSOLUTE: 0 K/CU MM
BASOPHILS RELATIVE PERCENT: 1.1 % (ref 0–1)
BILIRUB SERPL-MCNC: 0.7 MG/DL (ref 0–1)
BUN BLDV-MCNC: 11 MG/DL (ref 6–23)
CALCIUM SERPL-MCNC: 9.2 MG/DL (ref 8.3–10.6)
CHLORIDE BLD-SCNC: 104 MMOL/L (ref 99–110)
CO2: 30 MMOL/L (ref 21–32)
CREAT SERPL-MCNC: 0.8 MG/DL (ref 0.6–1.1)
DIFFERENTIAL TYPE: ABNORMAL
EOSINOPHILS ABSOLUTE: 0.1 K/CU MM
EOSINOPHILS RELATIVE PERCENT: 1.4 % (ref 0–3)
GFR AFRICAN AMERICAN: >60 ML/MIN/1.73M2
GFR NON-AFRICAN AMERICAN: >60 ML/MIN/1.73M2
GLUCOSE BLD-MCNC: 156 MG/DL (ref 70–99)
HCT VFR BLD CALC: 29.8 % (ref 37–47)
HEMOGLOBIN: 10 GM/DL (ref 12.5–16)
IGA: 416 MG/DL (ref 69–382)
IGG,SERUM: 930 MG/DL (ref 723–1685)
IGM,SERUM: 26 MG/DL (ref 62–277)
LACTATE DEHYDROGENASE: 164 IU/L (ref 120–246)
LYMPHOCYTES ABSOLUTE: 0.9 K/CU MM
LYMPHOCYTES RELATIVE PERCENT: 24.4 % (ref 24–44)
MCH RBC QN AUTO: 31.7 PG (ref 27–31)
MCHC RBC AUTO-ENTMCNC: 33.6 % (ref 32–36)
MCV RBC AUTO: 94.6 FL (ref 78–100)
MONOCYTES ABSOLUTE: 0.3 K/CU MM
MONOCYTES RELATIVE PERCENT: 8.6 % (ref 0–4)
PDW BLD-RTO: 15.6 % (ref 11.7–14.9)
PLATELET # BLD: 237 K/CU MM (ref 140–440)
PMV BLD AUTO: 9 FL (ref 7.5–11.1)
POTASSIUM SERPL-SCNC: 3.5 MMOL/L (ref 3.5–5.1)
RBC # BLD: 3.15 M/CU MM (ref 4.2–5.4)
SEGMENTED NEUTROPHILS ABSOLUTE COUNT: 2.2 K/CU MM
SEGMENTED NEUTROPHILS RELATIVE PERCENT: 64.5 % (ref 36–66)
SODIUM BLD-SCNC: 143 MMOL/L (ref 135–145)
TOTAL PROTEIN: 6.1 GM/DL (ref 6.4–8.2)
WBC # BLD: 3.5 K/CU MM (ref 4–10.5)

## 2020-09-15 PROCEDURE — 84165 PROTEIN E-PHORESIS SERUM: CPT

## 2020-09-15 PROCEDURE — 80053 COMPREHEN METABOLIC PANEL: CPT

## 2020-09-15 PROCEDURE — 4040F PNEUMOC VAC/ADMIN/RCVD: CPT | Performed by: INTERNAL MEDICINE

## 2020-09-15 PROCEDURE — 83883 ASSAY NEPHELOMETRY NOT SPEC: CPT

## 2020-09-15 PROCEDURE — 82232 ASSAY OF BETA-2 PROTEIN: CPT

## 2020-09-15 PROCEDURE — 99214 OFFICE O/P EST MOD 30 MIN: CPT | Performed by: INTERNAL MEDICINE

## 2020-09-15 PROCEDURE — 85025 COMPLETE CBC W/AUTO DIFF WBC: CPT

## 2020-09-15 PROCEDURE — 36415 COLL VENOUS BLD VENIPUNCTURE: CPT

## 2020-09-15 PROCEDURE — 83615 LACTATE (LD) (LDH) ENZYME: CPT

## 2020-09-15 PROCEDURE — G8399 PT W/DXA RESULTS DOCUMENT: HCPCS | Performed by: INTERNAL MEDICINE

## 2020-09-15 PROCEDURE — 1123F ACP DISCUSS/DSCN MKR DOCD: CPT | Performed by: INTERNAL MEDICINE

## 2020-09-15 PROCEDURE — G8427 DOCREV CUR MEDS BY ELIG CLIN: HCPCS | Performed by: INTERNAL MEDICINE

## 2020-09-15 PROCEDURE — 1036F TOBACCO NON-USER: CPT | Performed by: INTERNAL MEDICINE

## 2020-09-15 PROCEDURE — 86320 SERUM IMMUNOELECTROPHORESIS: CPT

## 2020-09-15 PROCEDURE — 99211 OFF/OP EST MAY X REQ PHY/QHP: CPT

## 2020-09-15 PROCEDURE — 85652 RBC SED RATE AUTOMATED: CPT

## 2020-09-15 PROCEDURE — 1090F PRES/ABSN URINE INCON ASSESS: CPT | Performed by: INTERNAL MEDICINE

## 2020-09-15 PROCEDURE — 1111F DSCHRG MED/CURRENT MED MERGE: CPT | Performed by: INTERNAL MEDICINE

## 2020-09-15 PROCEDURE — G8417 CALC BMI ABV UP PARAM F/U: HCPCS | Performed by: INTERNAL MEDICINE

## 2020-09-15 PROCEDURE — 3017F COLORECTAL CA SCREEN DOC REV: CPT | Performed by: INTERNAL MEDICINE

## 2020-09-15 PROCEDURE — 82784 ASSAY IGA/IGD/IGG/IGM EACH: CPT

## 2020-09-15 RX ORDER — CALCIUM ACETATE 667 MG/1
667 CAPSULE ORAL 3 TIMES DAILY
COMMUNITY
End: 2020-09-15

## 2020-09-15 RX ORDER — DULOXETIN HYDROCHLORIDE 60 MG/1
CAPSULE, DELAYED RELEASE ORAL
COMMUNITY
Start: 2020-07-13 | End: 2021-04-20 | Stop reason: SDUPTHER

## 2020-09-15 RX ORDER — DIPHENOXYLATE HYDROCHLORIDE AND ATROPINE SULFATE 2.5; .025 MG/1; MG/1
TABLET ORAL
COMMUNITY
Start: 2020-06-27 | End: 2021-04-20 | Stop reason: SDUPTHER

## 2020-09-15 ASSESSMENT — PATIENT HEALTH QUESTIONNAIRE - PHQ9
SUM OF ALL RESPONSES TO PHQ9 QUESTIONS 1 & 2: 1
1. LITTLE INTEREST OR PLEASURE IN DOING THINGS: 0
SUM OF ALL RESPONSES TO PHQ QUESTIONS 1-9: 1
SUM OF ALL RESPONSES TO PHQ QUESTIONS 1-9: 1
2. FEELING DOWN, DEPRESSED OR HOPELESS: 1

## 2020-09-15 NOTE — PROGRESS NOTES
MA Rooming Questions  Patient: Lucía Lyles  MRN: [id number]    Date: 9/15/2020        1. Do you have any new issues? yes - pneumonia          2. Do you need any refills on medications?    no    3. Have you had any imaging done since your last visit? Yes, Chest xray, bloodwork    4. Have you been hospitalized or seen in the emergency room since your last visit here?   yes - 8/29-9/2    5. Did the patient have a depression screening completed today?  Yes    PHQ-9 Total Score: 1 (9/15/2020  2:54 PM)       PHQ-9 Given to (if applicable):               PHQ-9 Score (if applicable):                     [] Positive     []  Negative              Does question #9 need addressed (if applicable)                     [] Yes    []  No               Mitali Masters

## 2020-09-16 LAB
ALBUMIN ELP: 3.1 GM/DL (ref 3.2–5.6)
ALPHA-1-GLOBULIN: 0.3 GM/DL (ref 0.1–0.4)
ALPHA-2-GLOBULIN: 0.6 GM/DL (ref 0.4–1.2)
BETA GLOBULIN: 1.2 GM/DL (ref 0.5–1.3)
GAMMA GLOBULIN: 0.9 GM/DL (ref 0.5–1.6)
SPEP INTERPRETATION: ABNORMAL
SPEP INTERPRETATION: NORMAL
TOTAL PROTEIN: 6.1 GM/DL (ref 6.4–8.2)

## 2020-09-17 LAB — BETA-2 MICROGLOBULIN: 2.7 MG/L (ref 1.1–2.4)

## 2020-09-18 LAB
KAPPA QUANT FREE LIGHT CHAINS: 32.56 MG/L (ref 3.3–19.4)
KAPPA/LAMBDA FREE LIGHT CHAIN RATIO: 1.16 (ref 0.26–1.65)
LAMBDA FREE LIGHT CHAINS URINE/ VOL: 28.03 MG/L (ref 5.71–26.3)

## 2020-09-18 RX ORDER — LENALIDOMIDE 10 MG/1
10 CAPSULE ORAL DAILY
Qty: 28 CAPSULE | Refills: 0 | Status: SHIPPED | OUTPATIENT
Start: 2020-09-18 | End: 2020-09-21 | Stop reason: SDUPTHER

## 2020-09-21 RX ORDER — LENALIDOMIDE 10 MG/1
10 CAPSULE ORAL DAILY
Qty: 28 CAPSULE | Refills: 0 | Status: SHIPPED | OUTPATIENT
Start: 2020-09-21 | End: 2020-10-19 | Stop reason: SDUPTHER

## 2020-09-22 ENCOUNTER — APPOINTMENT (OUTPATIENT)
Dept: INFUSION THERAPY | Age: 70
End: 2020-09-22
Payer: MEDICARE

## 2020-09-22 ENCOUNTER — TELEPHONE (OUTPATIENT)
Dept: INFUSION THERAPY | Age: 70
End: 2020-09-22

## 2020-09-22 ENCOUNTER — TELEPHONE (OUTPATIENT)
Dept: ONCOLOGY | Age: 70
End: 2020-09-22

## 2020-10-19 RX ORDER — LENALIDOMIDE 10 MG/1
10 CAPSULE ORAL DAILY
Qty: 28 CAPSULE | Refills: 0 | Status: SHIPPED | OUTPATIENT
Start: 2020-10-19 | End: 2020-11-17 | Stop reason: SDUPTHER

## 2020-10-20 RX ORDER — LENALIDOMIDE 10 MG/1
CAPSULE ORAL
Qty: 28 CAPSULE | Refills: 0 | Status: SHIPPED | OUTPATIENT
Start: 2020-10-20 | End: 2020-11-17

## 2020-10-23 ENCOUNTER — OFFICE VISIT (OUTPATIENT)
Dept: FAMILY MEDICINE CLINIC | Age: 70
End: 2020-10-23
Payer: MEDICARE

## 2020-10-23 VITALS
DIASTOLIC BLOOD PRESSURE: 58 MMHG | TEMPERATURE: 97.2 F | WEIGHT: 191 LBS | HEIGHT: 63 IN | SYSTOLIC BLOOD PRESSURE: 112 MMHG | BODY MASS INDEX: 33.84 KG/M2 | HEART RATE: 72 BPM

## 2020-10-23 PROBLEM — D64.9 SEVERE ANEMIA: Status: RESOLVED | Noted: 2018-12-21 | Resolved: 2020-10-23

## 2020-10-23 PROBLEM — Z20.822 SUSPECTED COVID-19 VIRUS INFECTION: Status: RESOLVED | Noted: 2020-08-29 | Resolved: 2020-10-23

## 2020-10-23 PROCEDURE — G8427 DOCREV CUR MEDS BY ELIG CLIN: HCPCS | Performed by: FAMILY MEDICINE

## 2020-10-23 PROCEDURE — 1036F TOBACCO NON-USER: CPT | Performed by: FAMILY MEDICINE

## 2020-10-23 PROCEDURE — 3017F COLORECTAL CA SCREEN DOC REV: CPT | Performed by: FAMILY MEDICINE

## 2020-10-23 PROCEDURE — 1123F ACP DISCUSS/DSCN MKR DOCD: CPT | Performed by: FAMILY MEDICINE

## 2020-10-23 PROCEDURE — G8484 FLU IMMUNIZE NO ADMIN: HCPCS | Performed by: FAMILY MEDICINE

## 2020-10-23 PROCEDURE — G8417 CALC BMI ABV UP PARAM F/U: HCPCS | Performed by: FAMILY MEDICINE

## 2020-10-23 PROCEDURE — G8399 PT W/DXA RESULTS DOCUMENT: HCPCS | Performed by: FAMILY MEDICINE

## 2020-10-23 PROCEDURE — 99213 OFFICE O/P EST LOW 20 MIN: CPT | Performed by: FAMILY MEDICINE

## 2020-10-23 PROCEDURE — 1090F PRES/ABSN URINE INCON ASSESS: CPT | Performed by: FAMILY MEDICINE

## 2020-10-23 PROCEDURE — 4040F PNEUMOC VAC/ADMIN/RCVD: CPT | Performed by: FAMILY MEDICINE

## 2020-10-23 RX ORDER — ALENDRONATE SODIUM 70 MG/1
70 TABLET ORAL WEEKLY
Qty: 12 TABLET | Refills: 1 | Status: SHIPPED | OUTPATIENT
Start: 2020-10-23 | End: 2021-04-23 | Stop reason: SDUPTHER

## 2020-10-23 RX ORDER — AMLODIPINE BESYLATE 5 MG/1
5 TABLET ORAL EVERY MORNING
Qty: 90 TABLET | Refills: 1 | Status: SHIPPED | OUTPATIENT
Start: 2020-10-23 | End: 2021-04-23 | Stop reason: SDUPTHER

## 2020-10-23 RX ORDER — LOSARTAN POTASSIUM 50 MG/1
50 TABLET ORAL EVERY MORNING
Qty: 90 TABLET | Refills: 1 | Status: SHIPPED | OUTPATIENT
Start: 2020-10-23 | End: 2021-04-23 | Stop reason: SDUPTHER

## 2020-10-23 ASSESSMENT — ENCOUNTER SYMPTOMS
SHORTNESS OF BREATH: 0
COUGH: 0
SORE THROAT: 0

## 2020-10-23 NOTE — PROGRESS NOTES
10/23/2020     Annita James      Chief Complaint   Patient presents with    6 Month Follow-Up    Other     no complaints       HPI      Ramila Schultz is a 78 y.o. female who presents today with the followin. Multiple myeloma not having achieved remission ([address]    2. Mixed hyperlipidemia    3. Essential hypertension    For recheck  She is doing pretty well  She is still on oral therapy for multiple myeloma which appears to be in remission    REVIEW OF SYMPTOMS    Review of Systems   Constitutional: Positive for activity change. Negative for fever and unexpected weight change. She is pretty much staying home and avoiding COVID-19 exposure  She is immunocompromised  She lives with her   Neither has had known exposure or symptoms   HENT: Negative for congestion and sore throat. Respiratory: Negative for cough and shortness of breath. Cardiovascular: Negative for chest pain. History of essential hypertension which is well controlled   Endocrine:        History of osteopenia   Genitourinary: Negative. Neurological: Positive for numbness.         Symptoms of peripheral neuropathy from chemotherapy  She responds to duloxetine   Hematological:        Multiple myeloma responding to treatment  Anemia is stable         PAST MEDICAL HISTORY  Past Medical History:   Diagnosis Date    Anemia     \"With Childbirth\"    Arthritis     \"Hands, Knees And Hips\"    CCC (chronic calculous cholecystitis)     s/p cholecystectomy 2015    Colitis Dx 's    Depression     \"In Mid \"    Essential hypertension     GERD (gastroesophageal reflux disease)     Hyperlipidemia     Motion sickness     Multiple myeloma (HCC)     Osteoporosis     LILIAN (stress urinary incontinence, female)     Thyroid disease     \"Took Part Of Thyroid Out \"       FAMILY HISTORY  Family History   Problem Relation Age of Onset    Heart Disease Mother     Arthritis Mother     Diabetes Mother     High Blood Pressure Mother     Dementia Father     Cancer Brother         Prostate Cancer    Arthritis Brother     Early Death Brother 61        Bladder And Brain Cancer    Diabetes Brother     Depression Brother     Cancer Brother         Bladder And Brain Cancer    Kidney Disease Son         Kidney Stones    Other Son         \"Charcot Swathi Tooth, Neuromuscular Disease\"       SOCIAL HISTORY  Social History     Socioeconomic History    Marital status:      Spouse name: Not on file    Number of children: Not on file    Years of education: Not on file    Highest education level: Not on file   Occupational History    Not on file   Social Needs    Financial resource strain: Not on file    Food insecurity     Worry: Not on file     Inability: Not on file   [employer] needs     Medical: Not on file     Non-medical: Not on file   Tobacco Use    Smoking status: Never Smoker    Smokeless tobacco: Never Used   Substance and Sexual Activity    Alcohol use: Yes     Comment: \"Occ.  Maybe Once A Week\"    Drug use: No    Sexual activity: Yes     Partners: Male   Lifestyle    Physical activity     Days per week: Not on file     Minutes per session: Not on file    Stress: Not on file   Relationships    Social connections     Talks on phone: Not on file     Gets together: Not on file     Attends Church service: Not on file     Active member of club or organization: Not on file     Attends meetings of clubs or organizations: Not on file     Relationship status: Not on file    Intimate partner violence     Fear of current or ex partner: Not on file     Emotionally abused: Not on file     Physically abused: Not on file     Forced sexual activity: Not on file   Other Topics Concern    Not on file   Social History Narrative    Not on file        SURGICAL HISTORY  Past Surgical History:   Procedure Laterality Date    [id number] During Vaginal Hysterectomy    BREAST SURGERY Right 1980's Benign Area Removed Nipple Area Right Breast   Sydnee Aurelia, LAPAROSCOPIC  [id number]    COLONOSCOPY  \"Two\" Last Done 2000's    COLONOSCOPY  10/05/2018    Sigmoid diverticulosis, Grade 1 Internal hemorrhoids    DENTAL SURGERY      Teeth Extracted In Past    ENDOSCOPY, COLON, DIAGNOSTIC  \"Once\" 1990's    EYE SURGERY Right 5-24-13    Cataract With Lens Implant    EYE SURGERY Left 1-17-14    Cataract With Lens Implant    FOOT SURGERY Left 1962 Or 1963    I & D Left Foot After Stepping On A Nail    HYSTERECTOMY      HYSTERECTOMY, VAGINAL  1985    Bladder Suspension Also Done    LYMPH NODE BIOPSY  Last Done In 2000    \"Had Five Lymph Node Biopsies Done, Arm Pit Areas\", Benign    CA COLONOSCOPY FLX DX W/COLLJ SPEC WHEN PFRMD N/A 10/5/2018    COLONOSCOPY DIAGNOSTIC OR SCREENING performed by Monda Koyanagi, MD at [address]  1990's    \"Took Part Of The Thyroid Out\"    TONSILLECTOMY  1970's    WISDOM TOOTH EXTRACTION  Early 1970's    All Four Elizabeth Teeth Extracted       CURRENT MEDICATIONS  Current Outpatient Medications   Medication Sig Dispense Refill    losartan (COZAAR) 50 MG tablet Take 1 tablet by mouth every morning 90 tablet 1    amLODIPine (NORVASC) 5 MG tablet Take 1 tablet by mouth every morning 90 tablet 1    alendronate (FOSAMAX) 70 MG tablet Take 1 tablet by mouth once a week 04/26/18 takes on Sundays 12 tablet 1    lenalidomide (REVLIMID) 10 MG chemo capsule Take 1 every day with no breaks.  28 capsule 0    lenalidomide (REVLIMID) 10 MG chemo capsule Take 1 capsule by mouth daily 28 capsule 0    DULoxetine (CYMBALTA) 60 MG extended release capsule       diphenoxylate-atropine (LOMOTIL) 2.5-0.025 MG per tablet       zoledronic acid (ZOMETA) 4 MG/5ML injection Infuse 4 mg intravenously once 1 per day of a 28 day cycle      Omega-3 Fatty Acids (FISH OIL) 1000 MG CAPS Take 3,000 mg by mouth 2 times daily      Multiple Vitamins-Minerals (THERAPEUTIC MULTIVITAMIN-MINERALS) tablet Take 1 tablet by mouth every morning      Cholecalciferol (VITAMIN D) 2000 units CAPS capsule Take 2,000 Units by mouth every morning       aspirin 81 MG tablet Take 81 mg by mouth nightly        No current facility-administered medications for this visit. ALLERGIES  Allergies   Allergen Reactions    Latex Rash and Swelling    Ace Inhibitors Swelling     Face and lips      Adhesive Tape Rash     \"Tears Skin , Paper Tape Is OK To Use\"  Skin breakdown       Lisinopril-Hydrochlorothiazide Swelling    Other      \"Allergic To Poinsetta Holley Causing Nasal Congestion\"    Sulfa Antibiotics Other (See Comments)     \"Flu Like Symptoms\"  Flu-like symptoms      Hydrochlorothiazide W-Triamterene     Naproxen        PHYSICAL EXAM    BP (!) 112/58   Pulse 72   Temp 97.2 °F (36.2 °C)   Ht 5' 2.5\" (1.588 m)   Wt 191 lb (86.6 kg)   BMI 34.38 kg/m²     Physical Exam  Vitals signs and nursing note reviewed. Constitutional:       General: She is not in acute distress. Appearance: Normal appearance. HENT:      Right Ear: External ear normal.      Left Ear: External ear normal.   Eyes:      Conjunctiva/sclera: Conjunctivae normal.   Cardiovascular:      Rate and Rhythm: Normal rate. Pulmonary:      Effort: Pulmonary effort is normal. No respiratory distress. Neurological:      General: No focal deficit present. Mental Status: She is alert. Psychiatric:         Mood and Affect: Mood normal.     Reviewed  Reviewed labs from oncology  Reviewed her immunizations and cancer screenings    ASSESSMENT and PLAN    Shen Hedrick was seen today for 6 month follow-up and other. Diagnoses and all orders for this visit:    Multiple myeloma not having achieved remission ([address]    Mixed hyperlipidemia    Essential hypertension    Other orders  -     losartan (COZAAR) 50 MG tablet; Take 1 tablet by mouth every morning  -     amLODIPine (NORVASC) 5 MG tablet;  Take 1 tablet by mouth every morning  -     alendronate (FOSAMAX) 70 MG tablet; Take 1 tablet by mouth once a week 04/26/18 takes on Sundays    Continue same treatments  Follow-up in 6 months    No follow-ups on file. Electronically signed by Eli Hackett MD on 10/23/2020    Please note that this chart was generated using dragon dictation software. Although every effort was made to ensure the accuracy of this automated transcription, some errors in transcription may have occurred.

## 2020-11-04 ENCOUNTER — HOSPITAL ENCOUNTER (OUTPATIENT)
Dept: INFUSION THERAPY | Age: 70
Discharge: HOME | End: 2020-11-04
Payer: MEDICARE

## 2020-11-04 DIAGNOSIS — C90.00 MULTIPLE MYELOMA NOT HAVING ACHIEVED REMISSION (HCC): ICD-10-CM

## 2020-11-04 LAB
ALBUMIN SERPL-MCNC: 3.9 GM/DL (ref 3.4–5)
ALP BLD-CCNC: 141 IU/L (ref 40–129)
ALT SERPL-CCNC: 48 U/L (ref 10–40)
ANION GAP SERPL CALCULATED.3IONS-SCNC: 12 MMOL/L (ref 4–16)
AST SERPL-CCNC: 34 IU/L (ref 15–37)
BASOPHILS ABSOLUTE: 0.1 K/CU MM
BASOPHILS RELATIVE PERCENT: 2.2 % (ref 0–1)
BILIRUB SERPL-MCNC: 0.7 MG/DL (ref 0–1)
BUN BLDV-MCNC: 10 MG/DL (ref 6–23)
CALCIUM SERPL-MCNC: 8.8 MG/DL (ref 8.3–10.6)
CHLORIDE BLD-SCNC: 101 MMOL/L (ref 99–110)
CO2: 26 MMOL/L (ref 21–32)
CREAT SERPL-MCNC: 0.7 MG/DL (ref 0.6–1.1)
DIFFERENTIAL TYPE: ABNORMAL
EOSINOPHILS ABSOLUTE: 0.3 K/CU MM
EOSINOPHILS RELATIVE PERCENT: 10.3 % (ref 0–3)
ERYTHROCYTE SEDIMENTATION RATE: 12 MM/HR (ref 0–30)
GFR AFRICAN AMERICAN: >60 ML/MIN/1.73M2
GFR NON-AFRICAN AMERICAN: >60 ML/MIN/1.73M2
GLUCOSE BLD-MCNC: 91 MG/DL (ref 70–99)
HCT VFR BLD CALC: 34.1 % (ref 37–47)
HEMOGLOBIN: 11.5 GM/DL (ref 12.5–16)
IGA: 418 MG/DL (ref 69–382)
IGG,SERUM: 990 MG/DL (ref 723–1685)
IGM,SERUM: <25 MG/DL (ref 62–277)
LACTATE DEHYDROGENASE: 143 IU/L (ref 120–246)
LYMPHOCYTES ABSOLUTE: 0.7 K/CU MM
LYMPHOCYTES RELATIVE PERCENT: 24.5 % (ref 24–44)
MCH RBC QN AUTO: 31.2 PG (ref 27–31)
MCHC RBC AUTO-ENTMCNC: 33.7 % (ref 32–36)
MCV RBC AUTO: 92.4 FL (ref 78–100)
MONOCYTES ABSOLUTE: 0.4 K/CU MM
MONOCYTES RELATIVE PERCENT: 13.6 % (ref 0–4)
PDW BLD-RTO: 15.1 % (ref 11.7–14.9)
PLATELET # BLD: 181 K/CU MM (ref 140–440)
PMV BLD AUTO: 8.9 FL (ref 7.5–11.1)
POTASSIUM SERPL-SCNC: 4.1 MMOL/L (ref 3.5–5.1)
RBC # BLD: 3.69 M/CU MM (ref 4.2–5.4)
SEGMENTED NEUTROPHILS ABSOLUTE COUNT: 1.4 K/CU MM
SEGMENTED NEUTROPHILS RELATIVE PERCENT: 49.4 % (ref 36–66)
SODIUM BLD-SCNC: 139 MMOL/L (ref 135–145)
TOTAL PROTEIN: 6.5 GM/DL (ref 6.4–8.2)
WBC # BLD: 2.7 K/CU MM (ref 4–10.5)

## 2020-11-04 PROCEDURE — 80053 COMPREHEN METABOLIC PANEL: CPT

## 2020-11-04 PROCEDURE — 85025 COMPLETE CBC W/AUTO DIFF WBC: CPT

## 2020-11-04 PROCEDURE — 86320 SERUM IMMUNOELECTROPHORESIS: CPT

## 2020-11-04 PROCEDURE — 84165 PROTEIN E-PHORESIS SERUM: CPT

## 2020-11-04 PROCEDURE — 82232 ASSAY OF BETA-2 PROTEIN: CPT

## 2020-11-04 PROCEDURE — 85652 RBC SED RATE AUTOMATED: CPT

## 2020-11-04 PROCEDURE — 83615 LACTATE (LD) (LDH) ENZYME: CPT

## 2020-11-04 PROCEDURE — 83883 ASSAY NEPHELOMETRY NOT SPEC: CPT

## 2020-11-04 PROCEDURE — 36415 COLL VENOUS BLD VENIPUNCTURE: CPT

## 2020-11-04 PROCEDURE — 82784 ASSAY IGA/IGD/IGG/IGM EACH: CPT

## 2020-11-06 LAB
BETA-2 MICROGLOBULIN: 2.4 MG/L (ref 1.1–2.4)
KAPPA QUANT FREE LIGHT CHAINS: 34.74 MG/L (ref 3.3–19.4)
KAPPA/LAMBDA FREE LIGHT CHAIN RATIO: 1.05 (ref 0.26–1.65)
LAMBDA FREE LIGHT CHAINS URINE/ VOL: 33.2 MG/L (ref 5.71–26.3)

## 2020-11-09 LAB
ALBUMIN ELP: 3.5 GM/DL (ref 3.2–5.6)
ALPHA-1-GLOBULIN: 0.3 GM/DL (ref 0.1–0.4)
ALPHA-2-GLOBULIN: 0.6 GM/DL (ref 0.4–1.2)
BETA GLOBULIN: 1.1 GM/DL (ref 0.5–1.3)
GAMMA GLOBULIN: 1 GM/DL (ref 0.5–1.6)
SPEP INTERPRETATION: NORMAL
SPEP INTERPRETATION: NORMAL
TOTAL PROTEIN: 6.5 GM/DL (ref 6.4–8.2)

## 2020-11-17 RX ORDER — LENALIDOMIDE 10 MG/1
10 CAPSULE ORAL DAILY
Qty: 28 CAPSULE | Refills: 0 | Status: SHIPPED | OUTPATIENT
Start: 2020-11-17 | End: 2020-12-14 | Stop reason: SDUPTHER

## 2020-11-18 ENCOUNTER — OFFICE VISIT (OUTPATIENT)
Dept: ONCOLOGY | Age: 70
End: 2020-11-18
Payer: MEDICARE

## 2020-11-18 ENCOUNTER — HOSPITAL ENCOUNTER (OUTPATIENT)
Dept: INFUSION THERAPY | Age: 70
Discharge: HOME | End: 2020-11-18
Payer: MEDICARE

## 2020-11-18 VITALS
DIASTOLIC BLOOD PRESSURE: 71 MMHG | TEMPERATURE: 97.4 F | HEART RATE: 73 BPM | WEIGHT: 191.2 LBS | HEIGHT: 64 IN | BODY MASS INDEX: 32.64 KG/M2 | OXYGEN SATURATION: 96 % | SYSTOLIC BLOOD PRESSURE: 166 MMHG | RESPIRATION RATE: 16 BRPM

## 2020-11-18 PROCEDURE — G8417 CALC BMI ABV UP PARAM F/U: HCPCS | Performed by: INTERNAL MEDICINE

## 2020-11-18 PROCEDURE — G8427 DOCREV CUR MEDS BY ELIG CLIN: HCPCS | Performed by: INTERNAL MEDICINE

## 2020-11-18 PROCEDURE — 99213 OFFICE O/P EST LOW 20 MIN: CPT | Performed by: INTERNAL MEDICINE

## 2020-11-18 PROCEDURE — 1090F PRES/ABSN URINE INCON ASSESS: CPT | Performed by: INTERNAL MEDICINE

## 2020-11-18 PROCEDURE — 1123F ACP DISCUSS/DSCN MKR DOCD: CPT | Performed by: INTERNAL MEDICINE

## 2020-11-18 PROCEDURE — 3017F COLORECTAL CA SCREEN DOC REV: CPT | Performed by: INTERNAL MEDICINE

## 2020-11-18 PROCEDURE — 1036F TOBACCO NON-USER: CPT | Performed by: INTERNAL MEDICINE

## 2020-11-18 PROCEDURE — G8484 FLU IMMUNIZE NO ADMIN: HCPCS | Performed by: INTERNAL MEDICINE

## 2020-11-18 PROCEDURE — 99211 OFF/OP EST MAY X REQ PHY/QHP: CPT

## 2020-11-18 PROCEDURE — G8399 PT W/DXA RESULTS DOCUMENT: HCPCS | Performed by: INTERNAL MEDICINE

## 2020-11-18 PROCEDURE — 4040F PNEUMOC VAC/ADMIN/RCVD: CPT | Performed by: INTERNAL MEDICINE

## 2020-11-18 ASSESSMENT — PATIENT HEALTH QUESTIONNAIRE - PHQ9
SUM OF ALL RESPONSES TO PHQ QUESTIONS 1-9: 0
1. LITTLE INTEREST OR PLEASURE IN DOING THINGS: 0
SUM OF ALL RESPONSES TO PHQ QUESTIONS 1-9: 0
SUM OF ALL RESPONSES TO PHQ QUESTIONS 1-9: 0
2. FEELING DOWN, DEPRESSED OR HOPELESS: 0
SUM OF ALL RESPONSES TO PHQ9 QUESTIONS 1 & 2: 0

## 2020-11-18 NOTE — PROGRESS NOTES
MA Rooming Questions  Patient: Lola Fabian  MRN: [id number]    Date: 11/18/2020        1. Do you have any new issues? yes - left hip pain x 2 months         2. Do you need any refills on medications?    no    3. Have you had any imaging done since your last visit?   no    4. Have you been hospitalized or seen in the emergency room since your last visit here?   no    5. Did the patient have a depression screening completed today?  Yes    PHQ-9 Total Score: 0 (11/18/2020 10:40 AM)       PHQ-9 Given to (if applicable):               PHQ-9 Score (if applicable):                     [] Positive     [x]  Negative              Does question #9 need addressed (if applicable)                     [] Yes    []  No               Gallo Moon MA

## 2020-11-18 NOTE — PROGRESS NOTES
partially preserved trilineage hematopoiesis and involvement by plasma cell neoplasm [85% of marrow cellularity is comprised of neoplastic plasma cells]. Cytogenetic reveals normal myocardial type [46XX]. Fish panel revealed gain of chromosome 1q21, monosomy 13, and aneuploidy [gain of chromosome 7, 9, 15 and FGFR3/4p]. Bone survey done on 1/8/19 showed multiple lytic lesions noted to the calvarium bilaterally as well as involving the left humerus. With the clinical history findings are concerning for multiple myeloma. No definite additional bony involvement identified. Multilevel degenerative changes to the cervical, thoracic and lumbar spine. Diffuse bone demineralization. First line chemotherapy with Velcade, Revlimid and Dexamethasone was started on January 18, 2019 and she completed her fifth cycle on 4/29/19. Her monoclonal protein has 7900mg/dl before therapy. It decreased to 1900mg/dl (2/8/19), 900mg/dl (3/4/19) and 400 mg/dl (4/26/19). She had bone marrow biopsy on 4/2/19 at [facility] and it showed no morphologic or immunophenotypic evidence of persistent/recurrent plasma cell neoplasm. She received autologus stem cell transplant on 5/16/19. Maintenance chemotherapy with Revlimid was started since August 27, 2019. On November 18, 2020, she presented to me for follow-up. I have been following her for stage III IgG kappa multiple myeloma and she is status post first line chemotherapy with Velcade, Revlimid and Dexamethasone (received total 4 cycles) and autologous stem cell transplantation. She has been on maintenance chemotherapy with revlimid since August 27, 2019. She is tolerating maintenance chemotherapy, Revlimid well and she doesn't encounter any major side effects from Revlimid. We change Revlimid to three weeks on one week off, since she has been having increasing SOB and fatigue. Since her symptoms are resolved now, we resumed back on daily basis since January 14, 2020.      I No fever, chills, night sweats, cough, shortness of breath, chest pain, hemoptysis or palpitations. Her bowel and bladder functions are normal. She denies nausea, vomiting, abdominal pain, diarrhea, constipation, dysuria, loss of appetite or weight loss. She doesn't have neuropathy and she denies bleeding or clotting issues. She denies any pain in her body. Denies anxiety or depression. The rest of the systems are unremarkable. Vital Signs:  BP (!) 166/71 (Site: Left Upper Arm, Position: Sitting, Cuff Size: Medium Adult)   Pulse 73   Temp 97.4 °F (36.3 °C) (Infrared)   Resp 16   Ht 5' 4\" (1.626 m)   Wt 191 lb 3.2 oz (86.7 kg)   SpO2 96%   BMI 32.82 kg/m²     Physical Exam:  CONSTITUTIONAL: awake, no apparent distress, alert, cooperative,    EYES: pupils equal, round and reactive to light, sclera clear and conjunctiva normal  ENT: Normocephalic, without obvious abnormality, atraumatic  NECK: supple, symmetrical, no jugular venous distension and no carotid bruits   HEMATOLOGIC/LYMPHATIC: no cervical, supraclavicular or axillary lymphadenopathy   LUNGS: VBS, no crackles, no rhonchi, no wheezes, no increased work of breathing and clear to auscultation   CARDIOVASCULAR: regular rate and rhythm, normal S1 and S2, no murmur noted  ABDOMEN: normal bowel sounds x 4, non-tender, no masses palpated, no hepatosplenomegaly, soft, non-distended,   MUSCULOSKELETAL: full range of motion noted, tone is normal  NEUROLOGIC: awake, alert, oriented to name, place and time. Motor skills grossly intact. SKIN: Normal skin color, texture, turgor and no jaundice.  appears intact   EXTREMITIES: no LE edema, no clubbing, no leg swelling, no cyanosis     Labs:  Hematology:  Lab Results   Component Value Date    WBC 2.7 (L) 11/04/2020    RBC 3.69 (L) 11/04/2020    HGB 11.5 (L) 11/04/2020    HCT 34.1 (L) 11/04/2020    MCV 92.4 11/04/2020    MCH 31.2 (H) 11/04/2020    MCHC 33.7 11/04/2020    RDW 15.1 (H) 11/04/2020     11/04/2020 MPV 8.9 11/04/2020    BANDSPCT 4 (L) 09/02/2020    SEGSPCT 49.4 11/04/2020    EOSRELPCT 10.3 (H) 11/04/2020    BASOPCT 2.2 (H) 11/04/2020    LYMPHOPCT 24.5 11/04/2020    MONOPCT 13.6 (H) 11/04/2020    BANDABS 0.08 09/02/2020    SEGSABS 1.4 11/04/2020    EOSABS 0.3 11/04/2020    BASOSABS 0.1 11/04/2020    LYMPHSABS 0.7 11/04/2020    MONOSABS 0.4 11/04/2020    DIFFTYPE AUTOMATED DIFFERENTIAL 11/04/2020    ANISOCYTOSIS 1+ 12/22/2018    POLYCHROM 1+ 12/23/2018    WBCMORP OCCASIONAL 09/02/2020    PLTM PLATELETS APPEAR NORMAL 12/23/2018     Lab Results   Component Value Date    ESR 12 11/04/2020     Chemistry:  Lab Results   Component Value Date     11/04/2020    K 4.1 11/04/2020     11/04/2020    CO2 26 11/04/2020    BUN 10 11/04/2020    CREATININE 0.7 11/04/2020    GLUCOSE 91 11/04/2020    CALCIUM 8.8 11/04/2020    PROT 6.5 11/04/2020    PROT 6.5 11/04/2020    LABALBU 3.9 11/04/2020    BILITOT 0.7 11/04/2020    ALKPHOS 141 (H) 11/04/2020    AST 34 11/04/2020    ALT 48 (H) 11/04/2020    LABGLOM >60 11/04/2020    GFRAA >60 11/04/2020    AGRATIO 1.2 09/10/2020    GLOB 2.9 09/10/2020    MG 2.2 09/02/2020    POCCA 1.09 (L) 08/21/2020    POCGLU 129 (H) 08/21/2020     Lab Results   Component Value Date     11/04/2020     No components found for: LD  Lab Results   Component Value Date    TSHHS 4.540 (H) 12/22/2018     Immunology:  Lab Results   Component Value Date    PROT 6.5 11/04/2020    PROT 6.5 11/04/2020    SPEP INTERPRETATION - Within normal limits.   RS 11/04/2020    ALBUMINELP 3.5 11/04/2020    LABALPH 0.3 11/04/2020    LABALPH 0.6 11/04/2020    LABBETA 1.1 11/04/2020    GAMGLOB 1.0 11/04/2020     Lab Results   Component Value Date    KAPPAUVOL 34.74 (H) 11/04/2020    LAMBDAUVOL 33.20 (H) 11/04/2020    KLFLCR 1.05 11/04/2020     Lab Results   Component Value Date    B2M 2.4 11/04/2020     Coagulation Panel:  Lab Results   Component Value Date    PROTIME 12.2 09/02/2020    INR 1.01 09/02/2020 APTT 27.9 09/02/2020    DDIMER 2430 (H) 09/02/2020     Anemia Panel:  Lab Results   Component Value Date    JBVBGVCU12 954.6 (H) 12/22/2018    FOLATE >20.0 (H) 09/17/2019     Tumor Markers:  No results found for: , CEA, , LABCA2, PSA  Observations:  PHQ-9 Total Score: 0 (11/18/2020 10:40 AM)        Assessment & Plan:   Stage III IgG kappa multiple myeloma    PLAN  Ms. Sanchez  is a 73-year-old very pleasant female who was found to have significantly high monoclonal gammopathy (7300 mg/dl) when she presented with symptomatic anemia. Furhter work ups with bone marrow biopsy confirmed that she has stage III IgG kappa multiple myeloma (high risk disease). Since she has symptomatic multiple myeloma, we started first-line chemotherapy with Velcade, Revlimid and dexamethasone on January 18, 2019 and she completed her fifth cycle on 4/29/19. Her monoclonal protein has 7900mg/dl before therapy. It decreased to 1900mg/dl (2/8/19), 900mg/dl (3/4/19) and 400 mg/dl (4/26/19). She had bone marrow biopsy on 4/2/19 at [facility] and it showed no morphologic or immunophenotypic evidence of persistent/recurrent plasma cell neoplasm. She received autologus stem cell transplant on 5/16/19. She has been on maintenance chemotherapy with revlimid since August 27, 2019. On November 18, 2020, she presented to me for follow-up. I have been following her for stage III IgG kappa multiple myeloma and she is status post first line chemotherapy with Velcade, Revlimid and Dexamethasone (received total 4 cycles) and autologous stem cell transplantation. She has been on maintenance chemotherapy with revlimid since August 27, 2019. She is tolerating maintenance chemotherapy, Revlimid well and she doesn't encounter any major side effects from Revlimid. We change Revlimid to three weeks on one week off, since she has been having increasing SOB and fatigue.  Since her symptoms are resolved now, we resumed back on daily basis since January 14, 2020. I recognized that her monoclonal protein is still undetectable (too small to measure on SARABJIT) on recent serum protein electrophoresis and immunofixation done on November 4, 2020. I believe her multiple myeloma is controlled very well with current maintenance chemotherapy and I recommended to continue with that for now. Since her revlimid induced diarrhea has been controlled well with lomotil, I recommend her to continue with it for now. I will repeat all the blood test again in 2 months, including SPEP, serum immunofixation, serum light chain assays and rations, beta 2 microglobulin and immunoglobulin panels. I recommend her to continue with aspirin 81 mg daily to prevent revlimid induced thromboembolic episodes. I also recommend her to continue with zometa every three month intervals for total two years. I recommend her to continue with cymbalta 60 mg daily since it controlled her neuropathy very well. I asked her to take tramadol prn for neuropathic pain. I reviewed with her findings on screening mammogram and DEXA scan, done on 3/9/2020. I answered all her questions and concerns for today. I asked her to follow-up with primary care physician, Dr. Brody Mora on regular basis. Recent imaging and labs were reviewed and discussed with the patient.

## 2020-11-24 ENCOUNTER — HOSPITAL ENCOUNTER (OUTPATIENT)
Dept: INFUSION THERAPY | Age: 70
Discharge: HOME | End: 2020-11-24
Payer: MEDICARE

## 2020-11-24 VITALS
TEMPERATURE: 96.9 F | HEART RATE: 72 BPM | BODY MASS INDEX: 33.37 KG/M2 | OXYGEN SATURATION: 96 % | RESPIRATION RATE: 16 BRPM | WEIGHT: 194.4 LBS | DIASTOLIC BLOOD PRESSURE: 67 MMHG | SYSTOLIC BLOOD PRESSURE: 145 MMHG

## 2020-11-24 DIAGNOSIS — C90.00 MULTIPLE MYELOMA NOT HAVING ACHIEVED REMISSION (HCC): Primary | Chronic | ICD-10-CM

## 2020-11-24 DIAGNOSIS — M85.89 OSTEOPENIA OF MULTIPLE SITES: ICD-10-CM

## 2020-11-24 PROCEDURE — 96374 THER/PROPH/DIAG INJ IV PUSH: CPT

## 2020-11-24 PROCEDURE — 2580000003 HC RX 258: Performed by: INTERNAL MEDICINE

## 2020-11-24 PROCEDURE — 6360000002 HC RX W HCPCS: Performed by: INTERNAL MEDICINE

## 2020-11-24 RX ORDER — SODIUM CHLORIDE 9 MG/ML
20 INJECTION, SOLUTION INTRAVENOUS ONCE
Status: CANCELLED | OUTPATIENT
Start: 2020-11-24

## 2020-11-24 RX ORDER — SODIUM CHLORIDE 0.9 % (FLUSH) 0.9 %
5 SYRINGE (ML) INJECTION PRN
Status: CANCELLED | OUTPATIENT
Start: 2021-02-24

## 2020-11-24 RX ORDER — SODIUM CHLORIDE 9 MG/ML
INJECTION, SOLUTION INTRAVENOUS CONTINUOUS
Status: CANCELLED | OUTPATIENT
Start: 2020-11-24

## 2020-11-24 RX ORDER — DIPHENHYDRAMINE HYDROCHLORIDE 50 MG/ML
50 INJECTION INTRAMUSCULAR; INTRAVENOUS ONCE
Status: CANCELLED | OUTPATIENT
Start: 2020-11-24

## 2020-11-24 RX ORDER — DIPHENHYDRAMINE HYDROCHLORIDE 50 MG/ML
50 INJECTION INTRAMUSCULAR; INTRAVENOUS ONCE
Status: CANCELLED | OUTPATIENT
Start: 2021-02-24

## 2020-11-24 RX ORDER — HEPARIN SODIUM (PORCINE) LOCK FLUSH IV SOLN 100 UNIT/ML 100 UNIT/ML
500 SOLUTION INTRAVENOUS PRN
Status: CANCELLED | OUTPATIENT
Start: 2021-02-24

## 2020-11-24 RX ORDER — SODIUM CHLORIDE 9 MG/ML
20 INJECTION, SOLUTION INTRAVENOUS ONCE
Status: DISCONTINUED | OUTPATIENT
Start: 2020-11-24 | End: 2020-11-25 | Stop reason: HOSPADM

## 2020-11-24 RX ORDER — EPINEPHRINE 1 MG/ML
0.3 INJECTION, SOLUTION, CONCENTRATE INTRAVENOUS PRN
Status: CANCELLED | OUTPATIENT
Start: 2021-02-24

## 2020-11-24 RX ORDER — METHYLPREDNISOLONE SODIUM SUCCINATE 125 MG/2ML
125 INJECTION, POWDER, LYOPHILIZED, FOR SOLUTION INTRAMUSCULAR; INTRAVENOUS ONCE
Status: CANCELLED | OUTPATIENT
Start: 2020-11-24

## 2020-11-24 RX ORDER — SODIUM CHLORIDE 0.9 % (FLUSH) 0.9 %
10 SYRINGE (ML) INJECTION PRN
Status: CANCELLED | OUTPATIENT
Start: 2020-11-24

## 2020-11-24 RX ORDER — EPINEPHRINE 1 MG/ML
0.3 INJECTION, SOLUTION, CONCENTRATE INTRAVENOUS PRN
Status: CANCELLED | OUTPATIENT
Start: 2020-11-24

## 2020-11-24 RX ORDER — SODIUM CHLORIDE 9 MG/ML
20 INJECTION, SOLUTION INTRAVENOUS ONCE
Status: CANCELLED | OUTPATIENT
Start: 2021-02-24

## 2020-11-24 RX ORDER — HEPARIN SODIUM (PORCINE) LOCK FLUSH IV SOLN 100 UNIT/ML 100 UNIT/ML
500 SOLUTION INTRAVENOUS PRN
Status: CANCELLED | OUTPATIENT
Start: 2020-11-24

## 2020-11-24 RX ORDER — SODIUM CHLORIDE 0.9 % (FLUSH) 0.9 %
10 SYRINGE (ML) INJECTION PRN
Status: CANCELLED | OUTPATIENT
Start: 2021-02-24

## 2020-11-24 RX ORDER — SODIUM CHLORIDE 9 MG/ML
INJECTION, SOLUTION INTRAVENOUS CONTINUOUS
Status: CANCELLED | OUTPATIENT
Start: 2021-02-24

## 2020-11-24 RX ORDER — SODIUM CHLORIDE 0.9 % (FLUSH) 0.9 %
5 SYRINGE (ML) INJECTION PRN
Status: CANCELLED | OUTPATIENT
Start: 2020-11-24

## 2020-11-24 RX ORDER — METHYLPREDNISOLONE SODIUM SUCCINATE 125 MG/2ML
125 INJECTION, POWDER, LYOPHILIZED, FOR SOLUTION INTRAMUSCULAR; INTRAVENOUS ONCE
Status: CANCELLED | OUTPATIENT
Start: 2021-02-24

## 2020-11-24 RX ADMIN — ZOLEDRONIC ACID 4 MG: 4 INJECTION, SOLUTION, CONCENTRATE INTRAVENOUS at 09:31

## 2020-11-24 RX ADMIN — SODIUM CHLORIDE 20 ML/HR: 9 INJECTION, SOLUTION INTRAVENOUS at 09:31

## 2020-11-24 NOTE — PROGRESS NOTES
Pt. Here for Zometa. Peripheral IV started in right ac after second attempt. Pt. Denies any questions or concerns. Treatment administered without incident.

## 2020-12-14 RX ORDER — LENALIDOMIDE 10 MG/1
10 CAPSULE ORAL DAILY
Qty: 28 CAPSULE | Refills: 0 | Status: SHIPPED | OUTPATIENT
Start: 2020-12-14 | End: 2021-01-14 | Stop reason: SDUPTHER

## 2021-01-03 NOTE — PROGRESS NOTES
Patient Name: Cherelle Bailey  Patient : 1950  Patient MRN: [id number]     Primary Oncologist: Lis Yao MD  Referring Provider: Parth Mosher MD     Date of Service: 2021      Chief Complaint:   Chief Complaint   Patient presents with    Follow-up     Patient Active Problem List:     Severe anemia     Multiple myeloma not having achieved remission      Drug related polyneuropathy      Osteopenia of multiple sites    HPI:   Cherelle Bailey is a 72-year-old very pleasnat female with medical history significant for hypertension, GERD, depression, anemia, initially presented to me on 2018 to follow up with her monocloal gammopathy. She initially presented to [facility] on 18 with low hemoglobin. She stated that she had colonoscopy in  by Dr. Odin Patiño. She has been tired and fatigue since 2018. She denies weight loss. She was found to have severe anemia on blood test done in her primary care physician office and she was asked to come to ER. Her base line hemoglobin was 8.4 -9.4 gram since . It was 13 grams in  and on arrival to hospital on 18 was 5.7 grams. She received 2 units of PRBC. I was called to evaluate her anemia at that time. I recognized that she has worsening macrocytic anemia. She also has mild leukopenia and thrombocytopenia. Her total protein level was significantly elevated (11.9 grams), but she has normal calcium and serum creatinine. I requested work ups to rule out plasma cell dyscrasias and she was found to have 7900 mg/dl IgG kappa monoclonal gammopathy on serum protein electrophoresis and serum immunofixation. Her beta 2 microglobulin was 7.5 mg./dl, serum kappa 9.34 mg/dl, lambda 0.19 mg/dl, ratio was 49.16. ESR > 120, CRP 3 and .     Bone marrow biopsy was done on 2018 and final pathology showed hypocellular bone marrow [85%], with partially preserved trilineage hematopoiesis and involvement by plasma cell neoplasm [85% of marrow cellularity is comprised of neoplastic plasma cells]. Cytogenetic reveals normal myocardial type [46XX]. Fish panel revealed gain of chromosome 1q21, monosomy 13, and aneuploidy [gain of chromosome 7, 9, 15 and FGFR3/4p]. Bone survey done on 1/8/19 showed multiple lytic lesions noted to the calvarium bilaterally as well as involving the left humerus. With the clinical history findings are concerning for multiple myeloma. No definite additional bony involvement identified. Multilevel degenerative changes to the cervical, thoracic and lumbar spine. Diffuse bone demineralization. First line chemotherapy with Velcade, Revlimid and Dexamethasone was started on January 18, 2019 and she completed her fifth cycle on 4/29/19. Her monoclonal protein has 7900mg/dl before therapy. It decreased to 1900mg/dl (2/8/19), 900mg/dl (3/4/19) and 400 mg/dl (4/26/19). She had bone marrow biopsy on 4/2/19 at [facility] and it showed no morphologic or immunophenotypic evidence of persistent/recurrent plasma cell neoplasm. She received autologus stem cell transplant on 5/16/19. Maintenance chemotherapy with Revlimid was started since August 27, 2019. On January 19, 2021, she presented to me for follow-up. I have been following her for stage III IgG kappa multiple myeloma and she is status post first line chemotherapy with Velcade, Revlimid and Dexamethasone (received total 4 cycles) and autologous stem cell transplantation. She has been on maintenance chemotherapy with revlimid since August 27, 2019. She is tolerating maintenance chemotherapy, Revlimid well and she doesn't encounter any major side effects from Revlimid. We change Revlimid to three weeks on one week off, since she has been having increasing SOB and fatigue. Since her symptoms are resolved now, we resumed back on daily basis since January 14, 2020.      I recognized that her monoclonal protein is still undetectable (too small to measure on SARABJIT) on recent serum protein electrophoresis and immunofixation done on January 11, 2021. I believe her multiple myeloma is controlled very well with current maintenance chemotherapy and I recommended to continue with that for now. Since her revlimid induced diarrhea has been controlled well with lomotil, I recommend her to continue with it for now. I will repeat all the blood test again in 3 months, including SPEP, serum immunofixation, serum light chain assays and rations, beta 2 microglobulin and immunoglobulin panels. Chemo induced thromboembolism prophylaxis - I recommend her to continue with aspirin 81 mg daily. Myeloma bone disease - to continue with zometa every three month intervals for total two years. Also asked her to continue with vitamin D daily. Chemo induced neuropathy - she is feeling better and we will decrease cymbalta dose to 60 mg every other day. I asked her to take tramadol prn for neuropathic pain. Hypertension - on amlodipine and losartan. BP is stable and I recommend her for salt restriction. COVID19 vaccine - I recommend her not to have vaccine for now because of her chemotherapy. I reviewed with her findings on screening mammogram and DEXA scan, done on 3/9/2020. She doesn't have any significant symptoms on today visit. Past Medical History  Significant for  1. Hypertension  2. Gastroesophageal reflux disease  3. Depression  4. Anemia    Surgical History  Significant for  1. Cholecystectomy  2. Cataract surgery  3. Hysterectomy in 1985  4. Partial thyroidectomy  5. Tonsillectomy  6. Bladder suspension surgery    Allergies  Adhesive tape  Sulfamide  lisinopril    Social History  She denies smoking and illicit drug abuse. She socially drink alcohol. She is currently living in [facility]. Family History  Significant for bladder cancer and brain cancer in one of her brother.   Prostate cancer in another brother. No other family history of malignancy. Review of Systems: \"Per interval history; otherwise 10 point ROS is negative. \"  Her energy level is stable, appetite, and sleep are good. She doesn't have fever, chills, night sweats, cough, shortness of breath, chest pain, hemoptysis or palpitations. Her bowel and bladder functions are normal. She doesn't have nausea, vomiting, abdominal pain, diarrhea, constipation, dysuria, loss of appetite or weight loss. She denies neuropathy and she doesn't have bleeding or clotting issues. She doesn't have any pain in her body. No anxiety or depression. The rest of the systems are unremarkable. Vital Signs:  BP (!) 151/67 (Site: Right Upper Arm, Position: Sitting, Cuff Size: Large Adult)   Pulse 70   Temp 97.6 °F (36.4 °C) (Infrared)   Ht 5' 4\" (1.626 m)   Wt 193 lb 12.8 oz (87.9 kg)   SpO2 96%   BMI 33.27 kg/m²     Physical Exam:  CONSTITUTIONAL: awake, alert, cooperative, no apparent distress,    EYES: pupils equal, round and reactive to light, sclera clear and conjunctiva normal  ENT: Normocephalic, without obvious abnormality, atraumatic  NECK: supple, symmetrical, no jugular venous distension and no carotid bruits   HEMATOLOGIC/LYMPHATIC: no cervical, supraclavicular or axillary lymphadenopathy   LUNGS: VBS, no wheezes, no increased work of breathing and clear to auscultation, no crackles, no rhonchi,    CARDIOVASCULAR: regular rate and rhythm, normal S1 and S2, no murmur noted  ABDOMEN: normal bowel sounds x 4, non-tender, no masses palpated, no hepatosplenomegaly, soft, non-distended,   MUSCULOSKELETAL: full range of motion noted, tone is normal  NEUROLOGIC: awake, alert, oriented to name, place and time. Motor skills grossly intact. SKIN: Normal skin color, texture, turgor and no jaundice.  appears intact   EXTREMITIES: no clubbing, no leg swelling, no LE edema, no cyanosis     Labs:  Hematology:  Lab Results   Component Value Date    WBC 2.7 (L) 01/11/2021    RBC 3.71 (L) 01/11/2021    HGB 11.7 (L) 01/11/2021    HCT 34.8 (L) 01/11/2021    MCV 93.8 01/11/2021    MCH 31.5 (H) 01/11/2021    MCHC 33.6 01/11/2021    RDW 15.9 (H) 01/11/2021     01/11/2021    MPV 9.0 01/11/2021    BANDSPCT 4 (L) 09/02/2020    SEGSPCT 63.0 01/11/2021    EOSRELPCT 5.2 (H) 01/11/2021    BASOPCT 2.2 (H) 01/11/2021    LYMPHOPCT 19.9 (L) 01/11/2021    MONOPCT 9.7 (H) 01/11/2021    BANDABS 0.08 09/02/2020    SEGSABS 1.7 01/11/2021    EOSABS 0.1 01/11/2021    BASOSABS 0.1 01/11/2021    LYMPHSABS 0.5 01/11/2021    MONOSABS 0.3 01/11/2021    DIFFTYPE AUTOMATED DIFFERENTIAL 01/11/2021    ANISOCYTOSIS 1+ 12/22/2018    POLYCHROM 1+ 12/23/2018    WBCMORP OCCASIONAL 09/02/2020    PLTM PLATELETS APPEAR NORMAL 12/23/2018     Lab Results   Component Value Date    ESR 9 01/11/2021     Chemistry:  Lab Results   Component Value Date     01/11/2021    K 4.1 01/11/2021     01/11/2021    CO2 26 01/11/2021    BUN 10 01/11/2021    CREATININE 0.8 01/11/2021    GLUCOSE 116 (H) 01/11/2021    CALCIUM 8.8 01/11/2021    PROT 6.6 01/11/2021    PROT 6.6 01/11/2021    LABALBU 4.0 01/11/2021    LABALBU 3.92 01/11/2021    BILITOT 0.8 01/11/2021    ALKPHOS 118 01/11/2021    AST 30 01/11/2021    ALT 40 01/11/2021    LABGLOM >60 01/11/2021    GFRAA >60 01/11/2021    AGRATIO 1.2 09/10/2020    GLOB 2.9 09/10/2020    MG 2.2 09/02/2020    POCCA 1.09 (L) 08/21/2020    POCGLU 129 (H) 08/21/2020     Lab Results   Component Value Date     01/11/2021     No components found for: LD  Lab Results   Component Value Date    TSHHS 4.540 (H) 12/22/2018     Immunology:  Lab Results   Component Value Date    PROT 6.6 01/11/2021    PROT 6.6 01/11/2021    SPEP INTERPRETATION - Within normal limits.   RS 11/04/2020    ALBUMINELP 3.5 11/04/2020    LABALPH 0.27 01/11/2021    LABALPH 0.60 01/11/2021    LABBETA 0.92 01/11/2021    GAMGLOB 1.0 11/04/2020     Lab Results   Component Value Date    KAPPAUVOL 31.34 (H) 01/11/2021    LAMBDAUVOL 29.04 (H) 01/11/2021    KLFLCR 1.08 01/11/2021     Lab Results   Component Value Date    B2M 2.3 01/11/2021     Coagulation Panel:  Lab Results   Component Value Date    PROTIME 12.2 09/02/2020    INR 1.01 09/02/2020    APTT 27.9 09/02/2020    DDIMER 2430 (H) 09/02/2020     Anemia Panel:  Lab Results   Component Value Date    [id number] 954.6 (H) 12/22/2018    FOLATE >20.0 (H) 09/17/2019     Tumor Markers:  No results found for: , CEA, , LABCA2, PSA  Observations:  PHQ-9 Total Score: 0 (1/19/2021 11:56 AM)        Assessment & Plan:   Stage III IgG kappa multiple myeloma    PLAN  Ms. Jesi Koch is a 62-year-old very pleasant female who was found to have significantly high monoclonal gammopathy (7300 mg/dl) when she presented with symptomatic anemia. Furhter work ups with bone marrow biopsy confirmed that she has stage III IgG kappa multiple myeloma (high risk disease). Since she has symptomatic multiple myeloma, we started first-line chemotherapy with Velcade, Revlimid and dexamethasone on January 18, 2019 and she completed her fifth cycle on 4/29/19. Her monoclonal protein has 7900mg/dl before therapy. It decreased to 1900mg/dl (2/8/19), 900mg/dl (3/4/19) and 400 mg/dl (4/26/19). She had bone marrow biopsy on 4/2/19 at [address] [place] [address] and it showed no morphologic or immunophenotypic evidence of persistent/recurrent plasma cell neoplasm. She received autologus stem cell transplant on 5/16/19. She has been on maintenance chemotherapy with revlimid since August 27, 2019. On January 19, 2021, she presented to me for follow-up. I have been following her for stage III IgG kappa multiple myeloma and she is status post first line chemotherapy with Velcade, Revlimid and Dexamethasone (received total 4 cycles) and autologous stem cell transplantation. She has been on maintenance chemotherapy with revlimid since August 27, 2019.  She is tolerating maintenance chemotherapy, Revlimid well and she doesn't encounter any major side effects from Revlimid. We change Revlimid to three weeks on one week off, since she has been having increasing SOB and fatigue. Since her symptoms are resolved now, we resumed back on daily basis since January 14, 2020. I recognized that her monoclonal protein is still undetectable (too small to measure on SARABJIT) on recent serum protein electrophoresis and immunofixation done on January 11, 2021. I believe her multiple myeloma is controlled very well with current maintenance chemotherapy and I recommended to continue with that for now. Since her revlimid induced diarrhea has been controlled well with lomotil, I recommend her to continue with it for now. I will repeat all the blood test again in 3 months, including SPEP, serum immunofixation, serum light chain assays and rations, beta 2 microglobulin and immunoglobulin panels. Chemo induced thromboembolism prophylaxis - I recommend her to continue with aspirin 81 mg daily. Myeloma bone disease - to continue with zometa every three month intervals for total two years. Also asked her to continue with vitamin D daily. Chemo induced neuropathy - she is feeling better and we will decrease cymbalta dose to 60 mg every other day. I asked her to take tramadol prn for neuropathic pain. Hypertension - on amlodipine and losartan. BP is stable and I recommend her for salt restriction. COVID19 vaccine - I recommend her not to have vaccine for now because of her chemotherapy. I reviewed with her findings on screening mammogram and DEXA scan, done on 3/9/2020. I answered all her questions and concerns for today. I asked her to follow-up with primary care physician, Dr. Tim Watkins on regular basis. Recent imaging and labs were reviewed and discussed with the patient.

## 2021-01-11 ENCOUNTER — HOSPITAL ENCOUNTER (OUTPATIENT)
Dept: INFUSION THERAPY | Age: 71
Discharge: HOME | End: 2021-01-11
Payer: MEDICARE

## 2021-01-11 DIAGNOSIS — C90.00 MULTIPLE MYELOMA NOT HAVING ACHIEVED REMISSION (HCC): Chronic | ICD-10-CM

## 2021-01-11 DIAGNOSIS — C90.00 MULTIPLE MYELOMA NOT HAVING ACHIEVED REMISSION (HCC): Primary | Chronic | ICD-10-CM

## 2021-01-11 LAB
ALBUMIN SERPL-MCNC: 4 GM/DL (ref 3.4–5)
ALP BLD-CCNC: 118 IU/L (ref 40–129)
ALT SERPL-CCNC: 40 U/L (ref 10–40)
ANION GAP SERPL CALCULATED.3IONS-SCNC: 9 MMOL/L (ref 4–16)
AST SERPL-CCNC: 30 IU/L (ref 15–37)
BASOPHILS ABSOLUTE: 0.1 K/CU MM
BASOPHILS RELATIVE PERCENT: 2.2 % (ref 0–1)
BILIRUB SERPL-MCNC: 0.8 MG/DL (ref 0–1)
BUN BLDV-MCNC: 10 MG/DL (ref 6–23)
CALCIUM SERPL-MCNC: 8.8 MG/DL (ref 8.3–10.6)
CHLORIDE BLD-SCNC: 101 MMOL/L (ref 99–110)
CO2: 26 MMOL/L (ref 21–32)
CREAT SERPL-MCNC: 0.8 MG/DL (ref 0.6–1.1)
DIFFERENTIAL TYPE: ABNORMAL
EOSINOPHILS ABSOLUTE: 0.1 K/CU MM
EOSINOPHILS RELATIVE PERCENT: 5.2 % (ref 0–3)
ERYTHROCYTE SEDIMENTATION RATE: 9 MM/HR (ref 0–30)
GFR AFRICAN AMERICAN: >60 ML/MIN/1.73M2
GFR NON-AFRICAN AMERICAN: >60 ML/MIN/1.73M2
GLUCOSE BLD-MCNC: 116 MG/DL (ref 70–99)
HCT VFR BLD CALC: 34.8 % (ref 37–47)
HEMOGLOBIN: 11.7 GM/DL (ref 12.5–16)
IGA: 379 MG/DL (ref 69–382)
IGG,SERUM: 901 MG/DL (ref 723–1685)
IGM,SERUM: <25 MG/DL (ref 62–277)
LACTATE DEHYDROGENASE: 137 IU/L (ref 120–246)
LYMPHOCYTES ABSOLUTE: 0.5 K/CU MM
LYMPHOCYTES RELATIVE PERCENT: 19.9 % (ref 24–44)
MCH RBC QN AUTO: 31.5 PG (ref 27–31)
MCHC RBC AUTO-ENTMCNC: 33.6 % (ref 32–36)
MCV RBC AUTO: 93.8 FL (ref 78–100)
MONOCYTES ABSOLUTE: 0.3 K/CU MM
MONOCYTES RELATIVE PERCENT: 9.7 % (ref 0–4)
PDW BLD-RTO: 15.9 % (ref 11.7–14.9)
PLATELET # BLD: 174 K/CU MM (ref 140–440)
PMV BLD AUTO: 9 FL (ref 7.5–11.1)
POTASSIUM SERPL-SCNC: 4.1 MMOL/L (ref 3.5–5.1)
RBC # BLD: 3.71 M/CU MM (ref 4.2–5.4)
SEGMENTED NEUTROPHILS ABSOLUTE COUNT: 1.7 K/CU MM
SEGMENTED NEUTROPHILS RELATIVE PERCENT: 63 % (ref 36–66)
SODIUM BLD-SCNC: 136 MMOL/L (ref 135–145)
TOTAL PROTEIN: 6.6 GM/DL (ref 6.4–8.2)
WBC # BLD: 2.7 K/CU MM (ref 4–10.5)

## 2021-01-11 PROCEDURE — 84165 PROTEIN E-PHORESIS SERUM: CPT

## 2021-01-11 PROCEDURE — 86320 SERUM IMMUNOELECTROPHORESIS: CPT

## 2021-01-11 PROCEDURE — 83615 LACTATE (LD) (LDH) ENZYME: CPT

## 2021-01-11 PROCEDURE — 86334 IMMUNOFIX E-PHORESIS SERUM: CPT

## 2021-01-11 PROCEDURE — 36415 COLL VENOUS BLD VENIPUNCTURE: CPT

## 2021-01-11 PROCEDURE — 83883 ASSAY NEPHELOMETRY NOT SPEC: CPT

## 2021-01-11 PROCEDURE — 80053 COMPREHEN METABOLIC PANEL: CPT

## 2021-01-11 PROCEDURE — 85025 COMPLETE CBC W/AUTO DIFF WBC: CPT

## 2021-01-11 PROCEDURE — 85652 RBC SED RATE AUTOMATED: CPT

## 2021-01-11 PROCEDURE — 82232 ASSAY OF BETA-2 PROTEIN: CPT

## 2021-01-11 PROCEDURE — 84155 ASSAY OF PROTEIN SERUM: CPT

## 2021-01-11 PROCEDURE — 82784 ASSAY IGA/IGD/IGG/IGM EACH: CPT

## 2021-01-12 LAB — BETA-2 MICROGLOBULIN: 2.3 MG/L (ref 1.1–2.4)

## 2021-01-13 LAB
KAPPA QUANT FREE LIGHT CHAINS: 31.34 MG/L (ref 3.3–19.4)
KAPPA/LAMBDA FREE LIGHT CHAIN RATIO: 1.08 (ref 0.26–1.65)
LAMBDA FREE LIGHT CHAINS QNT: 29.04 MG/L (ref 5.71–26.3)

## 2021-01-13 RX ORDER — LENALIDOMIDE 10 MG/1
10 CAPSULE ORAL DAILY
Qty: 28 CAPSULE | Refills: 0 | Status: CANCELLED | OUTPATIENT
Start: 2021-01-13

## 2021-01-14 LAB
ALBUMIN SERPL-MCNC: 3.92 G/DL (ref 3.75–5.01)
ALPHA-1-GLOBULIN: 0.27 G/DL (ref 0.19–0.46)
ALPHA-2-GLOBULIN: 0.6 G/DL (ref 0.48–1.05)
BETA GLOBULIN: 0.92 G/DL (ref 0.48–1.1)
GAMMA: 0.89 G/DL (ref 0.62–1.51)
IMMUNOFIXATION REFLEX: NORMAL
PROTEIN ELECTROPHORESIS, SERUM: NORMAL
SPE/IFE INTERPRETATION: NORMAL
TOTAL PROTEIN: 6.6 G/DL (ref 6.3–8.2)

## 2021-01-14 RX ORDER — LENALIDOMIDE 10 MG/1
10 CAPSULE ORAL DAILY
Qty: 28 CAPSULE | Refills: 0 | Status: SHIPPED | OUTPATIENT
Start: 2021-01-14 | End: 2021-02-09 | Stop reason: SDUPTHER

## 2021-01-19 ENCOUNTER — HOSPITAL ENCOUNTER (OUTPATIENT)
Dept: INFUSION THERAPY | Age: 71
Discharge: HOME | End: 2021-01-19
Payer: MEDICARE

## 2021-01-19 ENCOUNTER — OFFICE VISIT (OUTPATIENT)
Dept: ONCOLOGY | Age: 71
End: 2021-01-19
Payer: MEDICARE

## 2021-01-19 VITALS
OXYGEN SATURATION: 96 % | HEART RATE: 70 BPM | WEIGHT: 193.8 LBS | BODY MASS INDEX: 33.09 KG/M2 | DIASTOLIC BLOOD PRESSURE: 67 MMHG | HEIGHT: 64 IN | TEMPERATURE: 97.6 F | SYSTOLIC BLOOD PRESSURE: 151 MMHG

## 2021-01-19 DIAGNOSIS — C90.00 MULTIPLE MYELOMA NOT HAVING ACHIEVED REMISSION (HCC): Primary | Chronic | ICD-10-CM

## 2021-01-19 PROCEDURE — 99211 OFF/OP EST MAY X REQ PHY/QHP: CPT

## 2021-01-19 PROCEDURE — 4040F PNEUMOC VAC/ADMIN/RCVD: CPT | Performed by: INTERNAL MEDICINE

## 2021-01-19 PROCEDURE — 99214 OFFICE O/P EST MOD 30 MIN: CPT | Performed by: INTERNAL MEDICINE

## 2021-01-19 PROCEDURE — 1036F TOBACCO NON-USER: CPT | Performed by: INTERNAL MEDICINE

## 2021-01-19 PROCEDURE — 1123F ACP DISCUSS/DSCN MKR DOCD: CPT | Performed by: INTERNAL MEDICINE

## 2021-01-19 PROCEDURE — 3017F COLORECTAL CA SCREEN DOC REV: CPT | Performed by: INTERNAL MEDICINE

## 2021-01-19 PROCEDURE — G8427 DOCREV CUR MEDS BY ELIG CLIN: HCPCS | Performed by: INTERNAL MEDICINE

## 2021-01-19 PROCEDURE — G8399 PT W/DXA RESULTS DOCUMENT: HCPCS | Performed by: INTERNAL MEDICINE

## 2021-01-19 PROCEDURE — G8484 FLU IMMUNIZE NO ADMIN: HCPCS | Performed by: INTERNAL MEDICINE

## 2021-01-19 PROCEDURE — 1090F PRES/ABSN URINE INCON ASSESS: CPT | Performed by: INTERNAL MEDICINE

## 2021-01-19 PROCEDURE — G8417 CALC BMI ABV UP PARAM F/U: HCPCS | Performed by: INTERNAL MEDICINE

## 2021-01-19 RX ORDER — FAMOTIDINE 20 MG/1
TABLET, FILM COATED ORAL
COMMUNITY
Start: 2020-11-17

## 2021-01-19 RX ORDER — GLUCOSAMINE/D3/BOSWELLIA SERRA 1500MG-400
TABLET ORAL
COMMUNITY

## 2021-01-19 ASSESSMENT — PATIENT HEALTH QUESTIONNAIRE - PHQ9
2. FEELING DOWN, DEPRESSED OR HOPELESS: 0
SUM OF ALL RESPONSES TO PHQ QUESTIONS 1-9: 0

## 2021-01-19 NOTE — PROGRESS NOTES
MA Rooming Questions  Patient: Lala Sloan  MRN: [id number]    Date: 1/19/2021        1. Do you have any new issues?   no         2. Do you need any refills on medications?    no    3. Have you had any imaging done since your last visit? yes - labs     4. Have you been hospitalized or seen in the emergency room since your last visit here?   no    5. Did the patient have a depression screening completed today?  Yes    PHQ-9 Total Score: 0 (1/19/2021 11:56 AM)       PHQ-9 Given to (if applicable):               PHQ-9 Score (if applicable):                     [] Positive     []  Negative              Does question #9 need addressed (if applicable)                     [] Yes    []  No               Laura Bean MA

## 2021-02-09 RX ORDER — LENALIDOMIDE 10 MG/1
10 CAPSULE ORAL DAILY
Qty: 28 CAPSULE | Refills: 0 | Status: SHIPPED | OUTPATIENT
Start: 2021-02-09 | End: 2021-03-09 | Stop reason: SDUPTHER

## 2021-02-18 DIAGNOSIS — C90.00 MULTIPLE MYELOMA NOT HAVING ACHIEVED REMISSION (HCC): Primary | Chronic | ICD-10-CM

## 2021-02-24 ENCOUNTER — HOSPITAL ENCOUNTER (OUTPATIENT)
Dept: INFUSION THERAPY | Age: 71
Discharge: HOME | End: 2021-02-24
Payer: MEDICARE

## 2021-02-24 VITALS
RESPIRATION RATE: 16 BRPM | WEIGHT: 193 LBS | BODY MASS INDEX: 32.95 KG/M2 | DIASTOLIC BLOOD PRESSURE: 70 MMHG | HEIGHT: 64 IN | TEMPERATURE: 96.6 F | HEART RATE: 71 BPM | SYSTOLIC BLOOD PRESSURE: 164 MMHG | OXYGEN SATURATION: 96 %

## 2021-02-24 DIAGNOSIS — C90.00 MULTIPLE MYELOMA NOT HAVING ACHIEVED REMISSION (HCC): Primary | Chronic | ICD-10-CM

## 2021-02-24 DIAGNOSIS — M85.89 OSTEOPENIA OF MULTIPLE SITES: ICD-10-CM

## 2021-02-24 DIAGNOSIS — C90.00 MULTIPLE MYELOMA NOT HAVING ACHIEVED REMISSION (HCC): Primary | ICD-10-CM

## 2021-02-24 LAB
ALBUMIN SERPL-MCNC: 3.6 GM/DL (ref 3.4–5)
ALP BLD-CCNC: 129 IU/L (ref 40–129)
ALT SERPL-CCNC: 42 U/L (ref 10–40)
ANION GAP SERPL CALCULATED.3IONS-SCNC: 8 MMOL/L (ref 4–16)
AST SERPL-CCNC: 32 IU/L (ref 15–37)
BASOPHILS ABSOLUTE: 0 K/CU MM
BASOPHILS RELATIVE PERCENT: 1.3 % (ref 0–1)
BILIRUB SERPL-MCNC: 1 MG/DL (ref 0–1)
BUN BLDV-MCNC: 12 MG/DL (ref 6–23)
CALCIUM SERPL-MCNC: 8.7 MG/DL (ref 8.3–10.6)
CHLORIDE BLD-SCNC: 101 MMOL/L (ref 99–110)
CO2: 27 MMOL/L (ref 21–32)
CREAT SERPL-MCNC: 0.7 MG/DL (ref 0.6–1.1)
DIFFERENTIAL TYPE: ABNORMAL
EOSINOPHILS ABSOLUTE: 0.1 K/CU MM
EOSINOPHILS RELATIVE PERCENT: 3.4 % (ref 0–3)
GFR AFRICAN AMERICAN: >60 ML/MIN/1.73M2
GFR AFRICAN AMERICAN: >60 ML/MIN/1.73M2
GFR NON-AFRICAN AMERICAN: >60 ML/MIN/1.73M2
GFR NON-AFRICAN AMERICAN: >60 ML/MIN/1.73M2
GLUCOSE BLD-MCNC: 135 MG/DL (ref 70–99)
GLUCOSE BLD-MCNC: 152 MG/DL (ref 70–99)
HCT VFR BLD CALC: 33.8 % (ref 37–47)
HEMOGLOBIN: 11.6 GM/DL (ref 12.5–16)
LYMPHOCYTES ABSOLUTE: 0.5 K/CU MM
LYMPHOCYTES RELATIVE PERCENT: 18.1 % (ref 24–44)
MCH RBC QN AUTO: 31.9 PG (ref 27–31)
MCHC RBC AUTO-ENTMCNC: 34.3 % (ref 32–36)
MCV RBC AUTO: 92.9 FL (ref 78–100)
MONOCYTES ABSOLUTE: 0.4 K/CU MM
MONOCYTES RELATIVE PERCENT: 12.1 % (ref 0–4)
PDW BLD-RTO: 15.5 % (ref 11.7–14.9)
PLATELET # BLD: 181 K/CU MM (ref 140–440)
PMV BLD AUTO: 9.7 FL (ref 7.5–11.1)
POC BUN: 12 MG/DL (ref 8–26)
POC CALCIUM: 1.15 MMOL/L (ref 1.12–1.32)
POC CHLORIDE: 104 MMOL/L (ref 98–109)
POC CO2: 27 MMOL/L (ref 21–32)
POC CREATININE: 0.8 MG/DL (ref 0.6–1.1)
POTASSIUM SERPL-SCNC: 3.6 MMOL/L (ref 3.5–4.5)
POTASSIUM SERPL-SCNC: 4.1 MMOL/L (ref 3.5–5.1)
RBC # BLD: 3.64 M/CU MM (ref 4.2–5.4)
SEGMENTED NEUTROPHILS ABSOLUTE COUNT: 1.9 K/CU MM
SEGMENTED NEUTROPHILS RELATIVE PERCENT: 65.1 % (ref 36–66)
SODIUM BLD-SCNC: 136 MMOL/L (ref 135–145)
SODIUM BLD-SCNC: 139 MMOL/L (ref 138–146)
SOURCE, BLOOD GAS: ABNORMAL
TOTAL PROTEIN: 6.4 GM/DL (ref 6.4–8.2)
WBC # BLD: 3 K/CU MM (ref 4–10.5)

## 2021-02-24 PROCEDURE — 96374 THER/PROPH/DIAG INJ IV PUSH: CPT

## 2021-02-24 PROCEDURE — 85025 COMPLETE CBC W/AUTO DIFF WBC: CPT

## 2021-02-24 PROCEDURE — 2580000003 HC RX 258: Performed by: INTERNAL MEDICINE

## 2021-02-24 PROCEDURE — 6360000002 HC RX W HCPCS: Performed by: INTERNAL MEDICINE

## 2021-02-24 PROCEDURE — 80053 COMPREHEN METABOLIC PANEL: CPT

## 2021-02-24 PROCEDURE — 83883 ASSAY NEPHELOMETRY NOT SPEC: CPT

## 2021-02-24 RX ORDER — SODIUM CHLORIDE 0.9 % (FLUSH) 0.9 %
10 SYRINGE (ML) INJECTION PRN
Status: DISCONTINUED | OUTPATIENT
Start: 2021-02-24 | End: 2021-02-25 | Stop reason: HOSPADM

## 2021-02-24 RX ADMIN — ZOLEDRONIC ACID 4 MG: 0.8 INJECTION, SOLUTION, CONCENTRATE INTRAVENOUS at 09:50

## 2021-02-24 NOTE — PROGRESS NOTES
Pt. Here for zometa, pt. Denies any questions or concerns. Peripheral IV started in right arm without difficulty, good blood return noted. Treatment administered without incident.

## 2021-02-26 ENCOUNTER — TELEPHONE (OUTPATIENT)
Dept: ONCOLOGY | Age: 71
End: 2021-02-26

## 2021-02-26 RX ORDER — CLINDAMYCIN HYDROCHLORIDE 300 MG/1
300 CAPSULE ORAL 4 TIMES DAILY
Qty: 28 CAPSULE | Refills: 0 | Status: SHIPPED | OUTPATIENT
Start: 2021-02-26 | End: 2021-03-05

## 2021-02-26 NOTE — TELEPHONE ENCOUNTER
Per DR Deisy Villafuerte, pt instructed to use Listerine 3 times daily and take Clindamycin 300 mg QID x 7 days. Pt instructed to follow up with dentist on Monday and call the office if any diarrhea develops. Pt expressed understanding.

## 2021-02-26 NOTE — TELEPHONE ENCOUNTER
Pt called to state she woke up this morning with jaw pain. She received the Zometa infusion a couple of days ago. Instructed pt to contact her dentist for an immediate evaluation. She expressed understanding.

## 2021-03-02 ENCOUNTER — TELEPHONE (OUTPATIENT)
Dept: ONCOLOGY | Age: 71
End: 2021-03-02

## 2021-03-02 NOTE — TELEPHONE ENCOUNTER
Pt called to state that she saw her dentist today, Dr Madhavi Parrish at [facility]. The dentist did xrays and stated there was no signs of bone deterioration or infection. She told her she thinks the pain is from TMJ and she could stop the antibiotics and continue on Zometa when the next dose is due.

## 2021-03-09 RX ORDER — LENALIDOMIDE 10 MG/1
10 CAPSULE ORAL DAILY
Qty: 28 CAPSULE | Refills: 0 | Status: SHIPPED | OUTPATIENT
Start: 2021-03-09 | End: 2021-04-06 | Stop reason: SDUPTHER

## 2021-03-29 LAB
EER IMMUNOFIX ELECTROPHORESIS GEL: NORMAL
IMMUNOFIX ELECTROPHORESIS GEL: NORMAL

## 2021-04-06 RX ORDER — LENALIDOMIDE 10 MG/1
10 CAPSULE ORAL DAILY
Qty: 28 CAPSULE | Refills: 0 | Status: SHIPPED | OUTPATIENT
Start: 2021-04-06 | End: 2021-05-03 | Stop reason: SDUPTHER

## 2021-04-12 NOTE — PROGRESS NOTES
hematopoiesis and involvement by plasma cell neoplasm [85% of marrow cellularity is comprised of neoplastic plasma cells]. Cytogenetic reveals normal myocardial type [46XX]. Fish panel revealed gain of chromosome 1q21, monosomy 13, and aneuploidy [gain of chromosome 7, 9, 15 and FGFR3/4p]. Bone survey done on 1/8/19 showed multiple lytic lesions noted to the calvarium bilaterally as well as involving the left humerus. With the clinical history findings are concerning for multiple myeloma. No definite additional bony involvement identified. Multilevel degenerative changes to the cervical, thoracic and lumbar spine. Diffuse bone demineralization. First line chemotherapy with Velcade, Revlimid and Dexamethasone was started on January 18, 2019 and she completed her fifth cycle on 4/29/19. Her monoclonal protein has 7900mg/dl before therapy. It decreased to 1900mg/dl (2/8/19), 900mg/dl (3/4/19) and 400 mg/dl (4/26/19). She had bone marrow biopsy on 4/2/19 at [facility] and it showed no morphologic or immunophenotypic evidence of persistent/recurrent plasma cell neoplasm. She received autologus stem cell transplant on 5/16/19. Maintenance chemotherapy with Revlimid was started since August 27, 2019. On April 20, 2021, she presented to me for follow-up. I have been following her for stage III IgG kappa multiple myeloma and she is status post first line chemotherapy with Velcade, Revlimid and Dexamethasone (received total 4 cycles) and autologous stem cell transplantation. She has been on maintenance chemotherapy with revlimid since August 27, 2019. She is tolerating maintenance chemotherapy, Revlimid well and she doesn't encounter any major side effects from Revlimid. We change Revlimid to three weeks on one week off, since she has been having increasing SOB and fatigue. Since her symptoms are resolved now, we resumed back on daily basis since January 14, 2020.      I recognized that her monoclonal protein is still undetectable (too small to measure on SARABJIT) on recent serum protein electrophoresis and immunofixation done on April 13, 2021. I believe her multiple myeloma is controlled very well with current maintenance chemotherapy and I recommended to continue with that for now. Since her revlimid induced diarrhea has been controlled well with lomotil, I recommend her to continue with it for now. I will repeat all the blood test again in 3 months, including SPEP, serum immunofixation, serum light chain assays and rations, beta 2 microglobulin and immunoglobulin panels. Chemo induced thromboembolism prophylaxis - I recommend her to continue with aspirin 81 mg daily. Myeloma bone disease - to continue with zometa every three month intervals for total two years. I will stop it after August 2021 dose. I asked her to continue with vitamin D daily. Chemo induced neuropathy - she is back on cymbalta 60 mg daily since her neuropathy gets worse after decreasing the dose. I asked her to take tramadol prn for neuropathic pain. Hypertension - she is on amlodipine and losartan. BP is stable and I recommend her for salt restriction. COVID19 vaccine - I recommend her not to have vaccine for now because of her chemotherapy. I reviewed with her findings on screening mammogram and DEXA scan, done on 3/9/2020. She is overdue for mammogram and I will request it soon. I will follow up with mammogram result. Health maintenance - I recommend her to have age-appropriate cancer screening, exercise, low-fat and low-sodium diet. She doesn't have any significant symptoms on today visit. Past Medical History  Significant for  1. Hypertension  2. Gastroesophageal reflux disease  3. Depression  4. Anemia    Surgical History  Significant for  1. Cholecystectomy  2. Cataract surgery  3. Hysterectomy in 1985  4. Partial thyroidectomy  5. Tonsillectomy  6.   Bladder suspension surgery    Allergies  Adhesive tape  Sulfamide  lisinopril    Social History  She denies smoking and illicit drug abuse. She socially drink alcohol. She is currently living in [facility]. Family History  Significant for bladder cancer and brain cancer in one of her brother. Prostate cancer in another brother. No other family history of malignancy. Review of Systems: \"Per interval history; otherwise 10 point ROS is negative. \"  Her energy level is pretty good, appetite, and sleep are fair. She denies fever, chills, night sweats, cough, shortness of breath, chest pain, hemoptysis or palpitations. Her bowel and bladder functions are normal. She denies nausea, vomiting, abdominal pain, diarrhea, constipation, dysuria, loss of appetite or weight loss. She doesn't have neuropathy and she denies bleeding or clotting issues. She denies any pain in her body. Denies anxiety or depression. The rest of the systems are unremarkable.     Vital Signs:  BP (!) 149/68 (Site: Right Upper Arm, Position: Sitting, Cuff Size: Medium Adult)   Pulse 72   Temp 98.3 °F (36.8 °C) (Temporal)   Resp 16   Ht 5' 4\" (1.626 m)   Wt 188 lb (85.3 kg)   SpO2 96%   BMI 32.27 kg/m²     Physical Exam:  CONSTITUTIONAL: awake, alert, cooperative, no apparent distress,    EYES: pupils equal, round and reactive to light, sclera clear and conjunctiva normal  ENT: Normocephalic, without obvious abnormality, atraumatic  NECK: supple, symmetrical, no jugular venous distension and no carotid bruits   HEMATOLOGIC/LYMPHATIC: no cervical, supraclavicular or axillary lymphadenopathy   LUNGS: VBS, no wheezes, no increased work of breathing, no crackles, clear to auscultation, no rhonchi,    CARDIOVASCULAR: regular rate and rhythm, normal S1 and S2, no murmur noted  ABDOMEN: normal bowel sounds x 4, non-tender, no masses palpated, no hepatosplenomegaly, soft, non-distended,   MUSCULOSKELETAL: full range of motion noted, tone is normal  NEUROLOGIC: 04/13/2021    SPEP INTERPRETATION - Within normal limits. LP. 04/13/2021    SPEP  04/13/2021     INTERPRETATION - A definitive monoclonal protein is not identified. LP.    ALBUMINELP 3.6 04/13/2021    LABALPH 0.3 04/13/2021    LABALPH 0.6 04/13/2021    LABBETA 1.1 04/13/2021    GAMGLOB 1.0 04/13/2021     Lab Results   Component Value Date    KAPPAUVOL 31.34 (H) 01/11/2021    LAMBDAUVOL 33.20 (H) 11/04/2020    KLFLCR 1.08 01/11/2021     Lab Results   Component Value Date    B2M 2.5 (H) 04/13/2021     Coagulation Panel:  Lab Results   Component Value Date    PROTIME 12.2 09/02/2020    INR 1.01 09/02/2020    APTT 27.9 09/02/2020    DDIMER 2430 (H) 09/02/2020     Anemia Panel:  Lab Results   Component Value Date    ZAMARUQL55 954.6 (H) 12/22/2018    FOLATE >20.0 (H) 09/17/2019     Tumor Markers:  No results found for: , CEA, , LABCA2, PSA  Observations:  No data recorded        Assessment & Plan:   Stage III IgG kappa multiple myeloma    PLAN  Ms. Cyn Falcon is a 82-year-old very pleasant female who was found to have significantly high monoclonal gammopathy (7300 mg/dl) when she presented with symptomatic anemia. Furhter work ups with bone marrow biopsy confirmed that she has stage III IgG kappa multiple myeloma (high risk disease). Since she has symptomatic multiple myeloma, we started first-line chemotherapy with Velcade, Revlimid and dexamethasone on January 18, 2019 and she completed her fifth cycle on 4/29/19. Her monoclonal protein has 7900mg/dl before therapy. It decreased to 1900mg/dl (2/8/19), 900mg/dl (3/4/19) and 400 mg/dl (4/26/19). She had bone marrow biopsy on 4/2/19 at [address] [place] [address] and it showed no morphologic or immunophenotypic evidence of persistent/recurrent plasma cell neoplasm. She received autologus stem cell transplant on 5/16/19. She has been on maintenance chemotherapy with revlimid since August 27, 2019. On April 20, 2021, she presented to me for follow-up. I have been following mammogram and DEXA scan, done on 3/9/2020. She is overdue for mammogram and I will request it soon. I will follow up with mammogram result. Health maintenance - I recommend her to have age-appropriate cancer screening, exercise, low-fat and low-sodium diet. I answered all her questions and concerns for today. I asked her to follow-up with primary care physician, Dr. Soco Milner on regular basis. Recent imaging and labs were reviewed and discussed with the patient.

## 2021-04-13 ENCOUNTER — HOSPITAL ENCOUNTER (OUTPATIENT)
Dept: INFUSION THERAPY | Age: 71
Discharge: HOME | End: 2021-04-13
Payer: MEDICARE

## 2021-04-13 DIAGNOSIS — C90.00 MULTIPLE MYELOMA NOT HAVING ACHIEVED REMISSION (HCC): Chronic | ICD-10-CM

## 2021-04-13 LAB
ALBUMIN SERPL-MCNC: 4.1 GM/DL (ref 3.4–5)
ALP BLD-CCNC: 116 IU/L (ref 40–128)
ALT SERPL-CCNC: 41 U/L (ref 10–40)
ANION GAP SERPL CALCULATED.3IONS-SCNC: 9 MMOL/L (ref 4–16)
AST SERPL-CCNC: 26 IU/L (ref 15–37)
BASOPHILS ABSOLUTE: 0.1 K/CU MM
BASOPHILS RELATIVE PERCENT: 2.3 % (ref 0–1)
BILIRUB SERPL-MCNC: 1.1 MG/DL (ref 0–1)
BUN BLDV-MCNC: 11 MG/DL (ref 6–23)
CALCIUM SERPL-MCNC: 8.9 MG/DL (ref 8.3–10.6)
CHLORIDE BLD-SCNC: 105 MMOL/L (ref 99–110)
CO2: 27 MMOL/L (ref 21–32)
CREAT SERPL-MCNC: 0.8 MG/DL (ref 0.6–1.1)
DIFFERENTIAL TYPE: ABNORMAL
EOSINOPHILS ABSOLUTE: 0.1 K/CU MM
EOSINOPHILS RELATIVE PERCENT: 6.4 % (ref 0–3)
ERYTHROCYTE SEDIMENTATION RATE: 40 MM/HR (ref 0–30)
GFR AFRICAN AMERICAN: >60 ML/MIN/1.73M2
GFR NON-AFRICAN AMERICAN: >60 ML/MIN/1.73M2
GLUCOSE BLD-MCNC: 105 MG/DL (ref 70–99)
HCT VFR BLD CALC: 34.7 % (ref 37–47)
HEMOGLOBIN: 11.8 GM/DL (ref 12.5–16)
IGA: 436 MG/DL (ref 69–382)
IGG,SERUM: 871 MG/DL (ref 723–1685)
IGM,SERUM: <25 MG/DL (ref 62–277)
LACTATE DEHYDROGENASE: 129 IU/L (ref 120–246)
LYMPHOCYTES ABSOLUTE: 0.5 K/CU MM
LYMPHOCYTES RELATIVE PERCENT: 24.5 % (ref 24–44)
MCH RBC QN AUTO: 31.6 PG (ref 27–31)
MCHC RBC AUTO-ENTMCNC: 34 % (ref 32–36)
MCV RBC AUTO: 92.8 FL (ref 78–100)
MONOCYTES ABSOLUTE: 0.2 K/CU MM
MONOCYTES RELATIVE PERCENT: 10.9 % (ref 0–4)
PDW BLD-RTO: 15.4 % (ref 11.7–14.9)
PLATELET # BLD: 161 K/CU MM (ref 140–440)
PMV BLD AUTO: 9 FL (ref 7.5–11.1)
POTASSIUM SERPL-SCNC: 4.1 MMOL/L (ref 3.5–5.1)
RBC # BLD: 3.74 M/CU MM (ref 4.2–5.4)
SEGMENTED NEUTROPHILS ABSOLUTE COUNT: 1.2 K/CU MM
SEGMENTED NEUTROPHILS RELATIVE PERCENT: 55.9 % (ref 36–66)
SODIUM BLD-SCNC: 141 MMOL/L (ref 135–145)
TOTAL PROTEIN: 6.5 GM/DL (ref 6.4–8.2)
WBC # BLD: 2.2 K/CU MM (ref 4–10.5)

## 2021-04-13 PROCEDURE — 82232 ASSAY OF BETA-2 PROTEIN: CPT

## 2021-04-13 PROCEDURE — 36415 COLL VENOUS BLD VENIPUNCTURE: CPT

## 2021-04-13 PROCEDURE — 80053 COMPREHEN METABOLIC PANEL: CPT

## 2021-04-13 PROCEDURE — 85652 RBC SED RATE AUTOMATED: CPT

## 2021-04-13 PROCEDURE — 86320 SERUM IMMUNOELECTROPHORESIS: CPT

## 2021-04-13 PROCEDURE — 83615 LACTATE (LD) (LDH) ENZYME: CPT

## 2021-04-13 PROCEDURE — 82784 ASSAY IGA/IGD/IGG/IGM EACH: CPT

## 2021-04-13 PROCEDURE — 84165 PROTEIN E-PHORESIS SERUM: CPT

## 2021-04-13 PROCEDURE — 85025 COMPLETE CBC W/AUTO DIFF WBC: CPT

## 2021-04-14 LAB
ALBUMIN ELP: 3.6 GM/DL (ref 3.2–5.6)
ALPHA-1-GLOBULIN: 0.3 GM/DL (ref 0.1–0.4)
ALPHA-2-GLOBULIN: 0.6 GM/DL (ref 0.4–1.2)
BETA GLOBULIN: 1.1 GM/DL (ref 0.5–1.3)
BETA-2 MICROGLOBULIN: 2.5 MG/L (ref 1.1–2.4)
GAMMA GLOBULIN: 1 GM/DL (ref 0.5–1.6)
SPEP INTERPRETATION: NORMAL
SPEP INTERPRETATION: NORMAL
TOTAL PROTEIN: 6.5 GM/DL (ref 6.4–8.2)

## 2021-04-20 ENCOUNTER — HOSPITAL ENCOUNTER (OUTPATIENT)
Dept: INFUSION THERAPY | Age: 71
Discharge: HOME | End: 2021-04-20
Payer: MEDICARE

## 2021-04-20 ENCOUNTER — OFFICE VISIT (OUTPATIENT)
Dept: ONCOLOGY | Age: 71
End: 2021-04-20
Payer: MEDICARE

## 2021-04-20 VITALS
WEIGHT: 188 LBS | HEART RATE: 72 BPM | DIASTOLIC BLOOD PRESSURE: 68 MMHG | TEMPERATURE: 98.3 F | RESPIRATION RATE: 16 BRPM | SYSTOLIC BLOOD PRESSURE: 149 MMHG | BODY MASS INDEX: 32.1 KG/M2 | OXYGEN SATURATION: 96 % | HEIGHT: 64 IN

## 2021-04-20 DIAGNOSIS — Z12.31 OTHER SCREENING MAMMOGRAM: ICD-10-CM

## 2021-04-20 DIAGNOSIS — C90.00 MULTIPLE MYELOMA NOT HAVING ACHIEVED REMISSION (HCC): Primary | Chronic | ICD-10-CM

## 2021-04-20 PROCEDURE — G8427 DOCREV CUR MEDS BY ELIG CLIN: HCPCS | Performed by: INTERNAL MEDICINE

## 2021-04-20 PROCEDURE — 4040F PNEUMOC VAC/ADMIN/RCVD: CPT | Performed by: INTERNAL MEDICINE

## 2021-04-20 PROCEDURE — 1090F PRES/ABSN URINE INCON ASSESS: CPT | Performed by: INTERNAL MEDICINE

## 2021-04-20 PROCEDURE — 3017F COLORECTAL CA SCREEN DOC REV: CPT | Performed by: INTERNAL MEDICINE

## 2021-04-20 PROCEDURE — 1123F ACP DISCUSS/DSCN MKR DOCD: CPT | Performed by: INTERNAL MEDICINE

## 2021-04-20 PROCEDURE — G8399 PT W/DXA RESULTS DOCUMENT: HCPCS | Performed by: INTERNAL MEDICINE

## 2021-04-20 PROCEDURE — 1036F TOBACCO NON-USER: CPT | Performed by: INTERNAL MEDICINE

## 2021-04-20 PROCEDURE — 99214 OFFICE O/P EST MOD 30 MIN: CPT | Performed by: INTERNAL MEDICINE

## 2021-04-20 PROCEDURE — G8417 CALC BMI ABV UP PARAM F/U: HCPCS | Performed by: INTERNAL MEDICINE

## 2021-04-20 PROCEDURE — 99211 OFF/OP EST MAY X REQ PHY/QHP: CPT

## 2021-04-20 RX ORDER — AZITHROMYCIN 250 MG/1
250 TABLET, FILM COATED ORAL SEE ADMIN INSTRUCTIONS
Qty: 6 TABLET | Refills: 0 | Status: SHIPPED | OUTPATIENT
Start: 2021-04-20 | End: 2021-04-23 | Stop reason: ALTCHOICE

## 2021-04-20 RX ORDER — DULOXETIN HYDROCHLORIDE 60 MG/1
60 CAPSULE, DELAYED RELEASE ORAL DAILY
Qty: 30 CAPSULE | Refills: 5 | Status: SHIPPED | OUTPATIENT
Start: 2021-04-20 | End: 2021-10-21

## 2021-04-20 RX ORDER — DIPHENOXYLATE HYDROCHLORIDE AND ATROPINE SULFATE 2.5; .025 MG/1; MG/1
1 TABLET ORAL 4 TIMES DAILY
Qty: 120 TABLET | Refills: 3 | Status: SHIPPED | OUTPATIENT
Start: 2021-04-20 | End: 2021-05-20

## 2021-04-20 NOTE — PROGRESS NOTES
MA Rooming Questions  Patient: Rachel Marie  MRN: [id number]    Date: 4/20/2021        1. Do you have any new issues? yes - States she has noticed her cough has gotten worse and throat clear more often x few weeks           2. Do you need any refills on medications? yes - Mammo , Lomotil and Cymbalta     3. Have you had any imaging done since your last visit?   no    4. Have you been hospitalized or seen in the emergency room since your last visit here?   no    5. Did the patient have a depression screening completed today?  No    No data recorded     PHQ-9 Given to (if applicable):               PHQ-9 Score (if applicable):                     [] Positive     []  Negative              Does question #9 need addressed (if applicable)                     [] Yes    []  No               Jodi Alonzo MA

## 2021-04-22 ENCOUNTER — TELEPHONE (OUTPATIENT)
Dept: ONCOLOGY | Age: 71
End: 2021-04-22

## 2021-04-22 NOTE — TELEPHONE ENCOUNTER
Called patient regarding her Mammo on 04.27.2021 at [address]   [address]  Went over prep and she stated understanding

## 2021-04-23 ENCOUNTER — OFFICE VISIT (OUTPATIENT)
Dept: FAMILY MEDICINE CLINIC | Age: 71
End: 2021-04-23
Payer: MEDICARE

## 2021-04-23 VITALS
BODY MASS INDEX: 31.89 KG/M2 | OXYGEN SATURATION: 99 % | TEMPERATURE: 96.6 F | DIASTOLIC BLOOD PRESSURE: 72 MMHG | HEIGHT: 64 IN | HEART RATE: 63 BPM | WEIGHT: 186.8 LBS | SYSTOLIC BLOOD PRESSURE: 130 MMHG

## 2021-04-23 DIAGNOSIS — C90.00 MULTIPLE MYELOMA NOT HAVING ACHIEVED REMISSION (HCC): Chronic | ICD-10-CM

## 2021-04-23 DIAGNOSIS — E78.2 MIXED HYPERLIPIDEMIA: ICD-10-CM

## 2021-04-23 DIAGNOSIS — G62.0 DRUG RELATED POLYNEUROPATHY (HCC): ICD-10-CM

## 2021-04-23 DIAGNOSIS — I10 ESSENTIAL HYPERTENSION: Primary | Chronic | ICD-10-CM

## 2021-04-23 PROBLEM — J18.9 LEFT LOWER LOBE PNEUMONIA: Status: RESOLVED | Noted: 2020-08-29 | Resolved: 2021-04-23

## 2021-04-23 PROBLEM — R68.89 FLU-LIKE SYMPTOMS: Status: RESOLVED | Noted: 2020-08-29 | Resolved: 2021-04-23

## 2021-04-23 PROCEDURE — 1090F PRES/ABSN URINE INCON ASSESS: CPT | Performed by: FAMILY MEDICINE

## 2021-04-23 PROCEDURE — 1036F TOBACCO NON-USER: CPT | Performed by: FAMILY MEDICINE

## 2021-04-23 PROCEDURE — 1123F ACP DISCUSS/DSCN MKR DOCD: CPT | Performed by: FAMILY MEDICINE

## 2021-04-23 PROCEDURE — G8427 DOCREV CUR MEDS BY ELIG CLIN: HCPCS | Performed by: FAMILY MEDICINE

## 2021-04-23 PROCEDURE — 3017F COLORECTAL CA SCREEN DOC REV: CPT | Performed by: FAMILY MEDICINE

## 2021-04-23 PROCEDURE — G8399 PT W/DXA RESULTS DOCUMENT: HCPCS | Performed by: FAMILY MEDICINE

## 2021-04-23 PROCEDURE — G8417 CALC BMI ABV UP PARAM F/U: HCPCS | Performed by: FAMILY MEDICINE

## 2021-04-23 PROCEDURE — 4040F PNEUMOC VAC/ADMIN/RCVD: CPT | Performed by: FAMILY MEDICINE

## 2021-04-23 PROCEDURE — 99214 OFFICE O/P EST MOD 30 MIN: CPT | Performed by: FAMILY MEDICINE

## 2021-04-23 RX ORDER — ALENDRONATE SODIUM 70 MG/1
70 TABLET ORAL WEEKLY
Qty: 12 TABLET | Refills: 1 | Status: SHIPPED | OUTPATIENT
Start: 2021-04-23 | End: 2021-10-25 | Stop reason: SDUPTHER

## 2021-04-23 RX ORDER — AMLODIPINE BESYLATE 5 MG/1
5 TABLET ORAL EVERY MORNING
Qty: 90 TABLET | Refills: 1 | Status: SHIPPED | OUTPATIENT
Start: 2021-04-23 | End: 2021-10-13

## 2021-04-23 RX ORDER — LOSARTAN POTASSIUM 50 MG/1
50 TABLET ORAL EVERY MORNING
Qty: 90 TABLET | Refills: 1 | Status: SHIPPED | OUTPATIENT
Start: 2021-04-23 | End: 2021-10-13

## 2021-04-23 NOTE — PROGRESS NOTES
2021     Kristen Powers      Chief Complaint   Patient presents with    6 Month Follow-Up     pt states no problems just needs med refills       HPI      Violette Diaz is a 73 y.o. female who presents today with the followin. Essential hypertension    2. Mixed hyperlipidemia    3. Drug related polyneuropathy ([facility] [address]    4. Multiple myeloma not having achieved remission ([facility] [address]    Is here for routine checkup  The patient has hypertension and is on appropriate treatment for that doing well    REVIEW OF SYMPTOMS    Review of Systems   Constitutional: Negative for activity change and unexpected weight change. Cardiovascular:        Hypertension well-controlled   Musculoskeletal: Negative. Neurological:        Classic symptoms of peripheral neuropathy which appeared to be induced by her chemotherapy  She is on Cymbalta and doing okay with that   Hematological:        Diagnosed with multiple myeloma within the past 3 years  She had aggressive chemotherapy and is in remission she still on 1 pill of some type of chemotherapy daily  She follows up both here with oncology and [place]  Had profound anemia when first diagnosed but most recent hemoglobin was in the mid 11 range  She runs a low white count   Psychiatric/Behavioral: Negative.         PAST MEDICAL HISTORY  Past Medical History:   Diagnosis Date    Anemia     \"With Childbirth\"    Arthritis     \"Hands, Knees And Hips\"   [facility] (chronic calculous cholecystitis)     s/p cholecystectomy 2015    Colitis Dx 's    Depression     \"In Mid \"    Essential hypertension     GERD (gastroesophageal reflux disease)     Hyperlipidemia     Motion sickness     Multiple myeloma (HCC)     Osteoporosis     LILIAN (stress urinary incontinence, female)     Thyroid disease     \"Took Part Of Thyroid Out \"       FAMILY HISTORY  Family History   Problem Relation Age of Onset    Heart Disease Mother     Arthritis Mother     Diabetes Mother     High Blood Pressure Mother     Dementia Father     Cancer Brother         Prostate Cancer    Arthritis Brother     Early Death Brother 61        Bladder And Brain Cancer    Diabetes Brother     Depression Brother     Cancer Brother         Bladder And Brain Cancer    Kidney Disease Son         Kidney Stones    Other Son         \"Charcot Swathi Tooth, Neuromuscular Disease\"       SOCIAL HISTORY  Social History     Socioeconomic History    Marital status:      Spouse name: None    Number of children: None    Years of education: None    Highest education level: None   Occupational History    None   Social Needs    Financial resource strain: None    Food insecurity     Worry: None     Inability: None    Transportation needs     Medical: None     Non-medical: None   Tobacco Use    Smoking status: Never Smoker    Smokeless tobacco: Never Used   Substance and Sexual Activity    Alcohol use: Yes     Comment: \"Occ.  Maybe Once A Week\"    Drug use: No    Sexual activity: Yes     Partners: Male   Lifestyle    Physical activity     Days per week: None     Minutes per session: None    Stress: None   Relationships    Social connections     Talks on phone: None     Gets together: None     Attends Voodoo service: None     Active member of club or organization: None     Attends meetings of clubs or organizations: None     Relationship status: None    Intimate partner violence     Fear of current or ex partner: None     Emotionally abused: None     Physically abused: None     Forced sexual activity: None   Other Topics Concern    None   Social History Narrative    None        SURGICAL HISTORY  Past Surgical History:   Procedure Laterality Date    BLADDER SUSPENSION  1985    Done During Vaginal Hysterectomy    BREAST SURGERY Right 1980's    Benign Area Removed Nipple Area Right Breast   Scott Miller  [id number]    COLONOSCOPY  \"Two\" Last Done 2000's    COLONOSCOPY  10/05/2018    Sigmoid diverticulosis, Grade 1 Internal hemorrhoids    DENTAL SURGERY      Teeth Extracted In Past    ENDOSCOPY, COLON, DIAGNOSTIC  \"Once\" 1990's    EYE SURGERY Right 5-24-13    Cataract With Lens Implant    EYE SURGERY Left 1-17-14    Cataract With Lens Implant    FOOT SURGERY Left 1962 Or 1963    I & D Left Foot After Stepping On A Nail    HYSTERECTOMY      HYSTERECTOMY, VAGINAL  1985    Bladder Suspension Also Done    LYMPH NODE BIOPSY  Last Done In 2000    \"Had Five Lymph Node Biopsies Done, Arm Pit Areas\", Benign    DE COLONOSCOPY FLX DX W/COLLJ SPEC WHEN PFRMD N/A 10/5/2018    COLONOSCOPY DIAGNOSTIC OR SCREENING performed by Ilana Garcia MD at [address]  1990's    \"Took Part Of The Thyroid Out\"    TONSILLECTOMY  1970's    WISDOM TOOTH EXTRACTION  Early 1970's    All [place] Teeth Extracted       CURRENT MEDICATIONS  Current Outpatient Medications   Medication Sig Dispense Refill    diphenoxylate-atropine (LOMOTIL) 2.5-0.025 MG per tablet Take 1 tablet by mouth 4 times daily for 30 days.  120 tablet 3    DULoxetine (CYMBALTA) 60 MG extended release capsule Take 1 capsule by mouth daily 30 capsule 5    lenalidomide (REVLIMID) 10 MG chemo capsule Take 1 capsule by mouth daily Auth #:[id number] 28 capsule 0    famotidine (PEPCID) 20 MG tablet       Biotin 17193 MCG TABS Take by mouth      losartan (COZAAR) 50 MG tablet Take 1 tablet by mouth every morning 90 tablet 1    amLODIPine (NORVASC) 5 MG tablet Take 1 tablet by mouth every morning 90 tablet 1    alendronate (FOSAMAX) 70 MG tablet Take 1 tablet by mouth once a week 04/26/18 takes on Sundays 12 tablet 1    zoledronic acid (ZOMETA) 4 MG/5ML injection Infuse 4 mg intravenously once 1 per day of a 28 day cycle      Omega-3 Fatty Acids (FISH OIL) 1000 MG CAPS Take 3,000 mg by mouth 2 times daily      Multiple Vitamins-Minerals (THERAPEUTIC MULTIVITAMIN-MINERALS) tablet Take 1 tablet by mouth every morning      Cholecalciferol (VITAMIN D) 2000 units CAPS capsule Take 2,000 Units by mouth every morning       aspirin 81 MG tablet Take 81 mg by mouth nightly        No current facility-administered medications for this visit. ALLERGIES  Allergies   Allergen Reactions    Latex Rash and Swelling    Adhesive Tape Rash     \"Tears Skin , Paper Tape Is OK To Use\"  Skin breakdown       Lisinopril-Hydrochlorothiazide Swelling    Other      \"Allergic To Poinsetta Holley Causing Nasal Congestion\"    Sulfa Antibiotics Other (See Comments)     \"Flu Like Symptoms\"  Flu-like symptoms      Hydrochlorothiazide W-Triamterene     Naproxen        PHYSICAL EXAM    /72   Pulse 63   Temp 96.6 °F (35.9 °C)   Ht 5' 4\" (1.626 m)   Wt 186 lb 12.8 oz (84.7 kg)   SpO2 99%   BMI 32.06 kg/m²     Physical Exam  Vitals signs and nursing note reviewed. Constitutional:       Appearance: Normal appearance. Cardiovascular:      Rate and Rhythm: Normal rate. Pulmonary:      Effort: Pulmonary effort is normal.   Neurological:      General: No focal deficit present. Reviewed her most recent labs  Reconciled medications  Reviewed immunizations        ASSESSMENT and PLAN    Tripp Johnson was seen today for 6 month follow-up. Diagnoses and all orders for this visit:    Essential hypertension    Mixed hyperlipidemia    Drug related polyneuropathy ([facility] [address]    Multiple myeloma not having achieved remission [facility])    Patient is stable  We continue same medicine through this office  Follow-up in 6 months    Return in about 6 months (around 10/23/2021). Electronically signed by Barry Sin MD on 4/23/2021    Please note that this chart was generated using dragon dictation software. Although every effort was made to ensure the accuracy of this automated transcription, some errors in transcription may have occurred.

## 2021-04-27 ENCOUNTER — HOSPITAL ENCOUNTER (OUTPATIENT)
Dept: WOMENS IMAGING | Age: 71
Discharge: HOME | End: 2021-04-27
Payer: MEDICARE

## 2021-04-27 DIAGNOSIS — Z12.31 OTHER SCREENING MAMMOGRAM: ICD-10-CM

## 2021-04-27 PROCEDURE — 77067 SCR MAMMO BI INCL CAD: CPT

## 2021-05-03 RX ORDER — LENALIDOMIDE 10 MG/1
10 CAPSULE ORAL DAILY
Qty: 28 CAPSULE | Refills: 0 | Status: SHIPPED | OUTPATIENT
Start: 2021-05-03 | End: 2021-05-28 | Stop reason: SDUPTHER

## 2021-05-11 ENCOUNTER — HOSPITAL ENCOUNTER (OUTPATIENT)
Dept: INFUSION THERAPY | Age: 71
Discharge: HOME | End: 2021-05-11
Payer: MEDICARE

## 2021-05-11 VITALS
TEMPERATURE: 97.4 F | HEIGHT: 64 IN | DIASTOLIC BLOOD PRESSURE: 72 MMHG | WEIGHT: 190.8 LBS | OXYGEN SATURATION: 92 % | HEART RATE: 72 BPM | SYSTOLIC BLOOD PRESSURE: 170 MMHG | BODY MASS INDEX: 32.58 KG/M2

## 2021-05-11 DIAGNOSIS — C90.00 MULTIPLE MYELOMA NOT HAVING ACHIEVED REMISSION (HCC): Primary | Chronic | ICD-10-CM

## 2021-05-11 DIAGNOSIS — M85.89 OSTEOPENIA OF MULTIPLE SITES: ICD-10-CM

## 2021-05-11 LAB
ALBUMIN SERPL-MCNC: 4.2 GM/DL (ref 3.4–5)
ALP BLD-CCNC: 141 IU/L (ref 40–128)
ALT SERPL-CCNC: 52 U/L (ref 10–40)
ANION GAP SERPL CALCULATED.3IONS-SCNC: 9 MMOL/L (ref 4–16)
AST SERPL-CCNC: 37 IU/L (ref 15–37)
BASOPHILS ABSOLUTE: 0.1 K/CU MM
BASOPHILS RELATIVE PERCENT: 1.4 % (ref 0–1)
BILIRUB SERPL-MCNC: 1 MG/DL (ref 0–1)
BUN BLDV-MCNC: 10 MG/DL (ref 6–23)
CALCIUM SERPL-MCNC: 9 MG/DL (ref 8.3–10.6)
CHLORIDE BLD-SCNC: 101 MMOL/L (ref 99–110)
CO2: 28 MMOL/L (ref 21–32)
CREAT SERPL-MCNC: 0.7 MG/DL (ref 0.6–1.1)
DIFFERENTIAL TYPE: ABNORMAL
EOSINOPHILS ABSOLUTE: 0.1 K/CU MM
EOSINOPHILS RELATIVE PERCENT: 2.6 % (ref 0–3)
GFR AFRICAN AMERICAN: >60 ML/MIN/1.73M2
GFR AFRICAN AMERICAN: >60 ML/MIN/1.73M2
GFR NON-AFRICAN AMERICAN: >60 ML/MIN/1.73M2
GFR NON-AFRICAN AMERICAN: >60 ML/MIN/1.73M2
GLUCOSE BLD-MCNC: 120 MG/DL (ref 70–99)
GLUCOSE BLD-MCNC: 121 MG/DL (ref 70–99)
HCT VFR BLD CALC: 35.3 % (ref 37–47)
HEMOGLOBIN: 12.2 GM/DL (ref 12.5–16)
LYMPHOCYTES ABSOLUTE: 0.6 K/CU MM
LYMPHOCYTES RELATIVE PERCENT: 17 % (ref 24–44)
MCH RBC QN AUTO: 31.9 PG (ref 27–31)
MCHC RBC AUTO-ENTMCNC: 34.6 % (ref 32–36)
MCV RBC AUTO: 92.2 FL (ref 78–100)
MONOCYTES ABSOLUTE: 0.4 K/CU MM
MONOCYTES RELATIVE PERCENT: 12.6 % (ref 0–4)
PDW BLD-RTO: 14.9 % (ref 11.7–14.9)
PLATELET # BLD: 177 K/CU MM (ref 140–440)
PMV BLD AUTO: 9.4 FL (ref 7.5–11.1)
POC BUN: 10 MG/DL (ref 8–26)
POC CALCIUM: 1.13 MMOL/L (ref 1.12–1.32)
POC CHLORIDE: 102 MMOL/L (ref 98–109)
POC CO2: 29 MMOL/L (ref 21–32)
POC CREATININE: 0.8 MG/DL (ref 0.6–1.1)
POTASSIUM SERPL-SCNC: 3.5 MMOL/L (ref 3.5–4.5)
POTASSIUM SERPL-SCNC: 3.6 MMOL/L (ref 3.5–5.1)
RBC # BLD: 3.83 M/CU MM (ref 4.2–5.4)
SEGMENTED NEUTROPHILS ABSOLUTE COUNT: 2.3 K/CU MM
SEGMENTED NEUTROPHILS RELATIVE PERCENT: 66.4 % (ref 36–66)
SODIUM BLD-SCNC: 138 MMOL/L (ref 135–145)
SODIUM BLD-SCNC: 142 MMOL/L (ref 138–146)
SOURCE, BLOOD GAS: ABNORMAL
TOTAL PROTEIN: 6.7 GM/DL (ref 6.4–8.2)
WBC # BLD: 3.5 K/CU MM (ref 4–10.5)

## 2021-05-11 PROCEDURE — 85025 COMPLETE CBC W/AUTO DIFF WBC: CPT

## 2021-05-11 PROCEDURE — 2580000003 HC RX 258: Performed by: INTERNAL MEDICINE

## 2021-05-11 PROCEDURE — 80053 COMPREHEN METABOLIC PANEL: CPT

## 2021-05-11 PROCEDURE — 6360000002 HC RX W HCPCS: Performed by: INTERNAL MEDICINE

## 2021-05-11 PROCEDURE — 96365 THER/PROPH/DIAG IV INF INIT: CPT

## 2021-05-11 RX ORDER — EPINEPHRINE 1 MG/ML
0.3 INJECTION, SOLUTION, CONCENTRATE INTRAVENOUS PRN
Status: CANCELLED | OUTPATIENT
Start: 2021-08-03

## 2021-05-11 RX ORDER — HEPARIN SODIUM (PORCINE) LOCK FLUSH IV SOLN 100 UNIT/ML 100 UNIT/ML
500 SOLUTION INTRAVENOUS PRN
Status: CANCELLED | OUTPATIENT
Start: 2021-10-26

## 2021-05-11 RX ORDER — METHYLPREDNISOLONE SODIUM SUCCINATE 125 MG/2ML
125 INJECTION, POWDER, LYOPHILIZED, FOR SOLUTION INTRAMUSCULAR; INTRAVENOUS ONCE
Status: CANCELLED | OUTPATIENT
Start: 2021-08-03 | End: 2021-08-03

## 2021-05-11 RX ORDER — SODIUM CHLORIDE 9 MG/ML
20 INJECTION, SOLUTION INTRAVENOUS ONCE
Status: CANCELLED | OUTPATIENT
Start: 2021-08-03 | End: 2021-08-03

## 2021-05-11 RX ORDER — SODIUM CHLORIDE 0.9 % (FLUSH) 0.9 %
10 SYRINGE (ML) INJECTION PRN
Status: CANCELLED | OUTPATIENT
Start: 2021-05-11

## 2021-05-11 RX ORDER — METHYLPREDNISOLONE SODIUM SUCCINATE 125 MG/2ML
125 INJECTION, POWDER, LYOPHILIZED, FOR SOLUTION INTRAMUSCULAR; INTRAVENOUS ONCE
Status: CANCELLED | OUTPATIENT
Start: 2021-05-11 | End: 2021-05-11

## 2021-05-11 RX ORDER — EPINEPHRINE 1 MG/ML
0.3 INJECTION, SOLUTION, CONCENTRATE INTRAVENOUS PRN
Status: CANCELLED | OUTPATIENT
Start: 2021-10-26

## 2021-05-11 RX ORDER — METHYLPREDNISOLONE SODIUM SUCCINATE 125 MG/2ML
125 INJECTION, POWDER, LYOPHILIZED, FOR SOLUTION INTRAMUSCULAR; INTRAVENOUS ONCE
Status: CANCELLED | OUTPATIENT
Start: 2021-10-26 | End: 2021-10-26

## 2021-05-11 RX ORDER — SODIUM CHLORIDE 0.9 % (FLUSH) 0.9 %
5 SYRINGE (ML) INJECTION PRN
Status: CANCELLED | OUTPATIENT
Start: 2021-08-03

## 2021-05-11 RX ORDER — DIPHENHYDRAMINE HYDROCHLORIDE 50 MG/ML
50 INJECTION INTRAMUSCULAR; INTRAVENOUS ONCE
Status: CANCELLED | OUTPATIENT
Start: 2021-05-11 | End: 2021-05-11

## 2021-05-11 RX ORDER — SODIUM CHLORIDE 9 MG/ML
INJECTION, SOLUTION INTRAVENOUS CONTINUOUS
Status: CANCELLED | OUTPATIENT
Start: 2021-08-03

## 2021-05-11 RX ORDER — SODIUM CHLORIDE 9 MG/ML
20 INJECTION, SOLUTION INTRAVENOUS ONCE
Status: DISCONTINUED | OUTPATIENT
Start: 2021-05-11 | End: 2021-05-12 | Stop reason: HOSPADM

## 2021-05-11 RX ORDER — EPINEPHRINE 1 MG/ML
0.3 INJECTION, SOLUTION, CONCENTRATE INTRAVENOUS PRN
Status: CANCELLED | OUTPATIENT
Start: 2021-05-11

## 2021-05-11 RX ORDER — SODIUM CHLORIDE 9 MG/ML
20 INJECTION, SOLUTION INTRAVENOUS ONCE
Status: CANCELLED | OUTPATIENT
Start: 2021-05-11 | End: 2021-05-11

## 2021-05-11 RX ORDER — DIPHENHYDRAMINE HYDROCHLORIDE 50 MG/ML
50 INJECTION INTRAMUSCULAR; INTRAVENOUS ONCE
Status: CANCELLED | OUTPATIENT
Start: 2021-10-26 | End: 2021-10-26

## 2021-05-11 RX ORDER — SODIUM CHLORIDE 0.9 % (FLUSH) 0.9 %
5 SYRINGE (ML) INJECTION PRN
Status: CANCELLED | OUTPATIENT
Start: 2021-10-26

## 2021-05-11 RX ORDER — SODIUM CHLORIDE 9 MG/ML
INJECTION, SOLUTION INTRAVENOUS CONTINUOUS
Status: CANCELLED | OUTPATIENT
Start: 2021-10-26

## 2021-05-11 RX ORDER — HEPARIN SODIUM (PORCINE) LOCK FLUSH IV SOLN 100 UNIT/ML 100 UNIT/ML
500 SOLUTION INTRAVENOUS PRN
Status: CANCELLED | OUTPATIENT
Start: 2021-05-11

## 2021-05-11 RX ORDER — DIPHENHYDRAMINE HYDROCHLORIDE 50 MG/ML
50 INJECTION INTRAMUSCULAR; INTRAVENOUS ONCE
Status: CANCELLED | OUTPATIENT
Start: 2021-08-03 | End: 2021-08-03

## 2021-05-11 RX ORDER — SODIUM CHLORIDE 0.9 % (FLUSH) 0.9 %
10 SYRINGE (ML) INJECTION PRN
Status: CANCELLED | OUTPATIENT
Start: 2021-08-03

## 2021-05-11 RX ORDER — SODIUM CHLORIDE 0.9 % (FLUSH) 0.9 %
5 SYRINGE (ML) INJECTION PRN
Status: CANCELLED | OUTPATIENT
Start: 2021-05-11

## 2021-05-11 RX ORDER — SODIUM CHLORIDE 9 MG/ML
20 INJECTION, SOLUTION INTRAVENOUS ONCE
Status: CANCELLED | OUTPATIENT
Start: 2021-10-26 | End: 2021-10-26

## 2021-05-11 RX ORDER — SODIUM CHLORIDE 9 MG/ML
INJECTION, SOLUTION INTRAVENOUS CONTINUOUS
Status: CANCELLED | OUTPATIENT
Start: 2021-05-11

## 2021-05-11 RX ORDER — SODIUM CHLORIDE 0.9 % (FLUSH) 0.9 %
10 SYRINGE (ML) INJECTION PRN
Status: CANCELLED | OUTPATIENT
Start: 2021-10-26

## 2021-05-11 RX ORDER — HEPARIN SODIUM (PORCINE) LOCK FLUSH IV SOLN 100 UNIT/ML 100 UNIT/ML
500 SOLUTION INTRAVENOUS PRN
Status: CANCELLED | OUTPATIENT
Start: 2021-08-03

## 2021-05-11 RX ADMIN — SODIUM CHLORIDE 20 ML/HR: 9 INJECTION, SOLUTION INTRAVENOUS at 10:27

## 2021-05-11 RX ADMIN — ZOLEDRONIC ACID 4 MG: 4 INJECTION, SOLUTION, CONCENTRATE INTRAVENOUS at 10:28

## 2021-05-11 NOTE — PROGRESS NOTES
Patient ambulated to treatment room. Gait steady. Denies pain. Energy okay. Appetite good. CBC,CMP done. Zometa given as ordered. Patient tolerated without any problems.  RTC 7/13 for lab and 7/20 for exam.

## 2021-05-28 RX ORDER — LENALIDOMIDE 10 MG/1
10 CAPSULE ORAL DAILY
Qty: 28 CAPSULE | Refills: 0 | Status: SHIPPED | OUTPATIENT
Start: 2021-05-28 | End: 2021-06-25 | Stop reason: SDUPTHER

## 2021-06-17 ENCOUNTER — TELEPHONE (OUTPATIENT)
Dept: ONCOLOGY | Age: 71
End: 2021-06-17

## 2021-06-17 NOTE — TELEPHONE ENCOUNTER
Pt called to state that she woke up this morning with a rash (red bumps) from her forehead to her feet. She stated that do not itch or hurt but they are raised. She denies any recent infection, fever, chills, dysuria, dyspnea, dysphagia, abdominal pain, or other signs of illness. She stated that she was working in her yard a few days ago but thought she stayed covered. Pt instructed to take loratadine with famotidine daily and call tomorrow if rash worsens or becomes symptomatic. Pt expressed understanding.

## 2021-06-18 ENCOUNTER — NURSE ONLY (OUTPATIENT)
Dept: ONCOLOGY | Age: 71
End: 2021-06-18

## 2021-06-18 ENCOUNTER — HOSPITAL ENCOUNTER (OUTPATIENT)
Dept: INFUSION THERAPY | Age: 71
Discharge: HOME | End: 2021-06-18
Payer: MEDICARE

## 2021-06-18 DIAGNOSIS — C90.00 MULTIPLE MYELOMA NOT HAVING ACHIEVED REMISSION (HCC): Chronic | ICD-10-CM

## 2021-06-18 LAB
ALBUMIN SERPL-MCNC: 4.2 GM/DL (ref 3.4–5)
ALP BLD-CCNC: 138 IU/L (ref 40–129)
ALT SERPL-CCNC: 44 U/L (ref 10–40)
ANION GAP SERPL CALCULATED.3IONS-SCNC: 10 MMOL/L (ref 4–16)
AST SERPL-CCNC: 31 IU/L (ref 15–37)
BASOPHILS ABSOLUTE: 0 K/CU MM
BASOPHILS RELATIVE PERCENT: 2 % (ref 0–1)
BILIRUB SERPL-MCNC: 1 MG/DL (ref 0–1)
BUN BLDV-MCNC: 13 MG/DL (ref 6–23)
CALCIUM SERPL-MCNC: 8.5 MG/DL (ref 8.3–10.6)
CHLORIDE BLD-SCNC: 98 MMOL/L (ref 99–110)
CO2: 27 MMOL/L (ref 21–32)
CREAT SERPL-MCNC: 0.7 MG/DL (ref 0.6–1.1)
DIFFERENTIAL TYPE: ABNORMAL
EOSINOPHILS ABSOLUTE: 0.1 K/CU MM
EOSINOPHILS RELATIVE PERCENT: 6.1 % (ref 0–3)
ERYTHROCYTE SEDIMENTATION RATE: 26 MM/HR (ref 0–30)
GFR AFRICAN AMERICAN: >60 ML/MIN/1.73M2
GFR NON-AFRICAN AMERICAN: >60 ML/MIN/1.73M2
GLUCOSE BLD-MCNC: 144 MG/DL (ref 70–99)
HCT VFR BLD CALC: 31.7 % (ref 37–47)
HEMOGLOBIN: 11 GM/DL (ref 12.5–16)
IGA: 391 MG/DL (ref 69–382)
IGG,SERUM: 817 MG/DL (ref 723–1685)
IGM,SERUM: 31 MG/DL (ref 62–277)
LACTATE DEHYDROGENASE: 202 IU/L (ref 120–246)
LYMPHOCYTES ABSOLUTE: 0.3 K/CU MM
LYMPHOCYTES RELATIVE PERCENT: 20.3 % (ref 24–44)
MCH RBC QN AUTO: 31.6 PG (ref 27–31)
MCHC RBC AUTO-ENTMCNC: 34.7 % (ref 32–36)
MCV RBC AUTO: 91.1 FL (ref 78–100)
MONOCYTES ABSOLUTE: 0.1 K/CU MM
MONOCYTES RELATIVE PERCENT: 7.4 % (ref 0–4)
PDW BLD-RTO: 15.7 % (ref 11.7–14.9)
PLATELET # BLD: 102 K/CU MM (ref 140–440)
PMV BLD AUTO: 9.4 FL (ref 7.5–11.1)
POTASSIUM SERPL-SCNC: 3.4 MMOL/L (ref 3.5–5.1)
RBC # BLD: 3.48 M/CU MM (ref 4.2–5.4)
SEGMENTED NEUTROPHILS ABSOLUTE COUNT: 1 K/CU MM
SEGMENTED NEUTROPHILS RELATIVE PERCENT: 64.2 % (ref 36–66)
SODIUM BLD-SCNC: 135 MMOL/L (ref 135–145)
TOTAL PROTEIN: 6.1 GM/DL (ref 6.4–8.2)
WBC # BLD: 1.5 K/CU MM (ref 4–10.5)

## 2021-06-18 PROCEDURE — 82784 ASSAY IGA/IGD/IGG/IGM EACH: CPT

## 2021-06-18 PROCEDURE — 83615 LACTATE (LD) (LDH) ENZYME: CPT

## 2021-06-18 PROCEDURE — 84165 PROTEIN E-PHORESIS SERUM: CPT

## 2021-06-18 PROCEDURE — 80053 COMPREHEN METABOLIC PANEL: CPT

## 2021-06-18 PROCEDURE — 82232 ASSAY OF BETA-2 PROTEIN: CPT

## 2021-06-18 PROCEDURE — 99211 OFF/OP EST MAY X REQ PHY/QHP: CPT

## 2021-06-18 PROCEDURE — 83883 ASSAY NEPHELOMETRY NOT SPEC: CPT

## 2021-06-18 PROCEDURE — 85025 COMPLETE CBC W/AUTO DIFF WBC: CPT

## 2021-06-18 PROCEDURE — 85652 RBC SED RATE AUTOMATED: CPT

## 2021-06-18 PROCEDURE — 36415 COLL VENOUS BLD VENIPUNCTURE: CPT

## 2021-06-18 PROCEDURE — 86320 SERUM IMMUNOELECTROPHORESIS: CPT

## 2021-06-18 RX ORDER — PREDNISONE 20 MG/1
20 TABLET ORAL DAILY
Qty: 5 TABLET | Refills: 0 | Status: SHIPPED | OUTPATIENT
Start: 2021-06-18 | End: 2021-06-23

## 2021-06-18 NOTE — PROGRESS NOTES
Pt called to state that her rash is a little better than yesterday but she thinks she may have some swelling now. Requested pt to come to the office so we could visualize her rash. Rash assessed and NP notified and also assessed rash. CBC ordered. NP ordered prednisone for rash and to hold Revlimid for rash and neutropenia. Pt notified and expressed understanding.

## 2021-06-21 LAB
ALBUMIN ELP: 3.3 GM/DL (ref 3.2–5.6)
ALPHA-1-GLOBULIN: 0.3 GM/DL (ref 0.1–0.4)
ALPHA-2-GLOBULIN: 0.6 GM/DL (ref 0.4–1.2)
BETA GLOBULIN: 0.9 GM/DL (ref 0.5–1.3)
BETA-2 MICROGLOBULIN: 3.7 MG/L (ref 1.1–2.4)
GAMMA GLOBULIN: 1 GM/DL (ref 0.5–1.6)
KAPPA QUANT FREE LIGHT CHAINS: 45.4 MG/L (ref 3.3–19.4)
KAPPA/LAMBDA FREE LIGHT CHAIN RATIO: 1 (ref 0.26–1.65)
LAMBDA FREE LIGHT CHAINS QNT: 45.27 MG/L (ref 5.71–26.3)
SPEP INTERPRETATION: ABNORMAL
SPEP INTERPRETATION: NORMAL
TOTAL PROTEIN: 6.1 GM/DL (ref 6.4–8.2)

## 2021-06-25 ENCOUNTER — TELEPHONE (OUTPATIENT)
Dept: ONCOLOGY | Age: 71
End: 2021-06-25

## 2021-06-25 ENCOUNTER — HOSPITAL ENCOUNTER (OUTPATIENT)
Dept: INFUSION THERAPY | Age: 71
Discharge: HOME | End: 2021-06-25
Payer: MEDICARE

## 2021-06-25 LAB
BASOPHILS ABSOLUTE: 0.1 K/CU MM
BASOPHILS RELATIVE PERCENT: 2.8 % (ref 0–1)
DIFFERENTIAL TYPE: ABNORMAL
EOSINOPHILS ABSOLUTE: 0 K/CU MM
EOSINOPHILS RELATIVE PERCENT: 1.4 % (ref 0–3)
HCT VFR BLD CALC: 35.4 % (ref 37–47)
HEMOGLOBIN: 12.1 GM/DL (ref 12.5–16)
LYMPHOCYTES ABSOLUTE: 0.7 K/CU MM
LYMPHOCYTES RELATIVE PERCENT: 24.8 % (ref 24–44)
MCH RBC QN AUTO: 31.8 PG (ref 27–31)
MCHC RBC AUTO-ENTMCNC: 34.2 % (ref 32–36)
MCV RBC AUTO: 93.2 FL (ref 78–100)
MONOCYTES ABSOLUTE: 0.6 K/CU MM
MONOCYTES RELATIVE PERCENT: 19.5 % (ref 0–4)
PDW BLD-RTO: 16.4 % (ref 11.7–14.9)
PLATELET # BLD: 213 K/CU MM (ref 140–440)
PMV BLD AUTO: 8.6 FL (ref 7.5–11.1)
RBC # BLD: 3.8 M/CU MM (ref 4.2–5.4)
SEGMENTED NEUTROPHILS ABSOLUTE COUNT: 1.5 K/CU MM
SEGMENTED NEUTROPHILS RELATIVE PERCENT: 51.5 % (ref 36–66)
WBC # BLD: 2.8 K/CU MM (ref 4–10.5)

## 2021-06-25 PROCEDURE — 85025 COMPLETE CBC W/AUTO DIFF WBC: CPT

## 2021-06-25 PROCEDURE — 36415 COLL VENOUS BLD VENIPUNCTURE: CPT

## 2021-06-25 RX ORDER — LENALIDOMIDE 10 MG/1
10 CAPSULE ORAL DAILY
Qty: 28 CAPSULE | Refills: 0 | Status: SHIPPED | OUTPATIENT
Start: 2021-06-25 | End: 2021-07-22 | Stop reason: SDUPTHER

## 2021-07-19 NOTE — PROGRESS NOTES
Patient Name: Bailee Marin  Patient : 1950  Patient MRN: [id number]     Primary Oncologist: Kyleigh Mcgill MD  Referring Provider: Joseph Lion MD     Date of Service: 2021      Chief Complaint:   Chief Complaint   Patient presents with    Follow-up     Patient Active Problem List:     Severe anemia     Multiple myeloma not having achieved remission      Drug related polyneuropathy      Osteopenia of multiple sites    HPI:   Bailee Marin is a 82-year-old very pleasnat female with medical history significant for hypertension, GERD, depression, anemia, initially presented to me on 2018 to follow up with her monocloal gammopathy. She initially presented to [facility] on 18 with low hemoglobin. She stated that she had colonoscopy in  by Dr. Aamir Agustin. She has been tired and fatigue since 2018. She denies weight loss. She was found to have severe anemia on blood test done in her primary care physician office and she was asked to come to ER. Her base line hemoglobin was 8.4 -9.4 gram since . It was 13 grams in  and on arrival to hospital on 18 was 5.7 grams. She received 2 units of PRBC. I was called to evaluate her anemia at that time. I recognized that she has worsening macrocytic anemia. She also has mild leukopenia and thrombocytopenia. Her total protein level was significantly elevated (11.9 grams), but she has normal calcium and serum creatinine. I requested work ups to rule out plasma cell dyscrasias and she was found to have 7900 mg/dl IgG kappa monoclonal gammopathy on serum protein electrophoresis and serum immunofixation. Her beta 2 microglobulin was 7.5 mg./dl, serum kappa 9.34 mg/dl, lambda 0.19 mg/dl, ratio was 49.16. ESR > 120, CRP 3 and .     Bone marrow biopsy was done on 2018 and final pathology showed hypocellular bone marrow [85%], with partially preserved trilineage hematopoiesis and involvement by plasma cell neoplasm [85% of marrow cellularity is comprised of neoplastic plasma cells]. Cytogenetic reveals normal myocardial type [46XX]. Fish panel revealed gain of chromosome 1q21, monosomy 13, and aneuploidy [gain of chromosome 7, 9, 15 and FGFR3/4p]. Bone survey done on 1/8/19 showed multiple lytic lesions noted to the calvarium bilaterally as well as involving the left humerus. With the clinical history findings are concerning for multiple myeloma. No definite additional bony involvement identified. Multilevel degenerative changes to the cervical, thoracic and lumbar spine. Diffuse bone demineralization. First line chemotherapy with Velcade, Revlimid and Dexamethasone was started on January 18, 2019 and she completed her fifth cycle on 4/29/19. Her monoclonal protein has 7900mg/dl before therapy. It decreased to 1900mg/dl (2/8/19), 900mg/dl (3/4/19) and 400 mg/dl (4/26/19). She had bone marrow biopsy on 4/2/19 at [facility] and it showed no morphologic or immunophenotypic evidence of persistent/recurrent plasma cell neoplasm. She received autologus stem cell transplant on 5/16/19. Mammogram done on April 27, 2021 showed no mammographic evidence of malignancy. Maintenance chemotherapy with Revlimid was started since August 27, 2019. On July 20, 2021, she presented to me for follow-up. I have been following her for stage III IgG kappa multiple myeloma and she is status post first line chemotherapy with Velcade, Revlimid and Dexamethasone (received total 4 cycles) and autologous stem cell transplantation. She has been on maintenance chemotherapy with revlimid since August 27, 2019. She is tolerating maintenance chemotherapy, Revlimid well and she doesn't encounter any major side effects from Revlimid. We change Revlimid to three weeks on one week off, since she has been having increasing SOB and fatigue.  Since her symptoms are resolved now, we resumed back on daily basis since January 14, 2020. I recognized that there is no monoclonal protein detected on recent serum protein electrophoresis and immunofixation done on June 18, 2021. I believe her multiple myeloma is controlled very well with current maintenance chemotherapy and I recommended to continue with that for now. Since her revlimid induced diarrhea has been controlled well with lomotil, I recommend her to continue with it for now. I will repeat all the blood test again in 3 months, including SPEP, serum immunofixation, serum light chain assays and rations, beta 2 microglobulin and immunoglobulin panels. Chemo induced thromboembolism prophylaxis - I recommend her to continue with aspirin 81 mg daily. Myeloma bone disease - Since she has completed 2 years of therapy, we will stopped it for now. I asked her to continue with vitamin D daily. Chemo induced neuropathy - she is back on cymbalta 60 mg daily since her neuropathy gets worse after decreasing the dose. I asked her to take tramadol prn for neuropathic pain. Hypertension - she is on amlodipine and losartan. BP is stable and I recommend her for salt restriction. COVID19 vaccine -she is status post COVID-19 vaccine. .     I reviewed with her findings on screening mammogram done on 4/27/21 and DEXA scan, done on 3/9/2020. I recommend her to have a repeat mammogram and April 2022. Health maintenance - I recommend her to have age-appropriate cancer screening, exercise, low-fat and low-sodium diet. She doesn't have any significant symptoms on today visit. Past Medical History  Significant for  1. Hypertension  2. Gastroesophageal reflux disease  3. Depression  4. Anemia    Surgical History  Significant for  1. Cholecystectomy  2. Cataract surgery  3. Hysterectomy in 1985  4. Partial thyroidectomy  5. Tonsillectomy  6.   Bladder suspension surgery    Allergies  Adhesive tape  Sulfamide  lisinopril    Social History  She denies smoking and illicit drug abuse. She socially drink alcohol. She is currently living in [facility]. Family History  Significant for bladder cancer and brain cancer in one of her brother. Prostate cancer in another brother. No other family history of malignancy. Review of Systems: \"Per interval history; otherwise 10 point ROS is negative. \"  Her energy level is good, appetite, and sleep are fine. She doesn't have fever, chills, night sweats, cough, shortness of breath, chest pain, hemoptysis or palpitations. Her bowel and bladder functions are normal. She doesn't have nausea, vomiting, abdominal pain, diarrhea, constipation, dysuria, loss of appetite or weight loss. She denies neuropathy and she doesn't have bleeding or clotting issues. She doesn't have any pain in her body. No anxiety or depression. The rest of the systems are unremarkable. Vital Signs:  /64 (Site: Right Upper Arm, Position: Sitting, Cuff Size: Medium Adult)   Pulse 61   Temp 97.8 °F (36.6 °C) (Infrared)   Ht 5' 4\" (1.626 m)   Wt 179 lb 3.2 oz (81.3 kg)   SpO2 96%   BMI 30.76 kg/m²     Physical Exam:  CONSTITUTIONAL: awake, alert, cooperative, no apparent distress,    EYES: pupils equal, round and reactive to light, sclera clear and conjunctiva normal  ENT: Normocephalic, without obvious abnormality, atraumatic  NECK: supple, symmetrical, no jugular venous distension and no carotid bruits   HEMATOLOGIC/LYMPHATIC: no cervical, supraclavicular or axillary lymphadenopathy   LUNGS: VBS, no crackles, clear to auscultation, no rhonchi, no wheezes, no increased work of breathing,    CARDIOVASCULAR: regular rate and rhythm, normal S1 and S2, no murmur noted  ABDOMEN: normal bowel sounds x 4, non-tender, no masses palpated, no hepatosplenomegaly, soft, non-distended,   MUSCULOSKELETAL: full range of motion noted, tone is normal  NEUROLOGIC: awake, alert, oriented to name, place and time.  Motor skills grossly intact. SKIN: Normal skin color, texture, turgor and no jaundice.  appears intact   EXTREMITIES: no leg swelling, no cyanosis, no LE edema, no clubbing,      Labs:  Hematology:  Lab Results   Component Value Date    WBC 2.8 (L) 06/25/2021    RBC 3.80 (L) 06/25/2021    HGB 12.1 (L) 06/25/2021    HCT 35.4 (L) 06/25/2021    MCV 93.2 06/25/2021    MCH 31.8 (H) 06/25/2021    MCHC 34.2 06/25/2021    RDW 16.4 (H) 06/25/2021     06/25/2021    MPV 8.6 06/25/2021    BANDSPCT 4 (L) 09/02/2020    SEGSPCT 51.5 06/25/2021    EOSRELPCT 1.4 06/25/2021    BASOPCT 2.8 (H) 06/25/2021    LYMPHOPCT 24.8 06/25/2021    MONOPCT 19.5 (H) 06/25/2021    BANDABS 0.08 09/02/2020    SEGSABS 1.5 06/25/2021    EOSABS 0.0 06/25/2021    BASOSABS 0.1 06/25/2021    LYMPHSABS 0.7 06/25/2021    MONOSABS 0.6 06/25/2021    DIFFTYPE AUTOMATED DIFFERENTIAL 06/25/2021    ANISOCYTOSIS 1+ 12/22/2018    POLYCHROM 1+ 12/23/2018    WBCMORP OCCASIONAL 09/02/2020    PLTM PLATELETS APPEAR NORMAL 12/23/2018     Lab Results   Component Value Date    ESR 26 06/18/2021     Chemistry:  Lab Results   Component Value Date     06/18/2021    K 3.4 (L) 06/18/2021    CL 98 (L) 06/18/2021    CO2 27 06/18/2021    BUN 13 06/18/2021    CREATININE 0.7 06/18/2021    GLUCOSE 144 (H) 06/18/2021    CALCIUM 8.5 06/18/2021    PROT 6.1 (L) 06/18/2021    PROT 6.1 (L) 06/18/2021    LABALBU 4.2 06/18/2021    BILITOT 1.0 06/18/2021    ALKPHOS 138 (H) 06/18/2021    AST 31 06/18/2021    ALT 44 (H) 06/18/2021    LABGLOM >60 06/18/2021    GFRAA >60 06/18/2021    AGRATIO 1.2 09/10/2020    GLOB 2.9 09/10/2020    MG 2.2 09/02/2020    POCCA 1.13 05/11/2021    POCGLU 121 (H) 05/11/2021     Lab Results   Component Value Date     06/18/2021     No components found for: LD  Lab Results   Component Value Date    TSHHS 4.540 (H) 12/22/2018     Immunology:  Lab Results   Component Value Date    PROT 6.1 (L) 06/18/2021    PROT 6.1 (L) 06/18/2021    SPEP INTERPRETATION - Within normal have been following her for stage III IgG kappa multiple myeloma and she is status post first line chemotherapy with Velcade, Revlimid and Dexamethasone (received total 4 cycles) and autologous stem cell transplantation. She has been on maintenance chemotherapy with revlimid since August 27, 2019. She is tolerating maintenance chemotherapy, Revlimid well and she doesn't encounter any major side effects from Revlimid. We change Revlimid to three weeks on one week off, since she has been having increasing SOB and fatigue. Since her symptoms are resolved now, we resumed back on daily basis since January 14, 2020. I recognized that there is no monoclonal protein detected on recent serum protein electrophoresis and immunofixation done on June 18, 2021. I believe her multiple myeloma is controlled very well with current maintenance chemotherapy and I recommended to continue with that for now. Since her revlimid induced diarrhea has been controlled well with lomotil, I recommend her to continue with it for now. I will repeat all the blood test again in 3 months, including SPEP, serum immunofixation, serum light chain assays and rations, beta 2 microglobulin and immunoglobulin panels. Chemo induced thromboembolism prophylaxis - I recommend her to continue with aspirin 81 mg daily. Myeloma bone disease - Since she has completed 2 years of therapy, we will stopped it for now. I asked her to continue with vitamin D daily. Chemo induced neuropathy - she is back on cymbalta 60 mg daily since her neuropathy gets worse after decreasing the dose. I asked her to take tramadol prn for neuropathic pain. Hypertension - she is on amlodipine and losartan. BP is stable and I recommend her for salt restriction. COVID19 vaccine -she is status post COVID-19 vaccine. .     I reviewed with her findings on screening mammogram done on 4/27/21 and DEXA scan, done on 3/9/2020.   I recommend her to have a repeat mammogram and April 2022. Health maintenance - I recommend her to have age-appropriate cancer screening, exercise, low-fat and low-sodium diet. I answered all her questions and concerns for today. I asked her to follow-up with primary care physician, Dr. Tom Carreon on regular basis. Recent imaging and labs were reviewed and discussed with the patient.

## 2021-07-20 ENCOUNTER — OFFICE VISIT (OUTPATIENT)
Dept: ONCOLOGY | Age: 71
End: 2021-07-20
Payer: MEDICARE

## 2021-07-20 ENCOUNTER — HOSPITAL ENCOUNTER (OUTPATIENT)
Dept: INFUSION THERAPY | Age: 71
Discharge: HOME | End: 2021-07-20
Payer: MEDICARE

## 2021-07-20 VITALS
SYSTOLIC BLOOD PRESSURE: 138 MMHG | TEMPERATURE: 97.8 F | OXYGEN SATURATION: 96 % | DIASTOLIC BLOOD PRESSURE: 64 MMHG | HEIGHT: 64 IN | BODY MASS INDEX: 30.59 KG/M2 | HEART RATE: 61 BPM | WEIGHT: 179.2 LBS

## 2021-07-20 DIAGNOSIS — C90.00 MULTIPLE MYELOMA NOT HAVING ACHIEVED REMISSION (HCC): Primary | ICD-10-CM

## 2021-07-20 PROCEDURE — G8427 DOCREV CUR MEDS BY ELIG CLIN: HCPCS | Performed by: INTERNAL MEDICINE

## 2021-07-20 PROCEDURE — 1123F ACP DISCUSS/DSCN MKR DOCD: CPT | Performed by: INTERNAL MEDICINE

## 2021-07-20 PROCEDURE — 99214 OFFICE O/P EST MOD 30 MIN: CPT | Performed by: INTERNAL MEDICINE

## 2021-07-20 PROCEDURE — 99211 OFF/OP EST MAY X REQ PHY/QHP: CPT

## 2021-07-20 PROCEDURE — G8417 CALC BMI ABV UP PARAM F/U: HCPCS | Performed by: INTERNAL MEDICINE

## 2021-07-20 PROCEDURE — 3017F COLORECTAL CA SCREEN DOC REV: CPT | Performed by: INTERNAL MEDICINE

## 2021-07-20 PROCEDURE — G8399 PT W/DXA RESULTS DOCUMENT: HCPCS | Performed by: INTERNAL MEDICINE

## 2021-07-20 PROCEDURE — 4040F PNEUMOC VAC/ADMIN/RCVD: CPT | Performed by: INTERNAL MEDICINE

## 2021-07-20 PROCEDURE — 1036F TOBACCO NON-USER: CPT | Performed by: INTERNAL MEDICINE

## 2021-07-20 PROCEDURE — 1090F PRES/ABSN URINE INCON ASSESS: CPT | Performed by: INTERNAL MEDICINE

## 2021-07-20 ASSESSMENT — PATIENT HEALTH QUESTIONNAIRE - PHQ9
1. LITTLE INTEREST OR PLEASURE IN DOING THINGS: 0
2. FEELING DOWN, DEPRESSED OR HOPELESS: 0
SUM OF ALL RESPONSES TO PHQ QUESTIONS 1-9: 0
SUM OF ALL RESPONSES TO PHQ QUESTIONS 1-9: 0
SUM OF ALL RESPONSES TO PHQ9 QUESTIONS 1 & 2: 0
SUM OF ALL RESPONSES TO PHQ QUESTIONS 1-9: 0

## 2021-07-20 NOTE — PROGRESS NOTES
MA Rooming Questions  Patient: Kat Win  MRN: [id number]    Date: 7/20/2021        1. Do you have any new issues?   no         2. Do you need any refills on medications?    no    3. Have you had any imaging done since your last visit?   no    4. Have you been hospitalized or seen in the emergency room since your last visit here?   no    5. Did the patient have a depression screening completed today?  Yes    PHQ-9 Total Score: 0 (7/20/2021  9:27 AM)       PHQ-9 Given to (if applicable):               PHQ-9 Score (if applicable):                     [] Positive     [x]  Negative              Does question #9 need addressed (if applicable)                     [] Yes    []  No               Mary Dwyer CMA

## 2021-07-22 RX ORDER — LENALIDOMIDE 10 MG/1
10 CAPSULE ORAL DAILY
Qty: 28 CAPSULE | Refills: 0 | Status: SHIPPED | OUTPATIENT
Start: 2021-07-22 | End: 2021-08-20 | Stop reason: SDUPTHER

## 2021-08-20 RX ORDER — LENALIDOMIDE 10 MG/1
10 CAPSULE ORAL DAILY
Qty: 28 CAPSULE | Refills: 0 | Status: SHIPPED | OUTPATIENT
Start: 2021-08-20 | End: 2021-09-17 | Stop reason: SDUPTHER

## 2021-09-13 ENCOUNTER — TELEPHONE (OUTPATIENT)
Dept: FAMILY MEDICINE CLINIC | Age: 71
End: 2021-09-13
Payer: MEDICARE

## 2021-09-13 DIAGNOSIS — Z23 VACCINE FOR STREPTOCOCCUS PNEUMONIAE AND INFLUENZA: Primary | ICD-10-CM

## 2021-09-13 PROCEDURE — G0008 ADMIN INFLUENZA VIRUS VAC: HCPCS | Performed by: STUDENT IN AN ORGANIZED HEALTH CARE EDUCATION/TRAINING PROGRAM

## 2021-09-13 PROCEDURE — 90694 VACC AIIV4 NO PRSRV 0.5ML IM: CPT | Performed by: STUDENT IN AN ORGANIZED HEALTH CARE EDUCATION/TRAINING PROGRAM

## 2021-09-17 RX ORDER — LENALIDOMIDE 10 MG/1
10 CAPSULE ORAL DAILY
Qty: 28 CAPSULE | Refills: 0 | Status: SHIPPED | OUTPATIENT
Start: 2021-09-17 | End: 2021-10-12 | Stop reason: SDUPTHER

## 2021-10-12 RX ORDER — LENALIDOMIDE 10 MG/1
10 CAPSULE ORAL DAILY
Qty: 28 CAPSULE | Refills: 0 | Status: SHIPPED | OUTPATIENT
Start: 2021-10-12 | End: 2021-11-16 | Stop reason: SDUPTHER

## 2021-10-13 RX ORDER — AMLODIPINE BESYLATE 5 MG/1
TABLET ORAL
Qty: 90 TABLET | Refills: 1 | Status: SHIPPED | OUTPATIENT
Start: 2021-10-13 | End: 2022-03-14 | Stop reason: SDUPTHER

## 2021-10-13 RX ORDER — LOSARTAN POTASSIUM 50 MG/1
TABLET ORAL
Qty: 90 TABLET | Refills: 1 | Status: SHIPPED | OUTPATIENT
Start: 2021-10-13 | End: 2022-03-14 | Stop reason: SDUPTHER

## 2021-10-14 ENCOUNTER — HOSPITAL ENCOUNTER (OUTPATIENT)
Dept: INFUSION THERAPY | Age: 71
Discharge: HOME | End: 2021-10-14
Payer: MEDICARE

## 2021-10-14 DIAGNOSIS — C90.00 MULTIPLE MYELOMA NOT HAVING ACHIEVED REMISSION (HCC): ICD-10-CM

## 2021-10-14 LAB
ALBUMIN SERPL-MCNC: 4 GM/DL (ref 3.4–5)
ALP BLD-CCNC: 190 IU/L (ref 40–129)
ALT SERPL-CCNC: 56 U/L (ref 10–40)
ANION GAP SERPL CALCULATED.3IONS-SCNC: 13 MMOL/L (ref 4–16)
AST SERPL-CCNC: 31 IU/L (ref 15–37)
BASOPHILS ABSOLUTE: 0 K/CU MM
BASOPHILS RELATIVE PERCENT: 1.2 % (ref 0–1)
BILIRUB SERPL-MCNC: 1 MG/DL (ref 0–1)
BUN BLDV-MCNC: 11 MG/DL (ref 6–23)
CALCIUM SERPL-MCNC: 8.7 MG/DL (ref 8.3–10.6)
CHLORIDE BLD-SCNC: 100 MMOL/L (ref 99–110)
CO2: 27 MMOL/L (ref 21–32)
CREAT SERPL-MCNC: 0.5 MG/DL (ref 0.6–1.1)
DIFFERENTIAL TYPE: ABNORMAL
EOSINOPHILS ABSOLUTE: 0.2 K/CU MM
EOSINOPHILS RELATIVE PERCENT: 7.1 % (ref 0–3)
ERYTHROCYTE SEDIMENTATION RATE: 50 MM/HR (ref 0–30)
GFR AFRICAN AMERICAN: >60 ML/MIN/1.73M2
GFR NON-AFRICAN AMERICAN: >60 ML/MIN/1.73M2
GLUCOSE BLD-MCNC: 98 MG/DL (ref 70–99)
HCT VFR BLD CALC: 33.8 % (ref 37–47)
HEMOGLOBIN: 11.4 GM/DL (ref 12.5–16)
IGA: 406 MG/DL (ref 69–382)
IGG,SERUM: 827 MG/DL (ref 723–1685)
IGM,SERUM: <25 MG/DL (ref 62–277)
LACTATE DEHYDROGENASE: 130 IU/L (ref 120–246)
LYMPHOCYTES ABSOLUTE: 0.4 K/CU MM
LYMPHOCYTES RELATIVE PERCENT: 17 % (ref 24–44)
MCH RBC QN AUTO: 31.1 PG (ref 27–31)
MCHC RBC AUTO-ENTMCNC: 33.7 % (ref 32–36)
MCV RBC AUTO: 92.3 FL (ref 78–100)
MONOCYTES ABSOLUTE: 0.3 K/CU MM
MONOCYTES RELATIVE PERCENT: 12 % (ref 0–4)
PDW BLD-RTO: 15.5 % (ref 11.7–14.9)
PLATELET # BLD: 238 K/CU MM (ref 140–440)
PMV BLD AUTO: 9.6 FL (ref 7.5–11.1)
POTASSIUM SERPL-SCNC: 3.3 MMOL/L (ref 3.5–5.1)
RBC # BLD: 3.66 M/CU MM (ref 4.2–5.4)
SEGMENTED NEUTROPHILS ABSOLUTE COUNT: 1.5 K/CU MM
SEGMENTED NEUTROPHILS RELATIVE PERCENT: 62.7 % (ref 36–66)
SODIUM BLD-SCNC: 140 MMOL/L (ref 135–145)
TOTAL PROTEIN: 6.4 GM/DL (ref 6.4–8.2)
WBC # BLD: 2.4 K/CU MM (ref 4–10.5)

## 2021-10-14 PROCEDURE — 85025 COMPLETE CBC W/AUTO DIFF WBC: CPT

## 2021-10-14 PROCEDURE — 83883 ASSAY NEPHELOMETRY NOT SPEC: CPT

## 2021-10-14 PROCEDURE — 85652 RBC SED RATE AUTOMATED: CPT

## 2021-10-14 PROCEDURE — 86320 SERUM IMMUNOELECTROPHORESIS: CPT

## 2021-10-14 PROCEDURE — 82232 ASSAY OF BETA-2 PROTEIN: CPT

## 2021-10-14 PROCEDURE — 82784 ASSAY IGA/IGD/IGG/IGM EACH: CPT

## 2021-10-14 PROCEDURE — 36415 COLL VENOUS BLD VENIPUNCTURE: CPT

## 2021-10-14 PROCEDURE — 84165 PROTEIN E-PHORESIS SERUM: CPT

## 2021-10-14 PROCEDURE — 83615 LACTATE (LD) (LDH) ENZYME: CPT

## 2021-10-14 PROCEDURE — 80053 COMPREHEN METABOLIC PANEL: CPT

## 2021-10-15 LAB
ALBUMIN ELP: 2.9 GM/DL (ref 3.2–5.6)
ALPHA-1-GLOBULIN: 0.4 GM/DL (ref 0.1–0.4)
ALPHA-2-GLOBULIN: 1 GM/DL (ref 0.4–1.2)
BETA GLOBULIN: 1 GM/DL (ref 0.5–1.3)
GAMMA GLOBULIN: 1.1 GM/DL (ref 0.5–1.6)
SPEP INTERPRETATION: ABNORMAL
SPEP INTERPRETATION: NORMAL
TOTAL PROTEIN: 6.4 GM/DL (ref 6.4–8.2)

## 2021-10-16 LAB
BETA-2 MICROGLOBULIN: 2.6 MG/L (ref 1.1–2.4)
KAPPA QUANT FREE LIGHT CHAINS: 37.61 MG/L (ref 3.3–19.4)
KAPPA/LAMBDA FREE LIGHT CHAIN RATIO: 1.33 (ref 0.26–1.65)
LAMBDA FREE LIGHT CHAINS QNT: 28.22 MG/L (ref 5.71–26.3)

## 2021-10-19 NOTE — PROGRESS NOTES
Patient Name: Marija Roberts  Patient : 1950  Patient MRN: [id number]     Primary Oncologist: Kirsten Jaime MD  Referring Provider: Rhiannon Christopher MD     Date of Service: 10/21/2021      Chief Complaint:   Chief Complaint   Patient presents with    Follow-up     Patient Active Problem List:     Severe anemia     Multiple myeloma not having achieved remission      Drug related polyneuropathy      Osteopenia of multiple sites    HPI:   Marija Roberts is a 82-year-old very pleasnat female with medical history significant for hypertension, GERD, depression, anemia, initially presented to me on 2018 to follow up with her monocloal gammopathy. She initially presented to [facility] on 18 with low hemoglobin. She stated that she had colonoscopy in  by Dr. Adriano Huston. She has been tired and fatigue since 2018. She denies weight loss. She was found to have severe anemia on blood test done in her primary care physician office and she was asked to come to ER. Her base line hemoglobin was 8.4 -9.4 gram since . It was 13 grams in 2017 and on arrival to hospital on 18 was 5.7 grams. She received 2 units of PRBC. I was called to evaluate her anemia at that time. I recognized that she has worsening macrocytic anemia. She also has mild leukopenia and thrombocytopenia. Her total protein level was significantly elevated (11.9 grams), but she has normal calcium and serum creatinine. I requested work ups to rule out plasma cell dyscrasias and she was found to have 7900 mg/dl IgG kappa monoclonal gammopathy on serum protein electrophoresis and serum immunofixation. Her beta 2 microglobulin was 7.5 mg./dl, serum kappa 9.34 mg/dl, lambda 0.19 mg/dl, ratio was 49.16. ESR > 120, CRP 3 and .     Bone marrow biopsy was done on 2018 and final pathology showed hypocellular bone marrow [85%], with partially preserved trilineage hematopoiesis and involvement by plasma cell neoplasm [85% of marrow cellularity is comprised of neoplastic plasma cells]. Cytogenetic reveals normal myocardial type [46XX]. Fish panel revealed gain of chromosome 1q21, monosomy 13, and aneuploidy [gain of chromosome 7, 9, 15 and FGFR3/4p]. Bone survey done on 1/8/19 showed multiple lytic lesions noted to the calvarium bilaterally as well as involving the left humerus. With the clinical history findings are concerning for multiple myeloma. No definite additional bony involvement identified. Multilevel degenerative changes to the cervical, thoracic and lumbar spine. Diffuse bone demineralization. First line chemotherapy with Velcade, Revlimid and Dexamethasone was started on January 18, 2019 and she completed her fifth cycle on 4/29/19. Her monoclonal protein has 7900mg/dl before therapy. It decreased to 1900mg/dl (2/8/19), 900mg/dl (3/4/19) and 400 mg/dl (4/26/19). She had bone marrow biopsy on 4/2/19 at [facility] and it showed no morphologic or immunophenotypic evidence of persistent/recurrent plasma cell neoplasm. She received autologus stem cell transplant on 5/16/19. Mammogram done on April 27, 2021 showed no mammographic evidence of malignancy. Maintenance chemotherapy with Revlimid was started since August 27, 2019. On October 21, 2021, she presented to me for follow-up. I have been following her for stage III IgG kappa multiple myeloma and she is status post first line chemotherapy with Velcade, Revlimid and Dexamethasone (received total 4 cycles) and autologous stem cell transplantation. She has been on maintenance chemotherapy with revlimid since August 27, 2019. She is tolerating maintenance chemotherapy, Revlimid well and she doesn't encounter any major side effects from Revlimid. We change Revlimid to three weeks on one week off, since she has been having increasing SOB and fatigue.  Since her symptoms are resolved now, we resumed back on daily basis since January 14, 2020. I recognized that she has 400 mg/dl IgG kappa monoclonal protein detected on recent serum protein electrophoresis and immunofixation done on 10/14/2021. It was undetectable before. I reviewed result with pathologist. I am concerned about rising M protein and I recommend her to have repeat myeloma panel, including SPEP, serum immunofixation, serum light chain assays and rations, beta 2 microglobulin and immunoglobulin panels in 4 weeks. I requested her to follow up with her oncologist, Dr. Martínez Feliciano after that. I will also follow her after repeat blood test.     Based on findings on repeat blood test, we will consider either to increase the dose of revlimid or change the chemotherapy. Since her revlimid induced diarrhea has been controlled well with lomotil, I recommend her to continue with it for now. Chemo induced thromboembolism prophylaxis - I recommend her to continue with aspirin 81 mg daily. Myeloma bone disease - Since she has completed 2 years of therapy, we will stopped it for now. I asked her to continue with vitamin D daily. Chemo induced neuropathy - she is back on cymbalta 60 mg daily since her neuropathy gets worse after decreasing the dose. I asked her to take tramadol prn for neuropathic pain. Hypertension - she is on amlodipine and losartan. BP is stable and I recommend her for salt restriction. COVID19 vaccine -she is status post COVID-19 vaccine. .     I reviewed with her findings on screening mammogram done on 4/27/21 and DEXA scan, done on 3/9/2020. I recommend her to have a repeat mammogram and April 2022. Health maintenance - I recommend her to have age-appropriate cancer screening, exercise, low-fat and low-sodium diet. She doesn't have any significant symptoms on today visit. Past Medical History  Significant for  1. Hypertension  2. Gastroesophageal reflux disease  3. Depression  4. Anemia    Surgical History  Significant for  1. Cholecystectomy  2. Cataract surgery  3. Hysterectomy in 1985  4. Partial thyroidectomy  5. Tonsillectomy  6. Bladder suspension surgery    Allergies  Adhesive tape  Sulfamide  lisinopril    Social History  She denies smoking and illicit drug abuse. She socially drink alcohol. She is currently living in [facility]. Family History  Significant for bladder cancer and brain cancer in one of her brother. Prostate cancer in another brother. No other family history of malignancy. Review of Systems: \"Per interval history; otherwise 10 point ROS is negative. \"  Her energy level is fair, appetite, and sleep are good. She denies fever, chills, night sweats, cough, shortness of breath, chest pain, hemoptysis or palpitations. Her bowel and bladder functions are normal. She denies nausea, vomiting, abdominal pain, diarrhea, constipation, dysuria, loss of appetite or weight loss. She doesn't have neuropathy and she denies bleeding or clotting issues. She denies any pain in her body. Denies anxiety or depression. The rest of the systems are unremarkable.      Vital Signs:  BP (!) 145/66 (Site: Right Upper Arm, Position: Sitting, Cuff Size: Medium Adult)   Pulse 80   Temp 97 °F (36.1 °C) (Infrared)   Resp 12   Ht 5' 4\" (1.626 m)   Wt 159 lb (72.1 kg)   SpO2 96%   BMI 27.29 kg/m²     Physical Exam:  CONSTITUTIONAL: awake, alert, cooperative, no apparent distress,    EYES: pupils equal, round and reactive to light, sclera clear and conjunctiva normal  ENT: Normocephalic, without obvious abnormality, atraumatic  NECK: supple, symmetrical, no jugular venous distension and no carotid bruits   HEMATOLOGIC/LYMPHATIC: no cervical, supraclavicular or axillary lymphadenopathy   LUNGS: VBS, no crackles, no wheezes, no increased work of breathing, clear to auscultation, no rhonchi,    CARDIOVASCULAR: regular rate and rhythm, normal S1 and S2, no murmur noted  ABDOMEN: normal bowel sounds x 4, non-tender, no masses palpated, no hepatosplenomegaly, soft, non-distended,   MUSCULOSKELETAL: full range of motion noted, tone is normal  NEUROLOGIC: awake, alert, oriented to name, place and time. Motor skills grossly intact. SKIN: Normal skin color, texture, turgor and no jaundice.  appears intact   EXTREMITIES: no leg swelling, no clubbing, no cyanosis, no LE edema,      Labs:  Hematology:  Lab Results   Component Value Date    WBC 2.4 (L) 10/14/2021    RBC 3.66 (L) 10/14/2021    HGB 11.4 (L) 10/14/2021    HCT 33.8 (L) 10/14/2021    MCV 92.3 10/14/2021    MCH 31.1 (H) 10/14/2021    MCHC 33.7 10/14/2021    RDW 15.5 (H) 10/14/2021     10/14/2021    MPV 9.6 10/14/2021    BANDSPCT 4 (L) 09/02/2020    SEGSPCT 62.7 10/14/2021    EOSRELPCT 7.1 (H) 10/14/2021    BASOPCT 1.2 (H) 10/14/2021    LYMPHOPCT 17.0 (L) 10/14/2021    MONOPCT 12.0 (H) 10/14/2021    BANDABS 0.08 09/02/2020    SEGSABS 1.5 10/14/2021    EOSABS 0.2 10/14/2021    BASOSABS 0.0 10/14/2021    LYMPHSABS 0.4 10/14/2021    MONOSABS 0.3 10/14/2021    DIFFTYPE AUTOMATED DIFFERENTIAL 10/14/2021    ANISOCYTOSIS 1+ 12/22/2018    POLYCHROM 1+ 12/23/2018    WBCMORP OCCASIONAL 09/02/2020    PLTM PLATELETS APPEAR NORMAL 12/23/2018     Lab Results   Component Value Date    ESR 50 (H) 10/14/2021     Chemistry:  Lab Results   Component Value Date     10/14/2021    K 3.3 (L) 10/14/2021     10/14/2021    CO2 27 10/14/2021    BUN 11 10/14/2021    CREATININE 0.5 (L) 10/14/2021    GLUCOSE 98 10/14/2021    CALCIUM 8.7 10/14/2021    PROT 6.4 10/14/2021    PROT 6.4 10/14/2021    LABALBU 4.0 10/14/2021    BILITOT 1.0 10/14/2021    ALKPHOS 190 (H) 10/14/2021    AST 31 10/14/2021    ALT 56 (H) 10/14/2021    LABGLOM >60 10/14/2021    GFRAA >60 10/14/2021    AGRATIO 1.2 09/10/2020    GLOB 2.9 09/10/2020    MG 2.2 09/02/2020    POCCA 1.13 05/11/2021    POCGLU 121 (H) 05/11/2021     Lab Results   Component Value Date     10/14/2021     No components found for: LD  Lab Results   Component Value Date    TSHHS 4.540 (H) 12/22/2018     Immunology:  Lab Results   Component Value Date    PROT 6.4 10/14/2021    PROT 6.4 10/14/2021    SPEP  10/14/2021     INTERPRETATION - Monoclonal IgG kappa proteins. LP.    SPEP  10/14/2021     INTERPRETATION - Monoclonal gammopathy, confirmed as IgG kappa proteins by SARABJIT, M spike measuring 400 mg/dL. The prior SPEP on 6/21/2021 appears to be within normal limits. Decreased albumin. LP.    ALBUMINELP 2.9 (L) 10/14/2021    LABALPH 0.4 10/14/2021    LABALPH 1.0 10/14/2021    LABBETA 1.0 10/14/2021    GAMGLOB 1.1 10/14/2021     Lab Results   Component Value Date    KAPPAUVOL 37.61 (H) 10/14/2021    LAMBDAUVOL 33.20 (H) 11/04/2020    KLFLCR 1.33 10/14/2021     Lab Results   Component Value Date    B2M 2.6 (H) 10/14/2021     Coagulation Panel:  Lab Results   Component Value Date    PROTIME 12.2 09/02/2020    INR 1.01 09/02/2020    APTT 27.9 09/02/2020    DDIMER 2430 (H) 09/02/2020     Anemia Panel:  Lab Results   Component Value Date    POIBZTUT84 954.6 (H) 12/22/2018    FOLATE >20.0 (H) 09/17/2019     Tumor Markers:  No results found for: , CEA, , LABCA2, PSA  Observations:  No data recorded        Assessment & Plan:   Stage III IgG kappa multiple myeloma    PLAN  Ms. Kulwinder Borges is a 62-year-old very pleasant female who was found to have significantly high monoclonal gammopathy (7300 mg/dl) when she presented with symptomatic anemia. Furhter work ups with bone marrow biopsy confirmed that she has stage III IgG kappa multiple myeloma (high risk disease). Since she has symptomatic multiple myeloma, we started first-line chemotherapy with Velcade, Revlimid and dexamethasone on January 18, 2019 and she completed her fifth cycle on 4/29/19. Her monoclonal protein has 7900mg/dl before therapy. It decreased to 1900mg/dl (2/8/19), 900mg/dl (3/4/19) and 400 mg/dl (4/26/19).       She had bone marrow biopsy on 4/2/19 at [facility] and it showed no morphologic or immunophenotypic evidence of persistent/recurrent plasma cell neoplasm. She received autologus stem cell transplant on 5/16/19. Mammogram done on April 27, 2021 showed no mammographic evidence of malignancy. Maintenance chemotherapy with Revlimid was started since August 27, 2019. On October 21, 2021, she presented to me for follow-up. I have been following her for stage III IgG kappa multiple myeloma and she is status post first line chemotherapy with Velcade, Revlimid and Dexamethasone (received total 4 cycles) and autologous stem cell transplantation. She has been on maintenance chemotherapy with revlimid since August 27, 2019. She is tolerating maintenance chemotherapy, Revlimid well and she doesn't encounter any major side effects from Revlimid. We change Revlimid to three weeks on one week off, since she has been having increasing SOB and fatigue. Since her symptoms are resolved now, we resumed back on daily basis since January 14, 2020. I recognized that she has 400 mg/dl IgG kappa monoclonal protein detected on recent serum protein electrophoresis and immunofixation done on 10/14/2021. It was undetectable before. I reviewed result with pathologist. I am concerned about rising M protein and I recommend her to have repeat myeloma panel, including SPEP, serum immunofixation, serum light chain assays and rations, beta 2 microglobulin and immunoglobulin panels in 4 weeks. I requested her to follow up with her oncologist, Dr. Kirk Mota after that. I will also follow her after repeat blood test.     Based on findings on repeat blood test, we will consider either to increase the dose of revlimid or change the chemotherapy. Since her revlimid induced diarrhea has been controlled well with lomotil, I recommend her to continue with it for now. Chemo induced thromboembolism prophylaxis - I recommend her to continue with aspirin 81 mg daily. Myeloma bone disease - Since she has completed 2 years of therapy, we will stopped it for now. I asked her to continue with vitamin D daily. Chemo induced neuropathy - she is back on cymbalta 60 mg daily since her neuropathy gets worse after decreasing the dose. I asked her to take tramadol prn for neuropathic pain. Hypertension - she is on amlodipine and losartan. BP is stable and I recommend her for salt restriction. COVID19 vaccine -she is status post COVID-19 vaccine. .     I reviewed with her findings on screening mammogram done on 4/27/21 and DEXA scan, done on 3/9/2020. I recommend her to have a repeat mammogram and April 2022. Health maintenance - I recommend her to have age-appropriate cancer screening, exercise, low-fat and low-sodium diet. I answered all her questions and concerns for today. I asked her to follow-up with primary care physician, Dr. Génesis Moss on regular basis. Recent imaging and labs were reviewed and discussed with the patient.

## 2021-10-21 ENCOUNTER — HOSPITAL ENCOUNTER (OUTPATIENT)
Dept: INFUSION THERAPY | Age: 71
Discharge: HOME | End: 2021-10-21
Payer: MEDICARE

## 2021-10-21 ENCOUNTER — OFFICE VISIT (OUTPATIENT)
Dept: ONCOLOGY | Age: 71
End: 2021-10-21
Payer: MEDICARE

## 2021-10-21 VITALS
TEMPERATURE: 97 F | WEIGHT: 159 LBS | OXYGEN SATURATION: 96 % | HEIGHT: 64 IN | HEART RATE: 80 BPM | DIASTOLIC BLOOD PRESSURE: 66 MMHG | SYSTOLIC BLOOD PRESSURE: 145 MMHG | RESPIRATION RATE: 12 BRPM | BODY MASS INDEX: 27.14 KG/M2

## 2021-10-21 DIAGNOSIS — C90.00 MULTIPLE MYELOMA NOT HAVING ACHIEVED REMISSION (HCC): Primary | ICD-10-CM

## 2021-10-21 PROCEDURE — G8484 FLU IMMUNIZE NO ADMIN: HCPCS | Performed by: INTERNAL MEDICINE

## 2021-10-21 PROCEDURE — 1123F ACP DISCUSS/DSCN MKR DOCD: CPT | Performed by: INTERNAL MEDICINE

## 2021-10-21 PROCEDURE — 1090F PRES/ABSN URINE INCON ASSESS: CPT | Performed by: INTERNAL MEDICINE

## 2021-10-21 PROCEDURE — G8427 DOCREV CUR MEDS BY ELIG CLIN: HCPCS | Performed by: INTERNAL MEDICINE

## 2021-10-21 PROCEDURE — 4040F PNEUMOC VAC/ADMIN/RCVD: CPT | Performed by: INTERNAL MEDICINE

## 2021-10-21 PROCEDURE — 3017F COLORECTAL CA SCREEN DOC REV: CPT | Performed by: INTERNAL MEDICINE

## 2021-10-21 PROCEDURE — G8417 CALC BMI ABV UP PARAM F/U: HCPCS | Performed by: INTERNAL MEDICINE

## 2021-10-21 PROCEDURE — 99211 OFF/OP EST MAY X REQ PHY/QHP: CPT

## 2021-10-21 PROCEDURE — 1036F TOBACCO NON-USER: CPT | Performed by: INTERNAL MEDICINE

## 2021-10-21 PROCEDURE — 99214 OFFICE O/P EST MOD 30 MIN: CPT | Performed by: INTERNAL MEDICINE

## 2021-10-21 PROCEDURE — G8399 PT W/DXA RESULTS DOCUMENT: HCPCS | Performed by: INTERNAL MEDICINE

## 2021-10-21 RX ORDER — DULOXETIN HYDROCHLORIDE 60 MG/1
60 CAPSULE, DELAYED RELEASE ORAL DAILY
COMMUNITY
End: 2021-12-23

## 2021-10-21 RX ORDER — DIPHENOXYLATE HYDROCHLORIDE AND ATROPINE SULFATE 2.5; .025 MG/1; MG/1
1 TABLET ORAL 4 TIMES DAILY PRN
Qty: 120 TABLET | Refills: 2 | Status: SHIPPED | OUTPATIENT
Start: 2021-10-21 | End: 2022-08-17 | Stop reason: SDUPTHER

## 2021-10-21 NOTE — PROGRESS NOTES
MA Rooming Questions  Patient: Rob Velasco  MRN: [id number]    Date: 10/21/2021        1. Do you have any new issues?   no         2. Do you need any refills on medications? yes - Lomotil    3. Have you had any imaging done since your last visit?   no    4. Have you been hospitalized or seen in the emergency room since your last visit here?   no    5. Did the patient have a depression screening completed today?  No    No data recorded     PHQ-9 Given to (if applicable):               PHQ-9 Score (if applicable):                     [] Positive     []  Negative              Does question #9 need addressed (if applicable)                     [] Yes    []  No               Bg Faust MA

## 2021-10-22 ENCOUNTER — CLINICAL DOCUMENTATION (OUTPATIENT)
Dept: ONCOLOGY | Age: 71
End: 2021-10-22

## 2021-10-25 ENCOUNTER — OFFICE VISIT (OUTPATIENT)
Dept: FAMILY MEDICINE CLINIC | Age: 71
End: 2021-10-25
Payer: MEDICARE

## 2021-10-25 VITALS
SYSTOLIC BLOOD PRESSURE: 124 MMHG | WEIGHT: 157 LBS | HEIGHT: 64 IN | HEART RATE: 59 BPM | BODY MASS INDEX: 26.8 KG/M2 | DIASTOLIC BLOOD PRESSURE: 62 MMHG | OXYGEN SATURATION: 97 %

## 2021-10-25 DIAGNOSIS — D47.2 MONOCLONAL GAMMOPATHY: ICD-10-CM

## 2021-10-25 DIAGNOSIS — L21.9 SEBORRHEIC DERMATITIS: Primary | ICD-10-CM

## 2021-10-25 DIAGNOSIS — C90.00 MULTIPLE MYELOMA NOT HAVING ACHIEVED REMISSION (HCC): Chronic | ICD-10-CM

## 2021-10-25 DIAGNOSIS — I10 ESSENTIAL HYPERTENSION: Chronic | ICD-10-CM

## 2021-10-25 PROCEDURE — 99213 OFFICE O/P EST LOW 20 MIN: CPT | Performed by: FAMILY MEDICINE

## 2021-10-25 PROCEDURE — G8399 PT W/DXA RESULTS DOCUMENT: HCPCS | Performed by: FAMILY MEDICINE

## 2021-10-25 PROCEDURE — 3017F COLORECTAL CA SCREEN DOC REV: CPT | Performed by: FAMILY MEDICINE

## 2021-10-25 PROCEDURE — 4040F PNEUMOC VAC/ADMIN/RCVD: CPT | Performed by: FAMILY MEDICINE

## 2021-10-25 PROCEDURE — 1036F TOBACCO NON-USER: CPT | Performed by: FAMILY MEDICINE

## 2021-10-25 PROCEDURE — G8427 DOCREV CUR MEDS BY ELIG CLIN: HCPCS | Performed by: FAMILY MEDICINE

## 2021-10-25 PROCEDURE — G8417 CALC BMI ABV UP PARAM F/U: HCPCS | Performed by: FAMILY MEDICINE

## 2021-10-25 PROCEDURE — G8484 FLU IMMUNIZE NO ADMIN: HCPCS | Performed by: FAMILY MEDICINE

## 2021-10-25 PROCEDURE — 1090F PRES/ABSN URINE INCON ASSESS: CPT | Performed by: FAMILY MEDICINE

## 2021-10-25 PROCEDURE — 1123F ACP DISCUSS/DSCN MKR DOCD: CPT | Performed by: FAMILY MEDICINE

## 2021-10-25 RX ORDER — ALENDRONATE SODIUM 70 MG/1
70 TABLET ORAL WEEKLY
Qty: 12 TABLET | Refills: 1 | Status: SHIPPED | OUTPATIENT
Start: 2021-10-25 | End: 2022-03-16

## 2021-10-25 RX ORDER — MOMETASONE FUROATE 1 MG/G
CREAM TOPICAL
Qty: 1 EACH | Refills: 2 | Status: SHIPPED | OUTPATIENT
Start: 2021-10-25 | End: 2022-06-29 | Stop reason: SDUPTHER

## 2021-10-25 NOTE — PROGRESS NOTES
10/25/2021     Beto Orozco      Chief Complaint   Patient presents with    6 Month Follow-Up    Other     pt reports rough patches of skin on the face, curious what it is       HPI      Trent Musa is a 73 y.o. female who presents today with the followin. Seborrheic dermatitis    2. Essential hypertension    3. Monoclonal gammopathy    4.  Multiple myeloma not having achieved remission ([address]    She is here for recheck  She was asked about some labs she had done she had a monoclonal spike on serum protein electrophoresis is going to discuss that with specialist at [place]  She has had a stem cell or bone marrow transplant for multiple myeloma    REVIEW OF SYMPTOMS    Review of Systems   Cardiovascular:        Long history of hypertension which is well controlled   Skin:        Developed some scaly areas over her eyebrow particular on the left in her anterior scalp line   Hematological:        Undergone aggressive treatment of multiple myeloma and then doing generally well       PAST MEDICAL HISTORY  Past Medical History:   Diagnosis Date    Anemia     \"With Childbirth\"    Arthritis     \"Hands, Knees And Hips\"    CCC (chronic calculous cholecystitis)     s/p cholecystectomy 2015    Colitis Dx 's    Depression     \"In Mid \"    Essential hypertension     GERD (gastroesophageal reflux disease)     Hyperlipidemia     Motion sickness     Multiple myeloma ([address]     Osteoporosis     LILIAN (stress urinary incontinence, female)     Thyroid disease     \"Took Part Of Thyroid Out \"       FAMILY HISTORY  Family History   Problem Relation Age of Onset    Heart Disease Mother     Arthritis Mother     Diabetes Mother     High Blood Pressure Mother     Dementia Father     Cancer Brother         Prostate Cancer    Arthritis Brother     Early Death Brother 61        Bladder And Brain Cancer    Diabetes Brother     Depression Brother     Cancer Brother         Bladder And Brain Cancer    Kidney Disease Son         Kidney Stones    Other Son         \"Charcot Swathi Tooth, Neuromuscular Disease\"       SOCIAL HISTORY  Social History     Socioeconomic History    Marital status:      Spouse name: None    Number of children: None    Years of education: None    Highest education level: None   Occupational History    None   Tobacco Use    Smoking status: Never Smoker    Smokeless tobacco: Never Used   Vaping Use    Vaping Use: Never used   Substance and Sexual Activity    Alcohol use: Yes     Comment: \"Occ. Maybe Once A Week\"    Drug use: No    Sexual activity: Yes     Partners: Male   Other Topics Concern    None   Social History Narrative    None     Social Determinants of Health     Financial Resource Strain:     Difficulty of Paying Living Expenses:    Food Insecurity:     Worried About Running Out of Food in the Last Year:     [address] in the Last Year:    Transportation Needs:     Lack of Transportation (Medical):      Lack of Transportation (Non-Medical):    Physical Activity:     Days of Exercise per Week:     Minutes of Exercise per Session:    Stress:     Feeling of Stress :    Social Connections:     Frequency of Communication with Friends and Family:     Frequency of Social Gatherings with Friends and Family:     Attends Sabianism Services:     Active Member of Clubs or Organizations:     Attends Club or Organization Meetings:     Marital Status:    Intimate Partner Violence:     Fear of Current or Ex-Partner:     Emotionally Abused:     Physically Abused:     Sexually Abused:         SURGICAL HISTORY  Past Surgical History:   Procedure Laterality Date    BLADDER SUSPENSION  1985    Done During Vaginal Hysterectomy    BREAST SURGERY Right 1980's    Benign Area Removed Nipple Area Right Breast   Sanjana Joaquin  [id number]    COLONOSCOPY  \"Two\" Last Done 2000's    COLONOSCOPY  10/05/2018    Sigmoid diverticulosis, Grade 1 Internal hemorrhoids  DENTAL SURGERY      Teeth Extracted In Past    ENDOSCOPY, COLON, DIAGNOSTIC  \"Once\" 1990's    EYE SURGERY Right 5-24-13    Cataract With Lens Implant    EYE SURGERY Left 1-17-14    Cataract With Lens Implant    FOOT SURGERY Left 1962 Or 1963    I & D Left Foot After Stepping On A Nail    HYSTERECTOMY      approx. 1986    HYSTERECTOMY, VAGINAL  1985    Bladder Suspension Also Done    LYMPH NODE BIOPSY  Last Done In 2000    \"Had Five Lymph Node Biopsies Done, Arm Pit Areas\", Benign    FL COLONOSCOPY FLX DX W/COLLJ SPEC WHEN PFRMD N/A 10/5/2018    COLONOSCOPY DIAGNOSTIC OR SCREENING performed by Ava Jacobson MD at [address]  1990's    \"Took Part Of The Thyroid Out\"    TONSILLECTOMY  1970's    WISDOM TOOTH EXTRACTION  Early 1970's    All [place] Teeth Extracted       CURRENT MEDICATIONS  Current Outpatient Medications   Medication Sig Dispense Refill    alendronate (FOSAMAX) 70 MG tablet Take 1 tablet by mouth once a week 04/26/18 takes on Sundays 12 tablet 1    mometasone (ELOCON) 0.1 % cream Apply topically daily. 1 each 2    DULoxetine (CYMBALTA) 60 MG extended release capsule Take 60 mg by mouth daily      VITAMIN E COMPLEX PO Take by mouth daily      Cyanocobalamin (VITAMIN B12 PO) Take by mouth daily      diphenoxylate-atropine (DIPHENATOL) 2.5-0.025 MG per tablet Take 1 tablet by mouth 4 times daily as needed for Diarrhea for up to 10 days.  120 tablet 2    losartan (COZAAR) 50 MG tablet TAKE 1 TABLET EVERY MORNING 90 tablet 1    amLODIPine (NORVASC) 5 MG tablet TAKE 1 TABLET EVERY MORNING 90 tablet 1    lenalidomide (REVLIMID) 10 MG chemo capsule Take 1 capsule by mouth daily 28 capsule 0    famotidine (PEPCID) 20 MG tablet       Biotin 81336 MCG TABS Take by mouth      Omega-3 Fatty Acids (FISH OIL) 1000 MG CAPS Take 3,000 mg by mouth 2 times daily      Multiple Vitamins-Minerals (THERAPEUTIC MULTIVITAMIN-MINERALS) tablet Take 1 tablet by mouth every morning      Cholecalciferol (VITAMIN D) 2000 units CAPS capsule Take 2,000 Units by mouth every morning       aspirin 81 MG tablet Take 81 mg by mouth nightly        No current facility-administered medications for this visit. ALLERGIES  Allergies   Allergen Reactions    Latex Rash and Swelling    Adhesive Tape Rash     \"Tears Skin , Paper Tape Is OK To Use\"  Skin breakdown       Lisinopril-Hydrochlorothiazide Swelling    Other      \"Allergic To Poinsetta Holley Causing Nasal Congestion\"    Sulfa Antibiotics Other (See Comments)     \"Flu Like Symptoms\"  Flu-like symptoms      Hydrochlorothiazide W-Triamterene     Naproxen        PHYSICAL EXAM    /62   Pulse 59   Ht 5' 4\" (1.626 m)   Wt 157 lb (71.2 kg)   SpO2 97%   BMI 26.95 kg/m²     Physical Exam  Vitals and nursing note reviewed. Cardiovascular:      Rate and Rhythm: Normal rate. Pulmonary:      Effort: Pulmonary effort is normal.   Skin:     Comments: Scaly dermatitis at her anterior hairline and over her left eyebrow     Reviewed most recent labs including serum protein electrophoresis  Reviewed immunization      ASSESSMENT and PLAN    Trent Musa was seen today for 6 month follow-up and other. Diagnoses and all orders for this visit:    Seborrheic dermatitis    Essential hypertension    Monoclonal gammopathy    Multiple myeloma not having achieved remission ([facility])    Other orders  -     alendronate (FOSAMAX) 70 MG tablet; Take 1 tablet by mouth once a week 04/26/18 takes on Sundays  -     mometasone (ELOCON) 0.1 % cream; Apply topically daily. Elocon cream for the rash which is likely be seborrheic dermatitis  Can you other treatments and follow-up as before    Return in about 3 months (around 1/25/2022). Electronically signed by Ana Soni MD on 10/25/2021    Please note that this chart was generated using dragon dictation software.   Although every effort was made to ensure the accuracy of this automated transcription, some errors in transcription may have occurred.

## 2021-11-15 ENCOUNTER — HOSPITAL ENCOUNTER (OUTPATIENT)
Dept: INFUSION THERAPY | Age: 71
Discharge: HOME | End: 2021-11-15
Payer: MEDICARE

## 2021-11-15 DIAGNOSIS — C90.00 MULTIPLE MYELOMA NOT HAVING ACHIEVED REMISSION (HCC): ICD-10-CM

## 2021-11-15 LAB
ALBUMIN SERPL-MCNC: 4.1 GM/DL (ref 3.4–5)
ALP BLD-CCNC: 138 IU/L (ref 40–129)
ALT SERPL-CCNC: 32 U/L (ref 10–40)
ANION GAP SERPL CALCULATED.3IONS-SCNC: 9 MMOL/L (ref 4–16)
AST SERPL-CCNC: 31 IU/L (ref 15–37)
BASOPHILS ABSOLUTE: 0 K/CU MM
BASOPHILS RELATIVE PERCENT: 1.5 % (ref 0–1)
BILIRUB SERPL-MCNC: 1.4 MG/DL (ref 0–1)
BUN BLDV-MCNC: 7 MG/DL (ref 6–23)
CALCIUM SERPL-MCNC: 8.8 MG/DL (ref 8.3–10.6)
CHLORIDE BLD-SCNC: 103 MMOL/L (ref 99–110)
CO2: 26 MMOL/L (ref 21–32)
CREAT SERPL-MCNC: 0.5 MG/DL (ref 0.6–1.1)
DIFFERENTIAL TYPE: ABNORMAL
EOSINOPHILS ABSOLUTE: 0.1 K/CU MM
EOSINOPHILS RELATIVE PERCENT: 4.8 % (ref 0–3)
ERYTHROCYTE SEDIMENTATION RATE: 11 MM/HR (ref 0–30)
GFR AFRICAN AMERICAN: >60 ML/MIN/1.73M2
GFR NON-AFRICAN AMERICAN: >60 ML/MIN/1.73M2
GLUCOSE BLD-MCNC: 121 MG/DL (ref 70–99)
HCT VFR BLD CALC: 33.4 % (ref 37–47)
HEMOGLOBIN: 11.3 GM/DL (ref 12.5–16)
IGA: 437 MG/DL (ref 69–382)
IGG,SERUM: 875 MG/DL (ref 723–1685)
IGM,SERUM: <25 MG/DL (ref 62–277)
LACTATE DEHYDROGENASE: 152 IU/L (ref 120–246)
LYMPHOCYTES ABSOLUTE: 0.5 K/CU MM
LYMPHOCYTES RELATIVE PERCENT: 18.3 % (ref 24–44)
MCH RBC QN AUTO: 31.6 PG (ref 27–31)
MCHC RBC AUTO-ENTMCNC: 33.8 % (ref 32–36)
MCV RBC AUTO: 93.3 FL (ref 78–100)
MONOCYTES ABSOLUTE: 0.3 K/CU MM
MONOCYTES RELATIVE PERCENT: 11.7 % (ref 0–4)
PDW BLD-RTO: 16.8 % (ref 11.7–14.9)
PLATELET # BLD: 171 K/CU MM (ref 140–440)
PMV BLD AUTO: 9.5 FL (ref 7.5–11.1)
POTASSIUM SERPL-SCNC: 3.8 MMOL/L (ref 3.5–5.1)
RBC # BLD: 3.58 M/CU MM (ref 4.2–5.4)
SEGMENTED NEUTROPHILS ABSOLUTE COUNT: 1.7 K/CU MM
SEGMENTED NEUTROPHILS RELATIVE PERCENT: 63.7 % (ref 36–66)
SODIUM BLD-SCNC: 138 MMOL/L (ref 135–145)
TOTAL PROTEIN: 6.5 GM/DL (ref 6.4–8.2)
WBC # BLD: 2.7 K/CU MM (ref 4–10.5)

## 2021-11-15 PROCEDURE — 85025 COMPLETE CBC W/AUTO DIFF WBC: CPT

## 2021-11-15 PROCEDURE — 84165 PROTEIN E-PHORESIS SERUM: CPT

## 2021-11-15 PROCEDURE — 86320 SERUM IMMUNOELECTROPHORESIS: CPT

## 2021-11-15 PROCEDURE — 83883 ASSAY NEPHELOMETRY NOT SPEC: CPT

## 2021-11-15 PROCEDURE — 85652 RBC SED RATE AUTOMATED: CPT

## 2021-11-15 PROCEDURE — 80053 COMPREHEN METABOLIC PANEL: CPT

## 2021-11-15 PROCEDURE — 82784 ASSAY IGA/IGD/IGG/IGM EACH: CPT

## 2021-11-15 PROCEDURE — 83615 LACTATE (LD) (LDH) ENZYME: CPT

## 2021-11-15 PROCEDURE — 82232 ASSAY OF BETA-2 PROTEIN: CPT

## 2021-11-15 PROCEDURE — 36415 COLL VENOUS BLD VENIPUNCTURE: CPT

## 2021-11-16 DIAGNOSIS — C90.00 MULTIPLE MYELOMA NOT HAVING ACHIEVED REMISSION (HCC): Primary | ICD-10-CM

## 2021-11-16 RX ORDER — LENALIDOMIDE 10 MG/1
10 CAPSULE ORAL DAILY
Qty: 28 CAPSULE | Refills: 0 | Status: SHIPPED | OUTPATIENT
Start: 2021-11-16 | End: 2021-12-20 | Stop reason: SDUPTHER

## 2021-11-17 LAB
ALBUMIN ELP: 3.6 GM/DL (ref 3.2–5.6)
ALPHA-1-GLOBULIN: 0.3 GM/DL (ref 0.1–0.4)
ALPHA-2-GLOBULIN: 0.6 GM/DL (ref 0.4–1.2)
BETA GLOBULIN: 1 GM/DL (ref 0.5–1.3)
BETA-2 MICROGLOBULIN: 2 MG/L (ref 1.1–2.4)
GAMMA GLOBULIN: 1 GM/DL (ref 0.5–1.6)
KAPPA QUANT FREE LIGHT CHAINS: 35.37 MG/L (ref 3.3–19.4)
KAPPA/LAMBDA FREE LIGHT CHAIN RATIO: 1.12 (ref 0.26–1.65)
LAMBDA FREE LIGHT CHAINS QNT: 31.68 MG/L (ref 5.71–26.3)
SPEP INTERPRETATION: NORMAL
SPEP INTERPRETATION: NORMAL
TOTAL PROTEIN: 6.5 GM/DL (ref 6.4–8.2)

## 2021-11-29 NOTE — PROGRESS NOTES
Patient Name: Rebecca Saleh  Patient : 1950  Patient MRN: [id number]     Primary Oncologist: Bertram Shah MD  Referring Provider: Penelope Norris MD     Date of Service: 2021      Chief Complaint:   No chief complaint on file. Patient Active Problem List:     Severe anemia     Multiple myeloma not having achieved remission      Drug related polyneuropathy      Osteopenia of multiple sites    HPI:   Rebecca Saleh is a 72-year-old very pleasnat female with medical history significant for hypertension, GERD, depression, anemia, initially presented to me on 2018 to follow up with her monocloal gammopathy. She initially presented to [facility] on 18 with low hemoglobin. She stated that she had colonoscopy in  by Dr. Josy Joseph. She has been tired and fatigue since 2018. She denies weight loss. She was found to have severe anemia on blood test done in her primary care physician office and she was asked to come to ER. Her base line hemoglobin was 8.4 -9.4 gram since . It was 13 grams in 2017 and on arrival to hospital on 18 was 5.7 grams. She received 2 units of PRBC. I was called to evaluate her anemia at that time. I recognized that she has worsening macrocytic anemia. She also has mild leukopenia and thrombocytopenia. Her total protein level was significantly elevated (11.9 grams), but she has normal calcium and serum creatinine. I requested work ups to rule out plasma cell dyscrasias and she was found to have 7900 mg/dl IgG kappa monoclonal gammopathy on serum protein electrophoresis and serum immunofixation. Her beta 2 microglobulin was 7.5 mg./dl, serum kappa 9.34 mg/dl, lambda 0.19 mg/dl, ratio was 49.16. ESR > 120, CRP 3 and .     Bone marrow biopsy was done on 2018 and final pathology showed hypocellular bone marrow [85%], with partially preserved trilineage hematopoiesis and involvement by plasma cell neoplasm [85% of marrow cellularity is comprised of neoplastic plasma cells]. Cytogenetic reveals normal myocardial type [46XX]. Fish panel revealed gain of chromosome 1q21, monosomy 13, and aneuploidy [gain of chromosome 7, 9, 15 and FGFR3/4p]. Bone survey done on 1/8/19 showed multiple lytic lesions noted to the calvarium bilaterally as well as involving the left humerus. With the clinical history findings are concerning for multiple myeloma. No definite additional bony involvement identified. Multilevel degenerative changes to the cervical, thoracic and lumbar spine. Diffuse bone demineralization. First line chemotherapy with Velcade, Revlimid and Dexamethasone was started on January 18, 2019 and she completed her fifth cycle on 4/29/19. Her monoclonal protein has 7900mg/dl before therapy. It decreased to 1900mg/dl (2/8/19), 900mg/dl (3/4/19) and 400 mg/dl (4/26/19). She had bone marrow biopsy on 4/2/19 at [address] [place] [address] and it showed no morphologic or immunophenotypic evidence of persistent/recurrent plasma cell neoplasm. She received autologus stem cell transplant on 5/16/19. Mammogram done on April 27, 2021 showed no mammographic evidence of malignancy. Maintenance chemotherapy with Revlimid was started since August 27, 2019. On December 2, 2021, she presented to me for follow-up. I have been following her for stage III IgG kappa multiple myeloma and she is status post first line chemotherapy with Velcade, Revlimid and Dexamethasone (received total 4 cycles) and autologous stem cell transplantation. She has been on maintenance chemotherapy with revlimid since August 27, 2019. She is tolerating maintenance chemotherapy, Revlimid well and she doesn't encounter any major side effects from Revlimid. We change Revlimid to three weeks on one week off, since she has been having increasing SOB and fatigue.  Since her symptoms are resolved now, we resumed back on daily basis since January 14, 2020.     I recognized that she has 400 mg/dl IgG kappa monoclonal protein detected on recent serum protein electrophoresis and immunofixation done on 10/14/2021. However, repeat blood test on 11/15/21 showed no M protein detected. I believe she is in stringent complete remission and I recommend that he continue with current dose of Revlimid for now. I will reevaluate her multiple myeloma again in 2 months and I will see her again after that. Since her revlimid induced diarrhea has been controlled well with lomotil, I recommend her to continue with it for now. Chemo induced thromboembolism prophylaxis - I recommend her to continue with aspirin 81 mg daily. Myeloma bone disease - Since she has completed 2 years of therapy, we stopped it after 5/2021 dose. I asked her to continue with vitamin D daily. Chemo induced neuropathy - she is back on cymbalta 60 mg daily since her neuropathy gets worse after decreasing the dose. I asked her to take tramadol prn for neuropathic pain. Hypertension - she is on amlodipine and losartan. BP is stable and I recommend her for salt restriction. COVID19 vaccine -she is status post COVID-19 vaccine. .     I reviewed with her findings on screening mammogram done on 4/27/21 and DEXA scan, done on 3/9/2020. I recommend her to have a repeat mammogram and April 2022. Health maintenance - I recommend her to have age-appropriate cancer screening, exercise, low-fat and low-sodium diet. She doesn't have any significant symptoms on today visit. Past Medical History  Significant for  1. Hypertension  2. Gastroesophageal reflux disease  3. Depression  4. Anemia    Surgical History  Significant for  1. Cholecystectomy  2. Cataract surgery  3. Hysterectomy in 1985  4. Partial thyroidectomy  5. Tonsillectomy  6.   Bladder suspension surgery    Allergies  Adhesive tape  Sulfamide  lisinopril    Social History  She denies smoking and illicit drug abuse.  She socially drink alcohol. She is currently living in [facility]. Family History  Significant for bladder cancer and brain cancer in one of her brother. Prostate cancer in another brother. No other family history of malignancy. Review of Systems: \"Per interval history; otherwise 10 point ROS is negative. \"  Her energy level is stable, appetite and sleep are fine. She doesn't have fever, chills, night sweats, cough, shortness of breath, chest pain, hemoptysis or palpitations. Her bowel and bladder functions are normal. She doesn't have nausea, vomiting, abdominal pain, diarrhea, constipation, dysuria, loss of appetite or weight loss. She denies neuropathy and she doesn't have bleeding or clotting issues. She denies any pain in her body. No anxiety or depression. The rest of the systems are unremarkable. Vital Signs: There were no vitals taken for this visit. Physical Exam:  CONSTITUTIONAL: awake, alert, cooperative, no apparent distress,    EYES: pupils equal, round and reactive to light, sclera clear and conjunctiva normal  ENT: Normocephalic, without obvious abnormality, atraumatic  NECK: supple, symmetrical, no jugular venous distension and no carotid bruits   HEMATOLOGIC/LYMPHATIC: no cervical, supraclavicular or axillary lymphadenopathy   LUNGS: VBS, no wheezes, no increased work of breathing, no crackles, clear to auscultation, no rhonchi,  CARDIOVASCULAR: regular rate and rhythm, normal S1 and S2, no murmur noted  ABDOMEN: normal bowel sounds x 4, non-tender, no masses palpated, no hepatosplenomegaly, soft, non-distended,   MUSCULOSKELETAL: full range of motion noted, tone is normal  NEUROLOGIC: awake, alert, oriented to name, place and time. Motor skills grossly intact. SKIN: Normal skin color, texture, turgor and no jaundice.  appears intact   EXTREMITIES: no leg swelling, no LE edema, no clubbing, no cyanosis,    Labs:  Hematology:  Lab Results   Component Value Date    WBC 2.7 (L) 11/15/2021    RBC 3.58 (L) 11/15/2021    HGB 11.3 (L) 11/15/2021    HCT 33.4 (L) 11/15/2021    MCV 93.3 11/15/2021    MCH 31.6 (H) 11/15/2021    MCHC 33.8 11/15/2021    RDW 16.8 (H) 11/15/2021     11/15/2021    MPV 9.5 11/15/2021    BANDSPCT 4 (L) 09/02/2020    SEGSPCT 63.7 11/15/2021    EOSRELPCT 4.8 (H) 11/15/2021    BASOPCT 1.5 (H) 11/15/2021    LYMPHOPCT 18.3 (L) 11/15/2021    MONOPCT 11.7 (H) 11/15/2021    BANDABS 0.08 09/02/2020    SEGSABS 1.7 11/15/2021    EOSABS 0.1 11/15/2021    BASOSABS 0.0 11/15/2021    LYMPHSABS 0.5 11/15/2021    MONOSABS 0.3 11/15/2021    DIFFTYPE AUTOMATED DIFFERENTIAL 11/15/2021    ANISOCYTOSIS 1+ 12/22/2018    POLYCHROM 1+ 12/23/2018    WBCMORP OCCASIONAL 09/02/2020    PLTM PLATELETS APPEAR NORMAL 12/23/2018     Lab Results   Component Value Date    ESR 11 11/15/2021     Chemistry:  Lab Results   Component Value Date     11/15/2021    K 3.8 11/15/2021     11/15/2021    CO2 26 11/15/2021    BUN 7 11/15/2021    CREATININE 0.5 (L) 11/15/2021    GLUCOSE 121 (H) 11/15/2021    CALCIUM 8.8 11/15/2021    PROT 6.5 11/15/2021    PROT 6.5 11/15/2021    LABALBU 4.1 11/15/2021    BILITOT 1.4 (H) 11/15/2021    ALKPHOS 138 (H) 11/15/2021    AST 31 11/15/2021    ALT 32 11/15/2021    LABGLOM >60 11/15/2021    GFRAA >60 11/15/2021    AGRATIO 1.2 09/10/2020    GLOB 2.9 09/10/2020    MG 2.2 09/02/2020    POCCA 1.13 05/11/2021    POCGLU 121 (H) 05/11/2021     Lab Results   Component Value Date     11/15/2021     No components found for: LD  Lab Results   Component Value Date    TSHHS 4.540 (H) 12/22/2018     Immunology:  Lab Results   Component Value Date    PROT 6.5 11/15/2021    PROT 6.5 11/15/2021    SPEP  11/15/2021     INTERPRETATION - No monoclonal bands are detected. SAF    SPEP INTERPRETATION - Within normal limits.  SAF 11/15/2021    ALBUMINELP 3.6 11/15/2021    LABALPH 0.3 11/15/2021    LABALPH 0.6 11/15/2021    LABBETA 1.0 11/15/2021    GAMGLOB 1.0 11/15/2021 Lab Results   Component Value Date    KAPPAUVOL 35.37 (H) 11/15/2021    LAMBDAUVOL 33.20 (H) 11/04/2020    KLFLCR 1.12 11/15/2021     Lab Results   Component Value Date    B2M 2.0 11/15/2021     Coagulation Panel:  Lab Results   Component Value Date    PROTIME 12.2 09/02/2020    INR 1.01 09/02/2020    APTT 27.9 09/02/2020    DDIMER 2430 (H) 09/02/2020     Anemia Panel:  Lab Results   Component Value Date    EVERPWXG02 954.6 (H) 12/22/2018    FOLATE >20.0 (H) 09/17/2019     Tumor Markers:  No results found for: , CEA, , LABCA2, PSA  Observations:  No data recorded        Assessment & Plan:   Stage III IgG kappa multiple myeloma    PLAN  Ms. Gail Morales is a 63-year-old very pleasant female who was found to have significantly high monoclonal gammopathy (7300 mg/dl) when she presented with symptomatic anemia. Furhter work ups with bone marrow biopsy confirmed that she has stage III IgG kappa multiple myeloma (high risk disease). Since she has symptomatic multiple myeloma, we started first-line chemotherapy with Velcade, Revlimid and dexamethasone on January 18, 2019 and she completed her fifth cycle on 4/29/19. Her monoclonal protein has 7900mg/dl before therapy. It decreased to 1900mg/dl (2/8/19), 900mg/dl (3/4/19) and 400 mg/dl (4/26/19). She had bone marrow biopsy on 4/2/19 at [facility] and it showed no morphologic or immunophenotypic evidence of persistent/recurrent plasma cell neoplasm. She received autologus stem cell transplant on 5/16/19. Mammogram done on April 27, 2021 showed no mammographic evidence of malignancy. Maintenance chemotherapy with Revlimid was started since August 27, 2019. On December 2, 2021, she presented to me for follow-up. I have been following her for stage III IgG kappa multiple myeloma and she is status post first line chemotherapy with Velcade, Revlimid and Dexamethasone (received total 4 cycles) and autologous stem cell transplantation.     She has been on maintenance chemotherapy with revlimid since August 27, 2019. She is tolerating maintenance chemotherapy, Revlimid well and she doesn't encounter any major side effects from Revlimid. We change Revlimid to three weeks on one week off, since she has been having increasing SOB and fatigue. Since her symptoms are resolved now, we resumed back on daily basis since January 14, 2020. I recognized that she has 400 mg/dl IgG kappa monoclonal protein detected on recent serum protein electrophoresis and immunofixation done on 10/14/2021. However, repeat blood test on 11/15/21 showed no M protein detected. I believe she is in stringent complete remission and I recommend that he continue with current dose of Revlimid for now. I will reevaluate her multiple myeloma again in 2 months and I will see her again after that. Since her revlimid induced diarrhea has been controlled well with lomotil, I recommend her to continue with it for now. Chemo induced thromboembolism prophylaxis - I recommend her to continue with aspirin 81 mg daily. Myeloma bone disease - Since she has completed 2 years of therapy, we stopped it after 5/2021 dose. I asked her to continue with vitamin D daily. Chemo induced neuropathy - she is back on cymbalta 60 mg daily since her neuropathy gets worse after decreasing the dose. I asked her to take tramadol prn for neuropathic pain. Hypertension - she is on amlodipine and losartan. BP is stable and I recommend her for salt restriction. COVID19 vaccine -she is status post COVID-19 vaccine. .     I reviewed with her findings on screening mammogram done on 4/27/21 and DEXA scan, done on 3/9/2020. I recommend her to have a repeat mammogram and April 2022. Health maintenance - I recommend her to have age-appropriate cancer screening, exercise, low-fat and low-sodium diet. I answered all her questions and concerns for today.  I asked her to follow-up with primary care physician, Dr. Ben Berg on regular basis. Recent imaging and labs were reviewed and discussed with the patient.

## 2021-12-02 ENCOUNTER — OFFICE VISIT (OUTPATIENT)
Dept: ONCOLOGY | Age: 71
End: 2021-12-02
Payer: MEDICARE

## 2021-12-02 ENCOUNTER — HOSPITAL ENCOUNTER (OUTPATIENT)
Dept: INFUSION THERAPY | Age: 71
Discharge: HOME | End: 2021-12-02
Payer: MEDICARE

## 2021-12-02 VITALS
WEIGHT: 155.8 LBS | RESPIRATION RATE: 16 BRPM | HEART RATE: 76 BPM | OXYGEN SATURATION: 98 % | SYSTOLIC BLOOD PRESSURE: 147 MMHG | HEIGHT: 64 IN | TEMPERATURE: 98 F | DIASTOLIC BLOOD PRESSURE: 65 MMHG | BODY MASS INDEX: 26.6 KG/M2

## 2021-12-02 DIAGNOSIS — C90.00 MULTIPLE MYELOMA NOT HAVING ACHIEVED REMISSION (HCC): Primary | ICD-10-CM

## 2021-12-02 PROCEDURE — G8484 FLU IMMUNIZE NO ADMIN: HCPCS | Performed by: INTERNAL MEDICINE

## 2021-12-02 PROCEDURE — G8417 CALC BMI ABV UP PARAM F/U: HCPCS | Performed by: INTERNAL MEDICINE

## 2021-12-02 PROCEDURE — 1036F TOBACCO NON-USER: CPT | Performed by: INTERNAL MEDICINE

## 2021-12-02 PROCEDURE — 1123F ACP DISCUSS/DSCN MKR DOCD: CPT | Performed by: INTERNAL MEDICINE

## 2021-12-02 PROCEDURE — 99211 OFF/OP EST MAY X REQ PHY/QHP: CPT

## 2021-12-02 PROCEDURE — 1090F PRES/ABSN URINE INCON ASSESS: CPT | Performed by: INTERNAL MEDICINE

## 2021-12-02 PROCEDURE — G8427 DOCREV CUR MEDS BY ELIG CLIN: HCPCS | Performed by: INTERNAL MEDICINE

## 2021-12-02 PROCEDURE — G8399 PT W/DXA RESULTS DOCUMENT: HCPCS | Performed by: INTERNAL MEDICINE

## 2021-12-02 PROCEDURE — 99214 OFFICE O/P EST MOD 30 MIN: CPT | Performed by: INTERNAL MEDICINE

## 2021-12-02 PROCEDURE — 3017F COLORECTAL CA SCREEN DOC REV: CPT | Performed by: INTERNAL MEDICINE

## 2021-12-02 PROCEDURE — 4040F PNEUMOC VAC/ADMIN/RCVD: CPT | Performed by: INTERNAL MEDICINE

## 2021-12-02 NOTE — PROGRESS NOTES
MA Rooming Questions  Patient: Manas Eaton  MRN: [id number]    Date: 12/2/2021        1. Do you have any new issues?   no         2. Do you need any refills on medications?    no    3. Have you had any imaging done since your last visit?   no    4. Have you been hospitalized or seen in the emergency room since your last visit here?   no    5. Did the patient have a depression screening completed today?  No    No data recorded     PHQ-9 Given to (if applicable):               PHQ-9 Score (if applicable):                     [] Positive     []  Negative              Does question #9 need addressed (if applicable)                     [] Yes    []  No               Shaun Coley CMA

## 2021-12-20 DIAGNOSIS — C90.00 MULTIPLE MYELOMA NOT HAVING ACHIEVED REMISSION (HCC): ICD-10-CM

## 2021-12-20 RX ORDER — LENALIDOMIDE 10 MG/1
10 CAPSULE ORAL DAILY
Qty: 28 CAPSULE | Refills: 0 | Status: SHIPPED | OUTPATIENT
Start: 2021-12-20 | End: 2022-01-12 | Stop reason: SDUPTHER

## 2021-12-20 RX ORDER — LENALIDOMIDE 10 MG/1
10 CAPSULE ORAL DAILY
Qty: 28 CAPSULE | Refills: 0 | Status: CANCELLED | OUTPATIENT
Start: 2021-12-20

## 2021-12-20 NOTE — PROGRESS NOTES
Revlimid 10mg chemo capsules reordered. New ath# [id number] obtained through [employer]. Auth valid for 30 days.

## 2021-12-23 RX ORDER — DULOXETIN HYDROCHLORIDE 60 MG/1
CAPSULE, DELAYED RELEASE ORAL
Qty: 90 CAPSULE | Refills: 0 | Status: SHIPPED | OUTPATIENT
Start: 2021-12-23 | End: 2022-05-17 | Stop reason: SDUPTHER

## 2022-01-06 ENCOUNTER — TELEPHONE (OUTPATIENT)
Dept: INFUSION THERAPY | Age: 72
End: 2022-01-06

## 2022-01-12 DIAGNOSIS — C90.00 MULTIPLE MYELOMA NOT HAVING ACHIEVED REMISSION (HCC): ICD-10-CM

## 2022-01-12 RX ORDER — LENALIDOMIDE 10 MG/1
10 CAPSULE ORAL DAILY
Qty: 28 CAPSULE | Refills: 0 | Status: SHIPPED | OUTPATIENT
Start: 2022-01-12 | End: 2022-02-11 | Stop reason: SDUPTHER

## 2022-01-25 ENCOUNTER — OFFICE VISIT (OUTPATIENT)
Dept: FAMILY MEDICINE CLINIC | Age: 72
End: 2022-01-25
Payer: MEDICARE

## 2022-01-25 VITALS
BODY MASS INDEX: 25.61 KG/M2 | WEIGHT: 150 LBS | HEIGHT: 64 IN | OXYGEN SATURATION: 98 % | SYSTOLIC BLOOD PRESSURE: 130 MMHG | HEART RATE: 57 BPM | DIASTOLIC BLOOD PRESSURE: 66 MMHG

## 2022-01-25 DIAGNOSIS — E78.2 MIXED HYPERLIPIDEMIA: ICD-10-CM

## 2022-01-25 DIAGNOSIS — C90.00 MULTIPLE MYELOMA NOT HAVING ACHIEVED REMISSION (HCC): ICD-10-CM

## 2022-01-25 DIAGNOSIS — Z12.31 SCREENING MAMMOGRAM FOR BREAST CANCER: Primary | ICD-10-CM

## 2022-01-25 DIAGNOSIS — I10 ESSENTIAL HYPERTENSION: Chronic | ICD-10-CM

## 2022-01-25 DIAGNOSIS — G62.0 DRUG RELATED POLYNEUROPATHY (HCC): ICD-10-CM

## 2022-01-25 PROCEDURE — 4040F PNEUMOC VAC/ADMIN/RCVD: CPT | Performed by: FAMILY MEDICINE

## 2022-01-25 PROCEDURE — 99213 OFFICE O/P EST LOW 20 MIN: CPT | Performed by: FAMILY MEDICINE

## 2022-01-25 PROCEDURE — 3017F COLORECTAL CA SCREEN DOC REV: CPT | Performed by: FAMILY MEDICINE

## 2022-01-25 PROCEDURE — 1090F PRES/ABSN URINE INCON ASSESS: CPT | Performed by: FAMILY MEDICINE

## 2022-01-25 PROCEDURE — G8484 FLU IMMUNIZE NO ADMIN: HCPCS | Performed by: FAMILY MEDICINE

## 2022-01-25 PROCEDURE — 1036F TOBACCO NON-USER: CPT | Performed by: FAMILY MEDICINE

## 2022-01-25 PROCEDURE — 1123F ACP DISCUSS/DSCN MKR DOCD: CPT | Performed by: FAMILY MEDICINE

## 2022-01-25 PROCEDURE — G8399 PT W/DXA RESULTS DOCUMENT: HCPCS | Performed by: FAMILY MEDICINE

## 2022-01-25 PROCEDURE — G8417 CALC BMI ABV UP PARAM F/U: HCPCS | Performed by: FAMILY MEDICINE

## 2022-01-25 PROCEDURE — G8427 DOCREV CUR MEDS BY ELIG CLIN: HCPCS | Performed by: FAMILY MEDICINE

## 2022-01-25 NOTE — PROGRESS NOTES
2022     [employer]      Chief Complaint   Patient presents with    3 Month Follow-Up    Other     pt would like to have mammo ordered       HPI      Rico Go is a 81 y.o. female who presents today with the followin. Screening mammogram for breast cancer    2. Multiple myeloma not having achieved remission ([address]    3.  Drug related polyneuropathy ([address]    She is here for follow-up  She brought in blood pressures from home and all these are now in the normal range  She is on maintenance treatment for myeloma and is tolerating that well without too many side effects other than diarrhea  She is under close follow-up from oncology  She is requesting order for mammogram but would be due until April    REVIEW OF SYMPTOMS    Review of Systems   Cardiovascular:        Long history of essential hypertension which is under control currently   Hematological:        Multiple myeloma diagnosed years ago  She is in remission she is on maintenance therapy with close oncology follow-up       PAST MEDICAL HISTORY  Past Medical History:   Diagnosis Date    Anemia     \"With Childbirth\"    Arthritis     \"Hands, Knees And Hips\"    CCC (chronic calculous cholecystitis)     s/p cholecystectomy 2015    Colitis Dx 's    Depression     \"In Mid \"    Essential hypertension     GERD (gastroesophageal reflux disease)     Hyperlipidemia     Motion sickness     Multiple myeloma ([address]     Osteoporosis     LILIAN (stress urinary incontinence, female)     Thyroid disease     \"Took Part Of Thyroid Out \"       FAMILY HISTORY  Family History   Problem Relation Age of Onset    Heart Disease Mother     Arthritis Mother     Diabetes Mother     High Blood Pressure Mother     Dementia Father     Cancer Brother         Prostate Cancer    Arthritis Brother     Early Death Brother 61        Bladder And Brain Cancer    Diabetes Brother     Depression Brother     Cancer Brother         Bladder And Brain Cancer    Kidney Disease Son         Kidney Stones    Other Son         \"Charcot Swathi Tooth, Neuromuscular Disease\"       SOCIAL HISTORY  Social History     Socioeconomic History    Marital status:      Spouse name: None    Number of children: None    Years of education: None    Highest education level: None   Occupational History    None   Tobacco Use    Smoking status: Never Smoker    Smokeless tobacco: Never Used   Vaping Use    Vaping Use: Never used   Substance and Sexual Activity    Alcohol use: Yes     Comment: \"Occ. Maybe Once A Week\"    Drug use: No    Sexual activity: Yes     Partners: Male   Other Topics Concern    None   Social History Narrative    None     Social Determinants of Health     Financial Resource Strain:     Difficulty of Paying Living Expenses: Not on file   Food Insecurity:     Worried About Running Out of Food in the Last Year: Not on file    Tiny of Food in the Last Year: Not on file   Transportation Needs:     Lack of Transportation (Medical): Not on file    Lack of Transportation (Non-Medical):  Not on file   Physical Activity:     Days of Exercise per Week: Not on file    Minutes of Exercise per Session: Not on file   Stress:     Feeling of Stress : Not on file   Social Connections:     Frequency of Communication with Friends and Family: Not on file    Frequency of Social Gatherings with Friends and Family: Not on file    Attends Sikh Services: Not on file    Active Member of [address] [place] [address] or Organizations: Not on file    Attends Club or Organization Meetings: Not on file    Marital Status: Not on file   Intimate Partner Violence:     Fear of Current or Ex-Partner: Not on file    Emotionally Abused: Not on file    Physically Abused: Not on file    Sexually Abused: Not on file   Housing Stability:     Unable to Pay for Housing in the Last Year: Not on file    Number of [place] in the Last Year: Not on file    Unstable Housing in the Last Year: Not on file        SURGICAL HISTORY  Past Surgical History:   Procedure Laterality Date    BLADDER SUSPENSION  1985    Done During Vaginal Hysterectomy    BREAST SURGERY Right 1980's    Benign Area Removed Nipple Area Right Breast   Becky Velasco  [id number]    COLONOSCOPY  \"Two\" Last Done 2000's    COLONOSCOPY  10/05/2018    Sigmoid diverticulosis, Grade 1 Internal hemorrhoids    DENTAL SURGERY      Teeth Extracted In Past    ENDOSCOPY, COLON, DIAGNOSTIC  \"Once\" 1990's    EYE SURGERY Right 5-24-13    Cataract With Lens Implant    EYE SURGERY Left 1-17-14    Cataract With Lens Implant    FOOT SURGERY Left 1962 Or 1963    I & D Left Foot After Stepping On A Nail    HYSTERECTOMY      approx. [address]    Bladder Suspension Also Done    LYMPH NODE BIOPSY  Last Done In 2000    \"Had Five Lymph Node Biopsies Done, Arm Pit Areas\", Benign    MI COLONOSCOPY FLX DX W/COLLJ SPEC WHEN PFRMD N/A 10/5/2018    COLONOSCOPY DIAGNOSTIC OR SCREENING performed by James Hernández MD at [address]  1990's    \"Took Part Of The Thyroid Out\"    TONSILLECTOMY  1970's    WISDOM TOOTH EXTRACTION  Early 1970's    All [place] Teeth Extracted       CURRENT MEDICATIONS  Current Outpatient Medications   Medication Sig Dispense Refill    lenalidomide (REVLIMID) 10 MG chemo capsule Take 1 capsule by mouth daily 28 capsule 0    DULoxetine (CYMBALTA) 60 MG extended release capsule TAKE ONE CAPSULE BY MOUTH DAILY 90 capsule 0    alendronate (FOSAMAX) 70 MG tablet Take 1 tablet by mouth once a week 04/26/18 takes on Sundays 12 tablet 1    mometasone (ELOCON) 0.1 % cream Apply topically daily.  1 each 2    VITAMIN E COMPLEX PO Take by mouth daily      Cyanocobalamin (VITAMIN B12 PO) Take by mouth daily      losartan (COZAAR) 50 MG tablet TAKE 1 TABLET EVERY MORNING 90 tablet 1    amLODIPine (NORVASC) 5 MG tablet TAKE 1 TABLET EVERY MORNING 90 tablet 1    famotidine (PEPCID) 20 MG tablet       Biotin 17838 MCG TABS Take by mouth      Omega-3 Fatty Acids (FISH OIL) 1000 MG CAPS Take 3,000 mg by mouth 2 times daily      Multiple Vitamins-Minerals (THERAPEUTIC MULTIVITAMIN-MINERALS) tablet Take 1 tablet by mouth every morning      Cholecalciferol (VITAMIN D) 2000 units CAPS capsule Take 2,000 Units by mouth every morning       aspirin 81 MG tablet Take 81 mg by mouth nightly        No current facility-administered medications for this visit. ALLERGIES  Allergies   Allergen Reactions    Latex Rash and Swelling    Adhesive Tape Rash     \"Tears Skin , Paper Tape Is OK To Use\"  Skin breakdown       Lisinopril-Hydrochlorothiazide Swelling    Other      \"Allergic To Poinsetta Holley Causing Nasal Congestion\"    Sulfa Antibiotics Other (See Comments)     \"Flu Like Symptoms\"  Flu-like symptoms      Hydrochlorothiazide W-Triamterene     Naproxen        PHYSICAL EXAM    /66   Pulse 57   Ht 5' 4\" (1.626 m)   Wt 150 lb (68 kg)   SpO2 98%   BMI 25.75 kg/m²     Physical Exam  Vitals and nursing note reviewed. Constitutional:       Appearance: Normal appearance. Cardiovascular:      Rate and Rhythm: Normal rate. Pulmonary:      Effort: Pulmonary effort is normal.     Reviewed her last mammogram  Reviewed immunization  Reviewed home blood pressure record          ASSESSMENT and PLAN    Hugo Ruggiero was seen today for 3 month follow-up and other. Diagnoses and all orders for this visit:    Screening mammogram for breast cancer    Multiple myeloma not having achieved remission [facility])    Drug related polyneuropathy ([facility]    Follow-up in 6 months  Continue same treatments through this office  Mammogram order was placed I told the patient to wait till April to make sure it has been a year since she had      Return in about 6 months (around 7/25/2022).           Electronically signed by Max Kevin MD on 1/25/2022    Please note that this chart was generated using dragon dictation software. Although every effort was made to ensure the accuracy of this automated transcription, some errors in transcription may have occurred.

## 2022-01-27 ENCOUNTER — HOSPITAL ENCOUNTER (OUTPATIENT)
Dept: INFUSION THERAPY | Age: 72
Discharge: HOME | End: 2022-01-27
Payer: MEDICARE

## 2022-01-27 DIAGNOSIS — C90.00 MULTIPLE MYELOMA NOT HAVING ACHIEVED REMISSION (HCC): ICD-10-CM

## 2022-01-27 LAB
ALBUMIN SERPL-MCNC: 3.6 GM/DL (ref 3.4–5)
ALP BLD-CCNC: 110 IU/L (ref 40–129)
ALT SERPL-CCNC: 26 U/L (ref 10–40)
ANION GAP SERPL CALCULATED.3IONS-SCNC: 10 MMOL/L (ref 4–16)
AST SERPL-CCNC: 25 IU/L (ref 15–37)
BASOPHILS ABSOLUTE: 0 K/CU MM
BASOPHILS RELATIVE PERCENT: 1.3 % (ref 0–1)
BILIRUB SERPL-MCNC: 1.3 MG/DL (ref 0–1)
BUN BLDV-MCNC: 8 MG/DL (ref 6–23)
CALCIUM SERPL-MCNC: 8.9 MG/DL (ref 8.3–10.6)
CHLORIDE BLD-SCNC: 101 MMOL/L (ref 99–110)
CO2: 27 MMOL/L (ref 21–32)
CREAT SERPL-MCNC: 0.7 MG/DL (ref 0.6–1.1)
DIFFERENTIAL TYPE: ABNORMAL
EOSINOPHILS ABSOLUTE: 0.1 K/CU MM
EOSINOPHILS RELATIVE PERCENT: 4.3 % (ref 0–3)
ERYTHROCYTE SEDIMENTATION RATE: 4 MM/HR (ref 0–30)
GFR AFRICAN AMERICAN: >60 ML/MIN/1.73M2
GFR NON-AFRICAN AMERICAN: >60 ML/MIN/1.73M2
GLUCOSE BLD-MCNC: 96 MG/DL (ref 70–99)
HCT VFR BLD CALC: 33.4 % (ref 37–47)
HEMOGLOBIN: 11.4 GM/DL (ref 12.5–16)
IGA: 513 MG/DL (ref 69–382)
IGG,SERUM: 897 MG/DL (ref 723–1685)
IGM,SERUM: <25 MG/DL (ref 62–277)
LACTATE DEHYDROGENASE: 130 IU/L (ref 120–246)
LYMPHOCYTES ABSOLUTE: 0.6 K/CU MM
LYMPHOCYTES RELATIVE PERCENT: 23.9 % (ref 24–44)
MCH RBC QN AUTO: 32.2 PG (ref 27–31)
MCHC RBC AUTO-ENTMCNC: 34.1 % (ref 32–36)
MCV RBC AUTO: 94.4 FL (ref 78–100)
MONOCYTES ABSOLUTE: 0.3 K/CU MM
MONOCYTES RELATIVE PERCENT: 13.2 % (ref 0–4)
PDW BLD-RTO: 15 % (ref 11.7–14.9)
PLATELET # BLD: 159 K/CU MM (ref 140–440)
PMV BLD AUTO: 9.1 FL (ref 7.5–11.1)
POTASSIUM SERPL-SCNC: 3.7 MMOL/L (ref 3.5–5.1)
RBC # BLD: 3.54 M/CU MM (ref 4.2–5.4)
SEGMENTED NEUTROPHILS ABSOLUTE COUNT: 1.3 K/CU MM
SEGMENTED NEUTROPHILS RELATIVE PERCENT: 57.3 % (ref 36–66)
SODIUM BLD-SCNC: 138 MMOL/L (ref 135–145)
TOTAL PROTEIN: 6.3 GM/DL (ref 6.4–8.2)
WBC # BLD: 2.3 K/CU MM (ref 4–10.5)

## 2022-01-27 PROCEDURE — 82232 ASSAY OF BETA-2 PROTEIN: CPT

## 2022-01-27 PROCEDURE — 86320 SERUM IMMUNOELECTROPHORESIS: CPT

## 2022-01-27 PROCEDURE — 85652 RBC SED RATE AUTOMATED: CPT

## 2022-01-27 PROCEDURE — 83883 ASSAY NEPHELOMETRY NOT SPEC: CPT

## 2022-01-27 PROCEDURE — 85025 COMPLETE CBC W/AUTO DIFF WBC: CPT

## 2022-01-27 PROCEDURE — 36415 COLL VENOUS BLD VENIPUNCTURE: CPT

## 2022-01-27 PROCEDURE — 83615 LACTATE (LD) (LDH) ENZYME: CPT

## 2022-01-27 PROCEDURE — 80053 COMPREHEN METABOLIC PANEL: CPT

## 2022-01-27 PROCEDURE — 82784 ASSAY IGA/IGD/IGG/IGM EACH: CPT

## 2022-01-27 PROCEDURE — 84165 PROTEIN E-PHORESIS SERUM: CPT

## 2022-01-28 LAB
ALBUMIN ELP: 3.6 GM/DL (ref 3.2–5.6)
ALPHA-1-GLOBULIN: 0.2 GM/DL (ref 0.1–0.4)
ALPHA-2-GLOBULIN: 0.5 GM/DL (ref 0.4–1.2)
BETA GLOBULIN: 0.9 GM/DL (ref 0.5–1.3)
GAMMA GLOBULIN: 1 GM/DL (ref 0.5–1.6)
SPEP INTERPRETATION: ABNORMAL
SPEP INTERPRETATION: NORMAL
TOTAL PROTEIN: 6.3 GM/DL (ref 6.4–8.2)

## 2022-01-29 LAB
BETA-2 MICROGLOBULIN: 2.1 MG/L (ref 1.1–2.4)
KAPPA QUANT FREE LIGHT CHAINS: 38.14 MG/L (ref 3.3–19.4)
KAPPA/LAMBDA FREE LIGHT CHAIN RATIO: 1.23 (ref 0.26–1.65)
LAMBDA FREE LIGHT CHAINS QNT: 30.97 MG/L (ref 5.71–26.3)

## 2022-02-01 ENCOUNTER — TELEPHONE (OUTPATIENT)
Dept: INFUSION THERAPY | Age: 72
End: 2022-02-01

## 2022-02-01 NOTE — TELEPHONE ENCOUNTER
Pt LVM cancelling ov on 2/3 due to weather forecast; returned call at 12:30 & rescheduled to 2/14 @ 11:45; pt voiced understanding.

## 2022-02-11 DIAGNOSIS — C90.00 MULTIPLE MYELOMA NOT HAVING ACHIEVED REMISSION (HCC): Primary | ICD-10-CM

## 2022-02-11 RX ORDER — LENALIDOMIDE 10 MG/1
10 CAPSULE ORAL DAILY
Qty: 28 CAPSULE | Refills: 0 | Status: ACTIVE | OUTPATIENT
Start: 2022-02-11 | End: 2022-02-15 | Stop reason: SDUPTHER

## 2022-02-12 NOTE — PROGRESS NOTES
Patient Name: Marilee Bear  Patient : 1950  Patient MRN: [id number]     Primary Oncologist: Criss Chicas MD  Referring Provider: Chaka Roy MD     Date of Service: 2022      Chief Complaint:   Chief Complaint   Patient presents with    Follow-up     Patient Active Problem List:     Severe anemia     Multiple myeloma not having achieved remission      Drug related polyneuropathy      Osteopenia of multiple sites    HPI:   Marilee Bear is a 68-year-old very pleasnat female with medical history significant for hypertension, GERD, depression, anemia, initially presented to me on 2018 to follow up with her monocloal gammopathy. She initially presented to [facility] on 18 with low hemoglobin. She stated that she had colonoscopy in  by Dr. Josue Rodriguez. She has been tired and fatigue since 2018. She denies weight loss. She was found to have severe anemia on blood test done in her primary care physician office and she was asked to come to ER. Her base line hemoglobin was 8.4 -9.4 gram since . It was 13 grams in  and on arrival to hospital on 18 was 5.7 grams. She received 2 units of PRBC. I was called to evaluate her anemia at that time. I recognized that she has worsening macrocytic anemia. She also has mild leukopenia and thrombocytopenia. Her total protein level was significantly elevated (11.9 grams), but she has normal calcium and serum creatinine. I requested work ups to rule out plasma cell dyscrasias and she was found to have 7900 mg/dl IgG kappa monoclonal gammopathy on serum protein electrophoresis and serum immunofixation. Her beta 2 microglobulin was 7.5 mg./dl, serum kappa 9.34 mg/dl, lambda 0.19 mg/dl, ratio was 49.16. ESR > 120, CRP 3 and .     Bone marrow biopsy was done on 2018 and final pathology showed hypocellular bone marrow [85%], with partially preserved trilineage hematopoiesis and involvement by plasma cell neoplasm [85% of marrow cellularity is comprised of neoplastic plasma cells]. Cytogenetic reveals normal myocardial type [46XX]. Fish panel revealed gain of chromosome 1q21, monosomy 13, and aneuploidy [gain of chromosome 7, 9, 15 and FGFR3/4p]. Bone survey done on 1/8/19 showed multiple lytic lesions noted to the calvarium bilaterally as well as involving the left humerus. With the clinical history findings are concerning for multiple myeloma. No definite additional bony involvement identified. Multilevel degenerative changes to the cervical, thoracic and lumbar spine. Diffuse bone demineralization. First line chemotherapy with Velcade, Revlimid and Dexamethasone was started on January 18, 2019 and she completed her fifth cycle on 4/29/19. Her monoclonal protein has 7900mg/dl before therapy. It decreased to 1900mg/dl (2/8/19), 900mg/dl (3/4/19) and 400 mg/dl (4/26/19). She had bone marrow biopsy on 4/2/19 at [facility] and it showed no morphologic or immunophenotypic evidence of persistent/recurrent plasma cell neoplasm. She received autologus stem cell transplant on 5/16/19. Mammogram done on April 27, 2021 showed no mammographic evidence of malignancy. Maintenance chemotherapy with Revlimid was started since August 27, 2019. On February 14, 2022, she presented to me for follow-up. I have been following her for stage III IgG kappa multiple myeloma and she is status post first line chemotherapy with Velcade, Revlimid and Dexamethasone (received total 4 cycles) and autologous stem cell transplantation. She has been on maintenance chemotherapy with revlimid since August 27, 2019. She is tolerating maintenance chemotherapy, Revlimid well and she doesn't encounter any major side effects from Revlimid. We change Revlimid to three weeks on one week off, since she has been having increasing SOB and fatigue.  Since her symptoms are resolved now, we resumed back on daily basis since January 14, 2020. She has 400 mg/dl IgG kappa monoclonal protein on 10/14/2021. However, repeat blood test on 11/15/21 and 1/27/22 showed no M protein detected. I believe she is in stringent complete remission and I recommend that he continue with current dose of Revlimid for now. I will reevaluate her multiple myeloma again in 3 months and I will see her again after that. Since her revlimid induced diarrhea has been controlled well with lomotil, I recommend her to continue with it for now. Chemo induced thromboembolism prophylaxis - I recommend her to continue with aspirin 81 mg daily. Myeloma bone disease - Since she has completed 2 years of therapy, we stopped it after 5/2021 dose. I asked her to continue with vitamin D daily. Chemo induced neuropathy - she is back on cymbalta 60 mg daily since her neuropathy gets worse after decreasing the dose. I asked her to take tramadol prn for neuropathic pain. Hypertension - she is on amlodipine and losartan. BP is stable and I recommend her for salt restriction. COVID19 vaccine -she is status post COVID-19 vaccine. .     I reviewed with her findings on screening mammogram done on 4/27/21 and DEXA scan, done on 3/9/2020. I recommend her to have a repeat mammogram and April 2022. Health maintenance - I recommend her to have age-appropriate cancer screening, exercise, low-fat and low-sodium diet. She doesn't have any significant symptoms on today visit. Past Medical History  Significant for  1. Hypertension  2. Gastroesophageal reflux disease  3. Depression  4. Anemia    Surgical History  Significant for  1. Cholecystectomy  2. Cataract surgery  3. Hysterectomy in 1985  4. Partial thyroidectomy  5. Tonsillectomy  6. Bladder suspension surgery    Allergies  Adhesive tape  Sulfamide  lisinopril    Social History  She denies smoking and illicit drug abuse. She socially drink alcohol.   She is EXTREMITIES: no LE edema, no clubbing, no leg swelling, no cyanosis,    Labs:  Hematology:  Lab Results   Component Value Date    WBC 2.3 (L) 01/27/2022    RBC 3.54 (L) 01/27/2022    HGB 11.4 (L) 01/27/2022    HCT 33.4 (L) 01/27/2022    MCV 94.4 01/27/2022    MCH 32.2 (H) 01/27/2022    MCHC 34.1 01/27/2022    RDW 15.0 (H) 01/27/2022     01/27/2022    MPV 9.1 01/27/2022    BANDSPCT 4 (L) 09/02/2020    SEGSPCT 57.3 01/27/2022    EOSRELPCT 4.3 (H) 01/27/2022    BASOPCT 1.3 (H) 01/27/2022    LYMPHOPCT 23.9 (L) 01/27/2022    MONOPCT 13.2 (H) 01/27/2022    BANDABS 0.08 09/02/2020    SEGSABS 1.3 01/27/2022    EOSABS 0.1 01/27/2022    BASOSABS 0.0 01/27/2022    LYMPHSABS 0.6 01/27/2022    MONOSABS 0.3 01/27/2022    DIFFTYPE AUTOMATED DIFFERENTIAL 01/27/2022    ANISOCYTOSIS 1+ 12/22/2018    POLYCHROM 1+ 12/23/2018    WBCMORP OCCASIONAL 09/02/2020    PLTM PLATELETS APPEAR NORMAL 12/23/2018     Lab Results   Component Value Date    ESR 4 01/27/2022     Chemistry:  Lab Results   Component Value Date     01/27/2022    K 3.7 01/27/2022     01/27/2022    CO2 27 01/27/2022    BUN 8 01/27/2022    CREATININE 0.7 01/27/2022    GLUCOSE 96 01/27/2022    CALCIUM 8.9 01/27/2022    PROT 6.3 (L) 01/27/2022    PROT 6.3 (L) 01/27/2022    LABALBU 3.6 01/27/2022    BILITOT 1.3 (H) 01/27/2022    ALKPHOS 110 01/27/2022    AST 25 01/27/2022    ALT 26 01/27/2022    LABGLOM >60 01/27/2022    GFRAA >60 01/27/2022    AGRATIO 1.2 09/10/2020    GLOB 2.9 09/10/2020    MG 2.2 09/02/2020    POCCA 1.13 05/11/2021    POCGLU 121 (H) 05/11/2021     Lab Results   Component Value Date     01/27/2022     No components found for: LD  Lab Results   Component Value Date    TSHHS 4.540 (H) 12/22/2018     Immunology:  Lab Results   Component Value Date    PROT 6.3 (L) 01/27/2022    PROT 6.3 (L) 01/27/2022    SPEP INTERPRETATION - Within normal limits. SAF 01/27/2022    SPEP INTERPRETATION - No monoclonal bands are seen.  SAF 01/27/2022 ALBUMINELP 3.6 01/27/2022    LABALPH 0.2 01/27/2022    LABALPH 0.5 01/27/2022    LABBETA 0.9 01/27/2022    GAMGLOB 1.0 01/27/2022     Lab Results   Component Value Date    KAPPAUVOL 38.14 (H) 01/27/2022    LAMBDAUVOL 33.20 (H) 11/04/2020    KLFLCR 1.23 01/27/2022     Lab Results   Component Value Date    B2M 2.1 01/27/2022     Coagulation Panel:  Lab Results   Component Value Date    PROTIME 12.2 09/02/2020    INR 1.01 09/02/2020    APTT 27.9 09/02/2020    DDIMER 2430 (H) 09/02/2020     Anemia Panel:  Lab Results   Component Value Date    XXBOCYUQ91 954.6 (H) 12/22/2018    FOLATE >20.0 (H) 09/17/2019     Tumor Markers:  No results found for: , CEA, , LABCA2, PSA  Observations:  PHQ-9 Total Score: 0 (2/14/2022 11:41 AM)        Assessment & Plan:   Stage III IgG kappa multiple myeloma    PLAN  Ms. Berto Napier is a 61-year-old very pleasant female who was found to have significantly high monoclonal gammopathy (7300 mg/dl) when she presented with symptomatic anemia. Furhter work ups with bone marrow biopsy confirmed that she has stage III IgG kappa multiple myeloma (high risk disease). Since she has symptomatic multiple myeloma, we started first-line chemotherapy with Velcade, Revlimid and dexamethasone on January 18, 2019 and she completed her fifth cycle on 4/29/19. Her monoclonal protein has 7900mg/dl before therapy. It decreased to 1900mg/dl (2/8/19), 900mg/dl (3/4/19) and 400 mg/dl (4/26/19). She had bone marrow biopsy on 4/2/19 at [facility] and it showed no morphologic or immunophenotypic evidence of persistent/recurrent plasma cell neoplasm. She received autologus stem cell transplant on 5/16/19. Mammogram done on April 27, 2021 showed no mammographic evidence of malignancy. Maintenance chemotherapy with Revlimid was started since August 27, 2019. On February 14, 2022, she presented to me for follow-up.  I have been following her for stage III IgG kappa multiple myeloma and she is status post first line chemotherapy with Velcade, Revlimid and Dexamethasone (received total 4 cycles) and autologous stem cell transplantation. She has been on maintenance chemotherapy with revlimid since August 27, 2019. She is tolerating maintenance chemotherapy, Revlimid well and she doesn't encounter any major side effects from Revlimid. We change Revlimid to three weeks on one week off, since she has been having increasing SOB and fatigue. Since her symptoms are resolved now, we resumed back on daily basis since January 14, 2020. She has 400 mg/dl IgG kappa monoclonal protein on 10/14/2021. However, repeat blood test on 11/15/21 and 1/27/22 showed no M protein detected. I believe she is in stringent complete remission and I recommend that he continue with current dose of Revlimid for now. I will reevaluate her multiple myeloma again in 3 months and I will see her again after that. Since her revlimid induced diarrhea has been controlled well with lomotil, I recommend her to continue with it for now. Chemo induced thromboembolism prophylaxis - I recommend her to continue with aspirin 81 mg daily. Myeloma bone disease - Since she has completed 2 years of therapy, we stopped it after 5/2021 dose. I asked her to continue with vitamin D daily. Chemo induced neuropathy - she is back on cymbalta 60 mg daily since her neuropathy gets worse after decreasing the dose. I asked her to take tramadol prn for neuropathic pain. Hypertension - she is on amlodipine and losartan. BP is stable and I recommend her for salt restriction. COVID19 vaccine -she is status post COVID-19 vaccine. .     I reviewed with her findings on screening mammogram done on 4/27/21 and DEXA scan, done on 3/9/2020. I recommend her to have a repeat mammogram and April 2022. Health maintenance - I recommend her to have age-appropriate cancer screening, exercise, low-fat and low-sodium diet.     I answered all her questions and concerns for today. I asked her to follow-up with primary care physician, Dr. Shewta Devi on regular basis. Recent imaging and labs were reviewed and discussed with the patient.

## 2022-02-14 ENCOUNTER — OFFICE VISIT (OUTPATIENT)
Dept: ONCOLOGY | Age: 72
End: 2022-02-14
Payer: MEDICARE

## 2022-02-14 ENCOUNTER — CLINICAL DOCUMENTATION (OUTPATIENT)
Dept: ONCOLOGY | Age: 72
End: 2022-02-14

## 2022-02-14 ENCOUNTER — HOSPITAL ENCOUNTER (OUTPATIENT)
Dept: INFUSION THERAPY | Age: 72
Discharge: HOME | End: 2022-02-14

## 2022-02-14 VITALS
WEIGHT: 141.8 LBS | BODY MASS INDEX: 24.21 KG/M2 | OXYGEN SATURATION: 96 % | HEART RATE: 66 BPM | RESPIRATION RATE: 16 BRPM | TEMPERATURE: 97.1 F | HEIGHT: 64 IN | SYSTOLIC BLOOD PRESSURE: 145 MMHG | DIASTOLIC BLOOD PRESSURE: 64 MMHG

## 2022-02-14 DIAGNOSIS — C90.00 MULTIPLE MYELOMA NOT HAVING ACHIEVED REMISSION (HCC): Primary | ICD-10-CM

## 2022-02-14 PROCEDURE — 3017F COLORECTAL CA SCREEN DOC REV: CPT | Performed by: INTERNAL MEDICINE

## 2022-02-14 PROCEDURE — 1123F ACP DISCUSS/DSCN MKR DOCD: CPT | Performed by: INTERNAL MEDICINE

## 2022-02-14 PROCEDURE — 1090F PRES/ABSN URINE INCON ASSESS: CPT | Performed by: INTERNAL MEDICINE

## 2022-02-14 PROCEDURE — G8484 FLU IMMUNIZE NO ADMIN: HCPCS | Performed by: INTERNAL MEDICINE

## 2022-02-14 PROCEDURE — 1036F TOBACCO NON-USER: CPT | Performed by: INTERNAL MEDICINE

## 2022-02-14 PROCEDURE — 4040F PNEUMOC VAC/ADMIN/RCVD: CPT | Performed by: INTERNAL MEDICINE

## 2022-02-14 PROCEDURE — G8399 PT W/DXA RESULTS DOCUMENT: HCPCS | Performed by: INTERNAL MEDICINE

## 2022-02-14 PROCEDURE — 99214 OFFICE O/P EST MOD 30 MIN: CPT | Performed by: INTERNAL MEDICINE

## 2022-02-14 PROCEDURE — G8427 DOCREV CUR MEDS BY ELIG CLIN: HCPCS | Performed by: INTERNAL MEDICINE

## 2022-02-14 PROCEDURE — G8420 CALC BMI NORM PARAMETERS: HCPCS | Performed by: INTERNAL MEDICINE

## 2022-02-14 ASSESSMENT — PATIENT HEALTH QUESTIONNAIRE - PHQ9
SUM OF ALL RESPONSES TO PHQ QUESTIONS 1-9: 0
SUM OF ALL RESPONSES TO PHQ QUESTIONS 1-9: 0
2. FEELING DOWN, DEPRESSED OR HOPELESS: 0
1. LITTLE INTEREST OR PLEASURE IN DOING THINGS: 0
SUM OF ALL RESPONSES TO PHQ9 QUESTIONS 1 & 2: 0
SUM OF ALL RESPONSES TO PHQ QUESTIONS 1-9: 0
SUM OF ALL RESPONSES TO PHQ QUESTIONS 1-9: 0

## 2022-02-14 NOTE — PROGRESS NOTES
Patient's prescription benefits are being verified for coverage. Status update to follow as soon as possible. Please call us with any questions at [phone] opt.  6.

## 2022-02-14 NOTE — PROGRESS NOTES
Revlimid 10 mg chemo capsules reordered on 02/11/22 and sent to [facility]. Mauricio Adair #  [id number] through [address]

## 2022-02-14 NOTE — PROGRESS NOTES
Medication is currently being filled at [facility]  and has been routed there. Please call us with any questions at [phone] opt.  6.

## 2022-02-14 NOTE — PROGRESS NOTES
MA Rooming Questions  Patient: Sandeep Payne  MRN: [id number]    Date: 2/14/2022        1. Do you have any new issues?   no         2. Do you need any refills on medications?    no    3. Have you had any imaging done since your last visit?   no    4. Have you been hospitalized or seen in the emergency room since your last visit here?   no    5. Did the patient have a depression screening completed today?  Yes    PHQ-9 Total Score: 0 (2/14/2022 11:41 AM)       PHQ-9 Given to (if applicable):               PHQ-9 Score (if applicable):                     [] Positive     [x]  Negative              Does question #9 need addressed (if applicable)                     [] Yes    []  No               [facility]

## 2022-02-15 DIAGNOSIS — C90.00 MULTIPLE MYELOMA NOT HAVING ACHIEVED REMISSION (HCC): Primary | ICD-10-CM

## 2022-02-15 RX ORDER — LENALIDOMIDE 10 MG/1
10 CAPSULE ORAL DAILY
Qty: 28 CAPSULE | Refills: 0 | Status: ACTIVE | OUTPATIENT
Start: 2022-02-15 | End: 2022-02-18 | Stop reason: SDUPTHER

## 2022-02-15 NOTE — PROGRESS NOTES
Medication is currently being filled at [facility]  and has been routed there. Previous script routed to [facility] on 2/14/22. Please call us with any questions at [phone] opt.  6.

## 2022-02-18 ENCOUNTER — PATIENT MESSAGE (OUTPATIENT)
Dept: ONCOLOGY | Age: 72
End: 2022-02-18

## 2022-02-18 DIAGNOSIS — C90.00 MULTIPLE MYELOMA NOT HAVING ACHIEVED REMISSION (HCC): Primary | ICD-10-CM

## 2022-02-18 RX ORDER — LENALIDOMIDE 10 MG/1
10 CAPSULE ORAL DAILY
Qty: 28 CAPSULE | Refills: 0 | Status: ACTIVE | OUTPATIENT
Start: 2022-02-18 | End: 2022-03-16 | Stop reason: SDUPTHER

## 2022-02-18 NOTE — PROGRESS NOTES
Medication is currently being filled at [facility] and has been routed there. Please call us with any questions at [phone] opt.  6.

## 2022-02-18 NOTE — TELEPHONE ENCOUNTER
From: Waldo Abernathy  To: Dr. Shakira Lovett: 2/18/2022 7:44 AM EST  Subject: revlimid re-fill    I have an order for revlimid pending and it needs a new script. According to [facility], they have sent in requests for the new script but have not received. Would you be able to send them the needed script?  Thank you

## 2022-02-18 NOTE — PROGRESS NOTES
Patient's prescription benefits are being verified for coverage. Status update to follow as soon as possible. Please call us with any questions at [phone] opt.  6.

## 2022-03-14 RX ORDER — LOSARTAN POTASSIUM 50 MG/1
TABLET ORAL
Qty: 90 TABLET | Refills: 1 | Status: SHIPPED | OUTPATIENT
Start: 2022-03-14 | End: 2022-08-01 | Stop reason: SDUPTHER

## 2022-03-14 RX ORDER — AMLODIPINE BESYLATE 5 MG/1
TABLET ORAL
Qty: 90 TABLET | Refills: 1 | Status: SHIPPED | OUTPATIENT
Start: 2022-03-14 | End: 2022-08-01 | Stop reason: SDUPTHER

## 2022-03-16 ENCOUNTER — CLINICAL DOCUMENTATION (OUTPATIENT)
Dept: ONCOLOGY | Age: 72
End: 2022-03-16

## 2022-03-16 DIAGNOSIS — C90.00 MULTIPLE MYELOMA NOT HAVING ACHIEVED REMISSION (HCC): Primary | ICD-10-CM

## 2022-03-16 RX ORDER — ALENDRONATE SODIUM 70 MG/1
TABLET ORAL
Qty: 12 TABLET | Refills: 1 | Status: SHIPPED | OUTPATIENT
Start: 2022-03-16 | End: 2022-08-29

## 2022-03-16 RX ORDER — LENALIDOMIDE 10 MG/1
10 CAPSULE ORAL DAILY
Qty: 28 CAPSULE | Refills: 0 | Status: ACTIVE | OUTPATIENT
Start: 2022-03-16 | End: 2022-04-11 | Stop reason: SDUPTHER

## 2022-03-16 NOTE — PROGRESS NOTES
[facility]    Date: 03/16/22    Medication is currently being filled at [facility] and has been routed there. Please call us with any questions at [phone] opt.  6.     Thank you

## 2022-03-16 NOTE — PROGRESS NOTES
Revlimid 10 mg chemo capsules reordered on 03/16/22 and sent to [facility]. Jae Marsh #  [id number] obtained through [employer]. Informed patient.

## 2022-04-11 DIAGNOSIS — C90.00 MULTIPLE MYELOMA NOT HAVING ACHIEVED REMISSION (HCC): ICD-10-CM

## 2022-04-11 RX ORDER — LENALIDOMIDE 10 MG/1
10 CAPSULE ORAL DAILY
Qty: 28 CAPSULE | Refills: 0 | Status: ACTIVE | OUTPATIENT
Start: 2022-04-11 | End: 2022-05-09 | Stop reason: SDUPTHER

## 2022-04-11 NOTE — PROGRESS NOTES
Revlimid 10 mg chemo capsules reordered and sent to [facility]. K[place] #  [id number] obtained through [employer] risk management/[employer] portal. Auth valid for 30 days.

## 2022-04-11 NOTE — PROGRESS NOTES
[facility] Update    Date: 04/11/22    Patient's prescription benefits are being verified for coverage. Status update to follow as soon as possible. Please call us with any questions at [phone] opt.  6.

## 2022-04-11 NOTE — PROGRESS NOTES
[facility] A[address] [employer] Update    Date: 04/11/22    Medication is currently being filled at [facility] and has been routed there. Please call us with any questions at [phone] opt.  6.

## 2022-04-15 ENCOUNTER — TELEPHONE (OUTPATIENT)
Dept: FAMILY MEDICINE CLINIC | Age: 72
End: 2022-04-15

## 2022-04-18 ENCOUNTER — TELEPHONE (OUTPATIENT)
Dept: ONCOLOGY | Age: 72
End: 2022-04-18

## 2022-04-18 NOTE — TELEPHONE ENCOUNTER
Received call from patient advising she doesn't have enough funds on her [address] to cover her next refill. She states she has called PA[facility], and LLS and all funding is currently allocated for Multiple Myeloma and asked if I was aware if any additional funding was available. Advised patient I will look into this and call her back but that we will likely have to work with the  for possible free drug.

## 2022-04-21 ENCOUNTER — TELEPHONE (OUTPATIENT)
Dept: FAMILY MEDICINE CLINIC | Age: 72
End: 2022-04-21

## 2022-04-21 NOTE — TELEPHONE ENCOUNTER
----- Message from Odell Carrasco sent at 4/21/2022 11:03 AM EDT -----  Subject: Appointment Request    Reason for Call: Routine Medicare AWV    QUESTIONS  Type of Appointment? Established Patient  Reason for appointment request? No appointments available during search  Additional Information for Provider? pt received communication that she   needed to schedule her AWV. I searched between now and october and   couldn't find an appt. Can someone help her schedule it? thank you  ---------------------------------------------------------------------------  --------------  CALL BACK INFO  What is the best way for the office to contact you? OK to leave message on   voicemail  Preferred Call Back Phone Number? [phone]  ---------------------------------------------------------------------------  --------------  SCRIPT ANSWERS  Relationship to Patient? Self  (If the patient has Medicare as their primary insurance coverage ask this   question) Are you requesting a Medicare Annual Wellness Visit? Yes   Have you been diagnosed with, awaiting test results for, or told that you   are suspected of having COVID-19 (Coronavirus)? (If patient has tested   negative or was tested as a requirement for work, school, or travel and   not based on symptoms, answer no)? No  Within the past 10 days have you developed any of the following symptoms   (answer no if symptoms have been present longer than 10 days or began   more than 10 days ago)? Fever or Chills, Cough, Shortness of breath or   difficulty breathing, Loss of taste or smell, Sore throat, Nasal   congestion, Sneezing or runny nose, Fatigue or generalized body aches   (answer no if pain is specific to a body part e.g. back pain), Diarrhea,   Headache? No  Have you had close contact with someone with COVID-19 in the last 7 days? No  (Service Expert  click yes below to proceed with PAAY As Usual   Scheduling)?  Yes

## 2022-04-26 SDOH — HEALTH STABILITY: PHYSICAL HEALTH
ON AVERAGE, HOW MANY DAYS PER WEEK DO YOU ENGAGE IN MODERATE TO STRENUOUS EXERCISE (LIKE A BRISK WALK)?: PATIENT DECLINED

## 2022-04-26 ASSESSMENT — LIFESTYLE VARIABLES
HOW MANY STANDARD DRINKS CONTAINING ALCOHOL DO YOU HAVE ON A TYPICAL DAY: 1 OR 2
HOW OFTEN DO YOU HAVE SIX OR MORE DRINKS ON ONE OCCASION: 1
HOW OFTEN DO YOU HAVE A DRINK CONTAINING ALCOHOL: 2-4 TIMES A MONTH
HOW OFTEN DO YOU HAVE A DRINK CONTAINING ALCOHOL: 3
HOW MANY STANDARD DRINKS CONTAINING ALCOHOL DO YOU HAVE ON A TYPICAL DAY: 1

## 2022-04-26 ASSESSMENT — PATIENT HEALTH QUESTIONNAIRE - PHQ9
1. LITTLE INTEREST OR PLEASURE IN DOING THINGS: 0
SUM OF ALL RESPONSES TO PHQ QUESTIONS 1-9: 0
SUM OF ALL RESPONSES TO PHQ9 QUESTIONS 1 & 2: 0
2. FEELING DOWN, DEPRESSED OR HOPELESS: 0

## 2022-05-09 ENCOUNTER — HOSPITAL ENCOUNTER (OUTPATIENT)
Dept: WOMENS IMAGING | Age: 72
Discharge: HOME | End: 2022-05-09
Payer: MEDICARE

## 2022-05-09 DIAGNOSIS — C90.00 MULTIPLE MYELOMA NOT HAVING ACHIEVED REMISSION (HCC): ICD-10-CM

## 2022-05-09 DIAGNOSIS — Z12.31 SCREENING MAMMOGRAM FOR BREAST CANCER: ICD-10-CM

## 2022-05-09 PROCEDURE — 77067 SCR MAMMO BI INCL CAD: CPT

## 2022-05-09 RX ORDER — LENALIDOMIDE 10 MG/1
10 CAPSULE ORAL DAILY
Qty: 28 CAPSULE | Refills: 0 | Status: ACTIVE | OUTPATIENT
Start: 2022-05-09 | End: 2022-06-06 | Stop reason: SDUPTHER

## 2022-05-09 NOTE — PROGRESS NOTES
Revlimid 10 mg chemo capsules reordered and sent to [facility]. Johnson Bolds # [id number] obtained through [employer] risk management/REMS portal. Auth valid for 30 days.

## 2022-05-10 ENCOUNTER — TELEMEDICINE (OUTPATIENT)
Dept: FAMILY MEDICINE CLINIC | Age: 72
End: 2022-05-10
Payer: MEDICARE

## 2022-05-10 DIAGNOSIS — Z00.00 INITIAL MEDICARE ANNUAL WELLNESS VISIT: Primary | ICD-10-CM

## 2022-05-10 PROCEDURE — 3017F COLORECTAL CA SCREEN DOC REV: CPT | Performed by: STUDENT IN AN ORGANIZED HEALTH CARE EDUCATION/TRAINING PROGRAM

## 2022-05-10 PROCEDURE — 4040F PNEUMOC VAC/ADMIN/RCVD: CPT | Performed by: STUDENT IN AN ORGANIZED HEALTH CARE EDUCATION/TRAINING PROGRAM

## 2022-05-10 PROCEDURE — 1123F ACP DISCUSS/DSCN MKR DOCD: CPT | Performed by: STUDENT IN AN ORGANIZED HEALTH CARE EDUCATION/TRAINING PROGRAM

## 2022-05-10 PROCEDURE — G0438 PPPS, INITIAL VISIT: HCPCS | Performed by: STUDENT IN AN ORGANIZED HEALTH CARE EDUCATION/TRAINING PROGRAM

## 2022-05-10 RX ORDER — LORATADINE 10 MG/1
10 CAPSULE, LIQUID FILLED ORAL DAILY
COMMUNITY

## 2022-05-10 SDOH — ECONOMIC STABILITY: FOOD INSECURITY: WITHIN THE PAST 12 MONTHS, YOU WORRIED THAT YOUR FOOD WOULD RUN OUT BEFORE YOU GOT MONEY TO BUY MORE.: NEVER TRUE

## 2022-05-10 SDOH — ECONOMIC STABILITY: FOOD INSECURITY: WITHIN THE PAST 12 MONTHS, THE FOOD YOU BOUGHT JUST DIDN'T LAST AND YOU DIDN'T HAVE MONEY TO GET MORE.: NEVER TRUE

## 2022-05-10 ASSESSMENT — PATIENT HEALTH QUESTIONNAIRE - PHQ9
2. FEELING DOWN, DEPRESSED OR HOPELESS: 0
SUM OF ALL RESPONSES TO PHQ9 QUESTIONS 1 & 2: 0
1. LITTLE INTEREST OR PLEASURE IN DOING THINGS: 0
SUM OF ALL RESPONSES TO PHQ QUESTIONS 1-9: 0

## 2022-05-10 ASSESSMENT — SOCIAL DETERMINANTS OF HEALTH (SDOH): HOW HARD IS IT FOR YOU TO PAY FOR THE VERY BASICS LIKE FOOD, HOUSING, MEDICAL CARE, AND HEATING?: NOT HARD AT ALL

## 2022-05-10 ASSESSMENT — LIFESTYLE VARIABLES
HOW MANY STANDARD DRINKS CONTAINING ALCOHOL DO YOU HAVE ON A TYPICAL DAY: 1 OR 2
HOW OFTEN DO YOU HAVE A DRINK CONTAINING ALCOHOL: 2-4 TIMES A MONTH

## 2022-05-10 NOTE — PROGRESS NOTES
Medicare Annual Wellness Visit    Dominga Diego is here for Medicare Bentley Conte   Initial Medicare annual wellness visit      Recommendations for Preventive Services Due: see orders and patient instructions/AVS.  Recommended screening schedule for the next 5-10 years is provided to the patient in written form: see Patient Instructions/AVS.     No follow-ups on file. Subjective       Patient's complete Health Risk Assessment and screening values have been reviewed and are found in Flowsheets. The following problems were reviewed today and where indicated follow up appointments were made and/or referrals ordered.     Positive Risk Factor Screenings with Interventions:             General Health and ACP:  General  In general, how would you say your health is?: Very Good  In the past 7 days, have you experienced any of the following: New or Increased Pain, New or Increased Fatigue, Loneliness, Social Isolation, Stress or Anger?: No  Do you get the social and emotional support that you need?: Yes  Do you have a Living Will?: Yes    Advance Directives     Power of  Living Will ACP-Advance Directive ACP-Power of Alton Manning on 10/27/21 Not on File Not on [address] [facility] Interventions:  · No Living Will: ACP documents already completed- patient asked to provide copy to the office    Health Habits/Nutrition:     Physical Activity: Inactive    Days of Exercise per Week: 0 days    Minutes of Exercise per Session: 0 min     Have you lost any weight without trying in the past 3 months?: No     Have you seen the dentist within the past year?: Yes    Health Habits/Nutrition Interventions:  · Inadequate physical activity:  patient agrees to increase physical activity as follows: patient states she will be doing yard work and will be way more active now that it's getting nice outside             Objective      Patient-Reported Vitals  No data recorded     Unable to obtain 3 vital signs due to patient not having equipment to take blood pressure/temperature. Allergies   Allergen Reactions    Latex Rash and Swelling    Adhesive Tape Rash     \"Tears Skin , Paper Tape Is OK To Use\"  Skin breakdown       Lisinopril-Hydrochlorothiazide Swelling    Other      \"Allergic To Poinsetta Holley Causing Nasal Congestion\"    Sulfa Antibiotics Other (See Comments)     \"Flu Like Symptoms\"  Flu-like symptoms      Hydrochlorothiazide W-Triamterene     Naproxen      Prior to Visit Medications    Medication Sig Taking? Authorizing Provider   loratadine (CLARITIN) 10 MG capsule Take 10 mg by mouth daily Yes Historical Provider, MD   lenalidomide (REVLIMID) 10 MG chemo capsule Take 1 capsule by mouth daily Yes Alberto Munoz MD   alendronate (FOSAMAX) 70 MG tablet TAKE 1 TABLET ONCE WEEKLY  ON SUNDAYS. Yes RICHIE Olevra   losartan (COZAAR) 50 MG tablet TAKE 1 TABLET EVERY MORNING Yes RICHIE Olvera   amLODIPine (NORVASC) 5 MG tablet TAKE 1 TABLET EVERY MORNING Yes RICHIE Olvera   mometasone (ELOCON) 0.1 % cream Apply topically daily.  Yes Eli Hackett MD   VITAMIN E COMPLEX PO Take by mouth daily Yes Historical Provider, MD   Cyanocobalamin (VITAMIN B12 PO) Take by mouth daily Yes Historical Provider, MD   famotidine (PEPCID) 20 MG tablet  Yes Historical Provider, MD   Biotin 33381 MCG TABS Take by mouth Yes Historical Provider, MD   Omega-3 Fatty Acids (FISH OIL) 1000 MG CAPS Take 3,000 mg by mouth 2 times daily Yes Historical Provider, MD   Multiple Vitamins-Minerals (THERAPEUTIC MULTIVITAMIN-MINERALS) tablet Take 1 tablet by mouth every morning Yes Historical Provider, MD   Cholecalciferol (VITAMIN D) 2000 units CAPS capsule Take 2,000 Units by mouth every morning  Yes Historical Provider, MD   aspirin 81 MG tablet Take 81 mg by mouth nightly  Yes Historical Provider, MD   DULoxetine (CYMBALTA) 60 MG extended release capsule TAKE ONE CAPSULE BY MOUTH DAILY  Patient not taking: Reported on 5/10/2022  Magen Viera MD       [facility] (Including outside providers/suppliers regularly involved in providing care):   Patient Care Team:  Alondra Bunn MD as PCP - General  Alondra Bunn MD as PCP - [facility]    Reviewed and updated this visit:  Tobacco  Allergies  Meds  Med Hx  Surg Hx  Soc Hx  Fam Hx           Kat Win, was evaluated through a synchronous (real-time) audio-video encounter. The patient (or guardian if applicable) is aware that this is a billable service, which includes applicable co-pays. This Virtual Visit was conducted with patient's (and/or legal guardian's) consent. The visit was conducted pursuant to the emergency declaration under the [address] [place] [address] and the [employer] and [employer] Act. Patient identification was verified, and a caregiver was present when appropriate. The patient was located at home in a state where the provider was licensed to provide care. Mariana Pena LPN, 0/[id number]/0569, performed the documented evaluation under the direct supervision of the attending physician. Kat Win, was evaluated through a synchronous (real-time) audio encounter. The patient (or guardian if applicable) is aware that this is a billable service, which includes applicable co-pays. This Virtual Visit was conducted with patient's (and/or legal guardian's) consent. The visit was conducted pursuant to the emergency declaration under the [address] [place] [address] and the [employer] and [employer] Act. Patient identification was verified, and a caregiver was present when appropriate. The patient was located at home in a state where the provider was licensed to provide care.        Total time spent for this encounter: Not billed by time    --Indu Rothman LPN on 5/10/2022 at 4:13 PM    An electronic signature was used to authenticate this note. This encounter was performed under my, Ginger Bautista, direct supervision, 5/10/2022.

## 2022-05-10 NOTE — PATIENT INSTRUCTIONS
Personalized Preventive Plan for Berta Da Silva - 5/10/2022  Medicare offers a range of preventive health benefits. Some of the tests and screenings are paid in full while other may be subject to a deductible, co-insurance, and/or copay. Some of these benefits include a comprehensive review of your medical history including lifestyle, illnesses that may run in your family, and various assessments and screenings as appropriate. After reviewing your medical record and screening and assessments performed today your provider may have ordered immunizations, labs, imaging, and/or referrals for you. A list of these orders (if applicable) as well as your Preventive Care list are included within your After Visit Summary for your review. Other Preventive Recommendations:    · A preventive eye exam performed by an eye specialist is recommended every 1-2 years to screen for glaucoma; cataracts, macular degeneration, and other eye disorders. · A preventive dental visit is recommended every 6 months. · Try to get at least 150 minutes of exercise per week or 10,000 steps per day on a pedometer . · Order or download the FREE \"Exercise & Physical Activity: Your Everyday Guide\" from [employer]. Call 3-956.873.3011 or search [employer] online. · You need 0462-6000 mg of calcium and 0589-9637 IU of vitamin D per day. It is possible to meet your calcium requirement with diet alone, but a vitamin D supplement is usually necessary to meet this goal.  · When exposed to the sun, use a sunscreen that protects against both UVA and UVB radiation with an SPF of 30 or greater. Reapply every 2 to 3 hours or after sweating, drying off with a towel, or swimming. · Always wear a seat belt when traveling in a car. Always wear a helmet when riding a bicycle or motorcycle.

## 2022-05-10 NOTE — PROGRESS NOTES
55 A. [employer] [place] Update    Date: 05/10/22    Medication is currently being filled at [facility] and has been routed there. Please call us with any questions at [phone] opt.  2.

## 2022-05-11 ENCOUNTER — HOSPITAL ENCOUNTER (OUTPATIENT)
Dept: INFUSION THERAPY | Age: 72
Discharge: HOME | End: 2022-05-11
Payer: MEDICARE

## 2022-05-11 DIAGNOSIS — C90.00 MULTIPLE MYELOMA NOT HAVING ACHIEVED REMISSION (HCC): ICD-10-CM

## 2022-05-11 LAB
ALBUMIN SERPL-MCNC: 4 GM/DL (ref 3.4–5)
ALP BLD-CCNC: 127 IU/L (ref 40–129)
ALT SERPL-CCNC: 26 U/L (ref 10–40)
ANION GAP SERPL CALCULATED.3IONS-SCNC: 10 MMOL/L (ref 4–16)
AST SERPL-CCNC: 22 IU/L (ref 15–37)
BASOPHILS ABSOLUTE: 0.1 K/CU MM
BASOPHILS RELATIVE PERCENT: 2.3 % (ref 0–1)
BILIRUB SERPL-MCNC: 1.2 MG/DL (ref 0–1)
BUN BLDV-MCNC: 8 MG/DL (ref 6–23)
CALCIUM SERPL-MCNC: 8.4 MG/DL (ref 8.3–10.6)
CHLORIDE BLD-SCNC: 101 MMOL/L (ref 99–110)
CO2: 28 MMOL/L (ref 21–32)
CREAT SERPL-MCNC: 0.6 MG/DL (ref 0.6–1.1)
DIFFERENTIAL TYPE: ABNORMAL
EOSINOPHILS ABSOLUTE: 0.1 K/CU MM
EOSINOPHILS RELATIVE PERCENT: 5.4 % (ref 0–3)
ERYTHROCYTE SEDIMENTATION RATE: 3 MM/HR (ref 0–30)
GFR AFRICAN AMERICAN: >60 ML/MIN/1.73M2
GFR NON-AFRICAN AMERICAN: >60 ML/MIN/1.73M2
GLUCOSE BLD-MCNC: 105 MG/DL (ref 70–99)
HCT VFR BLD CALC: 32.6 % (ref 37–47)
HEMOGLOBIN: 10.8 GM/DL (ref 12.5–16)
IGA: 492 MG/DL (ref 69–382)
IGG,SERUM: 839 MG/DL (ref 723–1685)
IGM,SERUM: <25 MG/DL (ref 62–277)
LACTATE DEHYDROGENASE: 135 IU/L (ref 120–246)
LYMPHOCYTES ABSOLUTE: 0.4 K/CU MM
LYMPHOCYTES RELATIVE PERCENT: 19.5 % (ref 24–44)
MCH RBC QN AUTO: 32.7 PG (ref 27–31)
MCHC RBC AUTO-ENTMCNC: 33.1 % (ref 32–36)
MCV RBC AUTO: 98.8 FL (ref 78–100)
MONOCYTES ABSOLUTE: 0.3 K/CU MM
MONOCYTES RELATIVE PERCENT: 14.5 % (ref 0–4)
PDW BLD-RTO: 15.3 % (ref 11.7–14.9)
PLATELET # BLD: 165 K/CU MM (ref 140–440)
PMV BLD AUTO: 9.2 FL (ref 7.5–11.1)
POTASSIUM SERPL-SCNC: 3.5 MMOL/L (ref 3.5–5.1)
RBC # BLD: 3.3 M/CU MM (ref 4.2–5.4)
SEGMENTED NEUTROPHILS ABSOLUTE COUNT: 1.3 K/CU MM
SEGMENTED NEUTROPHILS RELATIVE PERCENT: 58.3 % (ref 36–66)
SODIUM BLD-SCNC: 139 MMOL/L (ref 135–145)
TOTAL PROTEIN: 6.3 GM/DL (ref 6.4–8.2)
WBC # BLD: 2.2 K/CU MM (ref 4–10.5)

## 2022-05-11 PROCEDURE — 84165 PROTEIN E-PHORESIS SERUM: CPT

## 2022-05-11 PROCEDURE — 83615 LACTATE (LD) (LDH) ENZYME: CPT

## 2022-05-11 PROCEDURE — 82232 ASSAY OF BETA-2 PROTEIN: CPT

## 2022-05-11 PROCEDURE — 85652 RBC SED RATE AUTOMATED: CPT

## 2022-05-11 PROCEDURE — 82784 ASSAY IGA/IGD/IGG/IGM EACH: CPT

## 2022-05-11 PROCEDURE — 83883 ASSAY NEPHELOMETRY NOT SPEC: CPT

## 2022-05-11 PROCEDURE — 85025 COMPLETE CBC W/AUTO DIFF WBC: CPT

## 2022-05-11 PROCEDURE — 86334 IMMUNOFIX E-PHORESIS SERUM: CPT

## 2022-05-11 PROCEDURE — 36415 COLL VENOUS BLD VENIPUNCTURE: CPT

## 2022-05-11 PROCEDURE — 84155 ASSAY OF PROTEIN SERUM: CPT

## 2022-05-11 PROCEDURE — 86320 SERUM IMMUNOELECTROPHORESIS: CPT

## 2022-05-11 PROCEDURE — 80053 COMPREHEN METABOLIC PANEL: CPT

## 2022-05-12 ENCOUNTER — CLINICAL DOCUMENTATION (OUTPATIENT)
Dept: ONCOLOGY | Age: 72
End: 2022-05-12

## 2022-05-12 NOTE — PROGRESS NOTES
Patient approved for chintan for Revlimid and brought in paperwork from the [address] [place] [address] ([facility]). Form filled out, signed by physician and faxed successfully to [phone] as instructed.

## 2022-05-13 LAB
BETA-2 MICROGLOBULIN: 2.1 MG/L
KAPPA QUANT FREE LIGHT CHAINS: 38.03 MG/L (ref 3.3–19.4)
KAPPA/LAMBDA FREE LIGHT CHAIN RATIO: 1.02 (ref 0.26–1.65)
LAMBDA FREE LIGHT CHAINS QNT: 37.15 MG/L (ref 5.71–26.3)

## 2022-05-14 LAB
ALBUMIN SERPL-MCNC: 3.52 G/DL (ref 3.75–5.01)
ALPHA-1-GLOBULIN: 0.3 G/DL (ref 0.19–0.46)
ALPHA-2-GLOBULIN: 0.58 G/DL (ref 0.48–1.05)
BETA GLOBULIN: 0.89 G/DL (ref 0.48–1.1)
GAMMA: 1.02 G/DL (ref 0.62–1.51)
IMMUNOFIXATION REFLEX: ABNORMAL
PROTEIN ELECTROPHORESIS, SERUM: ABNORMAL
SPE/IFE INTERPRETATION: ABNORMAL
TOTAL PROTEIN: 6.3 G/DL (ref 6.3–8.2)

## 2022-05-15 NOTE — PROGRESS NOTES
Patient Name: Micki Donato  Patient : 1950  Patient MRN: [id number]     Primary Oncologist: Christian Gilliland MD  Referring Provider: Eli Hackett MD     Date of Service: 2022      Chief Complaint:   Chief Complaint   Patient presents with    Follow-up     Patient Active Problem List:     Severe anemia     Multiple myeloma not having achieved remission      Drug related polyneuropathy      Osteopenia of multiple sites    HPI:   Micki Donato is a 21-year-old very pleasnat female with medical history significant for hypertension, GERD, depression, anemia, initially presented to me on 2018 to follow up with her monocloal gammopathy. She initially presented to [facility] on 18 with low hemoglobin. She stated that she had colonoscopy in  by Dr. Dominique Avilez. She has been tired and fatigue since 2018. She denies weight loss. She was found to have severe anemia on blood test done in her primary care physician office and she was asked to come to ER. Her base line hemoglobin was 8.4 -9.4 gram since . It was 13 grams in  and on arrival to hospital on 18 was 5.7 grams. She received 2 units of PRBC. I was called to evaluate her anemia at that time. I recognized that she has worsening macrocytic anemia. She also has mild leukopenia and thrombocytopenia. Her total protein level was significantly elevated (11.9 grams), but she has normal calcium and serum creatinine. I requested work ups to rule out plasma cell dyscrasias and she was found to have 7900 mg/dl IgG kappa monoclonal gammopathy on serum protein electrophoresis and serum immunofixation. Her beta 2 microglobulin was 7.5 mg./dl, serum kappa 9.34 mg/dl, lambda 0.19 mg/dl, ratio was 49.16. ESR > 120, CRP 3 and .     Bone marrow biopsy was done on 2018 and final pathology showed hypocellular bone marrow [85%], with partially preserved trilineage hematopoiesis and involvement by plasma cell neoplasm [85% of marrow cellularity is comprised of neoplastic plasma cells]. Cytogenetic reveals normal myocardial type [46XX]. Fish panel revealed gain of chromosome 1q21, monosomy 13, and aneuploidy [gain of chromosome 7, 9, 15 and FGFR3/4p]. Bone survey done on 1/8/19 showed multiple lytic lesions noted to the calvarium bilaterally as well as involving the left humerus. With the clinical history findings are concerning for multiple myeloma. No definite additional bony involvement identified. Multilevel degenerative changes to the cervical, thoracic and lumbar spine. Diffuse bone demineralization. First line chemotherapy with Velcade, Revlimid and Dexamethasone was started on January 18, 2019 and she completed her fifth cycle on 4/29/19. Her monoclonal protein has 7900mg/dl before therapy. It decreased to 1900mg/dl (2/8/19), 900mg/dl (3/4/19) and 400 mg/dl (4/26/19). She had bone marrow biopsy on 4/2/19 at [facility] and it showed no morphologic or immunophenotypic evidence of persistent/recurrent plasma cell neoplasm. She received autologus stem cell transplant on 5/16/19. Mammogram done on April 27, 2021 showed no mammographic evidence of malignancy. Maintenance chemotherapy with Revlimid was started since August 27, 2019. On May 17, 2022, she presented to me for follow-up. I have been following her for stage III IgG kappa multiple myeloma and she is status post first line chemotherapy with Velcade, Revlimid and Dexamethasone (received total 4 cycles) and autologous stem cell transplantation. She has been on maintenance chemotherapy with revlimid since August 27, 2019. She is tolerating maintenance chemotherapy, Revlimid well and she doesn't encounter any major side effects from Revlimid. We change Revlimid to three weeks on one week off, since she has been having increasing SOB and fatigue.  Since her symptoms are resolved now, we resumed back on daily Hysterectomy in 1985  4. Partial thyroidectomy  5. Tonsillectomy  6. Bladder suspension surgery    Allergies  Adhesive tape  Sulfamide  lisinopril    Social History  She denies smoking and illicit drug abuse. She socially drink alcohol. She is currently living in [facility]. Family History  Significant for bladder cancer and brain cancer in one of her brother. Prostate cancer in another brother. No other family history of malignancy. Review of Systems: \"Per interval history; otherwise 10 point ROS is negative. \"  Her energy level is stable and her sleep is fine. She denies fever, chills, night sweats, cough, shortness of breath, chest pain, hemoptysis or palpitations. Her bowel and bladder functions are normal. She doesn't have nausea, vomiting, abdominal pain, diarrhea, constipation, dysuria, loss of appetite or weight loss. She denies neuropathy and she doesn't have bleeding or clotting issues. She denies any pain in her body. No anxiety or depression. The rest of the systems are unremarkable.      Vital Signs:  BP (!) 146/65 (Site: Right Upper Arm, Position: Sitting, Cuff Size: Medium Adult)   Pulse 68   Temp 97.5 °F (36.4 °C) (Temporal)   Resp 16   Ht 5' 4\" (1.626 m)   Wt 150 lb 3.2 oz (68.1 kg)   SpO2 98%   BMI 25.78 kg/m²     Physical Exam:  CONSTITUTIONAL: awake, alert, cooperative, no apparent distress,    EYES: pupils equal, round and reactive to light, sclera clear and conjunctiva normal  ENT: Normocephalic, without obvious abnormality, atraumatic  NECK: supple, symmetrical, no jugular venous distension and no carotid bruits   HEMATOLOGIC/LYMPHATIC: no cervical, supraclavicular or axillary lymphadenopathy   LUNGS: VBS, no wheezes, no increased work of breathing, no crackles, clear to auscultation, no rhonchi,    CARDIOVASCULAR: regular rate and rhythm, normal S1 and S2, no murmur noted  ABDOMEN: normal bowel sounds x 4, non-tender, no masses palpated, no hepatosplenomegaly, soft, non-distended,   MUSCULOSKELETAL: full range of motion noted, tone is normal  NEUROLOGIC: awake, alert, oriented to name, place and time. Motor skills grossly intact. SKIN: Normal skin color, texture, turgor and no jaundice.  appears intact   EXTREMITIES: no LE edema, no cyanosis, no clubbing, no leg swelling,     Labs:  Hematology:  Lab Results   Component Value Date    WBC 2.2 (L) 05/11/2022    RBC 3.30 (L) 05/11/2022    HGB 10.8 (L) 05/11/2022    HCT 32.6 (L) 05/11/2022    MCV 98.8 05/11/2022    MCH 32.7 (H) 05/11/2022    MCHC 33.1 05/11/2022    RDW 15.3 (H) 05/11/2022     05/11/2022    MPV 9.2 05/11/2022    BANDSPCT 4 (L) 09/02/2020    SEGSPCT 58.3 05/11/2022    EOSRELPCT 5.4 (H) 05/11/2022    BASOPCT 2.3 (H) 05/11/2022    LYMPHOPCT 19.5 (L) 05/11/2022    MONOPCT 14.5 (H) 05/11/2022    BANDABS 0.08 09/02/2020    SEGSABS 1.3 05/11/2022    EOSABS 0.1 05/11/2022    BASOSABS 0.1 05/11/2022    LYMPHSABS 0.4 05/11/2022    MONOSABS 0.3 05/11/2022    DIFFTYPE AUTOMATED DIFFERENTIAL 05/11/2022    ANISOCYTOSIS 1+ 12/22/2018    POLYCHROM 1+ 12/23/2018    WBCMORP OCCASIONAL 09/02/2020    PLTM PLATELETS APPEAR NORMAL 12/23/2018     Lab Results   Component Value Date    ESR 3 05/11/2022     Chemistry:  Lab Results   Component Value Date     05/11/2022    K 3.5 05/11/2022     05/11/2022    CO2 28 05/11/2022    BUN 8 05/11/2022    CREATININE 0.6 05/11/2022    GLUCOSE 105 (H) 05/11/2022    CALCIUM 8.4 05/11/2022    PROT 6.3 05/11/2022    PROT 6.3 (L) 05/11/2022    LABALBU 3.52 (L) 05/11/2022    LABALBU 4.0 05/11/2022    BILITOT 1.2 (H) 05/11/2022    ALKPHOS 127 05/11/2022    AST 22 05/11/2022    ALT 26 05/11/2022    LABGLOM >60 05/11/2022    GFRAA >60 05/11/2022    AGRATIO 1.2 09/10/2020    GLOB 2.9 09/10/2020    MG 2.2 09/02/2020    POCCA 1.13 05/11/2021    POCGLU 121 (H) 05/11/2021     Lab Results   Component Value Date     05/11/2022     No components found for: LD  Lab Results   Component Value Date    TSHHS 4.540 (H) 12/22/2018     Immunology:  Lab Results   Component Value Date    PROT 6.3 05/11/2022    PROT 6.3 (L) 05/11/2022    SPEP INTERPRETATION - Within normal limits. SAF 01/27/2022    SPEP INTERPRETATION - No monoclonal bands are seen. SAF 01/27/2022    ALBUMINELP 3.6 01/27/2022    LABALPH 0.30 05/11/2022    LABALPH 0.58 05/11/2022    LABBETA 0.89 05/11/2022    GAMGLOB 1.0 01/27/2022     Lab Results   Component Value Date    KAPPAUVOL 38.03 (H) 05/11/2022    LAMBDAUVOL 33.20 (H) 11/04/2020    KLFLCR 1.02 05/11/2022     Lab Results   Component Value Date    B2M 2.1 05/11/2022     Coagulation Panel:  Lab Results   Component Value Date    PROTIME 12.2 09/02/2020    INR 1.01 09/02/2020    APTT 27.9 09/02/2020    DDIMER 2430 (H) 09/02/2020     Anemia Panel:  Lab Results   Component Value Date    GWSRRJZR37 954.6 (H) 12/22/2018    FOLATE >20.0 (H) 09/17/2019     Tumor Markers:  No results found for: , CEA, , LABCA2, PSA  Observations:  No data recorded        Assessment & Plan:   Stage III IgG kappa multiple myeloma    PLAN  Ms. Blair Christopher is a 80-year-old very pleasant female who was found to have significantly high monoclonal gammopathy (7300 mg/dl) when she presented with symptomatic anemia. Furhter work ups with bone marrow biopsy confirmed that she has stage III IgG kappa multiple myeloma (high risk disease). Since she has symptomatic multiple myeloma, we started first-line chemotherapy with Velcade, Revlimid and dexamethasone on January 18, 2019 and she completed her fifth cycle on 4/29/19. Her monoclonal protein has 7900mg/dl before therapy. It decreased to 1900mg/dl (2/8/19), 900mg/dl (3/4/19) and 400 mg/dl (4/26/19). She had bone marrow biopsy on 4/2/19 at [address] [place] [address] and it showed no morphologic or immunophenotypic evidence of persistent/recurrent plasma cell neoplasm. She received autologus stem cell transplant on 5/16/19.      Mammogram done on April 27, 2021 showed no mammographic evidence of malignancy. Maintenance chemotherapy with Revlimid was started since August 27, 2019. On May 17, 2022, she presented to me for follow-up. I have been following her for stage III IgG kappa multiple myeloma and she is status post first line chemotherapy with Velcade, Revlimid and Dexamethasone (received total 4 cycles) and autologous stem cell transplantation. She has been on maintenance chemotherapy with revlimid since August 27, 2019. She is tolerating maintenance chemotherapy, Revlimid well and she doesn't encounter any major side effects from Revlimid. We change Revlimid to three weeks on one week off, since she has been having increasing SOB and fatigue. Since her symptoms are resolved now, we resumed back on daily basis since January 14, 2020. She has 400 mg/dl IgG kappa monoclonal protein on 10/14/2021. However, repeat blood test on 11/15/21 and 1/27/22 showed no M protein detected. Blood work on 5/12/22 showed faint band in lambda suggestive of an early monoclonal protein or specific immune response. I believe she is in stringent complete remission and I recommend that she continue with current dose of Revlimid for now. Since she has more anemia and neutropenia, I asked her to hold revlimid for 7 to 10 days and then to resume it with same dose. I will reevaluate her multiple myeloma again in 3 months and I will see her again after that. Since her revlimid induced diarrhea has been controlled well with lomotil, I recommend her to continue with it for now. Chemo induced thromboembolism prophylaxis - I recommend her to continue with aspirin 81 mg daily. Myeloma bone disease - Since she has completed 2 years of therapy, we stopped it after 5/2021 dose. I asked her to continue with vitamin D daily. Chemo induced neuropathy - she is back on cymbalta 60 mg daily since her neuropathy gets worse after decreasing the dose.  I asked her to take tramadol prn for neuropathic pain. Hypertension - she is on amlodipine and losartan. BP is stable and I recommend her for salt restriction. COVID19 vaccine -she is status post COVID-19 vaccine. .     I reviewed with her findings on screening mammogram done on 4/27/21 and DEXA scan, done on 3/9/2020. I recommend her to have a repeat mammogram and April 2022. Health maintenance - I recommend her to have age-appropriate cancer screening, exercise, low-fat and low-sodium diet. I answered all her questions and concerns for today. I asked her to follow-up with primary care physician, Dr. Mis Pang on regular basis. Recent imaging and labs were reviewed and discussed with the patient.

## 2022-05-17 ENCOUNTER — OFFICE VISIT (OUTPATIENT)
Dept: ONCOLOGY | Age: 72
End: 2022-05-17
Payer: MEDICARE

## 2022-05-17 ENCOUNTER — HOSPITAL ENCOUNTER (OUTPATIENT)
Dept: INFUSION THERAPY | Age: 72
Discharge: HOME | End: 2022-05-17

## 2022-05-17 VITALS
OXYGEN SATURATION: 98 % | RESPIRATION RATE: 16 BRPM | HEART RATE: 68 BPM | WEIGHT: 150.2 LBS | BODY MASS INDEX: 25.64 KG/M2 | SYSTOLIC BLOOD PRESSURE: 146 MMHG | HEIGHT: 64 IN | TEMPERATURE: 97.5 F | DIASTOLIC BLOOD PRESSURE: 65 MMHG

## 2022-05-17 DIAGNOSIS — C90.00 MULTIPLE MYELOMA NOT HAVING ACHIEVED REMISSION (HCC): Primary | ICD-10-CM

## 2022-05-17 PROCEDURE — 3017F COLORECTAL CA SCREEN DOC REV: CPT | Performed by: INTERNAL MEDICINE

## 2022-05-17 PROCEDURE — G8427 DOCREV CUR MEDS BY ELIG CLIN: HCPCS | Performed by: INTERNAL MEDICINE

## 2022-05-17 PROCEDURE — G8399 PT W/DXA RESULTS DOCUMENT: HCPCS | Performed by: INTERNAL MEDICINE

## 2022-05-17 PROCEDURE — 4040F PNEUMOC VAC/ADMIN/RCVD: CPT | Performed by: INTERNAL MEDICINE

## 2022-05-17 PROCEDURE — 99214 OFFICE O/P EST MOD 30 MIN: CPT | Performed by: INTERNAL MEDICINE

## 2022-05-17 PROCEDURE — 1036F TOBACCO NON-USER: CPT | Performed by: INTERNAL MEDICINE

## 2022-05-17 PROCEDURE — 1123F ACP DISCUSS/DSCN MKR DOCD: CPT | Performed by: INTERNAL MEDICINE

## 2022-05-17 PROCEDURE — G8417 CALC BMI ABV UP PARAM F/U: HCPCS | Performed by: INTERNAL MEDICINE

## 2022-05-17 PROCEDURE — 1090F PRES/ABSN URINE INCON ASSESS: CPT | Performed by: INTERNAL MEDICINE

## 2022-05-17 RX ORDER — DULOXETIN HYDROCHLORIDE 60 MG/1
60 CAPSULE, DELAYED RELEASE ORAL 2 TIMES DAILY
Qty: 180 CAPSULE | Refills: 3 | Status: SHIPPED | OUTPATIENT
Start: 2022-05-17 | End: 2022-10-18

## 2022-05-17 NOTE — PROGRESS NOTES
[facility] Rooming Questions  Patient: Orlando Grier  MRN: [id number]    Date: 5/17/2022        1. Do you have any new issues? yes - questions about some labs, neuropathy getting worse         2. Do you need any refills on medications? yes - Duloxetine    3. Have you had any imaging done since your last visit? Yes-Mammo    4. Have you been hospitalized or seen in the emergency room since your last visit here?   no    5. Did the patient have a depression screening completed today?  No    No data recorded     PHQ-9 Given to (if applicable):               PHQ-9 Score (if applicable):                     [] Positive     []  Negative              Does question #9 need addressed (if applicable)                     [] Yes    []  No               Jaja Rodney CMA

## 2022-06-06 DIAGNOSIS — C90.00 MULTIPLE MYELOMA NOT HAVING ACHIEVED REMISSION (HCC): ICD-10-CM

## 2022-06-06 RX ORDER — LENALIDOMIDE 10 MG/1
10 CAPSULE ORAL DAILY
Qty: 28 CAPSULE | Refills: 0 | Status: ACTIVE | OUTPATIENT
Start: 2022-06-06 | End: 2022-06-08 | Stop reason: SDUPTHER

## 2022-06-06 NOTE — PROGRESS NOTES
Revlimid 10 mg chemo capsules reordered and sent to [facility] [address] [place] [address]. Elysia Amadeo # [id number] obtained through [employer] risk management/REMS portal. Auth valid for 30 days.

## 2022-06-06 NOTE — PROGRESS NOTES
55 A. [employer] [place] Update    Date: 06/06/22    Medication is currently being filled at [facility] and has been routed there. Please call us with any questions at [phone] opt.  2.

## 2022-06-08 DIAGNOSIS — C90.00 MULTIPLE MYELOMA NOT HAVING ACHIEVED REMISSION (HCC): ICD-10-CM

## 2022-06-08 RX ORDER — LENALIDOMIDE 10 MG/1
10 CAPSULE ORAL DAILY
Qty: 28 CAPSULE | Refills: 0 | Status: ACTIVE | OUTPATIENT
Start: 2022-06-08 | End: 2022-07-06 | Stop reason: SDUPTHER

## 2022-06-08 NOTE — PROGRESS NOTES
55 A. [employer] [facility] Update    Date: 06/08/22    Medication is currently being filled at [facility] and has been routed there. Please call us with any questions at [phone] opt.  2.

## 2022-06-08 NOTE — PROGRESS NOTES
VM received from patient regarding RX. Revlimid reordered on 06/06/2022 but sent to [facility] instead of [facility]. Revlimid 10 mg chemo capsules reordered and sent to preferred pharmacy as requested by patient.

## 2022-06-29 RX ORDER — MOMETASONE FUROATE 1 MG/G
CREAM TOPICAL
Qty: 1 EACH | Refills: 2 | Status: SHIPPED | OUTPATIENT
Start: 2022-06-29

## 2022-07-05 DIAGNOSIS — C90.00 MULTIPLE MYELOMA NOT HAVING ACHIEVED REMISSION (HCC): ICD-10-CM

## 2022-07-06 RX ORDER — LENALIDOMIDE 10 MG/1
10 CAPSULE ORAL DAILY
Qty: 28 CAPSULE | Refills: 5 | Status: ACTIVE | OUTPATIENT
Start: 2022-07-06 | End: 2022-07-08 | Stop reason: SDUPTHER

## 2022-07-06 NOTE — PROGRESS NOTES
[facility]    Date: 07/06/22    Medication is currently being filled at [facility] and has been routed there. No PA needed. Please call us with any questions at [phone] opt.  2.

## 2022-07-08 DIAGNOSIS — C90.00 MULTIPLE MYELOMA NOT HAVING ACHIEVED REMISSION (HCC): ICD-10-CM

## 2022-07-08 RX ORDER — LENALIDOMIDE 10 MG/1
10 CAPSULE ORAL DAILY
Qty: 28 CAPSULE | Refills: 0 | Status: SHIPPED | OUTPATIENT
Start: 2022-07-08 | End: 2022-07-12 | Stop reason: SDUPTHER

## 2022-07-08 NOTE — PROGRESS NOTES
Revlimid 10mg chemo capsules reordered and sent to [address] [place] [address].    Auth number obtained through [email or web address] portal  Auth # [id number]  Auth valid for 30 days

## 2022-07-11 DIAGNOSIS — C90.00 MULTIPLE MYELOMA NOT HAVING ACHIEVED REMISSION (HCC): ICD-10-CM

## 2022-07-11 RX ORDER — LENALIDOMIDE 10 MG/1
10 CAPSULE ORAL DAILY
Qty: 28 CAPSULE | Refills: 0 | OUTPATIENT
Start: 2022-07-11

## 2022-07-12 DIAGNOSIS — C90.00 MULTIPLE MYELOMA NOT HAVING ACHIEVED REMISSION (HCC): ICD-10-CM

## 2022-07-12 RX ORDER — LENALIDOMIDE 10 MG/1
10 CAPSULE ORAL DAILY
Qty: 28 CAPSULE | Refills: 0 | Status: ACTIVE | OUTPATIENT
Start: 2022-07-12 | End: 2022-07-13 | Stop reason: SDUPTHER

## 2022-07-12 NOTE — PROGRESS NOTES
55 A. [employer] [place] Update    Date: 07/12/22    Medication is currently being filled at [facility] and has been routed there. Please call us with any questions at [phone] opt.  2.

## 2022-07-12 NOTE — PROGRESS NOTES
Received VM from patient stating that [facility] pharmacy did not receive revlimid order from 07/08/22. Revlimid 10 mg, QTY 28 reordered and e-scribed to [facility].

## 2022-07-13 ENCOUNTER — PATIENT MESSAGE (OUTPATIENT)
Dept: ONCOLOGY | Age: 72
End: 2022-07-13

## 2022-07-13 DIAGNOSIS — C90.00 MULTIPLE MYELOMA NOT HAVING ACHIEVED REMISSION (HCC): ICD-10-CM

## 2022-07-13 RX ORDER — LENALIDOMIDE 10 MG/1
10 CAPSULE ORAL DAILY
Qty: 28 CAPSULE | Refills: 0 | Status: ACTIVE | OUTPATIENT
Start: 2022-07-13 | End: 2022-08-08 | Stop reason: SDUPTHER

## 2022-07-13 NOTE — PROGRESS NOTES
55 A. [facility] Update    Date: 07/13/22    Medication is currently being filled at [facility] and has been routed there. Orignally routed 7-6-22 however this script needed an updated auth #. Please call us with any questions at [phone] opt.  2.

## 2022-07-13 NOTE — TELEPHONE ENCOUNTER
Called patient at [phone] to address symptoms. Patient c/o common cold symptoms including runny nose, sore throat, HA, and fatigue. Denies fever. Physician aware of symptoms and advised to stop taking revlimd x1 week. Encouraged plenty of rest and fluid intake. Patient instructed to call office back if sypmtoms persist or worsen. Patient voices understanding. No further needs addressed at this time.

## 2022-07-28 LAB
EER IMMUNOFIX ELECTROPHORESIS GEL: NORMAL
IMMUNOFIX ELECTROPHORESIS GEL: NORMAL

## 2022-07-31 SDOH — HEALTH STABILITY: PHYSICAL HEALTH: ON AVERAGE, HOW MANY DAYS PER WEEK DO YOU ENGAGE IN MODERATE TO STRENUOUS EXERCISE (LIKE A BRISK WALK)?: 3 DAYS

## 2022-07-31 SDOH — HEALTH STABILITY: PHYSICAL HEALTH: ON AVERAGE, HOW MANY MINUTES DO YOU ENGAGE IN EXERCISE AT THIS LEVEL?: 60 MIN

## 2022-07-31 ASSESSMENT — SOCIAL DETERMINANTS OF HEALTH (SDOH)
WITHIN THE LAST YEAR, HAVE YOU BEEN HUMILIATED OR EMOTIONALLY ABUSED IN OTHER WAYS BY YOUR PARTNER OR EX-PARTNER?: NO
WITHIN THE LAST YEAR, HAVE YOU BEEN KICKED, HIT, SLAPPED, OR OTHERWISE PHYSICALLY HURT BY YOUR PARTNER OR EX-PARTNER?: NO
WITHIN THE LAST YEAR, HAVE YOU BEEN AFRAID OF YOUR PARTNER OR EX-PARTNER?: NO
WITHIN THE LAST YEAR, HAVE TO BEEN RAPED OR FORCED TO HAVE ANY KIND OF SEXUAL ACTIVITY BY YOUR PARTNER OR EX-PARTNER?: NO

## 2022-08-01 ENCOUNTER — OFFICE VISIT (OUTPATIENT)
Dept: FAMILY MEDICINE CLINIC | Age: 72
End: 2022-08-01
Payer: MEDICARE

## 2022-08-01 VITALS
SYSTOLIC BLOOD PRESSURE: 128 MMHG | WEIGHT: 131 LBS | BODY MASS INDEX: 22.36 KG/M2 | DIASTOLIC BLOOD PRESSURE: 64 MMHG | HEIGHT: 64 IN | HEART RATE: 75 BPM | OXYGEN SATURATION: 98 %

## 2022-08-01 DIAGNOSIS — M85.89 OSTEOPENIA OF MULTIPLE SITES: Chronic | ICD-10-CM

## 2022-08-01 DIAGNOSIS — Z13.220 LIPID SCREENING: ICD-10-CM

## 2022-08-01 DIAGNOSIS — I10 ESSENTIAL HYPERTENSION: Primary | Chronic | ICD-10-CM

## 2022-08-01 DIAGNOSIS — C90.00 MULTIPLE MYELOMA NOT HAVING ACHIEVED REMISSION (HCC): Chronic | ICD-10-CM

## 2022-08-01 PROCEDURE — G8399 PT W/DXA RESULTS DOCUMENT: HCPCS | Performed by: STUDENT IN AN ORGANIZED HEALTH CARE EDUCATION/TRAINING PROGRAM

## 2022-08-01 PROCEDURE — G8427 DOCREV CUR MEDS BY ELIG CLIN: HCPCS | Performed by: STUDENT IN AN ORGANIZED HEALTH CARE EDUCATION/TRAINING PROGRAM

## 2022-08-01 PROCEDURE — 1090F PRES/ABSN URINE INCON ASSESS: CPT | Performed by: STUDENT IN AN ORGANIZED HEALTH CARE EDUCATION/TRAINING PROGRAM

## 2022-08-01 PROCEDURE — 1123F ACP DISCUSS/DSCN MKR DOCD: CPT | Performed by: STUDENT IN AN ORGANIZED HEALTH CARE EDUCATION/TRAINING PROGRAM

## 2022-08-01 PROCEDURE — 3017F COLORECTAL CA SCREEN DOC REV: CPT | Performed by: STUDENT IN AN ORGANIZED HEALTH CARE EDUCATION/TRAINING PROGRAM

## 2022-08-01 PROCEDURE — 1036F TOBACCO NON-USER: CPT | Performed by: STUDENT IN AN ORGANIZED HEALTH CARE EDUCATION/TRAINING PROGRAM

## 2022-08-01 PROCEDURE — G8420 CALC BMI NORM PARAMETERS: HCPCS | Performed by: STUDENT IN AN ORGANIZED HEALTH CARE EDUCATION/TRAINING PROGRAM

## 2022-08-01 PROCEDURE — 99214 OFFICE O/P EST MOD 30 MIN: CPT | Performed by: STUDENT IN AN ORGANIZED HEALTH CARE EDUCATION/TRAINING PROGRAM

## 2022-08-01 RX ORDER — LOSARTAN POTASSIUM 50 MG/1
TABLET ORAL
Qty: 90 TABLET | Refills: 1 | Status: SHIPPED | OUTPATIENT
Start: 2022-08-01

## 2022-08-01 RX ORDER — AMLODIPINE BESYLATE 5 MG/1
TABLET ORAL
Qty: 90 TABLET | Refills: 1 | Status: SHIPPED | OUTPATIENT
Start: 2022-08-01

## 2022-08-01 ASSESSMENT — ENCOUNTER SYMPTOMS
NAUSEA: 0
SORE THROAT: 0
WHEEZING: 0
SHORTNESS OF BREATH: 0
ABDOMINAL PAIN: 0

## 2022-08-01 NOTE — PROGRESS NOTES
8/1/2022    Rizwan Singh    Chief Complaint   Patient presents with    [facility] Care     Previous patient of Dr. Nela Fernández    6 Month Follow-Up     Pt reports no concerns       HPI  History was obtained from patient. Jose Alberto Jett is a 81 y.o. female with a PMHx as listed below who presents today for   Follow up on chronic condtiions. No acute complaints. BP excellent today. Significant weight loss, improved from diet/lifestyle changes    Hx. Multiple Myeloma following with Dr. Pederson [place] and [facility]  Currently on Revlimid dialy    On cymbalta for peripheral neuropathy, stable        1. Essential hypertension    2. Osteopenia of multiple sites    3. Multiple myeloma not having achieved remission ([facility] [address]    4. Lipid screening             REVIEW OF SYMPTOMS    Review of Systems   Constitutional:  Negative for chills and fatigue. HENT:  Negative for congestion and sore throat. Respiratory:  Negative for shortness of breath and wheezing. Cardiovascular:  Negative for chest pain and palpitations. Gastrointestinal:  Negative for abdominal pain and nausea. Genitourinary:  Negative for frequency and urgency. Neurological:  Negative for light-headedness.      PAST MEDICAL HISTORY  Past Medical History:   Diagnosis Date    Anemia     \"With Childbirth\"    Arthritis     \"Hands, Knees And Hips\"    CCC (chronic calculous cholecystitis)     s/p cholecystectomy 2015    Colitis Dx 2000's    Depression     \"In Mid 1990's\"    Essential hypertension     GERD (gastroesophageal reflux disease)     Hyperlipidemia     Motion sickness     Multiple myeloma (HCC)     Osteoporosis     LILIAN (stress urinary incontinence, female)     Thyroid disease 1990's    \"Took Part Of Thyroid Out \"       FAMILY HISTORY  Family History   Problem Relation Age of Onset    Heart Disease Mother     Arthritis Mother     Diabetes Mother     High Blood Pressure Mother     Dementia Father     Arthritis Brother     Prostate Cancer Brother     Early Death Brother 61 Bladder And Brain Cancer    Diabetes Brother     Depression Brother     Cancer Brother         Bladder And Brain Cancer    Kidney Disease Son         Kidney Stones    Other Son         \"Charcot Swathi Tooth, Neuromuscular Disease\"    Breast Cancer Neg Hx     Ovarian Cancer Neg Hx        SOCIAL HISTORY  Social History     Socioeconomic History    Marital status:      Spouse name: None    Number of children: None    Years of education: None    Highest education level: None   Tobacco Use    Smoking status: Never    Smokeless tobacco: Never   Vaping Use    Vaping Use: Never used   Substance and Sexual Activity    Alcohol use: Yes     Comment: \"Occ.  Maybe Once A Week\"    Drug use: No    Sexual activity: Yes     Partners: Male     Social Determinants of Health     Financial Resource Strain: Low Risk     Difficulty of Paying Living Expenses: Not hard at all   Food Insecurity: No Food Insecurity    Worried About Running Out of Food in the Last Year: Never true    Ran Out of Food in the Last Year: Never true   Physical Activity: Sufficiently Active    Days of Exercise per Week: 3 days    Minutes of Exercise per Session: 60 min   Intimate Partner Violence: Not At Risk    Fear of Current or Ex-Partner: No    Emotionally Abused: No    Physically Abused: No    Sexually Abused: No        SURGICAL HISTORY  Past Surgical History:   Procedure Laterality Date    BLADDER SUSPENSION  1985    Done During Vaginal Hysterectomy    BREAST SURGERY Right 1980's    Benign Area Removed Nipple Area Right Breast    CHOLECYSTECTOMY, LAPAROSCOPIC  [id number]    COLONOSCOPY  \"Two\" Last Done 2000's    COLONOSCOPY  10/05/2018    Sigmoid diverticulosis, Grade 1 Internal hemorrhoids    DENTAL SURGERY      Teeth Extracted In Past    ENDOSCOPY, COLON, DIAGNOSTIC  \"Once\" 1990's    EYE SURGERY Right 5-24-13    Cataract With Lens Implant    EYE SURGERY Left 1-17-14    Cataract With Lens Implant    FOOT SURGERY Left [address] [place] [address]

## 2022-08-08 DIAGNOSIS — C90.00 MULTIPLE MYELOMA NOT HAVING ACHIEVED REMISSION (HCC): ICD-10-CM

## 2022-08-08 RX ORDER — LENALIDOMIDE 10 MG/1
10 CAPSULE ORAL DAILY
Qty: 28 CAPSULE | Refills: 0 | Status: ACTIVE | OUTPATIENT
Start: 2022-08-08 | End: 2022-09-06 | Stop reason: SDUPTHER

## 2022-08-08 NOTE — PROGRESS NOTES
Revlimid 10 mg chemo capsules reordered and sent to [facility]. Raegan Slipper # [id number] obtained through [employer] risk management/REMS portal. Auth valid for 30 days.

## 2022-08-10 ENCOUNTER — HOSPITAL ENCOUNTER (OUTPATIENT)
Age: 72
Setting detail: SPECIMEN
Discharge: HOME | End: 2022-08-10
Payer: MEDICARE

## 2022-08-10 ENCOUNTER — CLINICAL DOCUMENTATION (OUTPATIENT)
Dept: ONCOLOGY | Age: 72
End: 2022-08-10

## 2022-08-10 ENCOUNTER — HOSPITAL ENCOUNTER (OUTPATIENT)
Dept: INFUSION THERAPY | Age: 72
Discharge: HOME | End: 2022-08-10
Payer: MEDICARE

## 2022-08-10 DIAGNOSIS — C90.00 MULTIPLE MYELOMA NOT HAVING ACHIEVED REMISSION (HCC): ICD-10-CM

## 2022-08-10 LAB
ALBUMIN SERPL-MCNC: 4 GM/DL (ref 3.4–5)
ALP BLD-CCNC: 122 IU/L (ref 40–129)
ALT SERPL-CCNC: 25 U/L (ref 10–40)
ANION GAP SERPL CALCULATED.3IONS-SCNC: 11 MMOL/L (ref 4–16)
AST SERPL-CCNC: 30 IU/L (ref 15–37)
BASOPHILS ABSOLUTE: 0 K/CU MM
BASOPHILS RELATIVE PERCENT: 0.7 % (ref 0–1)
BILIRUB SERPL-MCNC: 1.6 MG/DL (ref 0–1)
BUN BLDV-MCNC: 11 MG/DL (ref 6–23)
CALCIUM SERPL-MCNC: 8.1 MG/DL (ref 8.3–10.6)
CHLORIDE BLD-SCNC: 102 MMOL/L (ref 99–110)
CHOLESTEROL: 115 MG/DL
CO2: 31 MMOL/L (ref 21–32)
CREAT SERPL-MCNC: 0.7 MG/DL (ref 0.6–1.1)
DIFFERENTIAL TYPE: ABNORMAL
EOSINOPHILS ABSOLUTE: 0.1 K/CU MM
EOSINOPHILS RELATIVE PERCENT: 5.6 % (ref 0–3)
ERYTHROCYTE SEDIMENTATION RATE: 12 MM/HR (ref 0–30)
GFR AFRICAN AMERICAN: >60 ML/MIN/1.73M2
GFR NON-AFRICAN AMERICAN: >60 ML/MIN/1.73M2
GLUCOSE BLD-MCNC: 98 MG/DL (ref 70–99)
HCT VFR BLD CALC: 29.4 % (ref 37–47)
HDLC SERPL-MCNC: 64 MG/DL
HEMOGLOBIN: 9.9 GM/DL (ref 12.5–16)
IGA: 486 MG/DL (ref 69–382)
IGG,SERUM: 940 MG/DL (ref 723–1685)
IGM,SERUM: 28 MG/DL (ref 62–277)
LACTATE DEHYDROGENASE: 145 IU/L (ref 120–246)
LDL CHOLESTEROL CALCULATED: 36 MG/DL
LYMPHOCYTES ABSOLUTE: 0.3 K/CU MM
LYMPHOCYTES RELATIVE PERCENT: 21 % (ref 24–44)
MCH RBC QN AUTO: 31.7 PG (ref 27–31)
MCHC RBC AUTO-ENTMCNC: 33.7 % (ref 32–36)
MCV RBC AUTO: 94.2 FL (ref 78–100)
MONOCYTES ABSOLUTE: 0.3 K/CU MM
MONOCYTES RELATIVE PERCENT: 18.2 % (ref 0–4)
PDW BLD-RTO: 15.5 % (ref 11.7–14.9)
PLATELET # BLD: 92 K/CU MM (ref 140–440)
PMV BLD AUTO: 10.2 FL (ref 7.5–11.1)
POTASSIUM SERPL-SCNC: 3.1 MMOL/L (ref 3.5–5.1)
RBC # BLD: 3.12 M/CU MM (ref 4.2–5.4)
SEGMENTED NEUTROPHILS ABSOLUTE COUNT: 0.8 K/CU MM
SEGMENTED NEUTROPHILS RELATIVE PERCENT: 54.5 % (ref 36–66)
SODIUM BLD-SCNC: 144 MMOL/L (ref 135–145)
TOTAL PROTEIN: 6.1 GM/DL (ref 6.4–8.2)
TRIGL SERPL-MCNC: 75 MG/DL
WBC # BLD: 1.4 K/CU MM (ref 4–10.5)

## 2022-08-10 PROCEDURE — 83883 ASSAY NEPHELOMETRY NOT SPEC: CPT

## 2022-08-10 PROCEDURE — 36415 COLL VENOUS BLD VENIPUNCTURE: CPT

## 2022-08-10 PROCEDURE — 82232 ASSAY OF BETA-2 PROTEIN: CPT

## 2022-08-10 PROCEDURE — 82784 ASSAY IGA/IGD/IGG/IGM EACH: CPT

## 2022-08-10 PROCEDURE — 85025 COMPLETE CBC W/AUTO DIFF WBC: CPT

## 2022-08-10 PROCEDURE — 80061 LIPID PANEL: CPT

## 2022-08-10 PROCEDURE — 84165 PROTEIN E-PHORESIS SERUM: CPT

## 2022-08-10 PROCEDURE — 86320 SERUM IMMUNOELECTROPHORESIS: CPT

## 2022-08-10 PROCEDURE — 84155 ASSAY OF PROTEIN SERUM: CPT

## 2022-08-10 PROCEDURE — 83615 LACTATE (LD) (LDH) ENZYME: CPT

## 2022-08-10 PROCEDURE — 80053 COMPREHEN METABOLIC PANEL: CPT

## 2022-08-10 PROCEDURE — 85652 RBC SED RATE AUTOMATED: CPT

## 2022-08-10 NOTE — PROGRESS NOTES
[facility] presented to [facility] for labs today requesting to speak with RN regarding sore mouth. This RN assessed patient and stomatitis to inside of the lips and tongue noted. Patient also c/o sore throat. Patient states she has tried magic mouthwash in the past, but could only use for a short amount of time because after a while it made her mouth burn. Patient has been rising mouth with baking soda and water. Physician aware and also assessed patient. Advised to hold revlimid for 14 days. RX for magic mouthwash with nystatin and dexamethasone only sent to [facility] on [address] [place] [address]. Patient voices understanding. Denies further needs at this time.

## 2022-08-11 LAB
BETA-2 MICROGLOBULIN: 4.3 MG/L
KAPPA QUANT FREE LIGHT CHAINS: 65.41 MG/L (ref 3.3–19.4)
KAPPA/LAMBDA FREE LIGHT CHAIN RATIO: 1.24 (ref 0.26–1.65)
LAMBDA FREE LIGHT CHAINS QNT: 52.93 MG/L (ref 5.71–26.3)

## 2022-08-11 RX ORDER — POTASSIUM CHLORIDE 750 MG/1
10 TABLET, FILM COATED, EXTENDED RELEASE ORAL 2 TIMES DAILY
Qty: 20 TABLET | Refills: 0 | Status: SHIPPED | OUTPATIENT
Start: 2022-08-11 | End: 2022-10-18

## 2022-08-11 NOTE — PROGRESS NOTES
Received VM from patient requesting magic mouthwash be resent to pharmacy. Labs results reviewed. K 3.1. patient will need to take KCl 10 mEq BID x10 days per physician instruction. RX for magic mouthwash needs to be sent to compounding pharmacy, so both RX's sent to [facility]. Attempted to call patient back @ [phone]; however, no answer at this time. VM left with this RN direct number should patient have any questions.

## 2022-08-12 ENCOUNTER — CLINICAL DOCUMENTATION (OUTPATIENT)
Dept: ONCOLOGY | Age: 72
End: 2022-08-12

## 2022-08-12 LAB
ALBUMIN ELP: 3.2 GM/DL (ref 3.2–5.6)
ALPHA-1-GLOBULIN: 0.3 GM/DL (ref 0.1–0.4)
ALPHA-2-GLOBULIN: 0.6 GM/DL (ref 0.4–1.2)
BETA GLOBULIN: 0.9 GM/DL (ref 0.5–1.3)
GAMMA GLOBULIN: 1.1 GM/DL (ref 0.5–1.6)
SPEP INTERPRETATION: ABNORMAL
SPEP INTERPRETATION: NORMAL
TOTAL PROTEIN: 6.1 GM/DL (ref 6.4–8.2)

## 2022-08-12 NOTE — PROGRESS NOTES
Received VM from patient, concerning her Magic Mouthwash prescription. This nurse called [address] [place] [address] to verify receipt of prescription and spoke with Tariq W Nicki Castro in the compounding lab. Tariq Castro verified receipt and stated that the prescription was ready. This nurse attempted to notify patient @ [phone], however there was no answer. RN direct number left if patient has further questions or needs.

## 2022-08-14 NOTE — PROGRESS NOTES
Patient Name: Mary Beth Stallworth  Patient : 1950  Patient MRN: [id number]     Primary Oncologist: Georgi Godinez MD  Referring Provider: Crista Sweet DO     Date of Service: 2022      Chief Complaint:   Chief Complaint   Patient presents with    Follow-up     Patient Active Problem List:     Severe anemia     Multiple myeloma not having achieved remission      Drug related polyneuropathy      Osteopenia of multiple sites    HPI:   Mary Beth Stallworth is a 63-year-old very pleasnat female with medical history significant for hypertension, GERD, depression, anemia, initially presented to me on 2018 to follow up with her monocloal gammopathy. She initially presented to [facility] on 18 with low hemoglobin. She stated that she had colonoscopy in  by Dr. Jl Gomes. She has been tired and fatigue since 2018. She denies weight loss. She was found to have severe anemia on blood test done in her primary care physician office and she was asked to come to ER. Her base line hemoglobin was 8.4 -9.4 gram since . It was 13 grams in  and on arrival to hospital on 18 was 5.7 grams. She received 2 units of PRBC. I was called to evaluate her anemia at that time. I recognized that she has worsening macrocytic anemia. She also has mild leukopenia and thrombocytopenia. Her total protein level was significantly elevated (11.9 grams), but she has normal calcium and serum creatinine. I requested work ups to rule out plasma cell dyscrasias and she was found to have 7900 mg/dl IgG kappa monoclonal gammopathy on serum protein electrophoresis and serum immunofixation. Her beta 2 microglobulin was 7.5 mg./dl, serum kappa 9.34 mg/dl, lambda 0.19 mg/dl, ratio was 49.16. ESR > 120, CRP 3 and .     Bone marrow biopsy was done on 2018 and final pathology showed hypocellular bone marrow [85%], with partially preserved trilineage hematopoiesis and involvement by plasma cell neoplasm [85% of marrow cellularity is comprised of neoplastic plasma cells]. Cytogenetic reveals normal myocardial type [46XX]. Fish panel revealed gain of chromosome 1q21, monosomy 13, and aneuploidy [gain of chromosome 7, 9, 15 and FGFR3/4p]. Bone survey done on 1/8/19 showed multiple lytic lesions noted to the calvarium bilaterally as well as involving the left humerus. With the clinical history findings are concerning for multiple myeloma. No definite additional bony involvement identified. Multilevel degenerative changes to the cervical, thoracic and lumbar spine. Diffuse bone demineralization. First line chemotherapy with Velcade, Revlimid and Dexamethasone was started on January 18, 2019 and she completed her fifth cycle on 4/29/19. Her monoclonal protein has 7900mg/dl before therapy. It decreased to 1900mg/dl (2/8/19), 900mg/dl (3/4/19) and 400 mg/dl (4/26/19). She had bone marrow biopsy on 4/2/19 at [facility] and it showed no morphologic or immunophenotypic evidence of persistent/recurrent plasma cell neoplasm. She received autologus stem cell transplant on 5/16/19. Mammogram done on April 27, 2021 showed no mammographic evidence of malignancy. Maintenance chemotherapy with Revlimid was started since August 27, 2019. On August 17, 2022, she presented to me for follow-up. I have been following her for stage III IgG kappa multiple myeloma and she is status post first line chemotherapy with Velcade, Revlimid and Dexamethasone (received total 4 cycles) and autologous stem cell transplantation. She has been on maintenance chemotherapy with revlimid since August 27, 2019. She is tolerating maintenance chemotherapy, Revlimid well and she doesn't encounter any major side effects from Revlimid. We change Revlimid to three weeks on one week off, since she has been having increasing SOB and fatigue.  Since her symptoms are resolved now, we resumed back on daily basis since January 14, 2020. She has normal SPEP and SARABJIT on 8/10/22 blood test.     I believe she is in stringent complete remission and I recommend that she continue with current dose of Revlimid for now. Since she has more anemia and neutropenia, I asked her to hold revlimid for 14 days. She has been having diarrhea due to revlimid since 3/2022. She has been on lomotil. Since she has significant diarrhea, I asked her to start taking revlimid 3 weeks on 1 week off schecule. I will reevaluate her multiple myeloma again in 2 months and I will see her again after that. Chemo induced thromboembolism prophylaxis - I recommend her to continue with aspirin 81 mg daily. Myeloma bone disease - Since she has completed 2 years of therapy, we stopped it after 5/2021 dose. I asked her to continue with vitamin D daily. Chemo induced neuropathy - she is back on cymbalta 60 mg daily since her neuropathy gets worse after decreasing the dose. I asked her to take tramadol prn for neuropathic pain. Hypertension - she is on amlodipine and losartan. BP is stable and I recommend her for salt restriction. COVID19 vaccine -she is status post COVID-19 vaccine. .     I reviewed with her findings on screening mammogram done on 4/27/21 and DEXA scan, done on 3/9/2020. I recommend her to have a repeat mammogram and April 2022. Health maintenance - I recommend her to have age-appropriate cancer screening, exercise, low-fat and low-sodium diet. She doesn't have any significant symptoms on today visit. Past Medical History  Significant for  1. Hypertension  2. Gastroesophageal reflux disease  3. Depression  4. Anemia    Surgical History  Significant for  1. Cholecystectomy  2. Cataract surgery  3. Hysterectomy in 1985  4. Partial thyroidectomy  5. Tonsillectomy  6.   Bladder suspension surgery    Allergies  Adhesive tape  Sulfamide  lisinopril    Social History  She denies smoking and illicit drug abuse. She socially drink alcohol. She is currently living in [facility]. Family History  Significant for bladder cancer and brain cancer in one of her brother. Prostate cancer in another brother. No other family history of malignancy. Review of Systems: \"Per interval history; otherwise 10 point ROS is negative. \"  Her energy level is fair and her sleep is fine. She denies fever, chills, night sweats, cough, shortness of breath, chest pain, hemoptysis or palpitations. Her bowel and bladder functions are normal. She doesn't have nausea, vomiting, abdominal pain, diarrhea, constipation, dysuria, loss of appetite or weight loss. She denies neuropathy and she doesn't have bleeding or clotting issues. She doesn't have any pain in her body. No anxiety or depression. The rest of the systems are unremarkable. Vital Signs:  /60 (Site: Right Upper Arm, Position: Sitting, Cuff Size: Medium Adult)   Pulse 67   Temp (!) 96.7 °F (35.9 °C) (Temporal)   Resp 16   Ht 5' 4\" (1.626 m)   Wt 130 lb 12.8 oz (59.3 kg)   SpO2 97%   BMI 22.45 kg/m²     Physical Exam:  CONSTITUTIONAL: awake, alert, cooperative, no apparent distress,    EYES: pupils equal, round and reactive to light, sclera clear and conjunctiva normal  ENT: Normocephalic, without obvious abnormality, atraumatic  NECK: supple, symmetrical, no jugular venous distension and no carotid bruits   HEMATOLOGIC/LYMPHATIC: no cervical, supraclavicular or axillary lymphadenopathy   LUNGS: VBS, no wheezes, no increased work of breathing, no crackles, clear to auscultation, no rhonchi,    CARDIOVASCULAR: regular rate and rhythm, normal S1 and S2, no murmur noted  ABDOMEN: normal bowel sounds x 4, non-tender, no masses palpated, no hepatosplenomegaly, soft, non-distended,   MUSCULOSKELETAL: full range of motion noted, tone is normal  NEUROLOGIC: awake, alert, oriented to name, place and time. Motor skills grossly intact.    SKIN: Normal skin color, texture, turgor and no jaundice. appears intact   EXTREMITIES: no cyanosis, no clubbing, no LE edema, no leg swelling,     Labs:  Hematology:  Lab Results   Component Value Date    WBC 1.4 (LL) 08/10/2022    RBC 3.12 (L) 08/10/2022    HGB 9.9 (L) 08/10/2022    HCT 29.4 (L) 08/10/2022    MCV 94.2 08/10/2022    MCH 31.7 (H) 08/10/2022    MCHC 33.7 08/10/2022    RDW 15.5 (H) 08/10/2022    PLT 92 (L) 08/10/2022    MPV 10.2 08/10/2022    BANDSPCT 4 (L) 09/02/2020    SEGSPCT 54.5 08/10/2022    EOSRELPCT 5.6 (H) 08/10/2022    BASOPCT 0.7 08/10/2022    LYMPHOPCT 21.0 (L) 08/10/2022    MONOPCT 18.2 (H) 08/10/2022    BANDABS 0.08 09/02/2020    SEGSABS 0.8 08/10/2022    EOSABS 0.1 08/10/2022    BASOSABS 0.0 08/10/2022    LYMPHSABS 0.3 08/10/2022    MONOSABS 0.3 08/10/2022    DIFFTYPE AUTOMATED DIFFERENTIAL 08/10/2022    ANISOCYTOSIS 1+ 12/22/2018    POLYCHROM 1+ 12/23/2018    WBCMORP OCCASIONAL 09/02/2020    PLTM PLATELETS APPEAR NORMAL 12/23/2018     Lab Results   Component Value Date    ESR 12 08/10/2022     Chemistry:  Lab Results   Component Value Date     08/10/2022    K 3.1 (L) 08/10/2022     08/10/2022    CO2 31 08/10/2022    BUN 11 08/10/2022    CREATININE 0.7 08/10/2022    GLUCOSE 98 08/10/2022    CALCIUM 8.1 (L) 08/10/2022    PROT 6.1 (L) 08/10/2022    PROT 6.1 (L) 08/10/2022    LABALBU 4.0 08/10/2022    BILITOT 1.6 (H) 08/10/2022    ALKPHOS 122 08/10/2022    AST 30 08/10/2022    ALT 25 08/10/2022    LABGLOM >60 08/10/2022    GFRAA >60 08/10/2022    AGRATIO 1.2 09/10/2020    GLOB 2.9 09/10/2020    MG 2.2 09/02/2020    POCCA 1.13 05/11/2021    POCGLU 121 (H) 05/11/2021     Lab Results   Component Value Date     08/10/2022     No components found for: LD  Lab Results   Component Value Date    TSHHS 4.540 (H) 12/22/2018     Immunology:  Lab Results   Component Value Date    PROT 6.1 (L) 08/10/2022    PROT 6.1 (L) 08/10/2022    SPEP INTERPRETATION - Within normal limits.   RS 08/10/2022    SPEP INTERPRETATION - Within normal limits. RS 08/10/2022    ALBUMINELP 3.2 08/10/2022    LABALPH 0.3 08/10/2022    LABALPH 0.6 08/10/2022    LABBETA 0.9 08/10/2022    GAMGLOB 1.1 08/10/2022     Lab Results   Component Value Date    KAPPAUVOL 65.41 (H) 08/10/2022    LAMBDAUVOL 33.20 (H) 11/04/2020    KLFLCR 1.24 08/10/2022     Lab Results   Component Value Date    B2M 4.3 (H) 08/10/2022     Coagulation Panel:  Lab Results   Component Value Date    PROTIME 12.2 09/02/2020    INR 1.01 09/02/2020    APTT 27.9 09/02/2020    DDIMER 2430 (H) 09/02/2020     Anemia Panel:  Lab Results   Component Value Date    UWECAZPX25 954.6 (H) 12/22/2018    FOLATE >20.0 (H) 09/17/2019     Tumor Markers:  No results found for: , CEA, , LABCA2, PSA  Observations:  No data recorded        Assessment & Plan:   Stage III IgG kappa multiple myeloma    PLAN  Ms. Chad Aguilera is a 65-year-old very pleasant female who was found to have significantly high monoclonal gammopathy (7300 mg/dl) when she presented with symptomatic anemia. Furhter work ups with bone marrow biopsy confirmed that she has stage III IgG kappa multiple myeloma (high risk disease). Since she has symptomatic multiple myeloma, we started first-line chemotherapy with Velcade, Revlimid and dexamethasone on January 18, 2019 and she completed her fifth cycle on 4/29/19. Her monoclonal protein has 7900mg/dl before therapy. It decreased to 1900mg/dl (2/8/19), 900mg/dl (3/4/19) and 400 mg/dl (4/26/19). She had bone marrow biopsy on 4/2/19 at [facility] and it showed no morphologic or immunophenotypic evidence of persistent/recurrent plasma cell neoplasm. She received autologus stem cell transplant on 5/16/19. Mammogram done on April 27, 2021 showed no mammographic evidence of malignancy. Maintenance chemotherapy with Revlimid was started since August 27, 2019. On August 17, 2022, she presented to me for follow-up.  I have been following her for stage III IgG kappa multiple myeloma and she is status post first line chemotherapy with Velcade, Revlimid and Dexamethasone (received total 4 cycles) and autologous stem cell transplantation. She has been on maintenance chemotherapy with revlimid since August 27, 2019. She is tolerating maintenance chemotherapy, Revlimid well and she doesn't encounter any major side effects from Revlimid. We change Revlimid to three weeks on one week off, since she has been having increasing SOB and fatigue. Since her symptoms are resolved now, we resumed back on daily basis since January 14, 2020. She has normal SPEP and SARABJIT on 8/10/22 blood test.     I believe she is in stringent complete remission and I recommend that she continue with current dose of Revlimid for now. Since she has more anemia and neutropenia, I asked her to hold revlimid for 14 days. She has been having diarrhea due to revlimid since 3/2022. She has been on lomotil. Since she has significant diarrhea, I asked her to start taking revlimid 3 weeks on 1 week off schecule. I will reevaluate her multiple myeloma again in 2 months and I will see her again after that. Chemo induced thromboembolism prophylaxis - I recommend her to continue with aspirin 81 mg daily. Myeloma bone disease - Since she has completed 2 years of therapy, we stopped it after 5/2021 dose. I asked her to continue with vitamin D daily. Chemo induced neuropathy - she is back on cymbalta 60 mg daily since her neuropathy gets worse after decreasing the dose. I asked her to take tramadol prn for neuropathic pain. Hypertension - she is on amlodipine and losartan. BP is stable and I recommend her for salt restriction. COVID19 vaccine -she is status post COVID-19 vaccine. .     I reviewed with her findings on screening mammogram done on 4/27/21 and DEXA scan, done on 3/9/2020. I recommend her to have a repeat mammogram and April 2022.      Health maintenance - I recommend her to have age-appropriate cancer screening, exercise, low-fat and low-sodium diet. I answered all her questions and concerns for today. Recent imaging and labs were reviewed and discussed with the patient.

## 2022-08-17 ENCOUNTER — OFFICE VISIT (OUTPATIENT)
Dept: ONCOLOGY | Age: 72
End: 2022-08-17
Payer: MEDICARE

## 2022-08-17 ENCOUNTER — HOSPITAL ENCOUNTER (OUTPATIENT)
Dept: INFUSION THERAPY | Age: 72
End: 2022-08-17

## 2022-08-17 VITALS
TEMPERATURE: 96.7 F | HEART RATE: 67 BPM | WEIGHT: 130.8 LBS | HEIGHT: 64 IN | BODY MASS INDEX: 22.33 KG/M2 | SYSTOLIC BLOOD PRESSURE: 132 MMHG | RESPIRATION RATE: 16 BRPM | DIASTOLIC BLOOD PRESSURE: 60 MMHG | OXYGEN SATURATION: 97 %

## 2022-08-17 DIAGNOSIS — C90.00 MULTIPLE MYELOMA NOT HAVING ACHIEVED REMISSION (HCC): ICD-10-CM

## 2022-08-17 DIAGNOSIS — D69.6 THROMBOCYTOPENIA, UNSPECIFIED (HCC): ICD-10-CM

## 2022-08-17 PROCEDURE — 1123F ACP DISCUSS/DSCN MKR DOCD: CPT | Performed by: INTERNAL MEDICINE

## 2022-08-17 PROCEDURE — G8420 CALC BMI NORM PARAMETERS: HCPCS | Performed by: INTERNAL MEDICINE

## 2022-08-17 PROCEDURE — 1090F PRES/ABSN URINE INCON ASSESS: CPT | Performed by: INTERNAL MEDICINE

## 2022-08-17 PROCEDURE — 1036F TOBACCO NON-USER: CPT | Performed by: INTERNAL MEDICINE

## 2022-08-17 PROCEDURE — G8399 PT W/DXA RESULTS DOCUMENT: HCPCS | Performed by: INTERNAL MEDICINE

## 2022-08-17 PROCEDURE — 3017F COLORECTAL CA SCREEN DOC REV: CPT | Performed by: INTERNAL MEDICINE

## 2022-08-17 PROCEDURE — G8427 DOCREV CUR MEDS BY ELIG CLIN: HCPCS | Performed by: INTERNAL MEDICINE

## 2022-08-17 PROCEDURE — 99214 OFFICE O/P EST MOD 30 MIN: CPT | Performed by: INTERNAL MEDICINE

## 2022-08-17 RX ORDER — DIPHENOXYLATE HYDROCHLORIDE AND ATROPINE SULFATE 2.5; .025 MG/1; MG/1
2 TABLET ORAL 4 TIMES DAILY PRN
Qty: 240 TABLET | Refills: 2 | Status: SHIPPED | OUTPATIENT
Start: 2022-08-17 | End: 2022-09-16

## 2022-08-17 NOTE — PROGRESS NOTES
MA Rooming Questions  Patient: Mary Beth Stallworth  MRN: [id number]    Date: 8/17/2022        1. Do you have any new issues? yes - questions about Fosamax         2. Do you need any refills on medications? yes - Lomotil    3. Have you had any imaging done since your last visit?   no    4. Have you been hospitalized or seen in the emergency room since your last visit here?   no    5. Did the patient have a depression screening completed today?  No    No data recorded     PHQ-9 Given to (if applicable):               PHQ-9 Score (if applicable):                     [] Positive     []  Negative              Does question #9 need addressed (if applicable)                     [] Yes    []  No               Leonardo Medeiros CMA

## 2022-08-28 DIAGNOSIS — I10 ESSENTIAL HYPERTENSION: Chronic | ICD-10-CM

## 2022-08-29 RX ORDER — ALENDRONATE SODIUM 70 MG/1
TABLET ORAL
Qty: 12 TABLET | Refills: 1 | Status: SHIPPED | OUTPATIENT
Start: 2022-08-29

## 2022-08-29 RX ORDER — LOSARTAN POTASSIUM 50 MG/1
TABLET ORAL
Qty: 90 TABLET | Refills: 1 | OUTPATIENT
Start: 2022-08-29

## 2022-09-06 DIAGNOSIS — C90.00 MULTIPLE MYELOMA NOT HAVING ACHIEVED REMISSION (HCC): ICD-10-CM

## 2022-09-06 RX ORDER — LENALIDOMIDE 10 MG/1
10 CAPSULE ORAL DAILY
Qty: 28 CAPSULE | Refills: 0 | Status: ACTIVE | OUTPATIENT
Start: 2022-09-06 | End: 2022-09-19 | Stop reason: SDUPTHER

## 2022-09-06 NOTE — PROGRESS NOTES
Revlimid 10 chemo capsules reordered and sent to [facility]. Mikey Rula # [id number] obtained through [employer] management/REMS portal. Auth valid for 30 days.

## 2022-09-06 NOTE — PROGRESS NOTES
[address] [facility]    Date: 09/06/22    Medication is currently being filled at [facility] and has been routed there. Please call us with any questions at [phone] opt.  2.

## 2022-09-19 DIAGNOSIS — C90.00 MULTIPLE MYELOMA NOT HAVING ACHIEVED REMISSION (HCC): ICD-10-CM

## 2022-09-19 RX ORDER — LENALIDOMIDE 10 MG/1
10 CAPSULE ORAL DAILY
Qty: 28 CAPSULE | Refills: 0 | Status: ACTIVE | OUTPATIENT
Start: 2022-09-19 | End: 2022-09-30

## 2022-09-20 NOTE — PROGRESS NOTES
[facility]    Date: 09/20/22    Medication is currently being filled at [facility] and has been routed there. Please call us with any questions at [phone] opt.  2.

## 2022-09-30 ENCOUNTER — CLINICAL DOCUMENTATION (OUTPATIENT)
Dept: ONCOLOGY | Age: 72
End: 2022-09-30

## 2022-09-30 DIAGNOSIS — C90.00 MULTIPLE MYELOMA NOT HAVING ACHIEVED REMISSION (HCC): ICD-10-CM

## 2022-09-30 RX ORDER — LENALIDOMIDE 10 MG/1
10 CAPSULE ORAL DAILY
Qty: 28 CAPSULE | Refills: 0 | Status: ACTIVE | OUTPATIENT
Start: 2022-09-30

## 2022-09-30 RX ORDER — LENALIDOMIDE 10 MG/1
10 CAPSULE ORAL DAILY
Qty: 28 CAPSULE | Refills: 0 | Status: ACTIVE | OUTPATIENT
Start: 2022-09-30 | End: 2022-09-30 | Stop reason: SDUPTHER

## 2022-09-30 NOTE — PROGRESS NOTES
55 A. [employer] Update    Date: 09/30/22    Medication is currently being filled at [facility] and has been routed there. Please call us with any questions at [phone] opt.  2.

## 2022-09-30 NOTE — PROGRESS NOTES
[facility]    Date: 09/30/22    Medication is currently being filled at [facility] and has been routed there. Please call us with any questions at [phone] opt.  2.

## 2022-09-30 NOTE — PROGRESS NOTES
Revlimid 10 mg chemo capsules reordered and sent to [facility] pharmacy. Douglas Olivera # [id number]  obtained through [employer] risk management/REMS portal. Auth valid for 30 days.

## 2022-10-11 ENCOUNTER — HOSPITAL ENCOUNTER (OUTPATIENT)
Dept: INFUSION THERAPY | Age: 72
Discharge: HOME | End: 2022-10-11
Payer: MEDICARE

## 2022-10-11 DIAGNOSIS — C90.00 MULTIPLE MYELOMA NOT HAVING ACHIEVED REMISSION (HCC): ICD-10-CM

## 2022-10-11 LAB
ALBUMIN SERPL-MCNC: 3.8 GM/DL (ref 3.4–5)
ALP BLD-CCNC: 100 IU/L (ref 40–129)
ALT SERPL-CCNC: 19 U/L (ref 10–40)
ANION GAP SERPL CALCULATED.3IONS-SCNC: 7 MMOL/L (ref 4–16)
AST SERPL-CCNC: 21 IU/L (ref 15–37)
BASOPHILS ABSOLUTE: 0.1 K/CU MM
BASOPHILS RELATIVE PERCENT: 1.9 % (ref 0–1)
BILIRUB SERPL-MCNC: 0.7 MG/DL (ref 0–1)
BUN BLDV-MCNC: 12 MG/DL (ref 6–23)
CALCIUM SERPL-MCNC: 8.6 MG/DL (ref 8.3–10.6)
CHLORIDE BLD-SCNC: 104 MMOL/L (ref 99–110)
CO2: 29 MMOL/L (ref 21–32)
CREAT SERPL-MCNC: 0.7 MG/DL (ref 0.6–1.1)
DIFFERENTIAL TYPE: ABNORMAL
EOSINOPHILS ABSOLUTE: 0.2 K/CU MM
EOSINOPHILS RELATIVE PERCENT: 6.3 % (ref 0–3)
ERYTHROCYTE SEDIMENTATION RATE: 2 MM/HR (ref 0–30)
GFR AFRICAN AMERICAN: >60 ML/MIN/1.73M2
GFR NON-AFRICAN AMERICAN: >60 ML/MIN/1.73M2
GLUCOSE BLD-MCNC: 115 MG/DL (ref 70–99)
HCT VFR BLD CALC: 33.5 % (ref 37–47)
HEMOGLOBIN: 11 GM/DL (ref 12.5–16)
IGA: 432 MG/DL (ref 69–382)
IGG,SERUM: 1066 MG/DL (ref 723–1685)
IGM,SERUM: <25 MG/DL (ref 62–277)
LACTATE DEHYDROGENASE: 133 IU/L (ref 120–246)
LYMPHOCYTES ABSOLUTE: 0.5 K/CU MM
LYMPHOCYTES RELATIVE PERCENT: 17.1 % (ref 24–44)
MCH RBC QN AUTO: 32.4 PG (ref 27–31)
MCHC RBC AUTO-ENTMCNC: 32.8 % (ref 32–36)
MCV RBC AUTO: 98.5 FL (ref 78–100)
MONOCYTES ABSOLUTE: 0.5 K/CU MM
MONOCYTES RELATIVE PERCENT: 16.2 % (ref 0–4)
PDW BLD-RTO: 14.9 % (ref 11.7–14.9)
PLATELET # BLD: 174 K/CU MM (ref 140–440)
PMV BLD AUTO: 9.4 FL (ref 7.5–11.1)
POTASSIUM SERPL-SCNC: 3.9 MMOL/L (ref 3.5–5.1)
RBC # BLD: 3.4 M/CU MM (ref 4.2–5.4)
SEGMENTED NEUTROPHILS ABSOLUTE COUNT: 1.8 K/CU MM
SEGMENTED NEUTROPHILS RELATIVE PERCENT: 58.5 % (ref 36–66)
SODIUM BLD-SCNC: 140 MMOL/L (ref 135–145)
TOTAL PROTEIN: 6.1 GM/DL (ref 6.4–8.2)
WBC # BLD: 3.2 K/CU MM (ref 4–10.5)

## 2022-10-11 PROCEDURE — 85025 COMPLETE CBC W/AUTO DIFF WBC: CPT

## 2022-10-11 PROCEDURE — 85652 RBC SED RATE AUTOMATED: CPT

## 2022-10-11 PROCEDURE — 86320 SERUM IMMUNOELECTROPHORESIS: CPT

## 2022-10-11 PROCEDURE — 36415 COLL VENOUS BLD VENIPUNCTURE: CPT

## 2022-10-11 PROCEDURE — 82232 ASSAY OF BETA-2 PROTEIN: CPT

## 2022-10-11 PROCEDURE — 83883 ASSAY NEPHELOMETRY NOT SPEC: CPT

## 2022-10-11 PROCEDURE — 84165 PROTEIN E-PHORESIS SERUM: CPT

## 2022-10-11 PROCEDURE — 84155 ASSAY OF PROTEIN SERUM: CPT

## 2022-10-11 PROCEDURE — 83615 LACTATE (LD) (LDH) ENZYME: CPT

## 2022-10-11 PROCEDURE — 82784 ASSAY IGA/IGD/IGG/IGM EACH: CPT

## 2022-10-11 PROCEDURE — 80053 COMPREHEN METABOLIC PANEL: CPT

## 2022-10-11 NOTE — PROGRESS NOTES
Patient Name: Aamir Putnam  Patient : 1950  Patient MRN: [id number]     Primary Oncologist: Zulema So MD  Referring Provider: Kranthi Simmons DO     Date of Service: 10/18/2022      Chief Complaint:   Chief Complaint   Patient presents with    Follow-up     Patient Active Problem List:     Severe anemia     Multiple myeloma not having achieved remission      Drug related polyneuropathy      Osteopenia of multiple sites    HPI:   Aamir Putnam is a 68-year-old very pleasnat female with medical history significant for hypertension, GERD, depression, anemia, initially presented to me on 2018 to follow up with her monocloal gammopathy. She initially presented to [facility] on 18 with low hemoglobin. She stated that she had colonoscopy in  by Dr. Pedro Mitchell. She has been tired and fatigue since 2018. She denies weight loss. She was found to have severe anemia on blood test done in her primary care physician office and she was asked to come to ER. Her base line hemoglobin was 8.4 -9.4 gram since . It was 13 grams in  and on arrival to hospital on 18 was 5.7 grams. She received 2 units of PRBC. I was called to evaluate her anemia at that time. I recognized that she has worsening macrocytic anemia. She also has mild leukopenia and thrombocytopenia. Her total protein level was significantly elevated (11.9 grams), but she has normal calcium and serum creatinine. I requested work ups to rule out plasma cell dyscrasias and she was found to have 7900 mg/dl IgG kappa monoclonal gammopathy on serum protein electrophoresis and serum immunofixation. Her beta 2 microglobulin was 7.5 mg./dl, serum kappa 9.34 mg/dl, lambda 0.19 mg/dl, ratio was 49.16. ESR > 120, CRP 3 and .     Bone marrow biopsy was done on 2018 and final pathology showed hypocellular bone marrow [85%], with partially preserved trilineage hematopoiesis and involvement by plasma cell neoplasm [85% of marrow cellularity is comprised of neoplastic plasma cells]. Cytogenetic reveals normal myocardial type [46XX]. Fish panel revealed gain of chromosome 1q21, monosomy 13, and aneuploidy [gain of chromosome 7, 9, 15 and FGFR3/4p]. Bone survey done on 1/8/19 showed multiple lytic lesions noted to the calvarium bilaterally as well as involving the left humerus. With the clinical history findings are concerning for multiple myeloma. No definite additional bony involvement identified. Multilevel degenerative changes to the cervical, thoracic and lumbar spine. Diffuse bone demineralization. First line chemotherapy with Velcade, Revlimid and Dexamethasone was started on January 18, 2019 and she completed her fifth cycle on 4/29/19. Her monoclonal protein has 7900mg/dl before therapy. It decreased to 1900mg/dl (2/8/19), 900mg/dl (3/4/19) and 400 mg/dl (4/26/19). She had bone marrow biopsy on 4/2/19 at [facility] and it showed no morphologic or immunophenotypic evidence of persistent/recurrent plasma cell neoplasm. She received autologus stem cell transplant on 5/16/19. Mammogram done on April 27, 2021 showed no mammographic evidence of malignancy. Maintenance chemotherapy with Revlimid was started since August 27, 2019. On October 18, 2022, she presented to me for follow-up. I have been following her for stage III IgG kappa multiple myeloma and she is status post first line chemotherapy with Velcade, Revlimid and Dexamethasone (received total 4 cycles) and autologous stem cell transplantation. She has been on maintenance chemotherapy with revlimid since August 27, 2019. She is tolerating maintenance chemotherapy, Revlimid well and she doesn't encounter any major side effects from Revlimid. We change Revlimid to three weeks on one week off, since she has been having increasing SOB and fatigue.  Since her symptoms are resolved now, we resumed back on daily basis since January 14, 2020 until 8/16/22. Since she has significant diarrhea, we changed revlimid to 3 weeks on 1 week off schecule starting from 8/17/22. She has normal SPEP and SARABJIT on 10/11/22 blood test.     I believe she is in stringent complete remission and I recommend that she continue with current dose of Revlimid for now. She has been having diarrhea due to revlimid since 3/2022. She has been on lomotil with good control. I will reevaluate her multiple myeloma again in 3 months and I will see her again after that. Chemo induced thromboembolism prophylaxis - I recommend her to continue with aspirin 81 mg daily. Myeloma bone disease - Since she has completed 2 years of therapy, we stopped it after 5/2021 dose. I asked her to continue with vitamin D daily. Chemo induced neuropathy - she is back on cymbalta 60 mg daily since her neuropathy gets worse after decreasing the dose. I asked her to take tramadol prn for neuropathic pain. Hypertension - she is on amlodipine and losartan. BP is stable and I recommend her for salt restriction. COVID19 vaccine -she is status post COVID-19 vaccine. .     I reviewed with her findings on screening mammogram done on 4/27/21 and DEXA scan, done on 3/9/2020. I recommend her to have a repeat mammogram and April 2022. Health maintenance - I recommend her to have age-appropriate cancer screening, exercise, low-fat and low-sodium diet. She doesn't have any significant symptoms on today visit. Past Medical History  Significant for  1. Hypertension  2. Gastroesophageal reflux disease  3. Depression  4. Anemia    Surgical History  Significant for  1. Cholecystectomy  2. Cataract surgery  3. Hysterectomy in 1985  4. Partial thyroidectomy  5. Tonsillectomy  6. Bladder suspension surgery    Allergies  Adhesive tape  Sulfamide  lisinopril    Social History  She denies smoking and illicit drug abuse.   She socially drink alcohol. She is currently living in [facility]. Family History  Significant for bladder cancer and brain cancer in one of her brother. Prostate cancer in another brother. No other family history of malignancy. Review of Systems: \"Per interval history; otherwise 10 point ROS is negative. \"  Her energy level is stable and her sleep is fine. She denies fever, chills, night sweats, cough, shortness of breath, chest pain, hemoptysis or palpitations. Her bowel and bladder functions are normal, except diarrhea. She doesn't have nausea, vomiting, abdominal pain, constipation, dysuria, loss of appetite or weight loss. She denies neuropathy and she doesn't have bleeding or clotting issues. She doesn't have any pain in her body. No anxiety or depression. The rest of the systems are unremarkable. Vital Signs:  BP (!) 149/64 (Site: Right Upper Arm, Position: Sitting, Cuff Size: Medium Adult)   Pulse 64   Temp 97.4 °F (36.3 °C) (Infrared)   Ht 5' 4\" (1.626 m)   Wt 133 lb (60.3 kg)   SpO2 97%   BMI 22.83 kg/m²     Physical Exam:  CONSTITUTIONAL: awake, alert, cooperative, no apparent distress,    EYES: pupils equal, round and reactive to light, sclera clear and conjunctiva normal  ENT: Normocephalic, without obvious abnormality, atraumatic  NECK: supple, symmetrical, no jugular venous distension and no carotid bruits   HEMATOLOGIC/LYMPHATIC: no cervical, supraclavicular or axillary lymphadenopathy   LUNGS: VBS, no wheezes, no increased work of breathing, no crackles, clear to auscultation, no rhonchi,    CARDIOVASCULAR: regular rate and rhythm, normal S1 and S2, no murmur noted  ABDOMEN: normal bowel sounds x 4, non-tender, no masses palpated, no hepatosplenomegaly, soft, non-distended,   MUSCULOSKELETAL: full range of motion noted, tone is normal  NEUROLOGIC: awake, alert, oriented to name, place and time. Motor skills grossly intact. SKIN: Normal skin color, texture, turgor and no jaundice.  appears intact EXTREMITIES: no clubbing, no LE edema, no cyanosis, no leg swelling,     Labs:  Hematology:  Lab Results   Component Value Date    WBC 3.2 (L) 10/11/2022    RBC 3.40 (L) 10/11/2022    HGB 11.0 (L) 10/11/2022    HCT 33.5 (L) 10/11/2022    MCV 98.5 10/11/2022    MCH 32.4 (H) 10/11/2022    MCHC 32.8 10/11/2022    RDW 14.9 10/11/2022     10/11/2022    MPV 9.4 10/11/2022    BANDSPCT 4 (L) 09/02/2020    SEGSPCT 58.5 10/11/2022    EOSRELPCT 6.3 (H) 10/11/2022    BASOPCT 1.9 (H) 10/11/2022    LYMPHOPCT 17.1 (L) 10/11/2022    MONOPCT 16.2 (H) 10/11/2022    BANDABS 0.08 09/02/2020    SEGSABS 1.8 10/11/2022    EOSABS 0.2 10/11/2022    BASOSABS 0.1 10/11/2022    LYMPHSABS 0.5 10/11/2022    MONOSABS 0.5 10/11/2022    DIFFTYPE AUTOMATED DIFFERENTIAL 10/11/2022    ANISOCYTOSIS 1+ 12/22/2018    POLYCHROM 1+ 12/23/2018    WBCMORP OCCASIONAL 09/02/2020    PLTM PLATELETS APPEAR NORMAL 12/23/2018     Lab Results   Component Value Date    ESR 2 10/11/2022     Chemistry:  Lab Results   Component Value Date     10/11/2022    K 3.9 10/11/2022     10/11/2022    CO2 29 10/11/2022    BUN 12 10/11/2022    CREATININE 0.7 10/11/2022    GLUCOSE 115 (H) 10/11/2022    CALCIUM 8.6 10/11/2022    PROT 6.1 (L) 10/11/2022    PROT 6.1 (L) 10/11/2022    LABALBU 3.8 10/11/2022    BILITOT 0.7 10/11/2022    ALKPHOS 100 10/11/2022    AST 21 10/11/2022    ALT 19 10/11/2022    LABGLOM >60 10/11/2022    GFRAA >60 10/11/2022    AGRATIO 1.2 09/10/2020    GLOB 2.9 09/10/2020    MG 2.2 09/02/2020    POCCA 1.13 05/11/2021    POCGLU 121 (H) 05/11/2021     Lab Results   Component Value Date     10/11/2022     No components found for: LD  Lab Results   Component Value Date    TSHHS 4.540 (H) 12/22/2018     Immunology:  Lab Results   Component Value Date    PROT 6.1 (L) 10/11/2022    PROT 6.1 (L) 10/11/2022    SPEP  10/11/2022     INTERPRETATION - Slightly decreased total proteins. A definitive monoclonal protein is not identified.   LP. SPEP  10/11/2022     INTERPRETATION - Definitive Monoclonal proteins are not seen. LP.    ALBUMINELP 3.4 10/11/2022    LABALPH 0.2 10/11/2022    LABALPH 0.5 10/11/2022    LABBETA 0.9 10/11/2022    GAMGLOB 1.1 10/11/2022     Lab Results   Component Value Date    KAPPAUVOL 41.34 (H) 10/11/2022    LAMBDAUVOL 33.20 (H) 11/04/2020    KLFLCR 1.00 10/11/2022     Lab Results   Component Value Date    B2M 2.2 10/11/2022     Coagulation Panel:  Lab Results   Component Value Date    PROTIME 12.2 09/02/2020    INR 1.01 09/02/2020    APTT 27.9 09/02/2020    DDIMER 2430 (H) 09/02/2020     Anemia Panel:  Lab Results   Component Value Date    TUOWAPID77 954.6 (H) 12/22/2018    FOLATE >20.0 (H) 09/17/2019     Tumor Markers:  No results found for: , CEA, , LABCA2, PSA  Observations:  No data recorded        Assessment & Plan:   Stage III IgG kappa multiple myeloma    PLAN  Ms. Solorio is a 29-year-old very pleasant female who was found to have significantly high monoclonal gammopathy (7300 mg/dl) when she presented with symptomatic anemia. Furhter work ups with bone marrow biopsy confirmed that she has stage III IgG kappa multiple myeloma (high risk disease). Since she has symptomatic multiple myeloma, we started first-line chemotherapy with Velcade, Revlimid and dexamethasone on January 18, 2019 and she completed her fifth cycle on 4/29/19. Her monoclonal protein has 7900mg/dl before therapy. It decreased to 1900mg/dl (2/8/19), 900mg/dl (3/4/19) and 400 mg/dl (4/26/19). She had bone marrow biopsy on 4/2/19 at [facility] and it showed no morphologic or immunophenotypic evidence of persistent/recurrent plasma cell neoplasm. She received autologus stem cell transplant on 5/16/19. Mammogram done on April 27, 2021 showed no mammographic evidence of malignancy. Maintenance chemotherapy with Revlimid was started since August 27, 2019. On October 18, 2022, she presented to me for follow-up.  I have been following her for stage III IgG kappa multiple myeloma and she is status post first line chemotherapy with Velcade, Revlimid and Dexamethasone (received total 4 cycles) and autologous stem cell transplantation. She has been on maintenance chemotherapy with revlimid since August 27, 2019. She is tolerating maintenance chemotherapy, Revlimid well and she doesn't encounter any major side effects from Revlimid. We change Revlimid to three weeks on one week off, since she has been having increasing SOB and fatigue. Since her symptoms are resolved now, we resumed back on daily basis since January 14, 2020 until 8/16/22. Since she has significant diarrhea, we changed revlimid to 3 weeks on 1 week off schecule starting from 8/17/22. She has normal SPEP and SARABJIT on 10/11/22 blood test.     I believe she is in stringent complete remission and I recommend that she continue with current dose of Revlimid for now. She has been having diarrhea due to revlimid since 3/2022. She has been on lomotil with good control. I will reevaluate her multiple myeloma again in 3 months and I will see her again after that. Chemo induced thromboembolism prophylaxis - I recommend her to continue with aspirin 81 mg daily. Myeloma bone disease - Since she has completed 2 years of therapy, we stopped it after 5/2021 dose. I asked her to continue with vitamin D daily. Chemo induced neuropathy - she is back on cymbalta 60 mg daily since her neuropathy gets worse after decreasing the dose. I asked her to take tramadol prn for neuropathic pain. Hypertension - she is on amlodipine and losartan. BP is stable and I recommend her for salt restriction. COVID19 vaccine -she is status post COVID-19 vaccine. .     I reviewed with her findings on screening mammogram done on 4/27/21 and DEXA scan, done on 3/9/2020. I recommend her to have a repeat mammogram and April 2022.      Health maintenance - I recommend her to have age-appropriate cancer screening, exercise, low-fat and low-sodium diet. I answered all her questions and concerns for today. Recent imaging and labs were reviewed and discussed with the patient.

## 2022-10-13 LAB
ALBUMIN ELP: 3.4 GM/DL (ref 3.2–5.6)
ALPHA-1-GLOBULIN: 0.2 GM/DL (ref 0.1–0.4)
ALPHA-2-GLOBULIN: 0.5 GM/DL (ref 0.4–1.2)
BETA GLOBULIN: 0.9 GM/DL (ref 0.5–1.3)
BETA-2 MICROGLOBULIN: 2.2 MG/L
GAMMA GLOBULIN: 1.1 GM/DL (ref 0.5–1.6)
KAPPA QUANT FREE LIGHT CHAINS: 41.34 MG/L (ref 3.3–19.4)
KAPPA/LAMBDA FREE LIGHT CHAIN RATIO: 1 (ref 0.26–1.65)
LAMBDA FREE LIGHT CHAINS QNT: 41.17 MG/L (ref 5.71–26.3)
SPEP INTERPRETATION: ABNORMAL
SPEP INTERPRETATION: NORMAL
TOTAL PROTEIN: 6.1 GM/DL (ref 6.4–8.2)

## 2022-10-18 ENCOUNTER — OFFICE VISIT (OUTPATIENT)
Dept: ONCOLOGY | Age: 72
End: 2022-10-18
Payer: MEDICARE

## 2022-10-18 ENCOUNTER — HOSPITAL ENCOUNTER (OUTPATIENT)
Dept: INFUSION THERAPY | Age: 72
Discharge: HOME | End: 2022-10-18
Payer: MEDICARE

## 2022-10-18 VITALS
WEIGHT: 133 LBS | OXYGEN SATURATION: 97 % | HEART RATE: 64 BPM | BODY MASS INDEX: 22.71 KG/M2 | SYSTOLIC BLOOD PRESSURE: 149 MMHG | TEMPERATURE: 97.4 F | HEIGHT: 64 IN | DIASTOLIC BLOOD PRESSURE: 64 MMHG

## 2022-10-18 DIAGNOSIS — C90.00 MULTIPLE MYELOMA NOT HAVING ACHIEVED REMISSION (HCC): Primary | ICD-10-CM

## 2022-10-18 PROCEDURE — 1123F ACP DISCUSS/DSCN MKR DOCD: CPT | Performed by: INTERNAL MEDICINE

## 2022-10-18 PROCEDURE — G8399 PT W/DXA RESULTS DOCUMENT: HCPCS | Performed by: INTERNAL MEDICINE

## 2022-10-18 PROCEDURE — G8484 FLU IMMUNIZE NO ADMIN: HCPCS | Performed by: INTERNAL MEDICINE

## 2022-10-18 PROCEDURE — 1036F TOBACCO NON-USER: CPT | Performed by: INTERNAL MEDICINE

## 2022-10-18 PROCEDURE — G8427 DOCREV CUR MEDS BY ELIG CLIN: HCPCS | Performed by: INTERNAL MEDICINE

## 2022-10-18 PROCEDURE — 3017F COLORECTAL CA SCREEN DOC REV: CPT | Performed by: INTERNAL MEDICINE

## 2022-10-18 PROCEDURE — G8420 CALC BMI NORM PARAMETERS: HCPCS | Performed by: INTERNAL MEDICINE

## 2022-10-18 PROCEDURE — 99214 OFFICE O/P EST MOD 30 MIN: CPT | Performed by: INTERNAL MEDICINE

## 2022-10-18 PROCEDURE — 1090F PRES/ABSN URINE INCON ASSESS: CPT | Performed by: INTERNAL MEDICINE

## 2022-10-18 PROCEDURE — 99211 OFF/OP EST MAY X REQ PHY/QHP: CPT

## 2022-10-18 NOTE — PROGRESS NOTES
MA Rooming Questions  Patient: Ila Bolanos  MRN: [id number]    Date: 10/18/2022        1. Do you have any new issues?   no         2. Do you need any refills on medications?    no    3. Have you had any imaging done since your last visit?   no    4. Have you been hospitalized or seen in the emergency room since your last visit here?   no    5. Did the patient have a depression screening completed today?  No    No data recorded     PHQ-9 Given to (if applicable):               PHQ-9 Score (if applicable):                     [] Positive     []  Negative              Does question #9 need addressed (if applicable)                     [] Yes    []  No               Kibmerly Dave CMA

## 2022-11-06 ENCOUNTER — PATIENT MESSAGE (OUTPATIENT)
Dept: FAMILY MEDICINE CLINIC | Age: 72
End: 2022-11-06

## 2022-11-06 DIAGNOSIS — I10 ESSENTIAL HYPERTENSION: Chronic | ICD-10-CM

## 2022-11-07 RX ORDER — LOSARTAN POTASSIUM 50 MG/1
TABLET ORAL
Qty: 30 TABLET | Refills: 0 | Status: SHIPPED | OUTPATIENT
Start: 2022-11-07

## 2022-11-07 RX ORDER — AMLODIPINE BESYLATE 5 MG/1
TABLET ORAL
Qty: 30 TABLET | Refills: 0 | Status: SHIPPED | OUTPATIENT
Start: 2022-11-07

## 2022-11-07 NOTE — TELEPHONE ENCOUNTER
From: Ila Bolanos  To: Dr. Vásquez Minor: 11/6/2022 2:10 PM EST  Subject: B/P meds    I left for vacation on 11/5 and my blood pressure meds didn't make the trip. I spoke to the pharmacist at the local [facility] and he explained that if the doctor could send a script, they could fill a new script. Would you be able to call a 30-day script to the [facility] in [address] [place] [address] at [phone] for:  5 mg amlodipine and 50 mg lorsartan potassium.     If you need to reach me, you can use this portal or cell phone [phone]    Thank you

## 2022-11-14 DIAGNOSIS — C90.00 MULTIPLE MYELOMA NOT HAVING ACHIEVED REMISSION (HCC): ICD-10-CM

## 2022-11-14 RX ORDER — LENALIDOMIDE 10 MG/1
10 CAPSULE ORAL DAILY
Qty: 28 CAPSULE | Refills: 0 | Status: ACTIVE | OUTPATIENT
Start: 2022-11-14

## 2022-11-14 NOTE — PROGRESS NOTES
Revlimid 10 mg chemo capsules (take 1 capsule by mouth daily for  28 days) reordered and sent to [facility] pharmacy. [place] # [id number] obtained through [email or web address] risk management/[employer] portal. Auth valid for 30 days.

## 2022-11-14 NOTE — PROGRESS NOTES
[facility] [address] [facility] [place] Update    Date: 11/14/22    Medication is currently being filled at [facility] and has been routed there. Please call us with any questions at [phone] opt.  2.

## 2022-12-14 DIAGNOSIS — C90.00 MULTIPLE MYELOMA NOT HAVING ACHIEVED REMISSION (HCC): ICD-10-CM

## 2022-12-14 RX ORDER — LENALIDOMIDE 10 MG/1
10 CAPSULE ORAL DAILY
Qty: 28 CAPSULE | Refills: 0 | OUTPATIENT
Start: 2022-12-14

## 2022-12-14 RX ORDER — LENALIDOMIDE 10 MG/1
10 CAPSULE ORAL DAILY
Qty: 28 CAPSULE | Refills: 0 | Status: ACTIVE | OUTPATIENT
Start: 2022-12-14

## 2022-12-14 NOTE — PROGRESS NOTES
Revlimid 10 mg chemo capsules ( Take 1 capsule by mouth daily QTY 28) reordered and sent to [facility]. Grazyna Noun # [id number] obtained through [employer] risk management/[employer] portal. Auth valid for 30 days.

## 2022-12-21 NOTE — TELEPHONE ENCOUNTER
Returned call to pt to verify her ov for 9/29 has been cancelled per her request. Pt requested her recent lab results; Nurse navigator informed to call pt with results. Complex Repair And Dermal Autograft Text: The defect edges were debeveled with a #15 scalpel blade.  The primary defect was closed partially with a complex linear closure.  Given the location of the defect, shape of the defect and the proximity to free margins an dermal autograft was deemed most appropriate to repair the remaining defect.  The graft was trimmed to fit the size of the remaining defect.  The graft was then placed in the primary defect, oriented appropriately, and sutured into place.

## 2023-01-05 DIAGNOSIS — C90.00 MULTIPLE MYELOMA NOT HAVING ACHIEVED REMISSION (HCC): ICD-10-CM

## 2023-01-05 RX ORDER — LENALIDOMIDE 10 MG/1
10 CAPSULE ORAL DAILY
Qty: 28 CAPSULE | Refills: 0 | Status: ACTIVE | OUTPATIENT
Start: 2023-01-05

## 2023-01-05 NOTE — PROGRESS NOTES
Revlimid 10 chemo capsules reordered and sent to [facility]. Royce [place]le[place] # [id number] obtained through [employer] risk management/[employer] portal. Auth valid for 30 days.

## 2023-01-08 NOTE — PROGRESS NOTES
Patient Name: Lay Ocampo  Patient : 1950  Patient MRN: [id number]     Primary Oncologist: Valarie Vicente MD  Referring Provider: Darrin Santana DO     Date of Service: 2023      Chief Complaint:   Chief Complaint   Patient presents with    Follow-up     Patient Active Problem List:     Severe anemia     Multiple myeloma not having achieved remission      Drug related polyneuropathy      Osteopenia of multiple sites    HPI:   Lay Ocampo is a 68-year-old very pleasnat female with medical history significant for hypertension, GERD, depression, anemia, initially presented to me on 2018 to follow up with her monocloal gammopathy. She initially presented to [facility] on 18 with low hemoglobin. She stated that she had colonoscopy in  by Dr. Queenie Pathak. She has been tired and fatigue since 2018. She denies weight loss. She was found to have severe anemia on blood test done in her primary care physician office and she was asked to come to ER. Her base line hemoglobin was 8.4 -9.4 gram since . It was 13 grams in  and on arrival to hospital on 18 was 5.7 grams. She received 2 units of PRBC. I was called to evaluate her anemia at that time. I recognized that she has worsening macrocytic anemia. She also has mild leukopenia and thrombocytopenia. Her total protein level was significantly elevated (11.9 grams), but she has normal calcium and serum creatinine. I requested work ups to rule out plasma cell dyscrasias and she was found to have 7900 mg/dl IgG kappa monoclonal gammopathy on serum protein electrophoresis and serum immunofixation. Her beta 2 microglobulin was 7.5 mg./dl, serum kappa 9.34 mg/dl, lambda 0.19 mg/dl, ratio was 49.16. ESR > 120, CRP 3 and .     Bone marrow biopsy was done on 2018 and final pathology showed hypocellular bone marrow [85%], with partially preserved trilineage hematopoiesis and involvement by plasma cell neoplasm [85% of marrow cellularity is comprised of neoplastic plasma cells]. Cytogenetic reveals normal myocardial type [46XX]. Fish panel revealed gain of chromosome 1q21, monosomy 13, and aneuploidy [gain of chromosome 7, 9, 15 and FGFR3/4p]. Bone survey done on 1/8/19 showed multiple lytic lesions noted to the calvarium bilaterally as well as involving the left humerus. With the clinical history findings are concerning for multiple myeloma. No definite additional bony involvement identified. Multilevel degenerative changes to the cervical, thoracic and lumbar spine. Diffuse bone demineralization. First line chemotherapy with Velcade, Revlimid and Dexamethasone was started on January 18, 2019 and she completed her fifth cycle on 4/29/19. Her monoclonal protein has 7900mg/dl before therapy. It decreased to 1900mg/dl (2/8/19), 900mg/dl (3/4/19) and 400 mg/dl (4/26/19). She had bone marrow biopsy on 4/2/19 at [facility] and it showed no morphologic or immunophenotypic evidence of persistent/recurrent plasma cell neoplasm. She received autologus stem cell transplant on 5/16/19. Mammogram done on April 27, 2021 showed no mammographic evidence of malignancy. Maintenance chemotherapy with Revlimid was started since August 27, 2019. On January 18, 2023, she presented to me for follow-up. I have been following her for stage III IgG kappa multiple myeloma and she is status post first line chemotherapy with Velcade, Revlimid and Dexamethasone (received total 4 cycles) and autologous stem cell transplantation. She has been on maintenance chemotherapy with revlimid since August 27, 2019. She is tolerating maintenance chemotherapy, Revlimid well and she doesn't encounter any major side effects from Revlimid. We change Revlimid to three weeks on one week off, since she has been having increasing SOB and fatigue.  Since her symptoms are resolved now, we resumed back on daily basis since January 14, 2020 until 8/16/22. Since she has significant diarrhea, we changed revlimid to 3 weeks on 1 week off schecule starting from 8/17/22. She has normal SPEP and SARABJIT on 1/11/23 blood test.     I believe she is in stringent complete remission and I recommend that she continues with current dose of Revlimid for now. She has been having diarrhea due to revlimid since 3/2022. She has been on lomotil with good control. I will reevaluate her multiple myeloma again in 3 months and I will see her again after that. Chemo induced thromboembolism prophylaxis - I recommend her to continue with aspirin 81 mg daily. Myeloma bone disease - Since she has completed 2 years of therapy, we stopped it after 5/2021 dose. I asked her to continue with vitamin D daily. Chemo induced neuropathy - she is back on cymbalta 60 mg daily since her neuropathy gets worse after decreasing the dose. I asked her to take tramadol prn for neuropathic pain. Hypertension - she is on amlodipine and losartan. BP is stable and I recommend her for salt restriction. COVID19 vaccine -she is status post COVID-19 vaccine. .     I reviewed with her findings on screening mammogram done on 5/9/22 and DEXA scan, done on 3/9/2020. I recommend her to have a repeat mammogram in 5/2023. Health maintenance - I recommend her to have age-appropriate cancer screening, exercise, low-fat and low-sodium diet. She doesn't have any significant symptoms on today visit. Past Medical History  Significant for  1. Hypertension  2. Gastroesophageal reflux disease  3. Depression  4. Anemia    Surgical History  Significant for  1. Cholecystectomy  2. Cataract surgery  3. Hysterectomy in 1985  4. Partial thyroidectomy  5. Tonsillectomy  6. Bladder suspension surgery    Allergies  Adhesive tape  Sulfamide  lisinopril    Social History  She denies smoking and illicit drug abuse. She socially drink alcohol. She is currently living in [place].    Family History  Significant for bladder cancer and brain cancer in one of her brother.  Prostate cancer in another brother. No other family history of malignancy.    Review of Systems:  \"Per interval history; otherwise 10 point ROS is negative.\"  Her energy level is pretty good today and her sleep is fine. She denies fever, chills, night sweats, cough, shortness of breath, chest pain, hemoptysis or palpitations. Her bowel and bladder functions are normal, except diarrhea. She doesn't have nausea, vomiting, abdominal pain, constipation, dysuria, loss of appetite or weight loss. She denies neuropathy and she doesn't have bleeding or clotting issues. She denies any pain in her body. No anxiety or depression. The rest of the systems are unremarkable.     Vital Signs:  /60 (Site: Right Upper Arm, Position: Sitting, Cuff Size: Medium Adult)   Pulse 76   Temp 97.3 °F (36.3 °C) (Infrared)   Ht 5' 4\" (1.626 m)   Wt 133 lb 12.8 oz (60.7 kg)   SpO2 98%   BMI 22.97 kg/m²     Physical Exam:  CONSTITUTIONAL: awake, alert, cooperative, no apparent distress,    EYES: pupils equal, round and reactive to light, sclera clear and conjunctiva normal  ENT: Normocephalic, without obvious abnormality, atraumatic  NECK: supple, symmetrical, no jugular venous distension and no carotid bruits   HEMATOLOGIC/LYMPHATIC: no cervical, supraclavicular or axillary lymphadenopathy   LUNGS: VBS, no wheezes, no increased work of breathing, no crackles, clear to auscultation, no rhonchi,    CARDIOVASCULAR: regular rate and rhythm, normal S1 and S2, no murmur noted  ABDOMEN: normal bowel sounds x 4, non-tender, no masses palpated, no hepatosplenomegaly, soft, non-distended,   MUSCULOSKELETAL: full range of motion noted, tone is normal  NEUROLOGIC: awake, alert, oriented to name, place and time. Motor skills grossly intact.   SKIN: Normal skin color, texture, turgor and no jaundice. appears intact  EXTREMITIES: no LE edema, no cyanosis, no clubbing, no leg swelling,     Labs:  Hematology:  Lab Results   Component Value Date    WBC 2.5 (L) 01/11/2023    RBC 3.75 (L) 01/11/2023    HGB 12.1 (L) 01/11/2023    HCT 36.4 (L) 01/11/2023    MCV 97.1 01/11/2023    MCH 32.3 (H) 01/11/2023    MCHC 33.2 01/11/2023    RDW 14.7 01/11/2023     01/11/2023    MPV 9.0 01/11/2023    BANDSPCT 4 (L) 09/02/2020    SEGSPCT 50.6 01/11/2023    EOSRELPCT 5.7 (H) 01/11/2023    BASOPCT 1.2 (H) 01/11/2023    LYMPHOPCT 28.2 01/11/2023    MONOPCT 14.3 (H) 01/11/2023    BANDABS 0.08 09/02/2020    SEGSABS 1.2 01/11/2023    EOSABS 0.1 01/11/2023    BASOSABS 0.0 01/11/2023    LYMPHSABS 0.7 01/11/2023    MONOSABS 0.4 01/11/2023    DIFFTYPE AUTOMATED DIFFERENTIAL 01/11/2023    ANISOCYTOSIS 1+ 12/22/2018    POLYCHROM 1+ 12/23/2018    WBCMORP OCCASIONAL 09/02/2020    PLTM PLATELETS APPEAR NORMAL 12/23/2018     Lab Results   Component Value Date    ESR 4 01/11/2023     Chemistry:  Lab Results   Component Value Date     01/11/2023    K 4.0 01/11/2023     01/11/2023    CO2 30 01/11/2023    BUN 9 01/11/2023    CREATININE 0.6 01/11/2023    GLUCOSE 102 (H) 01/11/2023    CALCIUM 8.7 01/11/2023    PROT 6.7 01/11/2023    PROT 6.7 01/11/2023    LABALBU 4.0 01/11/2023    BILITOT 1.1 (H) 01/11/2023    ALKPHOS 104 01/11/2023    AST 20 01/11/2023    ALT 21 01/11/2023    LABGLOM >60 01/11/2023    GFRAA >60 10/11/2022    AGRATIO 1.2 09/10/2020    GLOB 2.9 09/10/2020    MG 2.2 09/02/2020    POCCA 1.13 05/11/2021    POCGLU 121 (H) 05/11/2021     Lab Results   Component Value Date     01/11/2023     No components found for: LD  Lab Results   Component Value Date    TSHHS 4.540 (H) 12/22/2018     Immunology:  Lab Results   Component Value Date    PROT 6.7 01/11/2023    PROT 6.7 01/11/2023    SPEP  01/11/2023     INTERPRETATION - Within normal limits.   Tera Rodrigez MD    SPEP  01/11/2023     INTERPRETATION - Monoclonal gammopathy is not identified. Isaac Hammond MD    ALBUMINELP 3.6 01/11/2023    LABALPH 0.3 01/11/2023    LABALPH 0.6 01/11/2023    LABBETA 0.9 01/11/2023    GAMGLOB 1.3 01/11/2023     Lab Results   Component Value Date    KAPPAUVOL 41.98 (H) 01/11/2023    LAMBDAUVOL 33.20 (H) 11/04/2020    KLFLCR 1.04 01/11/2023     Lab Results   Component Value Date    B2M 2.0 01/11/2023     Coagulation Panel:  Lab Results   Component Value Date    PROTIME 12.2 09/02/2020    INR 1.01 09/02/2020    APTT 27.9 09/02/2020    DDIMER 2430 (H) 09/02/2020     Anemia Panel:  Lab Results   Component Value Date    VAQIZGZT10 954.6 (H) 12/22/2018    FOLATE >20.0 (H) 09/17/2019     Tumor Markers:  No results found for: , CEA, , LABCA2, PSA  Observations:  PHQ-9 Total Score: 0 (1/18/2023 11:00 AM)        Assessment:   Stage III IgG kappa multiple myeloma    Plan:  Ms. James Arriaza is a 51-year-old very pleasant female who was found to have significantly high monoclonal gammopathy (7300 mg/dl) when she presented with symptomatic anemia. Furhter work ups with bone marrow biopsy confirmed that she has stage III IgG kappa multiple myeloma (high risk disease). Since she has symptomatic multiple myeloma, we started first-line chemotherapy with Velcade, Revlimid and dexamethasone on January 18, 2019 and she completed her fifth cycle on 4/29/19. Her monoclonal protein has 7900mg/dl before therapy. It decreased to 1900mg/dl (2/8/19), 900mg/dl (3/4/19) and 400 mg/dl (4/26/19). She had bone marrow biopsy on 4/2/19 at [address] [place] [address] and it showed no morphologic or immunophenotypic evidence of persistent/recurrent plasma cell neoplasm. She received autologus stem cell transplant on 5/16/19. Mammogram done on April 27, 2021 showed no mammographic evidence of malignancy. Maintenance chemotherapy with Revlimid was started since August 27, 2019. On January 18, 2023, she presented to me for follow-up.  I have been following her for stage III IgG kappa multiple myeloma and she is status post first line chemotherapy with Velcade, Revlimid and Dexamethasone (received total 4 cycles) and autologous stem cell transplantation. She has been on maintenance chemotherapy with revlimid since August 27, 2019. She is tolerating maintenance chemotherapy, Revlimid well and she doesn't encounter any major side effects from Revlimid. We change Revlimid to three weeks on one week off, since she has been having increasing SOB and fatigue. Since her symptoms are resolved now, we resumed back on daily basis since January 14, 2020 until 8/16/22. Since she has significant diarrhea, we changed revlimid to 3 weeks on 1 week off schecule starting from 8/17/22. She has normal SPEP and SARABJIT on 1/11/23 blood test.     I believe she is in stringent complete remission and I recommend that she continues with current dose of Revlimid for now. She has been having diarrhea due to revlimid since 3/2022. She has been on lomotil with good control. I will reevaluate her multiple myeloma again in 3 months and I will see her again after that. Chemo induced thromboembolism prophylaxis - I recommend her to continue with aspirin 81 mg daily. Myeloma bone disease - Since she has completed 2 years of therapy, we stopped it after 5/2021 dose. I asked her to continue with vitamin D daily. Chemo induced neuropathy - she is back on cymbalta 60 mg daily since her neuropathy gets worse after decreasing the dose. I asked her to take tramadol prn for neuropathic pain. Hypertension - she is on amlodipine and losartan. BP is stable and I recommend her for salt restriction. COVID19 vaccine -she is status post COVID-19 vaccine. .     I reviewed with her findings on screening mammogram done on 5/9/22 and DEXA scan, done on 3/9/2020. I recommend her to have a repeat mammogram in 5/2023.      Health maintenance - I recommend her to have age-appropriate cancer screening, exercise, low-fat and low-sodium diet. I answered all her questions and concerns for today. Recent imaging and labs were reviewed and discussed with the patient.

## 2023-01-11 ENCOUNTER — HOSPITAL ENCOUNTER (OUTPATIENT)
Dept: INFUSION THERAPY | Age: 73
Discharge: HOME | End: 2023-01-11
Payer: MEDICARE

## 2023-01-11 DIAGNOSIS — C90.00 MULTIPLE MYELOMA NOT HAVING ACHIEVED REMISSION (HCC): ICD-10-CM

## 2023-01-11 LAB
ALBUMIN SERPL-MCNC: 4 GM/DL (ref 3.4–5)
ALP BLD-CCNC: 104 IU/L (ref 40–129)
ALT SERPL-CCNC: 21 U/L (ref 10–40)
ANION GAP SERPL CALCULATED.3IONS-SCNC: 8 MMOL/L (ref 4–16)
AST SERPL-CCNC: 20 IU/L (ref 15–37)
BASOPHILS ABSOLUTE: 0 K/CU MM
BASOPHILS RELATIVE PERCENT: 1.2 % (ref 0–1)
BILIRUB SERPL-MCNC: 1.1 MG/DL (ref 0–1)
BUN BLDV-MCNC: 9 MG/DL (ref 6–23)
CALCIUM SERPL-MCNC: 8.7 MG/DL (ref 8.3–10.6)
CHLORIDE BLD-SCNC: 102 MMOL/L (ref 99–110)
CO2: 30 MMOL/L (ref 21–32)
CREAT SERPL-MCNC: 0.6 MG/DL (ref 0.6–1.1)
DIFFERENTIAL TYPE: ABNORMAL
EOSINOPHILS ABSOLUTE: 0.1 K/CU MM
EOSINOPHILS RELATIVE PERCENT: 5.7 % (ref 0–3)
ERYTHROCYTE SEDIMENTATION RATE: 4 MM/HR (ref 0–30)
GFR SERPL CREATININE-BSD FRML MDRD: >60 ML/MIN/1.73M2
GLUCOSE BLD-MCNC: 102 MG/DL (ref 70–99)
HCT VFR BLD CALC: 36.4 % (ref 37–47)
HEMOGLOBIN: 12.1 GM/DL (ref 12.5–16)
IGA: 528 MG/DL (ref 69–382)
IGG,SERUM: 1104 MG/DL (ref 723–1685)
IGM,SERUM: <25 MG/DL (ref 62–277)
LACTATE DEHYDROGENASE: 138 IU/L (ref 120–246)
LYMPHOCYTES ABSOLUTE: 0.7 K/CU MM
LYMPHOCYTES RELATIVE PERCENT: 28.2 % (ref 24–44)
MCH RBC QN AUTO: 32.3 PG (ref 27–31)
MCHC RBC AUTO-ENTMCNC: 33.2 % (ref 32–36)
MCV RBC AUTO: 97.1 FL (ref 78–100)
MONOCYTES ABSOLUTE: 0.4 K/CU MM
MONOCYTES RELATIVE PERCENT: 14.3 % (ref 0–4)
PDW BLD-RTO: 14.7 % (ref 11.7–14.9)
PLATELET # BLD: 152 K/CU MM (ref 140–440)
PMV BLD AUTO: 9 FL (ref 7.5–11.1)
POTASSIUM SERPL-SCNC: 4 MMOL/L (ref 3.5–5.1)
RBC # BLD: 3.75 M/CU MM (ref 4.2–5.4)
SEGMENTED NEUTROPHILS ABSOLUTE COUNT: 1.2 K/CU MM
SEGMENTED NEUTROPHILS RELATIVE PERCENT: 50.6 % (ref 36–66)
SODIUM BLD-SCNC: 140 MMOL/L (ref 135–145)
TOTAL PROTEIN: 6.7 GM/DL (ref 6.4–8.2)
WBC # BLD: 2.5 K/CU MM (ref 4–10.5)

## 2023-01-11 PROCEDURE — 36415 COLL VENOUS BLD VENIPUNCTURE: CPT

## 2023-01-11 PROCEDURE — 83883 ASSAY NEPHELOMETRY NOT SPEC: CPT

## 2023-01-11 PROCEDURE — 83615 LACTATE (LD) (LDH) ENZYME: CPT

## 2023-01-11 PROCEDURE — 84165 PROTEIN E-PHORESIS SERUM: CPT

## 2023-01-11 PROCEDURE — 82784 ASSAY IGA/IGD/IGG/IGM EACH: CPT

## 2023-01-11 PROCEDURE — 84155 ASSAY OF PROTEIN SERUM: CPT

## 2023-01-11 PROCEDURE — 85652 RBC SED RATE AUTOMATED: CPT

## 2023-01-11 PROCEDURE — 85025 COMPLETE CBC W/AUTO DIFF WBC: CPT

## 2023-01-11 PROCEDURE — 80053 COMPREHEN METABOLIC PANEL: CPT

## 2023-01-11 PROCEDURE — 86320 SERUM IMMUNOELECTROPHORESIS: CPT

## 2023-01-11 PROCEDURE — 82232 ASSAY OF BETA-2 PROTEIN: CPT

## 2023-01-13 LAB
ALBUMIN ELP: 3.6 GM/DL (ref 3.2–5.6)
ALPHA-1-GLOBULIN: 0.3 GM/DL (ref 0.1–0.4)
ALPHA-2-GLOBULIN: 0.6 GM/DL (ref 0.4–1.2)
BETA GLOBULIN: 0.9 GM/DL (ref 0.5–1.3)
BETA-2 MICROGLOBULIN: 2 MG/L
GAMMA GLOBULIN: 1.3 GM/DL (ref 0.5–1.6)
KAPPA QUANT FREE LIGHT CHAINS: 41.98 MG/L (ref 3.3–19.4)
KAPPA/LAMBDA FREE LIGHT CHAIN RATIO: 1.04 (ref 0.26–1.65)
LAMBDA FREE LIGHT CHAINS QNT: 40.44 MG/L (ref 5.71–26.3)
SPEP INTERPRETATION: NORMAL
SPEP INTERPRETATION: NORMAL
TOTAL PROTEIN: 6.7 GM/DL (ref 6.4–8.2)

## 2023-01-18 ENCOUNTER — OFFICE VISIT (OUTPATIENT)
Dept: ONCOLOGY | Age: 73
End: 2023-01-18
Payer: MEDICARE

## 2023-01-18 ENCOUNTER — HOSPITAL ENCOUNTER (OUTPATIENT)
Dept: INFUSION THERAPY | Age: 73
Discharge: HOME | End: 2023-01-18
Payer: MEDICARE

## 2023-01-18 VITALS
SYSTOLIC BLOOD PRESSURE: 137 MMHG | DIASTOLIC BLOOD PRESSURE: 60 MMHG | WEIGHT: 133.8 LBS | HEIGHT: 64 IN | TEMPERATURE: 97.3 F | BODY MASS INDEX: 22.84 KG/M2 | OXYGEN SATURATION: 98 % | HEART RATE: 76 BPM

## 2023-01-18 DIAGNOSIS — D69.6 THROMBOCYTOPENIA, UNSPECIFIED (HCC): ICD-10-CM

## 2023-01-18 DIAGNOSIS — C90.00 MULTIPLE MYELOMA NOT HAVING ACHIEVED REMISSION (HCC): Primary | Chronic | ICD-10-CM

## 2023-01-18 PROCEDURE — 1036F TOBACCO NON-USER: CPT | Performed by: INTERNAL MEDICINE

## 2023-01-18 PROCEDURE — G8484 FLU IMMUNIZE NO ADMIN: HCPCS | Performed by: INTERNAL MEDICINE

## 2023-01-18 PROCEDURE — 1090F PRES/ABSN URINE INCON ASSESS: CPT | Performed by: INTERNAL MEDICINE

## 2023-01-18 PROCEDURE — G8399 PT W/DXA RESULTS DOCUMENT: HCPCS | Performed by: INTERNAL MEDICINE

## 2023-01-18 PROCEDURE — 3075F SYST BP GE 130 - 139MM HG: CPT | Performed by: INTERNAL MEDICINE

## 2023-01-18 PROCEDURE — 99211 OFF/OP EST MAY X REQ PHY/QHP: CPT

## 2023-01-18 PROCEDURE — G8427 DOCREV CUR MEDS BY ELIG CLIN: HCPCS | Performed by: INTERNAL MEDICINE

## 2023-01-18 PROCEDURE — 1123F ACP DISCUSS/DSCN MKR DOCD: CPT | Performed by: INTERNAL MEDICINE

## 2023-01-18 PROCEDURE — 99214 OFFICE O/P EST MOD 30 MIN: CPT | Performed by: INTERNAL MEDICINE

## 2023-01-18 PROCEDURE — 3078F DIAST BP <80 MM HG: CPT | Performed by: INTERNAL MEDICINE

## 2023-01-18 PROCEDURE — 3017F COLORECTAL CA SCREEN DOC REV: CPT | Performed by: INTERNAL MEDICINE

## 2023-01-18 PROCEDURE — G8420 CALC BMI NORM PARAMETERS: HCPCS | Performed by: INTERNAL MEDICINE

## 2023-01-18 ASSESSMENT — PATIENT HEALTH QUESTIONNAIRE - PHQ9
SUM OF ALL RESPONSES TO PHQ9 QUESTIONS 1 & 2: 0
SUM OF ALL RESPONSES TO PHQ QUESTIONS 1-9: 0
SUM OF ALL RESPONSES TO PHQ QUESTIONS 1-9: 0
2. FEELING DOWN, DEPRESSED OR HOPELESS: 0
SUM OF ALL RESPONSES TO PHQ QUESTIONS 1-9: 0
SUM OF ALL RESPONSES TO PHQ QUESTIONS 1-9: 0
1. LITTLE INTEREST OR PLEASURE IN DOING THINGS: 0

## 2023-01-18 NOTE — PROGRESS NOTES
MA Rooming Questions  Patient: Abida Loco  MRN: [id number]    Date: 1/18/2023        1. Do you have any new issues?   no         2. Do you need any refills on medications?    no    3. Have you had any imaging done since your last visit?   no    4. Have you been hospitalized or seen in the emergency room since your last visit here?   no    5. Did the patient have a depression screening completed today?  Yes    PHQ-9 Total Score: 0 (1/18/2023 11:00 AM)       PHQ-9 Given to (if applicable):               PHQ-9 Score (if applicable):                     [] Positive     [x]  Negative              Does question #9 need addressed (if applicable)                     [] Yes    []  No               Sharda Vickers CMA

## 2023-01-24 ENCOUNTER — HOSPITAL ENCOUNTER (OUTPATIENT)
Age: 73
Discharge: HOME | End: 2023-01-24
Payer: MEDICARE

## 2023-01-24 DIAGNOSIS — D69.6 THROMBOCYTOPENIA, UNSPECIFIED (HCC): ICD-10-CM

## 2023-01-24 DIAGNOSIS — C90.00 MULTIPLE MYELOMA NOT HAVING ACHIEVED REMISSION (HCC): Chronic | ICD-10-CM

## 2023-01-24 LAB
ALBUMIN SERPL-MCNC: 4.1 GM/DL (ref 3.4–5)
ALP BLD-CCNC: 89 IU/L (ref 40–128)
ALT SERPL-CCNC: 19 U/L (ref 10–40)
ANION GAP SERPL CALCULATED.3IONS-SCNC: 5 MMOL/L (ref 4–16)
AST SERPL-CCNC: 21 IU/L (ref 15–37)
BASOPHILS ABSOLUTE: 0.1 K/CU MM
BASOPHILS RELATIVE PERCENT: 2.3 % (ref 0–1)
BILIRUB SERPL-MCNC: 1.1 MG/DL (ref 0–1)
BUN BLDV-MCNC: 10 MG/DL (ref 6–23)
CALCIUM SERPL-MCNC: 9.1 MG/DL (ref 8.3–10.6)
CHLORIDE BLD-SCNC: 102 MMOL/L (ref 99–110)
CO2: 32 MMOL/L (ref 21–32)
CREAT SERPL-MCNC: 0.7 MG/DL (ref 0.6–1.1)
DIFFERENTIAL TYPE: ABNORMAL
EOSINOPHILS ABSOLUTE: 0.1 K/CU MM
EOSINOPHILS RELATIVE PERCENT: 3.2 % (ref 0–3)
ERYTHROCYTE SEDIMENTATION RATE: 1 MM/HR (ref 0–30)
GFR SERPL CREATININE-BSD FRML MDRD: >60 ML/MIN/1.73M2
GLUCOSE BLD-MCNC: 92 MG/DL (ref 70–99)
HCT VFR BLD CALC: 37.3 % (ref 37–47)
HEMOGLOBIN: 12.1 GM/DL (ref 12.5–16)
IGA: 510 MG/DL (ref 69–382)
IGG,SERUM: 1089 MG/DL (ref 723–1685)
IGM,SERUM: <25 MG/DL (ref 62–277)
IMMATURE NEUTROPHIL %: 0.5 % (ref 0–0.43)
LACTATE DEHYDROGENASE: 131 IU/L (ref 120–246)
LYMPHOCYTES ABSOLUTE: 0.5 K/CU MM
LYMPHOCYTES RELATIVE PERCENT: 20.5 % (ref 24–44)
MCH RBC QN AUTO: 31.7 PG (ref 27–31)
MCHC RBC AUTO-ENTMCNC: 32.4 % (ref 32–36)
MCV RBC AUTO: 97.6 FL (ref 78–100)
MONOCYTES ABSOLUTE: 0.3 K/CU MM
MONOCYTES RELATIVE PERCENT: 13.2 % (ref 0–4)
NUCLEATED RBC %: 0 %
PDW BLD-RTO: 14.5 % (ref 11.7–14.9)
PLATELET # BLD: 178 K/CU MM (ref 140–440)
PMV BLD AUTO: 9.2 FL (ref 7.5–11.1)
POTASSIUM SERPL-SCNC: 3.9 MMOL/L (ref 3.5–5.1)
RBC # BLD: 3.82 M/CU MM (ref 4.2–5.4)
SEGMENTED NEUTROPHILS ABSOLUTE COUNT: 1.3 K/CU MM
SEGMENTED NEUTROPHILS RELATIVE PERCENT: 60.3 % (ref 36–66)
SODIUM BLD-SCNC: 139 MMOL/L (ref 135–145)
TOTAL IMMATURE NEUTOROPHIL: 0.01 K/CU MM
TOTAL NUCLEATED RBC: 0 K/CU MM
TOTAL PROTEIN: 6.5 GM/DL (ref 6.4–8.2)
WBC # BLD: 2.2 K/CU MM (ref 4–10.5)

## 2023-01-24 PROCEDURE — 85652 RBC SED RATE AUTOMATED: CPT

## 2023-01-24 PROCEDURE — 80053 COMPREHEN METABOLIC PANEL: CPT

## 2023-01-24 PROCEDURE — 83883 ASSAY NEPHELOMETRY NOT SPEC: CPT

## 2023-01-24 PROCEDURE — 83615 LACTATE (LD) (LDH) ENZYME: CPT

## 2023-01-24 PROCEDURE — 85025 COMPLETE CBC W/AUTO DIFF WBC: CPT

## 2023-01-24 PROCEDURE — 82784 ASSAY IGA/IGD/IGG/IGM EACH: CPT

## 2023-01-24 PROCEDURE — 84165 PROTEIN E-PHORESIS SERUM: CPT

## 2023-01-24 PROCEDURE — 82232 ASSAY OF BETA-2 PROTEIN: CPT

## 2023-01-24 PROCEDURE — 86320 SERUM IMMUNOELECTROPHORESIS: CPT

## 2023-01-24 PROCEDURE — 84155 ASSAY OF PROTEIN SERUM: CPT

## 2023-01-24 PROCEDURE — 36415 COLL VENOUS BLD VENIPUNCTURE: CPT

## 2023-01-25 LAB
BETA-2 MICROGLOBULIN: 2 MG/L
KAPPA QUANT FREE LIGHT CHAINS: 35.44 MG/L (ref 3.3–19.4)
KAPPA/LAMBDA FREE LIGHT CHAIN RATIO: 1.09 (ref 0.26–1.65)
LAMBDA FREE LIGHT CHAINS QNT: 32.51 MG/L (ref 5.71–26.3)

## 2023-01-26 LAB
ALBUMIN ELP: 3.4 GM/DL (ref 3.2–5.6)
ALPHA-1-GLOBULIN: 0.3 GM/DL (ref 0.1–0.4)
ALPHA-2-GLOBULIN: 0.6 GM/DL (ref 0.4–1.2)
BETA GLOBULIN: 1 GM/DL (ref 0.5–1.3)
GAMMA GLOBULIN: 1.2 GM/DL (ref 0.5–1.6)
TOTAL PROTEIN: 6.5 GM/DL (ref 6.4–8.2)

## 2023-02-02 ENCOUNTER — OFFICE VISIT (OUTPATIENT)
Dept: FAMILY MEDICINE CLINIC | Age: 73
End: 2023-02-02
Payer: MEDICARE

## 2023-02-02 VITALS
HEART RATE: 78 BPM | DIASTOLIC BLOOD PRESSURE: 64 MMHG | WEIGHT: 134 LBS | OXYGEN SATURATION: 97 % | HEIGHT: 64 IN | RESPIRATION RATE: 16 BRPM | BODY MASS INDEX: 22.88 KG/M2 | SYSTOLIC BLOOD PRESSURE: 124 MMHG

## 2023-02-02 DIAGNOSIS — C90.00 MULTIPLE MYELOMA NOT HAVING ACHIEVED REMISSION (HCC): ICD-10-CM

## 2023-02-02 DIAGNOSIS — L21.9 SEBORRHEIC DERMATITIS: Primary | ICD-10-CM

## 2023-02-02 DIAGNOSIS — G62.0 DRUG RELATED POLYNEUROPATHY (HCC): ICD-10-CM

## 2023-02-02 DIAGNOSIS — I10 ESSENTIAL HYPERTENSION: Chronic | ICD-10-CM

## 2023-02-02 DIAGNOSIS — C90.00 MULTIPLE MYELOMA NOT HAVING ACHIEVED REMISSION (HCC): Chronic | ICD-10-CM

## 2023-02-02 DIAGNOSIS — Z23 NEED FOR PROPHYLACTIC VACCINATION AND INOCULATION AGAINST INFLUENZA: ICD-10-CM

## 2023-02-02 PROCEDURE — G8484 FLU IMMUNIZE NO ADMIN: HCPCS | Performed by: STUDENT IN AN ORGANIZED HEALTH CARE EDUCATION/TRAINING PROGRAM

## 2023-02-02 PROCEDURE — G8427 DOCREV CUR MEDS BY ELIG CLIN: HCPCS | Performed by: STUDENT IN AN ORGANIZED HEALTH CARE EDUCATION/TRAINING PROGRAM

## 2023-02-02 PROCEDURE — G8420 CALC BMI NORM PARAMETERS: HCPCS | Performed by: STUDENT IN AN ORGANIZED HEALTH CARE EDUCATION/TRAINING PROGRAM

## 2023-02-02 PROCEDURE — G8399 PT W/DXA RESULTS DOCUMENT: HCPCS | Performed by: STUDENT IN AN ORGANIZED HEALTH CARE EDUCATION/TRAINING PROGRAM

## 2023-02-02 PROCEDURE — 99214 OFFICE O/P EST MOD 30 MIN: CPT | Performed by: STUDENT IN AN ORGANIZED HEALTH CARE EDUCATION/TRAINING PROGRAM

## 2023-02-02 PROCEDURE — 3017F COLORECTAL CA SCREEN DOC REV: CPT | Performed by: STUDENT IN AN ORGANIZED HEALTH CARE EDUCATION/TRAINING PROGRAM

## 2023-02-02 PROCEDURE — 1123F ACP DISCUSS/DSCN MKR DOCD: CPT | Performed by: STUDENT IN AN ORGANIZED HEALTH CARE EDUCATION/TRAINING PROGRAM

## 2023-02-02 PROCEDURE — 3078F DIAST BP <80 MM HG: CPT | Performed by: STUDENT IN AN ORGANIZED HEALTH CARE EDUCATION/TRAINING PROGRAM

## 2023-02-02 PROCEDURE — 1090F PRES/ABSN URINE INCON ASSESS: CPT | Performed by: STUDENT IN AN ORGANIZED HEALTH CARE EDUCATION/TRAINING PROGRAM

## 2023-02-02 PROCEDURE — 1036F TOBACCO NON-USER: CPT | Performed by: STUDENT IN AN ORGANIZED HEALTH CARE EDUCATION/TRAINING PROGRAM

## 2023-02-02 PROCEDURE — 90694 VACC AIIV4 NO PRSRV 0.5ML IM: CPT | Performed by: STUDENT IN AN ORGANIZED HEALTH CARE EDUCATION/TRAINING PROGRAM

## 2023-02-02 PROCEDURE — 3074F SYST BP LT 130 MM HG: CPT | Performed by: STUDENT IN AN ORGANIZED HEALTH CARE EDUCATION/TRAINING PROGRAM

## 2023-02-02 PROCEDURE — G0008 ADMIN INFLUENZA VIRUS VAC: HCPCS | Performed by: STUDENT IN AN ORGANIZED HEALTH CARE EDUCATION/TRAINING PROGRAM

## 2023-02-02 RX ORDER — LENALIDOMIDE 10 MG/1
10 CAPSULE ORAL DAILY
Qty: 28 CAPSULE | Refills: 0 | Status: ACTIVE | OUTPATIENT
Start: 2023-02-02

## 2023-02-02 RX ORDER — LOSARTAN POTASSIUM 50 MG/1
TABLET ORAL
Qty: 90 TABLET | Refills: 1 | Status: SHIPPED | OUTPATIENT
Start: 2023-02-02

## 2023-02-02 RX ORDER — AMLODIPINE BESYLATE 5 MG/1
TABLET ORAL
Qty: 90 TABLET | Refills: 1 | Status: SHIPPED | OUTPATIENT
Start: 2023-02-02

## 2023-02-02 ASSESSMENT — ENCOUNTER SYMPTOMS
SHORTNESS OF BREATH: 0
SORE THROAT: 0
ABDOMINAL PAIN: 0
WHEEZING: 0
NAUSEA: 0

## 2023-02-02 NOTE — PROGRESS NOTES
Revlimid 10 mg chemo capsules reordered and e-scribed to [facility]. Prescriber survey completed. Esther Meier # [id number] obtained through [employer] risk management/[employer] portal. Auth valid for 30 days.

## 2023-02-02 NOTE — PROGRESS NOTES
2/2/2023    Shavon Moss    Chief Complaint   Patient presents with    6 Month Follow-Up     Has some red spots on her face that she has a cream for but it is only somewhat working. Other     Over the past couple weeks she has noticed her fingers are shaky - not sure if this is due to medication. HPI  History was obtained from patient. Cooper Kelly is a 72 y.o. female with a PMHx as listed below who presents today for follow up on chronic condtiions. Hx. MS following with Dr. Mary Wahl  Diarrhea controlled with lomotil    Neuropathy fairly stable on cymbalta  Recent labs reviewed  Bp stable     1. Seborrheic dermatitis    2. Essential hypertension    3. Drug related polyneuropathy ([facility] [address]    4. Multiple myeloma not having achieved remission ([facility] [address]    5. Need for prophylactic vaccination and inoculation against influenza             REVIEW OF SYMPTOMS    Review of Systems   Constitutional:  Negative for chills and fatigue. HENT:  Negative for congestion and sore throat. Respiratory:  Negative for shortness of breath and wheezing. Cardiovascular:  Negative for chest pain and palpitations. Gastrointestinal:  Negative for abdominal pain and nausea. Genitourinary:  Negative for frequency and urgency. Neurological:  Negative for light-headedness.      PAST MEDICAL HISTORY  Past Medical History:   Diagnosis Date    Anemia     \"With Childbirth\"    Arthritis     \"Hands, Knees And Hips\"    CCC (chronic calculous cholecystitis)     s/p cholecystectomy 2015    Colitis Dx 2000's    Depression     \"In Mid 1990's\"    Essential hypertension     GERD (gastroesophageal reflux disease)     Hyperlipidemia     Motion sickness     Osteoporosis     LILIAN (stress urinary incontinence, female)     Thyroid disease 1990's    \"Took Part Of Thyroid Out \"       FAMILY HISTORY  Family History   Problem Relation Age of Onset    Heart Disease Mother     Arthritis Mother     Diabetes Mother     High Blood Pressure Mother     Dementia Father Arthritis Brother     Prostate Cancer Brother     Early Death Brother 61        Bladder And Brain Cancer    Diabetes Brother     Depression Brother     Cancer Brother         Bladder And Brain Cancer    Kidney Disease Son         Kidney Stones    Other Son         \"Charcot Swathi Tooth, Neuromuscular Disease\"    Breast Cancer Neg Hx     Ovarian Cancer Neg Hx        SOCIAL HISTORY  Social History     Socioeconomic History    Marital status:    Tobacco Use    Smoking status: Never    Smokeless tobacco: Never   Vaping Use    Vaping Use: Never used   Substance and Sexual Activity    Alcohol use: Yes     Comment: \"Occ.  Maybe Once A Week\"    Drug use: No    Sexual activity: Yes     Partners: Male     Social Determinants of Health     Financial Resource Strain: Low Risk     Difficulty of Paying Living Expenses: Not hard at all   Food Insecurity: No Food Insecurity    Worried About Running Out of Food in the Last Year: Never true    Ran Out of Food in the Last Year: Never true   Physical Activity: Sufficiently Active    Days of Exercise per Week: 3 days    Minutes of Exercise per Session: 60 min   Intimate Partner Violence: Not At Risk    Fear of Current or Ex-Partner: No    Emotionally Abused: No    Physically Abused: No    Sexually Abused: No        SURGICAL HISTORY  Past Surgical History:   Procedure Laterality Date    BLADDER SUSPENSION  1985    Done During Vaginal Hysterectomy    BREAST SURGERY Right 1980's    Benign Area Removed Nipple Area Right Breast    CHOLECYSTECTOMY, LAPAROSCOPIC  [id number]    COLONOSCOPY  \"Two\" Last Done 2000's    COLONOSCOPY  10/05/2018    Sigmoid diverticulosis, Grade 1 Internal hemorrhoids    DENTAL SURGERY      Teeth Extracted In Past    ENDOSCOPY, COLON, DIAGNOSTIC  \"Once\" 1990's    EYE SURGERY Right 5-24-13    Cataract With Lens Implant    EYE SURGERY Left 1-17-14    Cataract With Lens Implant    FOOT SURGERY Left 1962 Or 1963    I & D Left Foot After Stepping On A Nail HYSTERECTOMY (CERVIX STATUS UNKNOWN)      approx. 1986    HYSTERECTOMY, VAGINAL  1985    Bladder Suspension Also Done    LYMPH NODE BIOPSY  Last Done In 2000    \"Had Five Lymph Node Biopsies Done, Arm Pit Areas\", Benign    SD COLONOSCOPY FLX DX W/COLLJ SPEC WHEN PFRMD N/A 10/5/2018    COLONOSCOPY DIAGNOSTIC OR SCREENING performed by Kim Velasco MD at [facility]  1990's    \"Took Part Of The Thyroid Out\"    TONSILLECTOMY  1970's    WISDOM TOOTH EXTRACTION  Early 1970's    All [place] Teeth Extracted                 CURRENT MEDICATIONS  Current Outpatient Medications   Medication Sig Dispense Refill    amLODIPine (NORVASC) 5 MG tablet TAKE 1 TABLET EVERY MORNING 90 tablet 1    losartan (COZAAR) 50 MG tablet TAKE 1 TABLET EVERY MORNING 90 tablet 1    mupirocin (BACTROBAN) 2 % ointment Apply thin layer to effected area topically 2 times daily. 30 g 2    ZINC PO Take 1 tablet by mouth daily      lenalidomide (REVLIMID) 10 MG chemo capsule Take 1 capsule by mouth daily 28 capsule 0    alendronate (FOSAMAX) 70 MG tablet TAKE 1 TABLET ONCE WEEKLY  ON SUNDAYS. 12 tablet 1    Magic Mouthwash (MIRACLE MOUTHWASH) Equal parts nystatin and dexamethsone. Take 5 mL 4 times daily. 240 mL 2    mometasone (ELOCON) 0.1 % cream Apply topically daily.  1 each 2    DULoxetine (CYMBALTA) 60 MG extended release capsule Take 1 capsule by mouth 2 times daily 180 capsule 3    loratadine (CLARITIN) 10 MG capsule Take 10 mg by mouth daily      VITAMIN E COMPLEX PO Take by mouth daily      Cyanocobalamin (VITAMIN B12 PO) Take by mouth daily      famotidine (PEPCID) 20 MG tablet       Biotin 32819 MCG TABS Take by mouth      Omega-3 Fatty Acids (FISH OIL) 1000 MG CAPS Take 3,000 mg by mouth 2 times daily      Multiple Vitamins-Minerals (THERAPEUTIC MULTIVITAMIN-MINERALS) tablet Take 1 tablet by mouth every morning      Cholecalciferol (VITAMIN D) 2000 units CAPS capsule Take 2,000 Units by mouth every morning aspirin 81 MG tablet Take 81 mg by mouth nightly       potassium chloride (K-TAB) 10 MEQ extended release tablet Take 1 tablet by mouth in the morning and 1 tablet before bedtime. Do all this for 10 days. (Patient not taking: Reported on 2/2/2023) 20 tablet 0     No current facility-administered medications for this visit. ALLERGIES  Allergies   Allergen Reactions    Latex Rash and Swelling    Adhesive Tape Rash     \"Tears Skin , Paper Tape Is OK To Use\"  Skin breakdown       Lisinopril-Hydrochlorothiazide Swelling    Other      \"Allergic To Poinsetta Holley Causing Nasal Congestion\"    Sulfa Antibiotics Other (See Comments)     \"Flu Like Symptoms\"  Flu-like symptoms      Hydrochlorothiazide W-Triamterene     Naproxen        PHYSICAL EXAM    /64 (Site: Left Upper Arm, Position: Sitting, Cuff Size: Medium Adult)   Pulse 78   Resp 16   Ht 5' 4\" (1.626 m)   Wt 134 lb (60.8 kg)   SpO2 97%   BMI 23.00 kg/m²     Physical Exam  Constitutional:       Appearance: Normal appearance. HENT:      Head: Normocephalic and atraumatic. Eyes:      Extraocular Movements: Extraocular movements intact. Pupils: Pupils are equal, round, and reactive to light. Cardiovascular:      Rate and Rhythm: Normal rate and regular rhythm. Pulses: Normal pulses. Heart sounds: No murmur heard. No friction rub. No gallop. Pulmonary:      Effort: Pulmonary effort is normal.      Breath sounds: Normal breath sounds. Skin:     General: Skin is warm and dry. Neurological:      General: No focal deficit present. Mental Status: She is alert. Psychiatric:         Mood and Affect: Mood normal.         Behavior: Behavior normal.     Skin abrasions forehead left temple    ASSESSMENT & PLAN    1. Essential hypertension    - amLODIPine (NORVASC) 5 MG tablet; TAKE 1 TABLET EVERY MORNING  Dispense: 90 tablet; Refill: 1  - losartan (COZAAR) 50 MG tablet; TAKE 1 TABLET EVERY MORNING  Dispense: 90 tablet;  Refill: 1    2. Seborrheic dermatitis    - mupirocin (BACTROBAN) 2 % ointment; Apply thin layer to effected area topically 2 times daily.  Dispense: 30 g; Refill: 2    3. Drug related polyneuropathy (HCC)      4. Multiple myeloma not having achieved remission (HCC)      5. Need for prophylactic vaccination and inoculation against influenza    - Influenza, FLUAD, (age 65 y+), IM, Preservative Free, 0.5 mL    Bp stable   Obtained flu vaccine  Trial of bactroban for skin moles if not improving consider dermatology referral    Return in about 6 months (around 8/2/2023).         Electronically signed by Marcela Calix DO on 2/2/2023

## 2023-02-06 DIAGNOSIS — C90.00 MULTIPLE MYELOMA NOT HAVING ACHIEVED REMISSION (HCC): ICD-10-CM

## 2023-02-06 DIAGNOSIS — I10 ESSENTIAL HYPERTENSION: Chronic | ICD-10-CM

## 2023-02-06 RX ORDER — LENALIDOMIDE 10 MG/1
10 CAPSULE ORAL DAILY
Qty: 28 CAPSULE | Refills: 0 | Status: ACTIVE | OUTPATIENT
Start: 2023-02-06 | End: 2023-02-07

## 2023-02-06 RX ORDER — AMLODIPINE BESYLATE 5 MG/1
TABLET ORAL
Qty: 90 TABLET | Refills: 1 | Status: SHIPPED | OUTPATIENT
Start: 2023-02-06

## 2023-02-06 RX ORDER — LOSARTAN POTASSIUM 50 MG/1
TABLET ORAL
Qty: 90 TABLET | Refills: 1 | Status: SHIPPED | OUTPATIENT
Start: 2023-02-06

## 2023-02-06 NOTE — TELEPHONE ENCOUNTER
[facility] can not fill these. Please send to [phone] for signing.

## 2023-02-06 NOTE — PROGRESS NOTES
Received VM from [employer] requesting new auth number on revlimid RX. Revlimid 10 mg chemo capsules reordered and e-scribed to [facility] with updated Auth #?? [id number] attached. Auth valid for 30 days.

## 2023-02-07 DIAGNOSIS — C90.00 MULTIPLE MYELOMA NOT HAVING ACHIEVED REMISSION (HCC): ICD-10-CM

## 2023-02-07 RX ORDER — LENALIDOMIDE 10 MG/1
10 CAPSULE ORAL DAILY
Qty: 28 CAPSULE | Refills: 0 | Status: ACTIVE | OUTPATIENT
Start: 2023-02-07

## 2023-02-09 ENCOUNTER — CLINICAL DOCUMENTATION (OUTPATIENT)
Dept: ONCOLOGY | Age: 73
End: 2023-02-09

## 2023-02-09 NOTE — PROGRESS NOTES
Received VM from patient stating she needs new order for revlimid 10 mg #28. RX was sent to [facility] pharmacy on 02/07/2023. Called [facility] pharmacy @ [phone] to inquire about status of oral chemo. Pharmacy tech confirmed that RX was received and they will be reaching out to patient to schedule delivery. Attempted to call patient @ [phone] to provide update; however, no answer at this time. VM left with this RN direct number should patient have any questions.

## 2023-02-09 NOTE — PROGRESS NOTES
Received call back from patient stating she talked with [facility] again and was told that they do not hve an order for revlimd. This RN spoke with [facility] susan (please see previous note) and informed they do have RX and will be contacting patient to schedule delivery of oral chemo. [facility] pharmacy # [phone]. Patient states this is a different number than what she had and will call number provided. Patient instructed to call this RN back is she is still unable to get medication scheduled for delivery.

## 2023-02-14 RX ORDER — ALENDRONATE SODIUM 70 MG/1
TABLET ORAL
Qty: 12 TABLET | Refills: 1 | Status: SHIPPED | OUTPATIENT
Start: 2023-02-14

## 2023-02-15 ENCOUNTER — CLINICAL DOCUMENTATION (OUTPATIENT)
Dept: ONCOLOGY | Age: 73
End: 2023-02-15

## 2023-02-15 NOTE — PROGRESS NOTES
This nurse received a VM from the pharmacist Abimbola Newell at [address] [place] [address] regarding a refill on the patient's Revlimid. Upon chart review this RN verified a [employer]it had been e-scribed on 02/07/2023, this nurse called [facility]. He pharmacist reports that they have been working with the patient to obtain grants.

## 2023-04-04 DIAGNOSIS — C90.00 MULTIPLE MYELOMA NOT HAVING ACHIEVED REMISSION (HCC): Primary | Chronic | ICD-10-CM

## 2023-04-11 ENCOUNTER — HOSPITAL ENCOUNTER (OUTPATIENT)
Dept: INFUSION THERAPY | Age: 73
Discharge: HOME | End: 2023-04-11
Payer: MEDICARE

## 2023-04-11 DIAGNOSIS — C90.00 MULTIPLE MYELOMA NOT HAVING ACHIEVED REMISSION (HCC): Chronic | ICD-10-CM

## 2023-04-11 LAB
ALBUMIN SERPL-MCNC: 3.8 GM/DL (ref 3.4–5)
ALP BLD-CCNC: 107 IU/L (ref 40–128)
ALT SERPL-CCNC: 17 U/L (ref 10–40)
ANION GAP SERPL CALCULATED.3IONS-SCNC: 9 MMOL/L (ref 4–16)
AST SERPL-CCNC: 21 IU/L (ref 15–37)
BASOPHILS ABSOLUTE: 0 K/CU MM
BASOPHILS RELATIVE PERCENT: 0.9 % (ref 0–1)
BILIRUB SERPL-MCNC: 0.7 MG/DL (ref 0–1)
BUN SERPL-MCNC: 12 MG/DL (ref 6–23)
CALCIUM SERPL-MCNC: 8.7 MG/DL (ref 8.3–10.6)
CHLORIDE BLD-SCNC: 102 MMOL/L (ref 99–110)
CO2: 28 MMOL/L (ref 21–32)
CREAT SERPL-MCNC: 0.6 MG/DL (ref 0.6–1.1)
DIFFERENTIAL TYPE: ABNORMAL
EOSINOPHILS ABSOLUTE: 0.1 K/CU MM
EOSINOPHILS RELATIVE PERCENT: 4.8 % (ref 0–3)
ERYTHROCYTE SEDIMENTATION RATE: 3 MM/HR (ref 0–30)
GFR SERPL CREATININE-BSD FRML MDRD: >60 ML/MIN/1.73M2
GLUCOSE SERPL-MCNC: 97 MG/DL (ref 70–99)
HCT VFR BLD CALC: 34 % (ref 37–47)
HEMOGLOBIN: 11.3 GM/DL (ref 12.5–16)
LACTATE DEHYDROGENASE: 146 IU/L (ref 120–246)
LYMPHOCYTES ABSOLUTE: 0.5 K/CU MM
LYMPHOCYTES RELATIVE PERCENT: 21 % (ref 24–44)
MCH RBC QN AUTO: 32.3 PG (ref 27–31)
MCHC RBC AUTO-ENTMCNC: 33.2 % (ref 32–36)
MCV RBC AUTO: 97.1 FL (ref 78–100)
MONOCYTES ABSOLUTE: 0.3 K/CU MM
MONOCYTES RELATIVE PERCENT: 12.7 % (ref 0–4)
PDW BLD-RTO: 14.8 % (ref 11.7–14.9)
PLATELET # BLD: 165 K/CU MM (ref 140–440)
PMV BLD AUTO: 8.7 FL (ref 7.5–11.1)
POTASSIUM SERPL-SCNC: 4 MMOL/L (ref 3.5–5.1)
RBC # BLD: 3.5 M/CU MM (ref 4.2–5.4)
SEGMENTED NEUTROPHILS ABSOLUTE COUNT: 1.4 K/CU MM
SEGMENTED NEUTROPHILS RELATIVE PERCENT: 60.6 % (ref 36–66)
SODIUM BLD-SCNC: 139 MMOL/L (ref 135–145)
TOTAL PROTEIN: 6.4 GM/DL (ref 6.4–8.2)
WBC # BLD: 2.3 K/CU MM (ref 4–10.5)

## 2023-04-11 PROCEDURE — 84165 PROTEIN E-PHORESIS SERUM: CPT

## 2023-04-11 PROCEDURE — 83615 LACTATE (LD) (LDH) ENZYME: CPT

## 2023-04-11 PROCEDURE — 83883 ASSAY NEPHELOMETRY NOT SPEC: CPT

## 2023-04-11 PROCEDURE — 85025 COMPLETE CBC W/AUTO DIFF WBC: CPT

## 2023-04-11 PROCEDURE — 82784 ASSAY IGA/IGD/IGG/IGM EACH: CPT

## 2023-04-11 PROCEDURE — 82232 ASSAY OF BETA-2 PROTEIN: CPT

## 2023-04-11 PROCEDURE — 36415 COLL VENOUS BLD VENIPUNCTURE: CPT

## 2023-04-11 PROCEDURE — 85652 RBC SED RATE AUTOMATED: CPT

## 2023-04-11 PROCEDURE — 80053 COMPREHEN METABOLIC PANEL: CPT

## 2023-04-11 PROCEDURE — 84155 ASSAY OF PROTEIN SERUM: CPT

## 2023-04-12 LAB
IGA: 508 MG/DL (ref 69–382)
IGG,SERUM: 1069 MG/DL (ref 723–1685)
IGM,SERUM: <25 MG/DL (ref 62–277)
KAPPA LC FREE SER-MCNC: 38.44 MG/L (ref 3.3–19.4)
KAPPA LC FREE/LAMBDA FREE SER NEPH: 1.12 {RATIO} (ref 0.26–1.65)
LAMBDA LC FREE SERPL-MCNC: 34.38 MG/L (ref 5.71–26.3)

## 2023-04-13 LAB — B2 MICROGLOB SERPL-MCNC: 2.1 MG/L

## 2023-04-17 DIAGNOSIS — C90.00 MULTIPLE MYELOMA NOT HAVING ACHIEVED REMISSION (HCC): ICD-10-CM

## 2023-04-17 RX ORDER — DIPHENOXYLATE HYDROCHLORIDE AND ATROPINE SULFATE 2.5; .025 MG/1; MG/1
TABLET ORAL
Qty: 240 TABLET | Refills: 0 | Status: SHIPPED | OUTPATIENT
Start: 2023-04-17 | End: 2023-05-17

## 2023-04-18 ENCOUNTER — HOSPITAL ENCOUNTER (OUTPATIENT)
Dept: INFUSION THERAPY | Age: 73
Discharge: HOME | End: 2023-04-18
Payer: MEDICARE

## 2023-04-18 ENCOUNTER — OFFICE VISIT (OUTPATIENT)
Dept: ONCOLOGY | Age: 73
End: 2023-04-18
Payer: MEDICARE

## 2023-04-18 VITALS
OXYGEN SATURATION: 99 % | BODY MASS INDEX: 22.53 KG/M2 | TEMPERATURE: 98.1 F | RESPIRATION RATE: 16 BRPM | DIASTOLIC BLOOD PRESSURE: 67 MMHG | WEIGHT: 132 LBS | SYSTOLIC BLOOD PRESSURE: 148 MMHG | HEART RATE: 76 BPM | HEIGHT: 64 IN

## 2023-04-18 DIAGNOSIS — Z12.31 SCREENING MAMMOGRAM FOR BREAST CANCER: ICD-10-CM

## 2023-04-18 DIAGNOSIS — C90.00 MULTIPLE MYELOMA NOT HAVING ACHIEVED REMISSION (HCC): Primary | Chronic | ICD-10-CM

## 2023-04-18 DIAGNOSIS — M85.89 OSTEOPENIA OF MULTIPLE SITES: Chronic | ICD-10-CM

## 2023-04-18 LAB
ALBUMIN SERPL ELPH-MCNC: 3.6 GM/DL (ref 3.2–5.6)
ALPHA-1-GLOBULIN: 0.2 GM/DL (ref 0.1–0.4)
ALPHA-2-GLOBULIN: 0.5 GM/DL (ref 0.4–1.2)
BETA GLOBULIN: 1 GM/DL (ref 0.5–1.3)
GAMMA GLOBULIN: 1.2 GM/DL (ref 0.5–1.6)
SPEP INTERPRETATION: NORMAL
TOTAL PROTEIN: 6.4 GM/DL (ref 6.4–8.2)

## 2023-04-18 PROCEDURE — 1036F TOBACCO NON-USER: CPT | Performed by: INTERNAL MEDICINE

## 2023-04-18 PROCEDURE — 99214 OFFICE O/P EST MOD 30 MIN: CPT | Performed by: INTERNAL MEDICINE

## 2023-04-18 PROCEDURE — 1123F ACP DISCUSS/DSCN MKR DOCD: CPT | Performed by: INTERNAL MEDICINE

## 2023-04-18 PROCEDURE — 3077F SYST BP >= 140 MM HG: CPT | Performed by: INTERNAL MEDICINE

## 2023-04-18 PROCEDURE — 99211 OFF/OP EST MAY X REQ PHY/QHP: CPT

## 2023-04-18 PROCEDURE — 3017F COLORECTAL CA SCREEN DOC REV: CPT | Performed by: INTERNAL MEDICINE

## 2023-04-18 PROCEDURE — G8399 PT W/DXA RESULTS DOCUMENT: HCPCS | Performed by: INTERNAL MEDICINE

## 2023-04-18 PROCEDURE — 3078F DIAST BP <80 MM HG: CPT | Performed by: INTERNAL MEDICINE

## 2023-04-18 PROCEDURE — 1090F PRES/ABSN URINE INCON ASSESS: CPT | Performed by: INTERNAL MEDICINE

## 2023-04-18 PROCEDURE — G8420 CALC BMI NORM PARAMETERS: HCPCS | Performed by: INTERNAL MEDICINE

## 2023-04-18 PROCEDURE — G8427 DOCREV CUR MEDS BY ELIG CLIN: HCPCS | Performed by: INTERNAL MEDICINE

## 2023-04-18 RX ORDER — POTASSIUM CHLORIDE 1500 MG/1
TABLET, EXTENDED RELEASE ORAL
COMMUNITY
Start: 2023-03-13

## 2023-04-18 NOTE — PROGRESS NOTES
MA Rooming Questions  Patient: Sukhi Delvalle  MRN: [id number]    Date: 4/18/2023        1. Do you have any new issues? Yes- does she need to cont Potassium         2. Do you need any refills on medications?    no    3. Have you had any imaging done since your last visit?   no    4. Have you been hospitalized or seen in the emergency room since your last visit here?   no    5. Did the patient have a depression screening completed today?  No    No data recorded     PHQ-9 Given to (if applicable):               PHQ-9 Score (if applicable):                     [] Positive     []  Negative              Does question #9 need addressed (if applicable)                     [] Yes    []  No               Leopoldo Pauling, CMA

## 2023-04-19 ENCOUNTER — CLINICAL DOCUMENTATION (OUTPATIENT)
Dept: ONCOLOGY | Age: 73
End: 2023-04-19

## 2023-04-19 NOTE — PROGRESS NOTES
Lab results reviewed. SPEP within normal range. No M protein seen. Patient still in stringent remission. Called patient @ [phone] to notify. Voices understanding. No further needs addressed at this time.

## 2023-05-11 ENCOUNTER — TELEPHONE (OUTPATIENT)
Dept: FAMILY MEDICINE CLINIC | Age: 73
End: 2023-05-11

## 2023-05-17 ENCOUNTER — HOSPITAL ENCOUNTER (OUTPATIENT)
Dept: WOMENS IMAGING | Age: 73
Discharge: HOME | End: 2023-05-17
Payer: MEDICARE

## 2023-05-17 DIAGNOSIS — M85.89 OSTEOPENIA OF MULTIPLE SITES: Chronic | ICD-10-CM

## 2023-05-17 DIAGNOSIS — Z12.31 SCREENING MAMMOGRAM FOR BREAST CANCER: ICD-10-CM

## 2023-05-17 DIAGNOSIS — C90.00 MULTIPLE MYELOMA NOT HAVING ACHIEVED REMISSION (HCC): Chronic | ICD-10-CM

## 2023-05-17 PROCEDURE — 77063 BREAST TOMOSYNTHESIS BI: CPT

## 2023-05-17 PROCEDURE — 77080 DXA BONE DENSITY AXIAL: CPT

## 2023-05-18 DIAGNOSIS — R92.8 ABNORMAL MAMMOGRAM: Primary | ICD-10-CM

## 2023-05-18 NOTE — PROGRESS NOTES
Patient had screening mammography on 5/17/23 which revealed:    IMPRESSION:  BI-RADS category 2-mordajmdzo-gkxqlmsi additional imaging. Spot compression  and rolled CC views of microcalcifications medial aspect of the left breast  in the craniocaudal projection. Per Dr. Serrano Led - order placed for left dx mammography per protocol. Patient scheduled at [facility] on Monday, 5/22/23.

## 2023-05-22 ENCOUNTER — HOSPITAL ENCOUNTER (OUTPATIENT)
Dept: WOMENS IMAGING | Age: 73
Discharge: HOME | End: 2023-05-22
Payer: MEDICARE

## 2023-05-22 ENCOUNTER — HOSPITAL ENCOUNTER (OUTPATIENT)
Dept: ULTRASOUND IMAGING | Age: 73
End: 2023-05-22
Payer: MEDICARE

## 2023-05-22 DIAGNOSIS — R92.8 ABNORMAL MAMMOGRAM: ICD-10-CM

## 2023-05-22 PROCEDURE — G0279 TOMOSYNTHESIS, MAMMO: HCPCS

## 2023-06-26 RX ORDER — DULOXETIN HYDROCHLORIDE 60 MG/1
60 CAPSULE, DELAYED RELEASE ORAL 2 TIMES DAILY
Qty: 180 CAPSULE | Refills: 3 | Status: SHIPPED | OUTPATIENT
Start: 2023-06-26 | End: 2023-09-24

## 2023-07-07 DIAGNOSIS — C90.00 MULTIPLE MYELOMA NOT HAVING ACHIEVED REMISSION (HCC): ICD-10-CM

## 2023-07-07 RX ORDER — LENALIDOMIDE 10 MG/1
10 CAPSULE ORAL DAILY
Qty: 28 CAPSULE | Refills: 0 | OUTPATIENT
Start: 2023-07-07

## 2023-07-07 RX ORDER — LENALIDOMIDE 10 MG/1
10 CAPSULE ORAL DAILY
Qty: 28 CAPSULE | Refills: 0 | Status: ACTIVE | OUTPATIENT
Start: 2023-07-07

## 2023-07-07 NOTE — PROGRESS NOTES
Revlimid 10 mg chemo capsules reordered and sent to [facility]. Prescriber survey completed and new auth # [id number] obtained through [employer] portal. Auth valid for 30 days and attached to RX.

## 2023-07-11 ENCOUNTER — HOSPITAL ENCOUNTER (OUTPATIENT)
Dept: INFUSION THERAPY | Age: 73
Discharge: HOME | End: 2023-07-11
Payer: MEDICARE

## 2023-07-11 DIAGNOSIS — Z12.31 SCREENING MAMMOGRAM FOR BREAST CANCER: ICD-10-CM

## 2023-07-11 DIAGNOSIS — M85.89 OSTEOPENIA OF MULTIPLE SITES: Chronic | ICD-10-CM

## 2023-07-11 DIAGNOSIS — C90.00 MULTIPLE MYELOMA NOT HAVING ACHIEVED REMISSION (HCC): Chronic | ICD-10-CM

## 2023-07-11 LAB
ALBUMIN SERPL-MCNC: 4.1 GM/DL (ref 3.4–5)
ALP BLD-CCNC: 92 IU/L (ref 40–129)
ALT SERPL-CCNC: 19 U/L (ref 10–40)
ANION GAP SERPL CALCULATED.3IONS-SCNC: 9 MMOL/L (ref 4–16)
AST SERPL-CCNC: 21 IU/L (ref 15–37)
BASOPHILS ABSOLUTE: 0.1 K/CU MM
BASOPHILS RELATIVE PERCENT: 2.3 % (ref 0–1)
BILIRUB SERPL-MCNC: 1.1 MG/DL (ref 0–1)
BUN SERPL-MCNC: 13 MG/DL (ref 6–23)
CALCIUM SERPL-MCNC: 8.3 MG/DL (ref 8.3–10.6)
CHLORIDE BLD-SCNC: 100 MMOL/L (ref 99–110)
CO2: 27 MMOL/L (ref 21–32)
CREAT SERPL-MCNC: 0.6 MG/DL (ref 0.6–1.1)
DIFFERENTIAL TYPE: ABNORMAL
EOSINOPHILS ABSOLUTE: 0 K/CU MM
EOSINOPHILS RELATIVE PERCENT: 1.8 % (ref 0–3)
ERYTHROCYTE SEDIMENTATION RATE: 1 MM/HR (ref 0–30)
GFR SERPL CREATININE-BSD FRML MDRD: >60 ML/MIN/1.73M2
GLUCOSE SERPL-MCNC: 94 MG/DL (ref 70–99)
HCT VFR BLD CALC: 35.9 % (ref 37–47)
HEMOGLOBIN: 12 GM/DL (ref 12.5–16)
IGA: 469 MG/DL (ref 69–382)
IGG,SERUM: 1109 MG/DL (ref 723–1685)
IGM,SERUM: <25 MG/DL (ref 62–277)
LACTATE DEHYDROGENASE: 131 IU/L (ref 120–246)
LYMPHOCYTES ABSOLUTE: 0.7 K/CU MM
LYMPHOCYTES RELATIVE PERCENT: 32.4 % (ref 24–44)
MCH RBC QN AUTO: 32.1 PG (ref 27–31)
MCHC RBC AUTO-ENTMCNC: 33.4 % (ref 32–36)
MCV RBC AUTO: 96 FL (ref 78–100)
MONOCYTES ABSOLUTE: 0.2 K/CU MM
MONOCYTES RELATIVE PERCENT: 10.4 % (ref 0–4)
PDW BLD-RTO: 14.8 % (ref 11.7–14.9)
PLATELET # BLD: 157 K/CU MM (ref 140–440)
PMV BLD AUTO: 8.8 FL (ref 7.5–11.1)
POTASSIUM SERPL-SCNC: 3.8 MMOL/L (ref 3.5–5.1)
RBC # BLD: 3.74 M/CU MM (ref 4.2–5.4)
SEGMENTED NEUTROPHILS ABSOLUTE COUNT: 1.2 K/CU MM
SEGMENTED NEUTROPHILS RELATIVE PERCENT: 53.1 % (ref 36–66)
SODIUM BLD-SCNC: 136 MMOL/L (ref 135–145)
TOTAL PROTEIN: 6.4 GM/DL (ref 6.4–8.2)
WBC # BLD: 2.2 K/CU MM (ref 4–10.5)

## 2023-07-11 PROCEDURE — 86320 SERUM IMMUNOELECTROPHORESIS: CPT

## 2023-07-11 PROCEDURE — 85652 RBC SED RATE AUTOMATED: CPT

## 2023-07-11 PROCEDURE — 83615 LACTATE (LD) (LDH) ENZYME: CPT

## 2023-07-11 PROCEDURE — 84165 PROTEIN E-PHORESIS SERUM: CPT

## 2023-07-11 PROCEDURE — 85025 COMPLETE CBC W/AUTO DIFF WBC: CPT

## 2023-07-11 PROCEDURE — 82784 ASSAY IGA/IGD/IGG/IGM EACH: CPT

## 2023-07-11 PROCEDURE — 83883 ASSAY NEPHELOMETRY NOT SPEC: CPT

## 2023-07-11 PROCEDURE — 82232 ASSAY OF BETA-2 PROTEIN: CPT

## 2023-07-11 PROCEDURE — 80053 COMPREHEN METABOLIC PANEL: CPT

## 2023-07-11 PROCEDURE — 84155 ASSAY OF PROTEIN SERUM: CPT

## 2023-07-11 PROCEDURE — 36415 COLL VENOUS BLD VENIPUNCTURE: CPT

## 2023-07-12 LAB
B2 MICROGLOB SERPL-MCNC: 1.9 MG/L
KAPPA LC FREE SER-MCNC: 32.69 MG/L (ref 3.3–19.4)
KAPPA LC FREE/LAMBDA FREE SER NEPH: 1.12 {RATIO} (ref 0.26–1.65)
LAMBDA LC FREE SERPL-MCNC: 29.11 MG/L (ref 5.71–26.3)

## 2023-07-14 LAB
ALBUMIN SERPL ELPH-MCNC: 3.6 GM/DL (ref 3.2–5.6)
ALPHA-1-GLOBULIN: 0.2 GM/DL (ref 0.1–0.4)
ALPHA-2-GLOBULIN: 0.6 GM/DL (ref 0.4–1.2)
BETA GLOBULIN: 0.9 GM/DL (ref 0.5–1.3)
GAMMA GLOBULIN: 1.1 GM/DL (ref 0.5–1.6)
SPEP INTERPRETATION: NORMAL
SPEP INTERPRETATION: NORMAL
TOTAL PROTEIN: 6.4 GM/DL (ref 6.4–8.2)

## 2023-07-18 ENCOUNTER — HOSPITAL ENCOUNTER (OUTPATIENT)
Dept: INFUSION THERAPY | Age: 73
Discharge: HOME | End: 2023-07-18
Payer: MEDICARE

## 2023-07-18 ENCOUNTER — OFFICE VISIT (OUTPATIENT)
Dept: ONCOLOGY | Age: 73
End: 2023-07-18
Payer: MEDICARE

## 2023-07-18 VITALS
TEMPERATURE: 97.3 F | HEIGHT: 64 IN | SYSTOLIC BLOOD PRESSURE: 130 MMHG | RESPIRATION RATE: 14 BRPM | BODY MASS INDEX: 23.12 KG/M2 | OXYGEN SATURATION: 98 % | DIASTOLIC BLOOD PRESSURE: 63 MMHG | HEART RATE: 78 BPM | WEIGHT: 135.4 LBS

## 2023-07-18 DIAGNOSIS — D69.6 THROMBOCYTOPENIA, UNSPECIFIED (HCC): ICD-10-CM

## 2023-07-18 DIAGNOSIS — C90.00 MULTIPLE MYELOMA NOT HAVING ACHIEVED REMISSION (HCC): Primary | Chronic | ICD-10-CM

## 2023-07-18 PROCEDURE — 1036F TOBACCO NON-USER: CPT | Performed by: INTERNAL MEDICINE

## 2023-07-18 PROCEDURE — 3075F SYST BP GE 130 - 139MM HG: CPT | Performed by: INTERNAL MEDICINE

## 2023-07-18 PROCEDURE — 1090F PRES/ABSN URINE INCON ASSESS: CPT | Performed by: INTERNAL MEDICINE

## 2023-07-18 PROCEDURE — 1123F ACP DISCUSS/DSCN MKR DOCD: CPT | Performed by: INTERNAL MEDICINE

## 2023-07-18 PROCEDURE — G8427 DOCREV CUR MEDS BY ELIG CLIN: HCPCS | Performed by: INTERNAL MEDICINE

## 2023-07-18 PROCEDURE — 3017F COLORECTAL CA SCREEN DOC REV: CPT | Performed by: INTERNAL MEDICINE

## 2023-07-18 PROCEDURE — 3078F DIAST BP <80 MM HG: CPT | Performed by: INTERNAL MEDICINE

## 2023-07-18 PROCEDURE — G8420 CALC BMI NORM PARAMETERS: HCPCS | Performed by: INTERNAL MEDICINE

## 2023-07-18 PROCEDURE — 99211 OFF/OP EST MAY X REQ PHY/QHP: CPT

## 2023-07-18 PROCEDURE — G8399 PT W/DXA RESULTS DOCUMENT: HCPCS | Performed by: INTERNAL MEDICINE

## 2023-07-18 PROCEDURE — 99214 OFFICE O/P EST MOD 30 MIN: CPT | Performed by: INTERNAL MEDICINE

## 2023-07-18 RX ORDER — MECLIZINE HCL 12.5 MG/1
12.5 TABLET ORAL DAILY
COMMUNITY

## 2023-07-18 RX ORDER — MECLIZINE HYDROCHLORIDE 25 MG/1
25 TABLET ORAL 3 TIMES DAILY PRN
Qty: 90 TABLET | Refills: 2 | Status: SHIPPED | OUTPATIENT
Start: 2023-07-18 | End: 2023-08-17

## 2023-07-18 NOTE — PROGRESS NOTES
MA Rooming Questions  Patient: Andrei Renee  MRN: [id number]    Date: 7/18/2023        1. Do you have any new issues? yes - occ dizziness & started taking her meclizine. 2. Do you need any refills on medications?    no    3. Have you had any imaging done since your last visit? yes - mammo & dexa 5/17 & 5/22    4. Have you been hospitalized or seen in the emergency room since your last visit here?   no    5. Did the patient have a depression screening completed today?  No    No data recorded     PHQ-9 Given to (if applicable):               PHQ-9 Score (if applicable):                     [] Positive     []  Negative              Does question #9 need addressed (if applicable)                     [] Yes    []  No               Rick Rios MA

## 2023-07-25 RX ORDER — ALENDRONATE SODIUM 70 MG/1
TABLET ORAL
Qty: 12 TABLET | Refills: 1 | Status: SHIPPED | OUTPATIENT
Start: 2023-07-25

## 2023-07-30 SDOH — ECONOMIC STABILITY: FOOD INSECURITY: WITHIN THE PAST 12 MONTHS, THE FOOD YOU BOUGHT JUST DIDN'T LAST AND YOU DIDN'T HAVE MONEY TO GET MORE.: NEVER TRUE

## 2023-07-30 SDOH — ECONOMIC STABILITY: TRANSPORTATION INSECURITY
IN THE PAST 12 MONTHS, HAS LACK OF TRANSPORTATION KEPT YOU FROM MEETINGS, WORK, OR FROM GETTING THINGS NEEDED FOR DAILY LIVING?: NO

## 2023-07-30 SDOH — ECONOMIC STABILITY: HOUSING INSECURITY
IN THE LAST 12 MONTHS, WAS THERE A TIME WHEN YOU DID NOT HAVE A STEADY PLACE TO SLEEP OR SLEPT IN A SHELTER (INCLUDING NOW)?: NO

## 2023-07-30 SDOH — HEALTH STABILITY: PHYSICAL HEALTH: ON AVERAGE, HOW MANY MINUTES DO YOU ENGAGE IN EXERCISE AT THIS LEVEL?: 30 MIN

## 2023-07-30 SDOH — ECONOMIC STABILITY: FOOD INSECURITY: WITHIN THE PAST 12 MONTHS, YOU WORRIED THAT YOUR FOOD WOULD RUN OUT BEFORE YOU GOT MONEY TO BUY MORE.: NEVER TRUE

## 2023-07-30 SDOH — ECONOMIC STABILITY: INCOME INSECURITY: HOW HARD IS IT FOR YOU TO PAY FOR THE VERY BASICS LIKE FOOD, HOUSING, MEDICAL CARE, AND HEATING?: NOT HARD AT ALL

## 2023-07-30 SDOH — HEALTH STABILITY: PHYSICAL HEALTH: ON AVERAGE, HOW MANY DAYS PER WEEK DO YOU ENGAGE IN MODERATE TO STRENUOUS EXERCISE (LIKE A BRISK WALK)?: 3 DAYS

## 2023-07-30 ASSESSMENT — LIFESTYLE VARIABLES
HOW OFTEN DO YOU HAVE SIX OR MORE DRINKS ON ONE OCCASION: 1
HOW OFTEN DO YOU HAVE A DRINK CONTAINING ALCOHOL: 2
HOW MANY STANDARD DRINKS CONTAINING ALCOHOL DO YOU HAVE ON A TYPICAL DAY: 1
HOW OFTEN DO YOU HAVE A DRINK CONTAINING ALCOHOL: MONTHLY OR LESS
HOW MANY STANDARD DRINKS CONTAINING ALCOHOL DO YOU HAVE ON A TYPICAL DAY: 1 OR 2

## 2023-07-30 ASSESSMENT — PATIENT HEALTH QUESTIONNAIRE - PHQ9
SUM OF ALL RESPONSES TO PHQ QUESTIONS 1-9: 0
2. FEELING DOWN, DEPRESSED OR HOPELESS: 0
SUM OF ALL RESPONSES TO PHQ QUESTIONS 1-9: 0
SUM OF ALL RESPONSES TO PHQ QUESTIONS 1-9: 0
SUM OF ALL RESPONSES TO PHQ9 QUESTIONS 1 & 2: 0
SUM OF ALL RESPONSES TO PHQ QUESTIONS 1-9: 0
1. LITTLE INTEREST OR PLEASURE IN DOING THINGS: 0

## 2023-08-01 ENCOUNTER — TELEMEDICINE (OUTPATIENT)
Dept: FAMILY MEDICINE CLINIC | Age: 73
End: 2023-08-01
Payer: MEDICARE

## 2023-08-01 ENCOUNTER — OFFICE VISIT (OUTPATIENT)
Dept: FAMILY MEDICINE CLINIC | Age: 73
End: 2023-08-01
Payer: MEDICARE

## 2023-08-01 VITALS
WEIGHT: 134.1 LBS | RESPIRATION RATE: 16 BRPM | DIASTOLIC BLOOD PRESSURE: 64 MMHG | HEIGHT: 64 IN | SYSTOLIC BLOOD PRESSURE: 134 MMHG | OXYGEN SATURATION: 100 % | BODY MASS INDEX: 22.9 KG/M2 | HEART RATE: 77 BPM

## 2023-08-01 DIAGNOSIS — I10 ESSENTIAL HYPERTENSION: Chronic | ICD-10-CM

## 2023-08-01 DIAGNOSIS — Z00.00 MEDICARE ANNUAL WELLNESS VISIT, SUBSEQUENT: Primary | ICD-10-CM

## 2023-08-01 DIAGNOSIS — M85.89 OSTEOPENIA OF MULTIPLE SITES: Primary | Chronic | ICD-10-CM

## 2023-08-01 DIAGNOSIS — C90.00 MULTIPLE MYELOMA NOT HAVING ACHIEVED REMISSION (HCC): Chronic | ICD-10-CM

## 2023-08-01 PROCEDURE — 1123F ACP DISCUSS/DSCN MKR DOCD: CPT | Performed by: STUDENT IN AN ORGANIZED HEALTH CARE EDUCATION/TRAINING PROGRAM

## 2023-08-01 PROCEDURE — G0439 PPPS, SUBSEQ VISIT: HCPCS | Performed by: STUDENT IN AN ORGANIZED HEALTH CARE EDUCATION/TRAINING PROGRAM

## 2023-08-01 PROCEDURE — 3078F DIAST BP <80 MM HG: CPT | Performed by: STUDENT IN AN ORGANIZED HEALTH CARE EDUCATION/TRAINING PROGRAM

## 2023-08-01 PROCEDURE — 3017F COLORECTAL CA SCREEN DOC REV: CPT | Performed by: STUDENT IN AN ORGANIZED HEALTH CARE EDUCATION/TRAINING PROGRAM

## 2023-08-01 PROCEDURE — G8427 DOCREV CUR MEDS BY ELIG CLIN: HCPCS | Performed by: STUDENT IN AN ORGANIZED HEALTH CARE EDUCATION/TRAINING PROGRAM

## 2023-08-01 PROCEDURE — G8399 PT W/DXA RESULTS DOCUMENT: HCPCS | Performed by: STUDENT IN AN ORGANIZED HEALTH CARE EDUCATION/TRAINING PROGRAM

## 2023-08-01 PROCEDURE — 1090F PRES/ABSN URINE INCON ASSESS: CPT | Performed by: STUDENT IN AN ORGANIZED HEALTH CARE EDUCATION/TRAINING PROGRAM

## 2023-08-01 PROCEDURE — G8420 CALC BMI NORM PARAMETERS: HCPCS | Performed by: STUDENT IN AN ORGANIZED HEALTH CARE EDUCATION/TRAINING PROGRAM

## 2023-08-01 PROCEDURE — 3074F SYST BP LT 130 MM HG: CPT | Performed by: STUDENT IN AN ORGANIZED HEALTH CARE EDUCATION/TRAINING PROGRAM

## 2023-08-01 PROCEDURE — 1036F TOBACCO NON-USER: CPT | Performed by: STUDENT IN AN ORGANIZED HEALTH CARE EDUCATION/TRAINING PROGRAM

## 2023-08-01 PROCEDURE — 99214 OFFICE O/P EST MOD 30 MIN: CPT | Performed by: STUDENT IN AN ORGANIZED HEALTH CARE EDUCATION/TRAINING PROGRAM

## 2023-08-01 RX ORDER — ALENDRONATE SODIUM 70 MG/1
TABLET ORAL
Qty: 12 TABLET | Refills: 1 | Status: CANCELLED | OUTPATIENT
Start: 2023-08-01

## 2023-08-01 RX ORDER — AMLODIPINE BESYLATE 5 MG/1
TABLET ORAL
Qty: 90 TABLET | Refills: 1 | Status: SHIPPED | OUTPATIENT
Start: 2023-08-01

## 2023-08-01 RX ORDER — LOSARTAN POTASSIUM 50 MG/1
TABLET ORAL
Qty: 90 TABLET | Refills: 1 | Status: SHIPPED | OUTPATIENT
Start: 2023-08-01

## 2023-08-01 SDOH — ECONOMIC STABILITY: FOOD INSECURITY: WITHIN THE PAST 12 MONTHS, THE FOOD YOU BOUGHT JUST DIDN'T LAST AND YOU DIDN'T HAVE MONEY TO GET MORE.: NEVER TRUE

## 2023-08-01 SDOH — ECONOMIC STABILITY: INCOME INSECURITY: HOW HARD IS IT FOR YOU TO PAY FOR THE VERY BASICS LIKE FOOD, HOUSING, MEDICAL CARE, AND HEATING?: NOT HARD AT ALL

## 2023-08-01 SDOH — ECONOMIC STABILITY: FOOD INSECURITY: WITHIN THE PAST 12 MONTHS, YOU WORRIED THAT YOUR FOOD WOULD RUN OUT BEFORE YOU GOT MONEY TO BUY MORE.: NEVER TRUE

## 2023-08-01 ASSESSMENT — PATIENT HEALTH QUESTIONNAIRE - PHQ9
SUM OF ALL RESPONSES TO PHQ QUESTIONS 1-9: 0
SUM OF ALL RESPONSES TO PHQ QUESTIONS 1-9: 0
SUM OF ALL RESPONSES TO PHQ9 QUESTIONS 1 & 2: 0
2. FEELING DOWN, DEPRESSED OR HOPELESS: 0
SUM OF ALL RESPONSES TO PHQ QUESTIONS 1-9: 0
SUM OF ALL RESPONSES TO PHQ QUESTIONS 1-9: 0
1. LITTLE INTEREST OR PLEASURE IN DOING THINGS: 0

## 2023-08-01 ASSESSMENT — LIFESTYLE VARIABLES
HOW OFTEN DO YOU HAVE A DRINK CONTAINING ALCOHOL: MONTHLY OR LESS
HOW MANY STANDARD DRINKS CONTAINING ALCOHOL DO YOU HAVE ON A TYPICAL DAY: 1 OR 2

## 2023-08-01 NOTE — PROGRESS NOTES
Medicare Annual Wellness Visit    Su Jean is here for Medicare AWV    Assessment & Plan   Medicare annual wellness visit, subsequent  Recommendations for Preventive Services Due: see orders and patient instructions/AVS.  Recommended screening schedule for the next 5-10 years is provided to the patient in written form: see Patient Instructions/AVS.     No follow-ups on file. Subjective       Patient's complete Health Risk Assessment and screening values have been reviewed and are found in Flowsheets. The following problems were reviewed today and where indicated follow up appointments were made and/or referrals ordered. No Positive Risk Factors identified today. Objective      Patient-Reported Vitals  No data recorded     Unable to obtain 3 vital signs due to patient not having equipment to take blood pressure/temperature. Allergies   Allergen Reactions    Latex Rash and Swelling    Adhesive Tape Rash     \"Tears Skin , Paper Tape Is OK To Use\"  Skin breakdown       Lisinopril-Hydrochlorothiazide Swelling    Other      \"Allergic To Poinsetta Holley Causing Nasal Congestion\"    Sulfa Antibiotics Other (See Comments)     \"Flu Like Symptoms\"  Flu-like symptoms      Hydrochlorothiazide W-Triamterene     Naproxen      Prior to Visit Medications    Medication Sig Taking?  Authorizing Provider   amLODIPine (NORVASC) 5 MG tablet TAKE 1 TABLET EVERY MORNING Yes Marcela Calix DO   losartan (COZAAR) 50 MG tablet TAKE 1 TABLET EVERY MORNING Yes Marcela Calix DO   meclizine (ANTIVERT) 12.5 MG tablet Take 1 tablet by mouth daily Yes Historical Provider, MD   meclizine (ANTIVERT) 25 MG tablet Take 1 tablet by mouth 3 times daily as needed for Dizziness Yes Sanjiv Baumann MD   lenalidomide (REVLIMID) 10 MG chemo capsule Take 1 capsule by mouth daily Yes Sanjiv Baumann MD   DULoxetine (CYMBALTA) 60 MG extended release capsule Take 1 capsule by mouth 2 times daily Yes Sanjiv Baumann

## 2023-08-01 NOTE — PROGRESS NOTES
deficit present. Mental Status: She is alert. Psychiatric:         Mood and Affect: Mood normal.         Behavior: Behavior normal.       ASSESSMENT & PLAN    1. Essential hypertension    - amLODIPine (NORVASC) 5 MG tablet; TAKE 1 TABLET EVERY MORNING  Dispense: 90 tablet; Refill: 1  - losartan (COZAAR) 50 MG tablet; TAKE 1 TABLET EVERY MORNING  Dispense: 90 tablet; Refill: 1    2. Osteopenia of multiple sites      3. Multiple myeloma not having achieved remission ([address])      4. Mixed hyperlipidemia    5 years alendronate use we will take drug holiday 1 year, and repeat dxa  BP stable  Following with Dr. Nel Johns nl SPEP and SARABJIT, in remission continue Revlimid  Neuropathy on cymbalta    Return in about 6 months (around 2/1/2024).          Electronically signed by Rajwinder Ferguson DO on 8/6/2023

## 2023-08-01 NOTE — PATIENT INSTRUCTIONS
Personalized Preventive Plan for Gregory Mcelroy - 8/1/2023  Medicare offers a range of preventive health benefits. Some of the tests and screenings are paid in full while other may be subject to a deductible, co-insurance, and/or copay. Some of these benefits include a comprehensive review of your medical history including lifestyle, illnesses that may run in your family, and various assessments and screenings as appropriate. After reviewing your medical record and screening and assessments performed today your provider may have ordered immunizations, labs, imaging, and/or referrals for you. A list of these orders (if applicable) as well as your Preventive Care list are included within your After Visit Summary for your review. Other Preventive Recommendations:    A preventive eye exam performed by an eye specialist is recommended every 1-2 years to screen for glaucoma; cataracts, macular degeneration, and other eye disorders. A preventive dental visit is recommended every 6 months. Try to get at least 150 minutes of exercise per week or 10,000 steps per day on a pedometer . Order or download the FREE \"Exercise & Physical Activity: Your Everyday Guide\" from [employer]. Call 1-845.879.3701 or search [employer] online. You need 0197-5745 mg of calcium and 5050-9759 IU of vitamin D per day. It is possible to meet your calcium requirement with diet alone, but a vitamin D supplement is usually necessary to meet this goal.  When exposed to the sun, use a sunscreen that protects against both UVA and UVB radiation with an SPF of 30 or greater. Reapply every 2 to 3 hours or after sweating, drying off with a towel, or swimming. Always wear a seat belt when traveling in a car. Always wear a helmet when riding a bicycle or motorcycle.

## 2023-08-02 DIAGNOSIS — C90.00 MULTIPLE MYELOMA NOT HAVING ACHIEVED REMISSION (HCC): ICD-10-CM

## 2023-08-02 RX ORDER — LENALIDOMIDE 10 MG/1
10 CAPSULE ORAL DAILY
Qty: 28 CAPSULE | Refills: 0 | Status: ACTIVE | OUTPATIENT
Start: 2023-08-02

## 2023-08-02 NOTE — PROGRESS NOTES
Revlimid 10 mg chemo capsules reordered and sent to [facility]. Prescriber survey completed and new auth # [id number] obtained through [employer] portal. Auth valid for 30 days and attached to RX.

## 2023-08-06 ASSESSMENT — ENCOUNTER SYMPTOMS
SHORTNESS OF BREATH: 0
NAUSEA: 0
WHEEZING: 0
ABDOMINAL PAIN: 0
SORE THROAT: 0

## 2023-09-01 DIAGNOSIS — C90.00 MULTIPLE MYELOMA NOT HAVING ACHIEVED REMISSION (HCC): ICD-10-CM

## 2023-09-01 RX ORDER — LENALIDOMIDE 10 MG/1
10 CAPSULE ORAL DAILY
Qty: 28 CAPSULE | Refills: 0 | Status: ACTIVE | OUTPATIENT
Start: 2023-09-01

## 2023-09-01 NOTE — PROGRESS NOTES
Lenalidomide (Revlimid) 10 chemo capsules reordered and e-scribed to [facility]. Prescriber survey completed and new auth # [id number] obtained through [employer] portal. Auth valid for 30 days and attached to RX.

## 2023-09-29 DIAGNOSIS — C90.00 MULTIPLE MYELOMA NOT HAVING ACHIEVED REMISSION (HCC): ICD-10-CM

## 2023-09-29 RX ORDER — LENALIDOMIDE 10 MG/1
10 CAPSULE ORAL DAILY
Qty: 28 CAPSULE | Refills: 0 | Status: ACTIVE | OUTPATIENT
Start: 2023-09-29

## 2023-09-29 NOTE — PROGRESS NOTES
Revlimid 10 mg chemo capsules reordered and sent to [facility]. Prescriber survey completed and new auth # [id number] obtained through [employer] portal. Auth valid for 30 days and attached to RX.

## 2023-10-13 ENCOUNTER — HOSPITAL ENCOUNTER (OUTPATIENT)
Dept: INFUSION THERAPY | Age: 73
Discharge: HOME | End: 2023-10-13
Payer: MEDICARE

## 2023-10-13 DIAGNOSIS — C90.00 MULTIPLE MYELOMA NOT HAVING ACHIEVED REMISSION (HCC): Chronic | ICD-10-CM

## 2023-10-13 DIAGNOSIS — D69.6 THROMBOCYTOPENIA, UNSPECIFIED (HCC): ICD-10-CM

## 2023-10-13 LAB
ALBUMIN SERPL-MCNC: 3.9 GM/DL (ref 3.4–5)
ALP BLD-CCNC: 119 IU/L (ref 40–129)
ALT SERPL-CCNC: 21 U/L (ref 10–40)
ANION GAP SERPL CALCULATED.3IONS-SCNC: 10 MMOL/L (ref 4–16)
AST SERPL-CCNC: 23 IU/L (ref 15–37)
BASOPHILS ABSOLUTE: 0.1 K/CU MM
BASOPHILS RELATIVE PERCENT: 1.6 % (ref 0–1)
BILIRUB SERPL-MCNC: 0.8 MG/DL (ref 0–1)
BUN SERPL-MCNC: 10 MG/DL (ref 6–23)
CALCIUM SERPL-MCNC: 9 MG/DL (ref 8.3–10.6)
CHLORIDE BLD-SCNC: 103 MMOL/L (ref 99–110)
CO2: 28 MMOL/L (ref 21–32)
CREAT SERPL-MCNC: 0.6 MG/DL (ref 0.6–1.1)
DIFFERENTIAL TYPE: ABNORMAL
EOSINOPHILS ABSOLUTE: 0 K/CU MM
EOSINOPHILS RELATIVE PERCENT: 0.6 % (ref 0–3)
ERYTHROCYTE SEDIMENTATION RATE: 6 MM/HR (ref 0–30)
GFR SERPL CREATININE-BSD FRML MDRD: >60 ML/MIN/1.73M2
GLUCOSE SERPL-MCNC: 89 MG/DL (ref 70–99)
HCT VFR BLD CALC: 34.9 % (ref 37–47)
HEMOGLOBIN: 11.7 GM/DL (ref 12.5–16)
IGA: 445 MG/DL (ref 69–382)
IGG,SERUM: 1183 MG/DL (ref 723–1685)
IGM,SERUM: <25 MG/DL (ref 62–277)
LACTATE DEHYDROGENASE: 143 IU/L (ref 120–246)
LYMPHOCYTES ABSOLUTE: 0.8 K/CU MM
LYMPHOCYTES RELATIVE PERCENT: 25.4 % (ref 24–44)
MCH RBC QN AUTO: 31.7 PG (ref 27–31)
MCHC RBC AUTO-ENTMCNC: 33.5 % (ref 32–36)
MCV RBC AUTO: 94.6 FL (ref 78–100)
MONOCYTES ABSOLUTE: 0.3 K/CU MM
MONOCYTES RELATIVE PERCENT: 8.7 % (ref 0–4)
PDW BLD-RTO: 14 % (ref 11.7–14.9)
PLATELET # BLD: 198 K/CU MM (ref 140–440)
PMV BLD AUTO: 8.1 FL (ref 7.5–11.1)
POTASSIUM SERPL-SCNC: 3.6 MMOL/L (ref 3.5–5.1)
RBC # BLD: 3.69 M/CU MM (ref 4.2–5.4)
SEGMENTED NEUTROPHILS ABSOLUTE COUNT: 2 K/CU MM
SEGMENTED NEUTROPHILS RELATIVE PERCENT: 63.7 % (ref 36–66)
SODIUM BLD-SCNC: 141 MMOL/L (ref 135–145)
TOTAL PROTEIN: 6.7 GM/DL (ref 6.4–8.2)
TOTAL PROTEIN: 6.7 GM/DL (ref 6.4–8.2)
WBC # BLD: 3.1 K/CU MM (ref 4–10.5)

## 2023-10-13 PROCEDURE — 83883 ASSAY NEPHELOMETRY NOT SPEC: CPT

## 2023-10-13 PROCEDURE — 80053 COMPREHEN METABOLIC PANEL: CPT

## 2023-10-13 PROCEDURE — 84165 PROTEIN E-PHORESIS SERUM: CPT

## 2023-10-13 PROCEDURE — 82232 ASSAY OF BETA-2 PROTEIN: CPT

## 2023-10-13 PROCEDURE — 85652 RBC SED RATE AUTOMATED: CPT

## 2023-10-13 PROCEDURE — 86320 SERUM IMMUNOELECTROPHORESIS: CPT

## 2023-10-13 PROCEDURE — 36415 COLL VENOUS BLD VENIPUNCTURE: CPT

## 2023-10-13 PROCEDURE — 84155 ASSAY OF PROTEIN SERUM: CPT

## 2023-10-13 PROCEDURE — 82784 ASSAY IGA/IGD/IGG/IGM EACH: CPT

## 2023-10-13 PROCEDURE — 85025 COMPLETE CBC W/AUTO DIFF WBC: CPT

## 2023-10-13 PROCEDURE — 83615 LACTATE (LD) (LDH) ENZYME: CPT

## 2023-10-14 LAB
KAPPA LC FREE SER-MCNC: 33.57 MG/L (ref 3.3–19.4)
KAPPA LC FREE/LAMBDA FREE SER NEPH: 1.15 {RATIO} (ref 0.26–1.65)
LAMBDA LC FREE SERPL-MCNC: 29.19 MG/L (ref 5.71–26.3)

## 2023-10-15 LAB — B2 MICROGLOB SERPL-MCNC: 1.9 MG/L

## 2023-10-16 LAB
ALBUMIN SERPL ELPH-MCNC: 3.5 GM/DL (ref 3.2–5.6)
ALPHA-1-GLOBULIN: 0.3 GM/DL (ref 0.1–0.4)
ALPHA-2-GLOBULIN: 0.7 GM/DL (ref 0.4–1.2)
BETA GLOBULIN: 1 GM/DL (ref 0.5–1.3)
GAMMA GLOBULIN: 1.2 GM/DL (ref 0.5–1.6)
SPEP INTERPRETATION: NORMAL
SPEP INTERPRETATION: NORMAL
TOTAL PROTEIN: 6.7 GM/DL (ref 6.4–8.2)

## 2023-10-16 NOTE — PROGRESS NOTES
Patient Name: Geeta Turner  Patient : 1950  Patient MRN: [id number]     Primary Oncologist: Rachell Morales MD  Referring Provider: Tonya Singletary DO     Date of Service: 10/20/2023      Chief Complaint:   Chief Complaint   Patient presents with    Follow-up    Results     Patient Active Problem List:     Severe anemia     Multiple myeloma not having achieved remission      Drug related polyneuropathy      Osteopenia of multiple sites    HPI:   Geeta Turner is a 77-year-old very pleasnat female with medical history significant for hypertension, GERD, depression, anemia, initially presented to me on 2018 to follow up with her monocloal gammopathy. She initially presented to [facility] on 18 with low hemoglobin. She stated that she had colonoscopy in  by Dr. Crystal Jacinto. She has been tired and fatigue since 2018. She denies weight loss. She was found to have severe anemia on blood test done in her primary care physician office and she was asked to come to ER. Her base line hemoglobin was 8.4 -9.4 gram since . It was 13 grams in  and on arrival to hospital on 18 was 5.7 grams. She received 2 units of PRBC. I was called to evaluate her anemia at that time. I recognized that she has worsening macrocytic anemia. She also has mild leukopenia and thrombocytopenia. Her total protein level was significantly elevated (11.9 grams), but she has normal calcium and serum creatinine. I requested work ups to rule out plasma cell dyscrasias and she was found to have 7900 mg/dl IgG kappa monoclonal gammopathy on serum protein electrophoresis and serum immunofixation. Her beta 2 microglobulin was 7.5 mg./dl, serum kappa 9.34 mg/dl, lambda 0.19 mg/dl, ratio was 49.16. ESR > 120, CRP 3 and .     Bone marrow biopsy was done on 2018 and final pathology showed hypocellular bone marrow [85%], with partially preserved trilineage

## 2023-10-20 ENCOUNTER — HOSPITAL ENCOUNTER (OUTPATIENT)
Dept: INFUSION THERAPY | Age: 73
Discharge: HOME | End: 2023-10-20
Payer: MEDICARE

## 2023-10-20 ENCOUNTER — OFFICE VISIT (OUTPATIENT)
Dept: ONCOLOGY | Age: 73
End: 2023-10-20
Payer: MEDICARE

## 2023-10-20 VITALS
BODY MASS INDEX: 24.79 KG/M2 | OXYGEN SATURATION: 97 % | SYSTOLIC BLOOD PRESSURE: 145 MMHG | HEIGHT: 64 IN | HEART RATE: 79 BPM | WEIGHT: 145.2 LBS | TEMPERATURE: 97.5 F | DIASTOLIC BLOOD PRESSURE: 65 MMHG

## 2023-10-20 DIAGNOSIS — C90.00 MULTIPLE MYELOMA NOT HAVING ACHIEVED REMISSION (HCC): Primary | Chronic | ICD-10-CM

## 2023-10-20 PROCEDURE — 1090F PRES/ABSN URINE INCON ASSESS: CPT | Performed by: INTERNAL MEDICINE

## 2023-10-20 PROCEDURE — 3078F DIAST BP <80 MM HG: CPT | Performed by: INTERNAL MEDICINE

## 2023-10-20 PROCEDURE — G8484 FLU IMMUNIZE NO ADMIN: HCPCS | Performed by: INTERNAL MEDICINE

## 2023-10-20 PROCEDURE — 1036F TOBACCO NON-USER: CPT | Performed by: INTERNAL MEDICINE

## 2023-10-20 PROCEDURE — 99211 OFF/OP EST MAY X REQ PHY/QHP: CPT

## 2023-10-20 PROCEDURE — 1123F ACP DISCUSS/DSCN MKR DOCD: CPT | Performed by: INTERNAL MEDICINE

## 2023-10-20 PROCEDURE — 3017F COLORECTAL CA SCREEN DOC REV: CPT | Performed by: INTERNAL MEDICINE

## 2023-10-20 PROCEDURE — G8420 CALC BMI NORM PARAMETERS: HCPCS | Performed by: INTERNAL MEDICINE

## 2023-10-20 PROCEDURE — G8427 DOCREV CUR MEDS BY ELIG CLIN: HCPCS | Performed by: INTERNAL MEDICINE

## 2023-10-20 PROCEDURE — G8399 PT W/DXA RESULTS DOCUMENT: HCPCS | Performed by: INTERNAL MEDICINE

## 2023-10-20 PROCEDURE — 3077F SYST BP >= 140 MM HG: CPT | Performed by: INTERNAL MEDICINE

## 2023-10-20 PROCEDURE — 99214 OFFICE O/P EST MOD 30 MIN: CPT | Performed by: INTERNAL MEDICINE

## 2023-10-20 RX ORDER — DIPHENOXYLATE HYDROCHLORIDE AND ATROPINE SULFATE 2.5; .025 MG/1; MG/1
2 TABLET ORAL 4 TIMES DAILY PRN
Qty: 240 TABLET | Refills: 2 | Status: SHIPPED | OUTPATIENT
Start: 2023-10-20 | End: 2023-11-19

## 2023-10-20 RX ORDER — LORAZEPAM 0.5 MG/1
0.5 TABLET ORAL EVERY 8 HOURS PRN
Qty: 60 TABLET | Refills: 0 | Status: SHIPPED | OUTPATIENT
Start: 2023-10-20 | End: 2023-11-09

## 2023-10-20 ASSESSMENT — PATIENT HEALTH QUESTIONNAIRE - PHQ9
1. LITTLE INTEREST OR PLEASURE IN DOING THINGS: 1
SUM OF ALL RESPONSES TO PHQ QUESTIONS 1-9: 2
2. FEELING DOWN, DEPRESSED OR HOPELESS: 1
SUM OF ALL RESPONSES TO PHQ QUESTIONS 1-9: 2
SUM OF ALL RESPONSES TO PHQ9 QUESTIONS 1 & 2: 2

## 2023-10-20 NOTE — PROGRESS NOTES
MA Rooming Questions  Patient: Mukesh Agee  MRN: [id number]    Date: 10/20/2023        1. Do you have any new issues?   no         2. Do you need any refills on medications? yes -diphenatol pended     3. Have you had any imaging done since your last visit? yes - Dexa 05/17    4. Have you been hospitalized or seen in the emergency room since your last visit here?   no    5. Did the patient have a depression screening completed today?  Yes    No data recorded     PHQ-9 Given to (if applicable):               PHQ-9 Score (if applicable):                     [] Positive     []  Negative              Does question #9 need addressed (if applicable)                     [] Yes    []  No               [employer], MA

## 2023-10-30 DIAGNOSIS — C90.00 MULTIPLE MYELOMA NOT HAVING ACHIEVED REMISSION (HCC): ICD-10-CM

## 2023-10-30 RX ORDER — LENALIDOMIDE 10 MG/1
10 CAPSULE ORAL DAILY
Qty: 28 CAPSULE | Refills: 0 | Status: ACTIVE | OUTPATIENT
Start: 2023-10-30

## 2023-10-30 NOTE — PROGRESS NOTES
Revlimid 10 mg chemo capsules reordered and sent to [facility]. Prescriber survey completed and new auth # [id number] obtained through [employer] portal. Auth valid for 30 days and attached to RX.

## 2023-10-31 DIAGNOSIS — C90.00 MULTIPLE MYELOMA NOT HAVING ACHIEVED REMISSION (HCC): ICD-10-CM

## 2023-10-31 RX ORDER — LENALIDOMIDE 10 MG/1
10 CAPSULE ORAL DAILY
Qty: 28 CAPSULE | Refills: 0 | OUTPATIENT
Start: 2023-10-31

## 2023-11-01 ENCOUNTER — PATIENT MESSAGE (OUTPATIENT)
Dept: ONCOLOGY | Age: 73
End: 2023-11-01

## 2023-11-01 NOTE — TELEPHONE ENCOUNTER
From: Marisol Rios  To: Dr. Basia Lobato: 11/1/2023 10:08 AM EDT  Subject: revlimid refill    I just spoke with [facility] [11/1) and they said they did not receive a new script yet. They even checked again today when questioned them. Is it possible that you can submit this script for revlimid again?     thank you    Jessy Kaba

## 2023-11-01 NOTE — TELEPHONE ENCOUNTER
Called [facility] @ [phone] to inquire about status of RX sent on 10/30/2023. Pharmacy states no RX on file even though e-scribing status: receipt confirmed by pharmacy. Verbal order provided to [address] [place] [address] with auth # [id number]. [address] [place] [address] will process RX and contact patient today or tomorrow to schedule delivery of oral chemo. Called patient @ [phone] to update. Voices understanding. Patient instructed to call office back if she has any further needs.

## 2023-11-05 PROBLEM — C44.92 SQUAMOUS CELL CARCINOMA OF SKIN: Status: ACTIVE | Noted: 2023-11-05

## 2023-11-28 DIAGNOSIS — C90.00 MULTIPLE MYELOMA NOT HAVING ACHIEVED REMISSION (HCC): ICD-10-CM

## 2023-11-28 RX ORDER — LENALIDOMIDE 10 MG/1
10 CAPSULE ORAL DAILY
Qty: 28 CAPSULE | Refills: 0 | Status: ACTIVE | OUTPATIENT
Start: 2023-11-28

## 2023-11-28 RX ORDER — LENALIDOMIDE 10 MG/1
10 CAPSULE ORAL DAILY
Qty: 28 CAPSULE | Refills: 0 | Status: SHIPPED | OUTPATIENT
Start: 2023-11-28 | End: 2023-11-28

## 2023-11-28 NOTE — PROGRESS NOTES
Received VM from patient stating she needs refill on oral chemo. Revlimid 10 mg chemo capsules reordered and sent to [facility]. Prescriber survey completed and new auth # [id number] obtained through [employer] portal. Auth valid for 30 days and attached to RX.

## 2023-12-11 ENCOUNTER — CLINICAL DOCUMENTATION (OUTPATIENT)
Dept: ONCOLOGY | Age: 73
End: 2023-12-11

## 2023-12-11 NOTE — PROGRESS NOTES
2024 Annual Renewal Application for maintenance Lenalidomide (Revlimid) 10 mg daily faxed successfully to [facility] @ [phone].

## 2023-12-23 DIAGNOSIS — I10 ESSENTIAL HYPERTENSION: Chronic | ICD-10-CM

## 2023-12-26 RX ORDER — LOSARTAN POTASSIUM 50 MG/1
TABLET ORAL
Qty: 90 TABLET | Refills: 1 | Status: SHIPPED | OUTPATIENT
Start: 2023-12-26

## 2023-12-26 RX ORDER — AMLODIPINE BESYLATE 5 MG/1
TABLET ORAL
Qty: 90 TABLET | Refills: 1 | Status: SHIPPED | OUTPATIENT
Start: 2023-12-26

## 2024-01-16 ENCOUNTER — HOSPITAL ENCOUNTER (OUTPATIENT)
Dept: INFUSION THERAPY | Age: 74
Discharge: HOME | End: 2024-01-16
Payer: MEDICARE

## 2024-01-16 DIAGNOSIS — C90.00 MULTIPLE MYELOMA NOT HAVING ACHIEVED REMISSION (HCC): Chronic | ICD-10-CM

## 2024-01-16 LAB
ALBUMIN SERPL-MCNC: 4.4 GM/DL (ref 3.4–5)
ALP BLD-CCNC: 124 IU/L (ref 40–129)
ALT SERPL-CCNC: 17 U/L (ref 10–40)
ANION GAP SERPL CALCULATED.3IONS-SCNC: 10 MMOL/L (ref 7–16)
AST SERPL-CCNC: 21 IU/L (ref 15–37)
BASOPHILS ABSOLUTE: 0.1 K/CU MM
BASOPHILS RELATIVE PERCENT: 2.3 % (ref 0–1)
BILIRUB SERPL-MCNC: 0.9 MG/DL (ref 0–1)
BUN SERPL-MCNC: 9 MG/DL (ref 6–23)
CALCIUM SERPL-MCNC: 9.3 MG/DL (ref 8.3–10.6)
CHLORIDE BLD-SCNC: 99 MMOL/L (ref 99–110)
CO2: 29 MMOL/L (ref 21–32)
CREAT SERPL-MCNC: 0.6 MG/DL (ref 0.6–1.1)
DIFFERENTIAL TYPE: ABNORMAL
EOSINOPHILS ABSOLUTE: 0.1 K/CU MM
EOSINOPHILS RELATIVE PERCENT: 2.7 % (ref 0–3)
ERYTHROCYTE SEDIMENTATION RATE: 4 MM/HR (ref 0–30)
GFR SERPL CREATININE-BSD FRML MDRD: >60 ML/MIN/1.73M2
GLUCOSE SERPL-MCNC: 108 MG/DL (ref 70–99)
HCT VFR BLD CALC: 39.4 % (ref 37–47)
HEMOGLOBIN: 13.1 GM/DL (ref 12.5–16)
IGA: 481 MG/DL (ref 69–382)
IGG,SERUM: 1195 MG/DL (ref 723–1685)
IGM,SERUM: <25 MG/DL (ref 62–277)
LACTATE DEHYDROGENASE: 143 IU/L (ref 120–246)
LYMPHOCYTES ABSOLUTE: 0.5 K/CU MM
LYMPHOCYTES RELATIVE PERCENT: 17 % (ref 24–44)
MCH RBC QN AUTO: 31.6 PG (ref 27–31)
MCHC RBC AUTO-ENTMCNC: 33.2 % (ref 32–36)
MCV RBC AUTO: 94.9 FL (ref 78–100)
MONOCYTES ABSOLUTE: 0.2 K/CU MM
MONOCYTES RELATIVE PERCENT: 7.7 % (ref 0–4)
PDW BLD-RTO: 13.7 % (ref 11.7–14.9)
PLATELET # BLD: 204 K/CU MM (ref 140–440)
PMV BLD AUTO: 8.8 FL (ref 7.5–11.1)
POTASSIUM SERPL-SCNC: 4.1 MMOL/L (ref 3.5–5.1)
RBC # BLD: 4.15 M/CU MM (ref 4.2–5.4)
SEGMENTED NEUTROPHILS ABSOLUTE COUNT: 2.1 K/CU MM
SEGMENTED NEUTROPHILS RELATIVE PERCENT: 70.3 % (ref 36–66)
SODIUM BLD-SCNC: 138 MMOL/L (ref 135–145)
TOTAL PROTEIN: 7.4 GM/DL (ref 6.4–8.2)
WBC # BLD: 3 K/CU MM (ref 4–10.5)

## 2024-01-16 PROCEDURE — 83615 LACTATE (LD) (LDH) ENZYME: CPT

## 2024-01-16 PROCEDURE — 84165 PROTEIN E-PHORESIS SERUM: CPT

## 2024-01-16 PROCEDURE — 82232 ASSAY OF BETA-2 PROTEIN: CPT

## 2024-01-16 PROCEDURE — 85652 RBC SED RATE AUTOMATED: CPT

## 2024-01-16 PROCEDURE — 86320 SERUM IMMUNOELECTROPHORESIS: CPT

## 2024-01-16 PROCEDURE — 82784 ASSAY IGA/IGD/IGG/IGM EACH: CPT

## 2024-01-16 PROCEDURE — 36415 COLL VENOUS BLD VENIPUNCTURE: CPT

## 2024-01-16 PROCEDURE — 84155 ASSAY OF PROTEIN SERUM: CPT

## 2024-01-16 PROCEDURE — 83883 ASSAY NEPHELOMETRY NOT SPEC: CPT

## 2024-01-16 PROCEDURE — 85025 COMPLETE CBC W/AUTO DIFF WBC: CPT

## 2024-01-16 PROCEDURE — 80053 COMPREHEN METABOLIC PANEL: CPT

## 2024-01-18 LAB — B2 MICROGLOB SERPL-MCNC: 2.2 MG/L

## 2024-01-19 LAB
KAPPA LC FREE SER-MCNC: 35.5 MG/L (ref 3.3–19.4)
KAPPA LC FREE/LAMBDA FREE SER NEPH: 1.19 {RATIO} (ref 0.26–1.65)
LAMBDA LC FREE SERPL-MCNC: 29.77 MG/L (ref 5.71–26.3)

## 2024-01-20 LAB
ALBUMIN SERPL ELPH-MCNC: 4 GM/DL (ref 3.2–5.6)
ALPHA-1-GLOBULIN: 0.3 GM/DL (ref 0.1–0.4)
ALPHA-2-GLOBULIN: 0.7 GM/DL (ref 0.4–1.2)
BETA GLOBULIN: 1 GM/DL (ref 0.5–1.3)
GAMMA GLOBULIN: 1.4 GM/DL (ref 0.5–1.6)
TOTAL PROTEIN: 7.4 GM/DL (ref 6.4–8.2)

## 2024-01-22 LAB
ALBUMIN SERPL ELPH-MCNC: 4 GM/DL (ref 3.2–5.6)
ALPHA-1-GLOBULIN: 0.3 GM/DL (ref 0.1–0.4)
ALPHA-2-GLOBULIN: 0.7 GM/DL (ref 0.4–1.2)
BETA GLOBULIN: 1 GM/DL (ref 0.5–1.3)
GAMMA GLOBULIN: 1.4 GM/DL (ref 0.5–1.6)
SPEP INTERPRETATION: NORMAL
SPEP INTERPRETATION: NORMAL
TOTAL PROTEIN: 7.4 GM/DL (ref 6.4–8.2)

## 2024-01-23 ENCOUNTER — OFFICE VISIT (OUTPATIENT)
Dept: ONCOLOGY | Age: 74
End: 2024-01-23
Payer: MEDICARE

## 2024-01-23 ENCOUNTER — HOSPITAL ENCOUNTER (OUTPATIENT)
Dept: INFUSION THERAPY | Age: 74
Discharge: HOME | End: 2024-01-23
Payer: MEDICARE

## 2024-01-23 VITALS
BODY MASS INDEX: 25.47 KG/M2 | HEIGHT: 64 IN | OXYGEN SATURATION: 98 % | WEIGHT: 149.2 LBS | TEMPERATURE: 97.4 F | HEART RATE: 76 BPM | SYSTOLIC BLOOD PRESSURE: 147 MMHG | DIASTOLIC BLOOD PRESSURE: 64 MMHG

## 2024-01-23 DIAGNOSIS — C90.00 MULTIPLE MYELOMA NOT HAVING ACHIEVED REMISSION (HCC): Primary | Chronic | ICD-10-CM

## 2024-01-23 DIAGNOSIS — C90.00 MULTIPLE MYELOMA NOT HAVING ACHIEVED REMISSION (HCC): ICD-10-CM

## 2024-01-23 PROCEDURE — 1090F PRES/ABSN URINE INCON ASSESS: CPT | Performed by: INTERNAL MEDICINE

## 2024-01-23 PROCEDURE — 3078F DIAST BP <80 MM HG: CPT | Performed by: INTERNAL MEDICINE

## 2024-01-23 PROCEDURE — 99211 OFF/OP EST MAY X REQ PHY/QHP: CPT

## 2024-01-23 PROCEDURE — G8427 DOCREV CUR MEDS BY ELIG CLIN: HCPCS | Performed by: INTERNAL MEDICINE

## 2024-01-23 PROCEDURE — 1123F ACP DISCUSS/DSCN MKR DOCD: CPT | Performed by: INTERNAL MEDICINE

## 2024-01-23 PROCEDURE — G8484 FLU IMMUNIZE NO ADMIN: HCPCS | Performed by: INTERNAL MEDICINE

## 2024-01-23 PROCEDURE — 99214 OFFICE O/P EST MOD 30 MIN: CPT | Performed by: INTERNAL MEDICINE

## 2024-01-23 PROCEDURE — 3017F COLORECTAL CA SCREEN DOC REV: CPT | Performed by: INTERNAL MEDICINE

## 2024-01-23 PROCEDURE — G8399 PT W/DXA RESULTS DOCUMENT: HCPCS | Performed by: INTERNAL MEDICINE

## 2024-01-23 PROCEDURE — 1036F TOBACCO NON-USER: CPT | Performed by: INTERNAL MEDICINE

## 2024-01-23 PROCEDURE — 3077F SYST BP >= 140 MM HG: CPT | Performed by: INTERNAL MEDICINE

## 2024-01-23 PROCEDURE — G8419 CALC BMI OUT NRM PARAM NOF/U: HCPCS | Performed by: INTERNAL MEDICINE

## 2024-01-23 RX ORDER — LENALIDOMIDE 10 MG/1
10 CAPSULE ORAL DAILY
Qty: 28 CAPSULE | Refills: 0 | Status: ACTIVE | OUTPATIENT
Start: 2024-01-23

## 2024-01-23 NOTE — PROGRESS NOTES
MA Rooming Questions  Patient: Esha Paz  MRN: [id number]    Date: 1/23/2024        1. Do you have any new issues?   no         2. Do you need any refills on medications?    no    3. Have you had any imaging done since your last visit?   no    4. Have you been hospitalized or seen in the emergency room since your last visit here?   no    5. Did the patient have a depression screening completed today? No    No data recorded     PHQ-9 Given to (if applicable):               PHQ-9 Score (if applicable):                     [] Positive     []  Negative              Does question #9 need addressed (if applicable)                     [] Yes    []  No               Tree Mcclain MA      
increasing SOB and fatigue. Since her symptoms are resolved now, we resumed back on daily basis since January 14, 2020 until 8/16/22.    Since she has significant diarrhea, we changed revlimid to 3 weeks on 1 week off schecule starting from 8/17/22.     She has normal SPEP and SARABJIT on 1/16/24 blood test. I believe she is in stringent complete remission and I recommend that she continues with current dose of Revlimid for now.     She has been having diarrhea due to revlimid since 3/2022. She has been on lomotil with good control.     I will reevaluate her multiple myeloma again in 3 months and I will see her again after that.    Depression - due to loss of her . She is on cymbalta. Will add ativan today and I asked her to take it prn.      Dizziness - recommend her to try meclizine. If not helping, I will consider to add promethazine.     Chemo induced thromboembolism prophylaxis - I recommend her to continue with aspirin 81 mg daily.     Myeloma bone disease - Since she has completed 2 years of therapy, we stopped it after 5/2021 dose. I asked her to continue with vitamin D daily.     Chemo induced neuropathy - she is on cymbalta 60 mg daily since her neuropathy gets worse after decreasing the dose. I asked her to take tramadol prn for neuropathic pain. It seems to be quite stable lately.     Hypertension - she is on amlodipine and losartan. BP is stable and I recommend her for salt restriction.     I reviewed with her findings on screening mammogram done on 5/22/23 and DEXA scan (showed osteopenia), done on 5/17/23.       Health maintenance - I recommend her to have age-appropriate cancer screening, exercise, low-fat and low-sodium diet.    She doesn't have any significant symptoms on today visit.               Past Medical History  Significant for  1.  Hypertension  2.  Gastroesophageal reflux disease  3.  Depression  4.  Anemia    Surgical History  Significant for  1.  Cholecystectomy  2.  Cataract

## 2024-01-23 NOTE — PROGRESS NOTES
Received VM from patient needing refill on oral chemo Revlimid 10 mg chemo capsules reordered and sent to [facility] pharmacy. Prescriber survey completed and new auth # [id number]  obtained through [employer] portal. Auth valid for 30 days and attached to RX.

## 2024-02-01 ENCOUNTER — OFFICE VISIT (OUTPATIENT)
Dept: FAMILY MEDICINE CLINIC | Age: 74
End: 2024-02-01

## 2024-02-01 VITALS
DIASTOLIC BLOOD PRESSURE: 64 MMHG | HEIGHT: 64 IN | OXYGEN SATURATION: 99 % | BODY MASS INDEX: 24.92 KG/M2 | HEART RATE: 77 BPM | SYSTOLIC BLOOD PRESSURE: 136 MMHG | WEIGHT: 146 LBS

## 2024-02-01 DIAGNOSIS — Z12.31 ENCOUNTER FOR SCREENING MAMMOGRAM FOR MALIGNANT NEOPLASM OF BREAST: ICD-10-CM

## 2024-02-01 DIAGNOSIS — Z13.220 LIPID SCREENING: ICD-10-CM

## 2024-02-01 DIAGNOSIS — Z11.59 NEED FOR HEPATITIS C SCREENING TEST: ICD-10-CM

## 2024-02-01 DIAGNOSIS — I10 ESSENTIAL HYPERTENSION: Primary | Chronic | ICD-10-CM

## 2024-02-01 DIAGNOSIS — E78.2 MIXED HYPERLIPIDEMIA: ICD-10-CM

## 2024-02-01 DIAGNOSIS — D69.6 THROMBOCYTOPENIA, UNSPECIFIED (HCC): ICD-10-CM

## 2024-02-01 DIAGNOSIS — R73.01 IFG (IMPAIRED FASTING GLUCOSE): ICD-10-CM

## 2024-02-01 DIAGNOSIS — G62.0 DRUG RELATED POLYNEUROPATHY (HCC): ICD-10-CM

## 2024-02-01 LAB
CHOLEST SERPL-MCNC: 124 MG/DL (ref 0–199)
HCV AB SERPL QL IA: NORMAL
HDLC SERPL-MCNC: 72 MG/DL (ref 40–60)
LDLC SERPL CALC-MCNC: 36 MG/DL
TRIGL SERPL-MCNC: 78 MG/DL (ref 0–150)
VLDLC SERPL CALC-MCNC: 16 MG/DL

## 2024-02-01 ASSESSMENT — PATIENT HEALTH QUESTIONNAIRE - PHQ9
SUM OF ALL RESPONSES TO PHQ QUESTIONS 1-9: 0
SUM OF ALL RESPONSES TO PHQ QUESTIONS 1-9: 0
1. LITTLE INTEREST OR PLEASURE IN DOING THINGS: 0
SUM OF ALL RESPONSES TO PHQ QUESTIONS 1-9: 0
SUM OF ALL RESPONSES TO PHQ QUESTIONS 1-9: 0
2. FEELING DOWN, DEPRESSED OR HOPELESS: 0

## 2024-02-01 NOTE — PROGRESS NOTES
2/7/2024    Esha Paz    Chief Complaint   Patient presents with    6 Month Follow-Up       HPI  History was obtained from patient.  Esha is a 74 y.o. female with a PMHx as listed below who presents today for 6 month follow up. No acute complaints    BP at home 130s to 140s SBP    On Revlimid following with Dr. Cruz/[facility]     1. Essential hypertension    2. Drug related polyneuropathy (HCC)    3. Thrombocytopenia, unspecified (HCC)    4. Lipid screening    5. Mixed hyperlipidemia    6. Need for hepatitis C screening test    7. IFG (impaired fasting glucose)    8. Encounter for screening mammogram for malignant neoplasm of breast             REVIEW OF SYMPTOMS    Review of Systems   Constitutional:  Negative for chills and fatigue.   HENT:  Negative for congestion and sore throat.    Respiratory:  Negative for shortness of breath and wheezing.    Cardiovascular:  Negative for chest pain and palpitations.   Gastrointestinal:  Negative for abdominal pain and nausea.   Genitourinary:  Negative for frequency and urgency.   Neurological:  Negative for light-headedness.       PAST MEDICAL HISTORY  Past Medical History:   Diagnosis Date    Anemia     \"With Childbirth\"    Arthritis     \"Hands, Knees And Hips\"    Cancer (HCC) 12/18    CCC (chronic calculous cholecystitis)     s/p cholecystectomy 2015    Colitis Dx 2000's    Depression     \"In Mid 1990's\"    Essential hypertension     GERD (gastroesophageal reflux disease)     Headache ?? 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    Osteoporosis     LILIAN (stress urinary incontinence, female)     Thyroid disease 1990's    \"Took Part Of Thyroid Out \"       FAMILY HISTORY  Family History   Problem Relation Age of Onset    Heart Disease Mother     Arthritis Mother     Diabetes Mother     High Blood Pressure Mother     Gout Mother     Obesity Mother     Osteoarthritis Mother     Osteoporosis

## 2024-02-02 LAB
EST. AVERAGE GLUCOSE BLD GHB EST-MCNC: 99.7 MG/DL
HBA1C MFR BLD: 5.1 %

## 2024-02-07 ASSESSMENT — ENCOUNTER SYMPTOMS
NAUSEA: 0
WHEEZING: 0
SHORTNESS OF BREATH: 0
ABDOMINAL PAIN: 0
SORE THROAT: 0

## 2024-02-21 DIAGNOSIS — C90.00 MULTIPLE MYELOMA NOT HAVING ACHIEVED REMISSION (HCC): ICD-10-CM

## 2024-02-21 RX ORDER — LENALIDOMIDE 10 MG/1
10 CAPSULE ORAL DAILY
Qty: 28 CAPSULE | Refills: 0 | Status: ACTIVE | OUTPATIENT
Start: 2024-02-21

## 2024-02-21 NOTE — PROGRESS NOTES
Revlimid 10 mg chemo capsules reordered and sent to [facility] pharmacy. Prescriber survey completed and new auth # [id number] obtained through [employer] portal. Auth valid for 30 days and attached to RX.

## 2024-03-06 ENCOUNTER — OFFICE VISIT (OUTPATIENT)
Dept: FAMILY MEDICINE CLINIC | Age: 74
End: 2024-03-06
Payer: MEDICARE

## 2024-03-06 VITALS
OXYGEN SATURATION: 98 % | DIASTOLIC BLOOD PRESSURE: 60 MMHG | TEMPERATURE: 97.2 F | WEIGHT: 145 LBS | HEIGHT: 64 IN | BODY MASS INDEX: 24.75 KG/M2 | HEART RATE: 75 BPM | SYSTOLIC BLOOD PRESSURE: 114 MMHG

## 2024-03-06 DIAGNOSIS — J01.90 ACUTE NON-RECURRENT SINUSITIS, UNSPECIFIED LOCATION: Primary | ICD-10-CM

## 2024-03-06 PROCEDURE — 3078F DIAST BP <80 MM HG: CPT | Performed by: PHYSICIAN ASSISTANT

## 2024-03-06 PROCEDURE — 1090F PRES/ABSN URINE INCON ASSESS: CPT | Performed by: PHYSICIAN ASSISTANT

## 2024-03-06 PROCEDURE — G8427 DOCREV CUR MEDS BY ELIG CLIN: HCPCS | Performed by: PHYSICIAN ASSISTANT

## 2024-03-06 PROCEDURE — 1036F TOBACCO NON-USER: CPT | Performed by: PHYSICIAN ASSISTANT

## 2024-03-06 PROCEDURE — 1123F ACP DISCUSS/DSCN MKR DOCD: CPT | Performed by: PHYSICIAN ASSISTANT

## 2024-03-06 PROCEDURE — 3074F SYST BP LT 130 MM HG: CPT | Performed by: PHYSICIAN ASSISTANT

## 2024-03-06 PROCEDURE — G8484 FLU IMMUNIZE NO ADMIN: HCPCS | Performed by: PHYSICIAN ASSISTANT

## 2024-03-06 PROCEDURE — G8420 CALC BMI NORM PARAMETERS: HCPCS | Performed by: PHYSICIAN ASSISTANT

## 2024-03-06 PROCEDURE — 3017F COLORECTAL CA SCREEN DOC REV: CPT | Performed by: PHYSICIAN ASSISTANT

## 2024-03-06 PROCEDURE — 99213 OFFICE O/P EST LOW 20 MIN: CPT | Performed by: PHYSICIAN ASSISTANT

## 2024-03-06 PROCEDURE — G8399 PT W/DXA RESULTS DOCUMENT: HCPCS | Performed by: PHYSICIAN ASSISTANT

## 2024-03-06 RX ORDER — DIPHENOXYLATE HYDROCHLORIDE AND ATROPINE SULFATE 2.5; .025 MG/1; MG/1
TABLET ORAL
COMMUNITY
Start: 2024-02-07

## 2024-03-06 RX ORDER — AMOXICILLIN AND CLAVULANATE POTASSIUM 875; 125 MG/1; MG/1
1 TABLET, FILM COATED ORAL 2 TIMES DAILY
Qty: 20 TABLET | Refills: 0 | Status: SHIPPED | OUTPATIENT
Start: 2024-03-06 | End: 2024-03-16

## 2024-03-06 RX ORDER — BENZONATATE 200 MG/1
200 CAPSULE ORAL 3 TIMES DAILY PRN
Qty: 30 CAPSULE | Refills: 0 | Status: SHIPPED | OUTPATIENT
Start: 2024-03-06

## 2024-03-06 NOTE — PROGRESS NOTES
3/6/2024    Esha Paz    Chief Complaint   Patient presents with    Sinus Problem     Congestion, nasal drainage, sinus pressure, headaches x 1 week    Cough     Deep cough, tightness, pain with cough, nonproductive x 5 days       HPI  History was obtained from   Esha youssef a 74 y.o. female who presents today with complaints of 1 week history of nasal congestion, postnasal drip, sinus pressure, sinus headaches.  She has had a deep and nonproductive cough for about 5 days.  Admits some chest tightness with long coughing fits.  Denies wheezing, shortness of breath, hemoptysis or fevers.  Denies nausea, vomiting, diarrhea.    PAST MEDICAL HISTORY  Past Medical History:   Diagnosis Date    Anemia     \"With Childbirth\"    Arthritis     \"Hands, Knees And Hips\"    Cancer (HCC) 12/18    CCC (chronic calculous cholecystitis)     s/p cholecystectomy 2015    Colitis Dx 2000's    Depression     \"In Mid 1990's\"    Essential hypertension     GERD (gastroesophageal reflux disease)     Headache ?? 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    Osteoporosis     LILIAN (stress urinary incontinence, female)     Thyroid disease 1990's    \"Took Part Of Thyroid Out \"       FAMILY HISTORY  Family History   Problem Relation Age of Onset    Heart Disease Mother     Arthritis Mother     Diabetes Mother     High Blood Pressure Mother     Gout Mother     Obesity Mother     Osteoarthritis Mother     Osteoporosis Mother     Dementia Father     Alcohol Abuse Father         quit on his own    High Blood Pressure Father     Arthritis Brother     Prostate Cancer Brother     Parkinson's Disease Brother     Early Death Brother 59        58 due to cancer    Diabetes Brother     Depression Brother     Cancer Brother         bladder    High Blood Pressure Brother     Obesity Brother     Kidney Disease Son         Kidney Stones    Other Son         \"Rocky Griffiths,

## 2024-03-07 ENCOUNTER — PATIENT MESSAGE (OUTPATIENT)
Dept: ONCOLOGY | Age: 74
End: 2024-03-07

## 2024-03-19 ENCOUNTER — CLINICAL DOCUMENTATION (OUTPATIENT)
Facility: HOSPITAL | Age: 74
End: 2024-03-19

## 2024-03-19 NOTE — PROGRESS NOTES
Patient Assistance     Spoke with Esha on the phone today about her Revlimid Prescription.  I called her [employer] to confirm that she is now in her catastrophic phase and her prescriptions are covered at 100%. This will remain until the end of 2024. She no longer requires financial assistance with prescription medications and will be reviewed at the end of 2024 or insurance changes.   Ref# [id number] [facility].     Thank you      Emi Ruiz  Financial Navigator

## 2024-03-20 ENCOUNTER — CLINICAL DOCUMENTATION (OUTPATIENT)
Dept: ONCOLOGY | Age: 74
End: 2024-03-20

## 2024-03-20 DIAGNOSIS — C90.00 MULTIPLE MYELOMA NOT HAVING ACHIEVED REMISSION (HCC): ICD-10-CM

## 2024-03-20 RX ORDER — LENALIDOMIDE 10 MG/1
10 CAPSULE ORAL DAILY
Qty: 28 CAPSULE | Refills: 0 | Status: ACTIVE | OUTPATIENT
Start: 2024-03-20

## 2024-03-20 NOTE — PROGRESS NOTES
Revlimid 10 chemo capsules reordered and sent to [facility] pharmacy. Prescriber survey completed and new auth # [id number]  obtained through [employer] portal. Auth valid for 30 days and attached to RX.

## 2024-04-16 ENCOUNTER — HOSPITAL ENCOUNTER (OUTPATIENT)
Dept: INFUSION THERAPY | Age: 74
Discharge: HOME | End: 2024-04-16
Payer: MEDICARE

## 2024-04-16 DIAGNOSIS — C90.00 MULTIPLE MYELOMA NOT HAVING ACHIEVED REMISSION (HCC): Chronic | ICD-10-CM

## 2024-04-16 LAB
ALBUMIN SERPL-MCNC: 4.1 GM/DL (ref 3.4–5)
ALP BLD-CCNC: 104 IU/L (ref 40–129)
ALT SERPL-CCNC: 26 U/L (ref 10–40)
ANION GAP SERPL CALCULATED.3IONS-SCNC: 9 MMOL/L (ref 7–16)
AST SERPL-CCNC: 22 IU/L (ref 15–37)
BASOPHILS ABSOLUTE: 0.1 K/CU MM
BASOPHILS RELATIVE PERCENT: 2.3 % (ref 0–1)
BILIRUB SERPL-MCNC: 1.4 MG/DL (ref 0–1)
BUN SERPL-MCNC: 11 MG/DL (ref 6–23)
CALCIUM SERPL-MCNC: 9 MG/DL (ref 8.3–10.6)
CHLORIDE BLD-SCNC: 104 MMOL/L (ref 99–110)
CO2: 30 MMOL/L (ref 21–32)
CREAT SERPL-MCNC: 0.5 MG/DL (ref 0.6–1.1)
DIFFERENTIAL TYPE: ABNORMAL
EOSINOPHILS ABSOLUTE: 0.1 K/CU MM
EOSINOPHILS RELATIVE PERCENT: 3.7 % (ref 0–3)
ERYTHROCYTE SEDIMENTATION RATE: 2 MM/HR (ref 0–30)
GFR SERPL CREATININE-BSD FRML MDRD: >90 ML/MIN/1.73M2
GLUCOSE SERPL-MCNC: 89 MG/DL (ref 70–99)
HCT VFR BLD CALC: 35.1 % (ref 37–47)
HEMOGLOBIN: 11.9 GM/DL (ref 12.5–16)
IGA: 450 MG/DL (ref 69–382)
IGG,SERUM: 1096 MG/DL (ref 723–1685)
IGM,SERUM: <25 MG/DL (ref 62–277)
LACTATE DEHYDROGENASE: 116 IU/L (ref 120–246)
LYMPHOCYTES ABSOLUTE: 0.6 K/CU MM
LYMPHOCYTES RELATIVE PERCENT: 28.1 % (ref 24–44)
MCH RBC QN AUTO: 31.7 PG (ref 27–31)
MCHC RBC AUTO-ENTMCNC: 33.9 % (ref 32–36)
MCV RBC AUTO: 93.6 FL (ref 78–100)
MONOCYTES ABSOLUTE: 0.2 K/CU MM
MONOCYTES RELATIVE PERCENT: 10.1 % (ref 0–4)
NEUTROPHILS RELATIVE PERCENT: 55.8 % (ref 36–66)
PDW BLD-RTO: 14.8 % (ref 11.7–14.9)
PLATELET # BLD: 203 K/CU MM (ref 140–440)
PMV BLD AUTO: 8.7 FL (ref 7.5–11.1)
POTASSIUM SERPL-SCNC: 3.8 MMOL/L (ref 3.5–5.1)
RBC # BLD: 3.75 M/CU MM (ref 4.2–5.4)
SEGMENTED NEUTROPHILS ABSOLUTE COUNT: 1.2 K/CU MM
SODIUM BLD-SCNC: 143 MMOL/L (ref 135–145)
TOTAL PROTEIN: 6.6 GM/DL (ref 6.4–8.2)
WBC # BLD: 2.2 K/CU MM (ref 4–10.5)

## 2024-04-16 PROCEDURE — 84165 PROTEIN E-PHORESIS SERUM: CPT

## 2024-04-16 PROCEDURE — 82784 ASSAY IGA/IGD/IGG/IGM EACH: CPT

## 2024-04-16 PROCEDURE — 84155 ASSAY OF PROTEIN SERUM: CPT

## 2024-04-16 PROCEDURE — 86320 SERUM IMMUNOELECTROPHORESIS: CPT

## 2024-04-16 PROCEDURE — 83883 ASSAY NEPHELOMETRY NOT SPEC: CPT

## 2024-04-16 PROCEDURE — 85652 RBC SED RATE AUTOMATED: CPT

## 2024-04-16 PROCEDURE — 85025 COMPLETE CBC W/AUTO DIFF WBC: CPT

## 2024-04-16 PROCEDURE — 36415 COLL VENOUS BLD VENIPUNCTURE: CPT

## 2024-04-16 PROCEDURE — 83615 LACTATE (LD) (LDH) ENZYME: CPT

## 2024-04-16 PROCEDURE — 82232 ASSAY OF BETA-2 PROTEIN: CPT

## 2024-04-16 PROCEDURE — 80053 COMPREHEN METABOLIC PANEL: CPT

## 2024-04-17 LAB — B2 MICROGLOB SERPL-MCNC: 1.9 MG/L

## 2024-04-18 LAB
KAPPA LC FREE SER-MCNC: 37.09 MG/L (ref 3.3–19.4)
KAPPA LC FREE/LAMBDA FREE SER NEPH: 1.19 {RATIO} (ref 0.26–1.65)
LAMBDA LC FREE SERPL-MCNC: 31.17 MG/L (ref 5.71–26.3)

## 2024-04-19 LAB
ALBUMIN SERPL ELPH-MCNC: 3.7 GM/DL (ref 3.2–5.6)
ALPHA-1-GLOBULIN: 0.2 GM/DL (ref 0.1–0.4)
ALPHA-2-GLOBULIN: 0.6 GM/DL (ref 0.4–1.2)
BETA GLOBULIN: 0.9 GM/DL (ref 0.5–1.3)
GAMMA GLOBULIN: 1.2 GM/DL (ref 0.5–1.6)
SPEP INTERPRETATION: NORMAL
SPEP INTERPRETATION: NORMAL
TOTAL PROTEIN: 6.6 GM/DL (ref 6.4–8.2)

## 2024-04-23 ENCOUNTER — OFFICE VISIT (OUTPATIENT)
Dept: ONCOLOGY | Age: 74
End: 2024-04-23
Payer: MEDICARE

## 2024-04-23 ENCOUNTER — HOSPITAL ENCOUNTER (OUTPATIENT)
Dept: INFUSION THERAPY | Age: 74
Discharge: HOME | End: 2024-04-23
Payer: MEDICARE

## 2024-04-23 VITALS
WEIGHT: 145.4 LBS | DIASTOLIC BLOOD PRESSURE: 62 MMHG | TEMPERATURE: 98.4 F | HEART RATE: 68 BPM | BODY MASS INDEX: 24.82 KG/M2 | OXYGEN SATURATION: 99 % | SYSTOLIC BLOOD PRESSURE: 138 MMHG | HEIGHT: 64 IN

## 2024-04-23 DIAGNOSIS — C90.00 MULTIPLE MYELOMA NOT HAVING ACHIEVED REMISSION (HCC): Primary | Chronic | ICD-10-CM

## 2024-04-23 DIAGNOSIS — C90.00 MULTIPLE MYELOMA NOT HAVING ACHIEVED REMISSION (HCC): ICD-10-CM

## 2024-04-23 PROCEDURE — G8399 PT W/DXA RESULTS DOCUMENT: HCPCS | Performed by: INTERNAL MEDICINE

## 2024-04-23 PROCEDURE — G8420 CALC BMI NORM PARAMETERS: HCPCS | Performed by: INTERNAL MEDICINE

## 2024-04-23 PROCEDURE — 99214 OFFICE O/P EST MOD 30 MIN: CPT | Performed by: INTERNAL MEDICINE

## 2024-04-23 PROCEDURE — 3017F COLORECTAL CA SCREEN DOC REV: CPT | Performed by: INTERNAL MEDICINE

## 2024-04-23 PROCEDURE — 3078F DIAST BP <80 MM HG: CPT | Performed by: INTERNAL MEDICINE

## 2024-04-23 PROCEDURE — 99211 OFF/OP EST MAY X REQ PHY/QHP: CPT

## 2024-04-23 PROCEDURE — 3075F SYST BP GE 130 - 139MM HG: CPT | Performed by: INTERNAL MEDICINE

## 2024-04-23 PROCEDURE — 1123F ACP DISCUSS/DSCN MKR DOCD: CPT | Performed by: INTERNAL MEDICINE

## 2024-04-23 PROCEDURE — 1090F PRES/ABSN URINE INCON ASSESS: CPT | Performed by: INTERNAL MEDICINE

## 2024-04-23 PROCEDURE — 1036F TOBACCO NON-USER: CPT | Performed by: INTERNAL MEDICINE

## 2024-04-23 PROCEDURE — G8427 DOCREV CUR MEDS BY ELIG CLIN: HCPCS | Performed by: INTERNAL MEDICINE

## 2024-04-23 RX ORDER — MECLIZINE HCL 12.5 MG/1
25 TABLET ORAL 3 TIMES DAILY PRN
Qty: 90 TABLET | Refills: 1 | Status: SHIPPED | OUTPATIENT
Start: 2024-04-23 | End: 2024-05-23

## 2024-04-23 RX ORDER — LENALIDOMIDE 10 MG/1
10 CAPSULE ORAL DAILY
Qty: 28 CAPSULE | Refills: 0 | Status: ACTIVE | OUTPATIENT
Start: 2024-04-23

## 2024-04-23 NOTE — PROGRESS NOTES
MA Rooming Questions  Patient: Esha Paz  MRN: [id number]    Date: 4/23/2024        1. Do you have any new issues?   no         2. Do you need any refills on medications?    yes - Meclizine     3. Have you had any imaging done since your last visit?   no    4. Have you been hospitalized or seen in the emergency room since your last visit here?   no    5. Did the patient have a depression screening completed today? No    No data recorded     PHQ-9 Given to (if applicable):               PHQ-9 Score (if applicable):                     [] Positive     []  Negative              Does question #9 need addressed (if applicable)                     [] Yes    []  No               Bertha Coon MA      
her to take tramadol prn for neuropathic pain. It seems to be quite stable lately.     Hypertension - she is on amlodipine and losartan. BP is stable and I recommend her for salt restriction.     I reviewed with her findings on screening mammogram done on 5/22/23 and DEXA scan (showed osteopenia), done on 5/17/23.  She is going to have mammogram in 5/2024 and I will f/u with the results.     Health maintenance - I recommend her to have age-appropriate cancer screening, exercise and low-sodium diet.    I answered all her questions and concerns for today. Recent imaging and labs were reviewed and discussed with the patient.

## 2024-04-23 NOTE — PROGRESS NOTES
Revlimid 10 mg chemo capsules reordered and sent to [facility] pharmacy. Prescriber survey completed and new auth # [id number]  obtained through [employer] portal. Auth valid for 30 days and attached to RX.

## 2024-05-20 ENCOUNTER — HOSPITAL ENCOUNTER (OUTPATIENT)
Dept: WOMENS IMAGING | Age: 74
Discharge: HOME | End: 2024-05-20
Attending: STUDENT IN AN ORGANIZED HEALTH CARE EDUCATION/TRAINING PROGRAM
Payer: MEDICARE

## 2024-05-20 DIAGNOSIS — Z12.31 ENCOUNTER FOR SCREENING MAMMOGRAM FOR MALIGNANT NEOPLASM OF BREAST: ICD-10-CM

## 2024-05-20 DIAGNOSIS — C90.00 MULTIPLE MYELOMA NOT HAVING ACHIEVED REMISSION (HCC): ICD-10-CM

## 2024-05-20 PROCEDURE — 77063 BREAST TOMOSYNTHESIS BI: CPT

## 2024-05-20 RX ORDER — LENALIDOMIDE 10 MG/1
10 CAPSULE ORAL DAILY
Qty: 28 CAPSULE | Refills: 0 | Status: ACTIVE | OUTPATIENT
Start: 2024-05-20

## 2024-05-20 NOTE — PROGRESS NOTES
Revlimid 10 mg chemo capsules reordered and sent to [facility] pharmacy. Prescriber survey completed and new auth #     [id number]    obtained through [employer] portal. Auth valid for 30 days and attached to RX.

## 2024-06-06 ENCOUNTER — TELEPHONE (OUTPATIENT)
Dept: FAMILY MEDICINE CLINIC | Age: 74
End: 2024-06-06

## 2024-06-18 ENCOUNTER — CLINICAL DOCUMENTATION (OUTPATIENT)
Dept: ONCOLOGY | Age: 74
End: 2024-06-18

## 2024-06-18 DIAGNOSIS — C90.00 MULTIPLE MYELOMA NOT HAVING ACHIEVED REMISSION (HCC): ICD-10-CM

## 2024-06-18 RX ORDER — LENALIDOMIDE 10 MG/1
10 CAPSULE ORAL DAILY
Qty: 28 CAPSULE | Refills: 0 | Status: ACTIVE | OUTPATIENT
Start: 2024-06-18

## 2024-06-18 NOTE — PROGRESS NOTES
Revlimid 10 mg chemo capsules reordered and e-scribed to [facility] pharmacy. Prescriber survey completed and new auth # [id number] obtained through [employer] portal. Auth valid for 30 days and attached to RX.

## 2024-07-03 RX ORDER — DULOXETIN HYDROCHLORIDE 60 MG/1
60 CAPSULE, DELAYED RELEASE ORAL 2 TIMES DAILY
Qty: 180 CAPSULE | Refills: 3 | Status: SHIPPED | OUTPATIENT
Start: 2024-07-03 | End: 2024-10-01

## 2024-07-16 ENCOUNTER — HOSPITAL ENCOUNTER (OUTPATIENT)
Dept: INFUSION THERAPY | Age: 74
Discharge: HOME | End: 2024-07-16
Payer: MEDICARE

## 2024-07-16 DIAGNOSIS — C90.00 MULTIPLE MYELOMA NOT HAVING ACHIEVED REMISSION (HCC): Chronic | ICD-10-CM

## 2024-07-16 DIAGNOSIS — C90.00 MULTIPLE MYELOMA NOT HAVING ACHIEVED REMISSION (HCC): ICD-10-CM

## 2024-07-16 LAB
ALBUMIN SERPL-MCNC: 3.9 GM/DL (ref 3.4–5)
ALP BLD-CCNC: 116 IU/L (ref 40–129)
ALT SERPL-CCNC: 18 U/L (ref 10–40)
ANION GAP SERPL CALCULATED.3IONS-SCNC: 10 MMOL/L (ref 7–16)
AST SERPL-CCNC: 17 IU/L (ref 15–37)
BASOPHILS ABSOLUTE: 0.1 K/CU MM
BASOPHILS RELATIVE PERCENT: 1.7 % (ref 0–1)
BILIRUB SERPL-MCNC: 0.9 MG/DL (ref 0–1)
BUN SERPL-MCNC: 11 MG/DL (ref 6–23)
CALCIUM SERPL-MCNC: 8.8 MG/DL (ref 8.3–10.6)
CHLORIDE BLD-SCNC: 102 MMOL/L (ref 99–110)
CO2: 28 MMOL/L (ref 21–32)
CREAT SERPL-MCNC: 0.6 MG/DL (ref 0.6–1.1)
DIFFERENTIAL TYPE: ABNORMAL
EOSINOPHILS ABSOLUTE: 0.1 K/CU MM
EOSINOPHILS RELATIVE PERCENT: 3.5 % (ref 0–3)
GFR, ESTIMATED: >90 ML/MIN/1.73M2
GLUCOSE SERPL-MCNC: 89 MG/DL (ref 70–99)
HCT VFR BLD CALC: 34.7 % (ref 37–47)
HEMOGLOBIN: 11.6 GM/DL (ref 12.5–16)
IGA: 445 MG/DL (ref 69–382)
IGG,SERUM: 1080 MG/DL (ref 723–1685)
IGM,SERUM: <25 MG/DL (ref 62–277)
LACTATE DEHYDROGENASE: 137 IU/L (ref 120–246)
LYMPHOCYTES ABSOLUTE: 0.7 K/CU MM
LYMPHOCYTES RELATIVE PERCENT: 19.4 % (ref 24–44)
MCH RBC QN AUTO: 31.8 PG (ref 27–31)
MCHC RBC AUTO-ENTMCNC: 33.4 % (ref 32–36)
MCV RBC AUTO: 95.1 FL (ref 78–100)
MONOCYTES ABSOLUTE: 0.5 K/CU MM
MONOCYTES RELATIVE PERCENT: 13.3 % (ref 0–4)
NEUTROPHILS ABSOLUTE: 2.2 K/CU MM
NEUTROPHILS RELATIVE PERCENT: 62.1 % (ref 36–66)
PDW BLD-RTO: 14.5 % (ref 11.7–14.9)
PLATELET # BLD: 200 K/CU MM (ref 140–440)
PMV BLD AUTO: 8.8 FL (ref 7.5–11.1)
POTASSIUM SERPL-SCNC: 4 MMOL/L (ref 3.5–5.1)
RBC # BLD: 3.65 M/CU MM (ref 4.2–5.4)
SED RATE, AUTOMATED: 10 MM/HR (ref 0–30)
SODIUM BLD-SCNC: 140 MMOL/L (ref 135–145)
TOTAL PROTEIN: 6.5 GM/DL (ref 6.4–8.2)
WBC # BLD: 3.5 K/CU MM (ref 4–10.5)

## 2024-07-16 PROCEDURE — 86320 SERUM IMMUNOELECTROPHORESIS: CPT

## 2024-07-16 PROCEDURE — 83883 ASSAY NEPHELOMETRY NOT SPEC: CPT

## 2024-07-16 PROCEDURE — 82232 ASSAY OF BETA-2 PROTEIN: CPT

## 2024-07-16 PROCEDURE — 84165 PROTEIN E-PHORESIS SERUM: CPT

## 2024-07-16 PROCEDURE — 84155 ASSAY OF PROTEIN SERUM: CPT

## 2024-07-16 PROCEDURE — 36415 COLL VENOUS BLD VENIPUNCTURE: CPT

## 2024-07-16 PROCEDURE — 85652 RBC SED RATE AUTOMATED: CPT

## 2024-07-16 PROCEDURE — 85025 COMPLETE CBC W/AUTO DIFF WBC: CPT

## 2024-07-16 PROCEDURE — 82784 ASSAY IGA/IGD/IGG/IGM EACH: CPT

## 2024-07-16 PROCEDURE — 80053 COMPREHEN METABOLIC PANEL: CPT

## 2024-07-16 PROCEDURE — 83615 LACTATE (LD) (LDH) ENZYME: CPT

## 2024-07-16 RX ORDER — LENALIDOMIDE 10 MG/1
10 CAPSULE ORAL DAILY
Qty: 28 CAPSULE | Refills: 0 | Status: ACTIVE | OUTPATIENT
Start: 2024-07-16

## 2024-07-16 NOTE — PROGRESS NOTES
Revlimid 10 mg chemo capsules reordered and sent to [facility]. Prescriber survey completed and new auth # [id number] obtained through [employer] portal. Auth valid for 30 days and attached to RX.

## 2024-07-17 LAB
B2 MICROGLOB SERPL-MCNC: 2 MG/L
KAPPA LC FREE SER-MCNC: 36.13 MG/L (ref 3.3–19.4)
KAPPA LC FREE/LAMBDA FREE SER NEPH: 1.17 {RATIO} (ref 0.26–1.65)
LAMBDA LC FREE SERPL-MCNC: 30.79 MG/L (ref 5.71–26.3)

## 2024-07-18 LAB
ALBUMIN SERPL ELPH-MCNC: 3.5 GM/DL (ref 3.2–5.6)
ALPHA-1-GLOBULIN: 0.3 GM/DL (ref 0.1–0.4)
ALPHA-2-GLOBULIN: 0.6 GM/DL (ref 0.4–1.2)
BETA GLOBULIN: 0.9 GM/DL (ref 0.5–1.3)
GAMMA GLOBULIN: 1.2 GM/DL (ref 0.5–1.6)
SPEP INTERPRETATION: NORMAL
SPEP INTERPRETATION: NORMAL
TOTAL PROTEIN: 6.5 GM/DL (ref 6.4–8.2)

## 2024-07-23 ENCOUNTER — HOSPITAL ENCOUNTER (OUTPATIENT)
Dept: INFUSION THERAPY | Age: 74
Discharge: HOME | End: 2024-07-23
Payer: MEDICARE

## 2024-07-23 ENCOUNTER — OFFICE VISIT (OUTPATIENT)
Dept: ONCOLOGY | Age: 74
End: 2024-07-23
Payer: MEDICARE

## 2024-07-23 VITALS
BODY MASS INDEX: 26.15 KG/M2 | HEART RATE: 75 BPM | OXYGEN SATURATION: 97 % | WEIGHT: 153.2 LBS | TEMPERATURE: 97.9 F | HEIGHT: 64 IN | SYSTOLIC BLOOD PRESSURE: 158 MMHG | DIASTOLIC BLOOD PRESSURE: 68 MMHG | RESPIRATION RATE: 16 BRPM

## 2024-07-23 DIAGNOSIS — C90.00 MULTIPLE MYELOMA NOT HAVING ACHIEVED REMISSION (HCC): Primary | Chronic | ICD-10-CM

## 2024-07-23 PROCEDURE — 99214 OFFICE O/P EST MOD 30 MIN: CPT | Performed by: INTERNAL MEDICINE

## 2024-07-23 PROCEDURE — G8427 DOCREV CUR MEDS BY ELIG CLIN: HCPCS | Performed by: INTERNAL MEDICINE

## 2024-07-23 PROCEDURE — 3017F COLORECTAL CA SCREEN DOC REV: CPT | Performed by: INTERNAL MEDICINE

## 2024-07-23 PROCEDURE — 3078F DIAST BP <80 MM HG: CPT | Performed by: INTERNAL MEDICINE

## 2024-07-23 PROCEDURE — 3077F SYST BP >= 140 MM HG: CPT | Performed by: INTERNAL MEDICINE

## 2024-07-23 PROCEDURE — G8419 CALC BMI OUT NRM PARAM NOF/U: HCPCS | Performed by: INTERNAL MEDICINE

## 2024-07-23 PROCEDURE — 99211 OFF/OP EST MAY X REQ PHY/QHP: CPT

## 2024-07-23 PROCEDURE — G8399 PT W/DXA RESULTS DOCUMENT: HCPCS | Performed by: INTERNAL MEDICINE

## 2024-07-23 PROCEDURE — 1036F TOBACCO NON-USER: CPT | Performed by: INTERNAL MEDICINE

## 2024-07-23 PROCEDURE — 1123F ACP DISCUSS/DSCN MKR DOCD: CPT | Performed by: INTERNAL MEDICINE

## 2024-07-23 PROCEDURE — 1090F PRES/ABSN URINE INCON ASSESS: CPT | Performed by: INTERNAL MEDICINE

## 2024-07-23 RX ORDER — TRAMADOL HYDROCHLORIDE 50 MG/1
50 TABLET ORAL EVERY 8 HOURS PRN
Qty: 90 TABLET | Refills: 0 | Status: SHIPPED | OUTPATIENT
Start: 2024-07-23 | End: 2024-08-22

## 2024-07-23 RX ORDER — DIPHENOXYLATE HYDROCHLORIDE AND ATROPINE SULFATE 2.5; .025 MG/1; MG/1
2 TABLET ORAL 4 TIMES DAILY PRN
Qty: 240 TABLET | Refills: 2 | Status: SHIPPED | OUTPATIENT
Start: 2024-07-23 | End: 2024-08-22

## 2024-07-23 NOTE — PROGRESS NOTES
MA Rooming Questions  Patient: Esha Paz  MRN: [id number]    Date: 7/23/2024        1. Do you have any new issues?   yes - tingling fingers , did have a few falls rec,          2. Do you need any refills on medications?    no    3. Have you had any imaging done since your last visit?   Yes- mammo    4. Have you been hospitalized or seen in the emergency room since your last visit here?   Yes-ED    5. Did the patient have a depression screening completed today? No    No data recorded     PHQ-9 Given to (if applicable):               PHQ-9 Score (if applicable):                     [] Positive     []  Negative              Does question #9 need addressed (if applicable)                     [] Yes    []  No               Ember Joe CMA      
IgG kappa multiple myeloma and she is status post first line chemotherapy with Velcade, Revlimid and Dexamethasone (received total 4 cycles) and autologous stem cell transplantation.    She has been on maintenance chemotherapy with revlimid since August 27, 2019.     She is here for close monitoring of toxicity and side effects from chemotherapy. She is tolerating maintenance chemotherapy, Revlimid well and she doesn't encounter any major side effects from Revlimid.     We change Revlimid to three weeks on one week off, since she has been having increasing SOB and fatigue. Since her symptoms are resolved now, we resumed back on daily basis since January 14, 2020 until 8/16/22.    Since she has significant diarrhea, we changed revlimid to 3 weeks on 1 week off schecule starting from 8/17/22.     She has normal SPEP and SARABJIT on 7/16/24 blood test. I believe she is in stringent complete remission and I recommend that she continues with current dose of Revlimid for now.     She has been having diarrhea due to revlimid since 3/2022. She has been on lomotil with good control.     I will reevaluate her multiple myeloma again in 3 months and I will see her again after that.    Depression - due to loss of her . She is on cymbalta. She doesn't need ativan anymore.     Dizziness - recommend her to try meclizine. If not helping, I will consider to add promethazine.     Chemo induced thromboembolism prophylaxis - I recommend her to continue with aspirin 81 mg daily.     Myeloma bone disease - Since she has completed 2 years of therapy, we stopped it after 5/2021 dose. I asked her to continue with vitamin D daily.     Chemo induced neuropathy - she is on cymbalta 60 mg daily since her neuropathy gets worse after decreasing the dose. I asked her to take tramadol prn for neuropathic pain. It seems to be quite stable lately. I also recommend for scrambler therapy, accupressure and reflexology.     Hypertension - she is on

## 2024-08-03 SDOH — ECONOMIC STABILITY: INCOME INSECURITY: HOW HARD IS IT FOR YOU TO PAY FOR THE VERY BASICS LIKE FOOD, HOUSING, MEDICAL CARE, AND HEATING?: NOT HARD AT ALL

## 2024-08-03 SDOH — ECONOMIC STABILITY: FOOD INSECURITY: WITHIN THE PAST 12 MONTHS, YOU WORRIED THAT YOUR FOOD WOULD RUN OUT BEFORE YOU GOT MONEY TO BUY MORE.: NEVER TRUE

## 2024-08-03 SDOH — HEALTH STABILITY: PHYSICAL HEALTH: ON AVERAGE, HOW MANY DAYS PER WEEK DO YOU ENGAGE IN MODERATE TO STRENUOUS EXERCISE (LIKE A BRISK WALK)?: 2 DAYS

## 2024-08-03 SDOH — ECONOMIC STABILITY: FOOD INSECURITY: WITHIN THE PAST 12 MONTHS, THE FOOD YOU BOUGHT JUST DIDN'T LAST AND YOU DIDN'T HAVE MONEY TO GET MORE.: NEVER TRUE

## 2024-08-03 SDOH — HEALTH STABILITY: PHYSICAL HEALTH: ON AVERAGE, HOW MANY MINUTES DO YOU ENGAGE IN EXERCISE AT THIS LEVEL?: 30 MIN

## 2024-08-03 ASSESSMENT — PATIENT HEALTH QUESTIONNAIRE - PHQ9
SUM OF ALL RESPONSES TO PHQ QUESTIONS 1-9: 0
2. FEELING DOWN, DEPRESSED OR HOPELESS: NOT AT ALL
SUM OF ALL RESPONSES TO PHQ QUESTIONS 1-9: 0
SUM OF ALL RESPONSES TO PHQ QUESTIONS 1-9: 0
1. LITTLE INTEREST OR PLEASURE IN DOING THINGS: NOT AT ALL
SUM OF ALL RESPONSES TO PHQ9 QUESTIONS 1 & 2: 0
SUM OF ALL RESPONSES TO PHQ QUESTIONS 1-9: 0

## 2024-08-03 ASSESSMENT — LIFESTYLE VARIABLES
HOW OFTEN DO YOU HAVE A DRINK CONTAINING ALCOHOL: MONTHLY OR LESS
HOW OFTEN DO YOU HAVE SIX OR MORE DRINKS ON ONE OCCASION: 1
HOW OFTEN DO YOU HAVE A DRINK CONTAINING ALCOHOL: 2
HOW MANY STANDARD DRINKS CONTAINING ALCOHOL DO YOU HAVE ON A TYPICAL DAY: 1 OR 2
HOW MANY STANDARD DRINKS CONTAINING ALCOHOL DO YOU HAVE ON A TYPICAL DAY: 1

## 2024-08-06 ENCOUNTER — TELEMEDICINE (OUTPATIENT)
Dept: FAMILY MEDICINE CLINIC | Age: 74
End: 2024-08-06

## 2024-08-06 DIAGNOSIS — Z00.00 MEDICARE ANNUAL WELLNESS VISIT, SUBSEQUENT: Primary | ICD-10-CM

## 2024-08-06 SDOH — ECONOMIC STABILITY: FOOD INSECURITY: WITHIN THE PAST 12 MONTHS, THE FOOD YOU BOUGHT JUST DIDN'T LAST AND YOU DIDN'T HAVE MONEY TO GET MORE.: NEVER TRUE

## 2024-08-06 SDOH — ECONOMIC STABILITY: INCOME INSECURITY: HOW HARD IS IT FOR YOU TO PAY FOR THE VERY BASICS LIKE FOOD, HOUSING, MEDICAL CARE, AND HEATING?: NOT HARD AT ALL

## 2024-08-06 SDOH — ECONOMIC STABILITY: FOOD INSECURITY: WITHIN THE PAST 12 MONTHS, YOU WORRIED THAT YOUR FOOD WOULD RUN OUT BEFORE YOU GOT MONEY TO BUY MORE.: NEVER TRUE

## 2024-08-06 ASSESSMENT — PATIENT HEALTH QUESTIONNAIRE - PHQ9
2. FEELING DOWN, DEPRESSED OR HOPELESS: NOT AT ALL
SUM OF ALL RESPONSES TO PHQ QUESTIONS 1-9: 0
1. LITTLE INTEREST OR PLEASURE IN DOING THINGS: NOT AT ALL
SUM OF ALL RESPONSES TO PHQ9 QUESTIONS 1 & 2: 0

## 2024-08-06 NOTE — PROGRESS NOTES
Medicare Annual Wellness Visit    Esha aPz is here for Medicare AWV    Assessment & Plan   Medicare annual wellness visit, subsequent  Recommendations for Preventive Services Due: see orders and patient instructions/AVS.  Recommended screening schedule for the next 5-10 years is provided to the patient in written form: see Patient Instructions/AVS.     No follow-ups on file.     Subjective       Patient's complete Health Risk Assessment and screening values have been reviewed and are found in Flowsheets. The following problems were reviewed today and where indicated follow up appointments were made and/or referrals ordered.    Positive Risk Factor Screenings with Interventions:    Fall Risk:  Do you feel unsteady or are you worried about falling? : no (uses walker when leaving home)  2 or more falls in past year?: (!) yes  Fall with injury in past year?: (!) yes     Interventions:    Patient comments: patient states she always uses a walker when she leaves her home.  Reviewed medications, home hazards, visual acuity, and co-morbidities that can increase risk for falls  Patient declines any further evaluation or treatment         Controlled Medication Review:    Today's Pain Level: No data recorded   Opioid Risk: (Low risk score <55) Opioid risk score: 9    Patient is low risk for opioid use disorder or overdose.    Last PDMP Efren as Reviewed:  Review User Review Instant Review Result   ДМИТРИЙ HERNANDEZ 7/23/2024 10:54 AM     Reviewed PDMP [1]         General HRA Questions:  Select all that apply: (!) New or Increased Pain (rt knee pain incr-saw Dr. Sherman yesterday and was told she has arthritis in her knee, rec'd steroid injection)  Interventions - Pain:  Patient comments: states right knee pain and was seen yesterday by Dr. Sherman, Orthopedist. She was told she has arthritis and was given a steroid injection in that knee.  Patient declined any further interventions or treatment      Inactivity:  On average, how

## 2024-08-06 NOTE — PATIENT INSTRUCTIONS
Personalized Preventive Plan for Esha Paz - 8/6/2024  Medicare offers a range of preventive health benefits. Some of the tests and screenings are paid in full while other may be subject to a deductible, co-insurance, and/or copay.    Some of these benefits include a comprehensive review of your medical history including lifestyle, illnesses that may run in your family, and various assessments and screenings as appropriate.    After reviewing your medical record and screening and assessments performed today your provider may have ordered immunizations, labs, imaging, and/or referrals for you.  A list of these orders (if applicable) as well as your Preventive Care list are included within your After Visit Summary for your review.    Other Preventive Recommendations:    A preventive eye exam performed by an eye specialist is recommended every 1-2 years to screen for glaucoma; cataracts, macular degeneration, and other eye disorders.  A preventive dental visit is recommended every 6 months.  Try to get at least 150 minutes of exercise per week or 10,000 steps per day on a pedometer .  Order or download the FREE \"Exercise & Physical Activity: Your Everyday Guide\" from [employer]. Call 1-527.240.3077 or search [employer] online.  You need 6750-5854 mg of calcium and 7182-7342 IU of vitamin D per day. It is possible to meet your calcium requirement with diet alone, but a vitamin D supplement is usually necessary to meet this goal.  When exposed to the sun, use a sunscreen that protects against both UVA and UVB radiation with an SPF of 30 or greater. Reapply every 2 to 3 hours or after sweating, drying off with a towel, or swimming.  Always wear a seat belt when traveling in a car. Always wear a helmet when riding a bicycle or motorcycle.

## 2024-08-14 DIAGNOSIS — C90.00 MULTIPLE MYELOMA NOT HAVING ACHIEVED REMISSION (HCC): ICD-10-CM

## 2024-08-14 RX ORDER — LENALIDOMIDE 10 MG/1
10 CAPSULE ORAL DAILY
Qty: 28 CAPSULE | Refills: 0 | Status: ACTIVE | OUTPATIENT
Start: 2024-08-14

## 2024-08-14 NOTE — PROGRESS NOTES
Revlimid 10 mg chemo capsules reordered and sent to [facility] pharmacy. Prescriber survey completed and new auth #[id number] obtained through [employer] portal. Auth valid for 30 days and attached to RX.

## 2024-08-27 DIAGNOSIS — M25.569 KNEE PAIN, UNSPECIFIED CHRONICITY, UNSPECIFIED LATERALITY: Primary | ICD-10-CM

## 2024-08-31 SDOH — HEALTH STABILITY: PHYSICAL HEALTH: ON AVERAGE, HOW MANY MINUTES DO YOU ENGAGE IN EXERCISE AT THIS LEVEL?: 20 MIN

## 2024-08-31 SDOH — HEALTH STABILITY: PHYSICAL HEALTH: ON AVERAGE, HOW MANY DAYS PER WEEK DO YOU ENGAGE IN MODERATE TO STRENUOUS EXERCISE (LIKE A BRISK WALK)?: 3 DAYS

## 2024-09-03 ENCOUNTER — OFFICE VISIT (OUTPATIENT)
Dept: ORTHOPEDIC SURGERY | Age: 74
End: 2024-09-03
Payer: MEDICARE

## 2024-09-03 VITALS — OXYGEN SATURATION: 99 % | WEIGHT: 141 LBS | BODY MASS INDEX: 24.07 KG/M2 | HEART RATE: 84 BPM | HEIGHT: 64 IN

## 2024-09-03 DIAGNOSIS — M17.11 PRIMARY OSTEOARTHRITIS OF RIGHT KNEE: Primary | ICD-10-CM

## 2024-09-03 PROCEDURE — 1036F TOBACCO NON-USER: CPT | Performed by: ORTHOPAEDIC SURGERY

## 2024-09-03 PROCEDURE — 99203 OFFICE O/P NEW LOW 30 MIN: CPT | Performed by: ORTHOPAEDIC SURGERY

## 2024-09-03 PROCEDURE — G8427 DOCREV CUR MEDS BY ELIG CLIN: HCPCS | Performed by: ORTHOPAEDIC SURGERY

## 2024-09-03 PROCEDURE — 3017F COLORECTAL CA SCREEN DOC REV: CPT | Performed by: ORTHOPAEDIC SURGERY

## 2024-09-03 PROCEDURE — G8399 PT W/DXA RESULTS DOCUMENT: HCPCS | Performed by: ORTHOPAEDIC SURGERY

## 2024-09-03 PROCEDURE — G8420 CALC BMI NORM PARAMETERS: HCPCS | Performed by: ORTHOPAEDIC SURGERY

## 2024-09-03 PROCEDURE — 1090F PRES/ABSN URINE INCON ASSESS: CPT | Performed by: ORTHOPAEDIC SURGERY

## 2024-09-03 PROCEDURE — 1123F ACP DISCUSS/DSCN MKR DOCD: CPT | Performed by: ORTHOPAEDIC SURGERY

## 2024-09-03 RX ORDER — MULTIVIT-MIN/IRON/FOLIC ACID/K 18-600-40
CAPSULE ORAL
COMMUNITY
Start: 2023-10-20

## 2024-09-03 ASSESSMENT — ENCOUNTER SYMPTOMS
EYE PAIN: 0
CHEST TIGHTNESS: 0
VOMITING: 0
COLOR CHANGE: 0
SHORTNESS OF BREATH: 0
EYE REDNESS: 0
WHEEZING: 0

## 2024-09-03 NOTE — PATIENT INSTRUCTIONS
Central scheduling [phone] will be calling you to schedule your MRI.  If you do not hear from them with in a week give them a call.   Follow up after MRI is completed.

## 2024-09-03 NOTE — PROGRESS NOTES
Patient presents to the office with right knee pain. She stated a couple of months ago she was painting and noticed increased pain after. She does not recall any falls or awkward movements. Pt stated that since then she has been wearing a compression brace and was given a steroid injection about a month ago which did not give any relief. Her pain is localized around the medial aspect of the knee.   
      Assessment and Plan  1.  Right knee primary osteoarthritis    2.  Possible medial meniscus tear    Based on the history and my physical exam, I have a high index of suspicion for a tear of the meniscus.  She has failed multiple conservative treatments and still having severe symptoms and problems with mobility and weightbearing.    I feel it is medically necessary to obtain an MRI to evaluate for tear of the medial meniscus and other intra-articular pathology such injury to the collateral ligaments, cartilage surfaces, or presence of loose body.    We also discussed her underlying degenerative joint disease and the potential for there to be much more severe degenerative changes in the knee which could be responsible for the majority of her symptoms as well.    I have instructed the patient on gentle range of motion exercises for the knee and avoidance of strenuous or painful activities.    They will follow-up after the MRI for a review of the results.      Electronically signed by Calvin Harvey MD on 9/3/2024 at 4:57 PM

## 2024-09-05 ENCOUNTER — OFFICE VISIT (OUTPATIENT)
Dept: FAMILY MEDICINE CLINIC | Age: 74
End: 2024-09-05

## 2024-09-05 VITALS
HEIGHT: 64 IN | BODY MASS INDEX: 24.92 KG/M2 | HEART RATE: 75 BPM | SYSTOLIC BLOOD PRESSURE: 122 MMHG | WEIGHT: 146 LBS | OXYGEN SATURATION: 99 % | DIASTOLIC BLOOD PRESSURE: 68 MMHG

## 2024-09-05 DIAGNOSIS — E78.2 MIXED HYPERLIPIDEMIA: ICD-10-CM

## 2024-09-05 DIAGNOSIS — I10 ESSENTIAL HYPERTENSION: Chronic | ICD-10-CM

## 2024-09-05 DIAGNOSIS — Z23 ENCOUNTER FOR IMMUNIZATION: Primary | ICD-10-CM

## 2024-09-05 DIAGNOSIS — E03.8 SUBCLINICAL HYPOTHYROIDISM: ICD-10-CM

## 2024-09-05 RX ORDER — LOSARTAN POTASSIUM 50 MG/1
TABLET ORAL
Qty: 90 TABLET | Refills: 1 | Status: SHIPPED | OUTPATIENT
Start: 2024-09-05

## 2024-09-05 RX ORDER — AMLODIPINE BESYLATE 5 MG/1
TABLET ORAL
Qty: 90 TABLET | Refills: 1 | Status: SHIPPED | OUTPATIENT
Start: 2024-09-05

## 2024-09-09 ENCOUNTER — HOSPITAL ENCOUNTER (OUTPATIENT)
Dept: MRI IMAGING | Age: 74
Discharge: HOME | End: 2024-09-09
Attending: ORTHOPAEDIC SURGERY
Payer: MEDICARE

## 2024-09-09 DIAGNOSIS — M17.11 PRIMARY OSTEOARTHRITIS OF RIGHT KNEE: ICD-10-CM

## 2024-09-09 PROCEDURE — 73721 MRI JNT OF LWR EXTRE W/O DYE: CPT

## 2024-09-12 DIAGNOSIS — C90.00 MULTIPLE MYELOMA NOT HAVING ACHIEVED REMISSION (HCC): ICD-10-CM

## 2024-09-12 RX ORDER — LENALIDOMIDE 10 MG/1
10 CAPSULE ORAL DAILY
Qty: 28 CAPSULE | Refills: 0 | Status: ACTIVE | OUTPATIENT
Start: 2024-09-12

## 2024-09-16 ASSESSMENT — ENCOUNTER SYMPTOMS
WHEEZING: 0
NAUSEA: 0
SHORTNESS OF BREATH: 0
ABDOMINAL PAIN: 0
SORE THROAT: 0

## 2024-09-24 ENCOUNTER — OFFICE VISIT (OUTPATIENT)
Dept: ORTHOPEDIC SURGERY | Age: 74
End: 2024-09-24
Payer: MEDICARE

## 2024-09-24 VITALS
HEART RATE: 61 BPM | BODY MASS INDEX: 24.24 KG/M2 | RESPIRATION RATE: 15 BRPM | HEIGHT: 64 IN | OXYGEN SATURATION: 96 % | WEIGHT: 142 LBS

## 2024-09-24 DIAGNOSIS — M17.11 PRIMARY OSTEOARTHRITIS OF RIGHT KNEE: Primary | ICD-10-CM

## 2024-09-24 DIAGNOSIS — S83.231A COMPLEX TEAR OF MEDIAL MENISCUS OF RIGHT KNEE AS CURRENT INJURY, INITIAL ENCOUNTER: ICD-10-CM

## 2024-09-24 PROCEDURE — 1123F ACP DISCUSS/DSCN MKR DOCD: CPT | Performed by: ORTHOPAEDIC SURGERY

## 2024-09-24 PROCEDURE — 1036F TOBACCO NON-USER: CPT | Performed by: ORTHOPAEDIC SURGERY

## 2024-09-24 PROCEDURE — 99214 OFFICE O/P EST MOD 30 MIN: CPT | Performed by: ORTHOPAEDIC SURGERY

## 2024-09-24 PROCEDURE — 1090F PRES/ABSN URINE INCON ASSESS: CPT | Performed by: ORTHOPAEDIC SURGERY

## 2024-09-24 PROCEDURE — 3017F COLORECTAL CA SCREEN DOC REV: CPT | Performed by: ORTHOPAEDIC SURGERY

## 2024-09-24 PROCEDURE — G8420 CALC BMI NORM PARAMETERS: HCPCS | Performed by: ORTHOPAEDIC SURGERY

## 2024-09-24 PROCEDURE — G8427 DOCREV CUR MEDS BY ELIG CLIN: HCPCS | Performed by: ORTHOPAEDIC SURGERY

## 2024-09-24 PROCEDURE — G8399 PT W/DXA RESULTS DOCUMENT: HCPCS | Performed by: ORTHOPAEDIC SURGERY

## 2024-09-24 ASSESSMENT — ENCOUNTER SYMPTOMS
WHEEZING: 0
SHORTNESS OF BREATH: 0
COLOR CHANGE: 0
EYE REDNESS: 0
CHEST TIGHTNESS: 0
EYE PAIN: 0
VOMITING: 0

## 2024-09-25 ENCOUNTER — TELEPHONE (OUTPATIENT)
Dept: ORTHOPEDIC SURGERY | Age: 74
End: 2024-09-25

## 2024-09-25 ENCOUNTER — PREP FOR PROCEDURE (OUTPATIENT)
Dept: ORTHOPEDIC SURGERY | Age: 74
End: 2024-09-25

## 2024-09-25 DIAGNOSIS — M25.561 CHRONIC PAIN OF RIGHT KNEE: ICD-10-CM

## 2024-09-25 DIAGNOSIS — G89.29 CHRONIC PAIN OF RIGHT KNEE: ICD-10-CM

## 2024-09-25 DIAGNOSIS — M17.11 PRIMARY OSTEOARTHRITIS OF RIGHT KNEE: Primary | ICD-10-CM

## 2024-09-25 DIAGNOSIS — M17.11 PRIMARY OSTEOARTHRITIS OF ONE KNEE, RIGHT: ICD-10-CM

## 2024-09-25 DIAGNOSIS — Z01.818 PRE-OP TESTING: Primary | ICD-10-CM

## 2024-09-25 NOTE — TELEPHONE ENCOUNTER
Patient called and left message with multiple questions about her upcoming surgery for Right Total Knee Arthroplasty on 12/2/2024.  1). She lives alone and asking for Home care or ARU.  2). [place] and mailboxes are located a distance from her home, she is asking if she could get a letter for the , requesting for her mail to be delivered to her door.  3) Asking which hospital surgery will be done.    I called patient but she did not answer, I left her a message about facility surgery will be performed at also that she could possible qualify for Home Care. I informed her that a message will be sent to [employer] for the letter to post office.

## 2024-09-26 RX ORDER — SODIUM CHLORIDE 0.9 % (FLUSH) 0.9 %
5-40 SYRINGE (ML) INJECTION EVERY 12 HOURS SCHEDULED
OUTPATIENT
Start: 2024-09-26

## 2024-09-26 RX ORDER — SODIUM CHLORIDE 9 MG/ML
INJECTION, SOLUTION INTRAVENOUS PRN
OUTPATIENT
Start: 2024-09-26

## 2024-09-26 RX ORDER — ACETAMINOPHEN 325 MG/1
1000 TABLET ORAL ONCE
OUTPATIENT
Start: 2024-09-26 | End: 2024-09-26

## 2024-09-26 RX ORDER — SODIUM CHLORIDE 0.9 % (FLUSH) 0.9 %
5-40 SYRINGE (ML) INJECTION PRN
OUTPATIENT
Start: 2024-09-26

## 2024-10-08 ENCOUNTER — CLINICAL DOCUMENTATION (OUTPATIENT)
Dept: ONCOLOGY | Age: 74
End: 2024-10-08

## 2024-10-08 DIAGNOSIS — C90.00 MULTIPLE MYELOMA NOT HAVING ACHIEVED REMISSION (HCC): ICD-10-CM

## 2024-10-08 RX ORDER — LENALIDOMIDE 10 MG/1
10 CAPSULE ORAL DAILY
Qty: 28 CAPSULE | Refills: 0 | Status: ACTIVE | OUTPATIENT
Start: 2024-10-08

## 2024-10-08 NOTE — PROGRESS NOTES
Revlimid 10 mg chemo capsules reordered and e-scribed to [facility] pharmacy. Prescriber survey completed and new auth # [id number] obtained through [employer] portal. Auth valid for 30 days and attached to RX.

## 2024-10-16 ENCOUNTER — HOSPITAL ENCOUNTER (OUTPATIENT)
Dept: INFUSION THERAPY | Age: 74
Discharge: HOME | End: 2024-10-16
Payer: MEDICARE

## 2024-10-16 DIAGNOSIS — C90.00 MULTIPLE MYELOMA NOT HAVING ACHIEVED REMISSION (HCC): Chronic | ICD-10-CM

## 2024-10-16 LAB
ALBUMIN SERPL-MCNC: 3.9 G/DL (ref 3.4–5)
ALBUMIN/GLOB SERPL: 1.6 {RATIO} (ref 1.1–2.2)
ALP SERPL-CCNC: 102 U/L (ref 40–129)
ALT SERPL-CCNC: 19 U/L (ref 10–40)
ANION GAP SERPL CALCULATED.3IONS-SCNC: 9 MMOL/L (ref 9–17)
AST SERPL-CCNC: 25 U/L (ref 15–37)
BASOPHILS # BLD: 0.04 K/UL
BASOPHILS NFR BLD: 1 % (ref 0–1)
BILIRUB SERPL-MCNC: 1.6 MG/DL (ref 0–1)
BUN SERPL-MCNC: 6 MG/DL (ref 7–20)
CALCIUM SERPL-MCNC: 9 MG/DL (ref 8.3–10.6)
CHLORIDE SERPL-SCNC: 100 MMOL/L (ref 99–110)
CO2 SERPL-SCNC: 30 MMOL/L (ref 21–32)
CREAT SERPL-MCNC: 0.7 MG/DL (ref 0.6–1.2)
EOSINOPHIL # BLD: 0.13 K/UL
EOSINOPHILS RELATIVE PERCENT: 5 % (ref 0–3)
ERYTHROCYTE [DISTWIDTH] IN BLOOD BY AUTOMATED COUNT: 15.1 % (ref 11.7–14.9)
GFR, ESTIMATED: 80 ML/MIN/1.73M2
GLUCOSE SERPL-MCNC: 96 MG/DL (ref 74–99)
HCT VFR BLD AUTO: 34.8 % (ref 37–47)
HGB BLD-MCNC: 11.6 G/DL (ref 12.5–16)
IGA SERPL-MCNC: 455 MG/DL (ref 70–400)
IGG SERPL-MCNC: 1125 MG/DL (ref 700–1600)
IGM SERPL-MCNC: 47 MG/DL (ref 40–230)
LDH SERPL-CCNC: 163 U/L (ref 100–190)
LYMPHOCYTES NFR BLD: 0.79 K/UL
LYMPHOCYTES RELATIVE PERCENT: 27 % (ref 24–44)
MCH RBC QN AUTO: 32.4 PG (ref 27–31)
MCHC RBC AUTO-ENTMCNC: 33.3 G/DL (ref 32–36)
MCV RBC AUTO: 97.2 FL (ref 78–100)
MONOCYTES NFR BLD: 0.43 K/UL
MONOCYTES NFR BLD: 15 % (ref 0–4)
NEUTROPHILS NFR BLD: 52 % (ref 36–66)
NEUTS SEG NFR BLD: 1.51 K/UL
PLATELET # BLD AUTO: 192 K/UL (ref 140–440)
PMV BLD AUTO: 8.8 FL (ref 7.5–11.1)
POTASSIUM SERPL-SCNC: 3.4 MMOL/L (ref 3.5–5.1)
PROT SERPL-MCNC: 6.4 G/DL (ref 6.4–8.2)
RBC # BLD AUTO: 3.58 M/UL (ref 4.2–5.4)
SODIUM SERPL-SCNC: 139 MMOL/L (ref 136–145)
WBC OTHER # BLD: 2.9 K/UL (ref 4–10.5)

## 2024-10-16 PROCEDURE — 36415 COLL VENOUS BLD VENIPUNCTURE: CPT

## 2024-10-16 PROCEDURE — 83521 IG LIGHT CHAINS FREE EACH: CPT

## 2024-10-16 PROCEDURE — 82232 ASSAY OF BETA-2 PROTEIN: CPT

## 2024-10-16 PROCEDURE — 86334 IMMUNOFIX E-PHORESIS SERUM: CPT

## 2024-10-16 PROCEDURE — 85652 RBC SED RATE AUTOMATED: CPT

## 2024-10-16 PROCEDURE — 85025 COMPLETE CBC W/AUTO DIFF WBC: CPT

## 2024-10-16 PROCEDURE — 83615 LACTATE (LD) (LDH) ENZYME: CPT

## 2024-10-16 PROCEDURE — 80053 COMPREHEN METABOLIC PANEL: CPT

## 2024-10-16 PROCEDURE — 84155 ASSAY OF PROTEIN SERUM: CPT

## 2024-10-16 PROCEDURE — 82784 ASSAY IGA/IGD/IGG/IGM EACH: CPT

## 2024-10-17 LAB — ERYTHROCYTE [SEDIMENTATION RATE] IN BLOOD BY WESTERGREN METHOD: 8 MM/HR (ref 0–30)

## 2024-10-18 LAB
BETA-2 MICROGLOBULIN: 1.9 MG/L
COMMENT: ABNORMAL
KAPPA FREE LIGHT CHAIN: 43.9 MG/L (ref 2.37–20.73)
KAPPA/LAMBDA RATIO: 1.21 (ref 0.22–1.74)
LAMBDA FREE LIGHT CHAIN: 36.4 MG/L (ref 4.23–27.69)

## 2024-10-19 LAB
MISCELLANEOUS LAB TEST RESULT: NORMAL
MISCELLANEOUS LAB TEST RESULT: NORMAL
TEST NAME: NORMAL
TEST NAME: NORMAL

## 2024-10-19 NOTE — PROGRESS NOTES
Patient Name: Esha Paz  Patient : 1950  Patient MRN: [id number]     Primary Oncologist: Juan Cruz MD  Referring Provider: Marcela Calix DO     Date of Service: 10/23/2024      Chief Complaint:   Chief Complaint   Patient presents with    Follow-up    Results     Patient Active Problem List:     Severe anemia     Multiple myeloma not having achieved remission      Drug related polyneuropathy      Osteopenia of multiple sites    HPI:   Esha Paz is a 74-year-old very pleasnat female with medical history significant for hypertension, GERD, depression, anemia, initially presented to me on 2018 to follow up with her monocloal gammopathy.     She initially presented to [facility] on 18 with low hemoglobin. She stated that she had colonoscopy in  by Dr. Torre. She has been tired and fatigue since 2018. She denies weight loss.    She was found to have severe anemia on blood test done in her primary care physician office and she was asked to come to ER. Her base line hemoglobin was 8.4 -9.4 gram since . It was 13 grams in 2017 and on arrival to hospital on 18 was 5.7 grams. She received 2 units of PRBC.    I was called to evaluate her anemia at that time. I recognized that she has worsening macrocytic anemia. She also has mild leukopenia and thrombocytopenia. Her total protein level was significantly elevated (11.9 grams), but she has normal calcium and serum creatinine.    I requested work ups to rule out plasma cell dyscrasias and she was found to have 7900 mg/dl IgG kappa monoclonal gammopathy on serum protein electrophoresis and serum immunofixation. Her beta 2 microglobulin was 7.5 mg./dl, serum kappa 9.34 mg/dl, lambda 0.19 mg/dl, ratio was 49.16. ESR > 120, CRP 3 and .    Bone marrow biopsy was done on 2018 and final pathology showed hypocellular bone marrow [85%], with partially preserved trilineage

## 2024-10-23 ENCOUNTER — HOSPITAL ENCOUNTER (OUTPATIENT)
Dept: INFUSION THERAPY | Age: 74
Discharge: HOME | End: 2024-10-23
Payer: MEDICARE

## 2024-10-23 ENCOUNTER — OFFICE VISIT (OUTPATIENT)
Dept: ONCOLOGY | Age: 74
End: 2024-10-23

## 2024-10-23 VITALS
WEIGHT: 148.8 LBS | HEIGHT: 64 IN | OXYGEN SATURATION: 96 % | SYSTOLIC BLOOD PRESSURE: 155 MMHG | HEART RATE: 78 BPM | BODY MASS INDEX: 25.4 KG/M2 | DIASTOLIC BLOOD PRESSURE: 71 MMHG | TEMPERATURE: 97.9 F

## 2024-10-23 DIAGNOSIS — C90.00 MULTIPLE MYELOMA NOT HAVING ACHIEVED REMISSION (HCC): Primary | Chronic | ICD-10-CM

## 2024-10-23 PROCEDURE — 99211 OFF/OP EST MAY X REQ PHY/QHP: CPT

## 2024-10-23 RX ORDER — MECLIZINE HCL 12.5 MG 12.5 MG/1
25 TABLET ORAL 3 TIMES DAILY PRN
Qty: 90 TABLET | Refills: 1 | Status: SHIPPED | OUTPATIENT
Start: 2024-10-23 | End: 2024-11-22

## 2024-10-23 RX ORDER — DIPHENOXYLATE HYDROCHLORIDE AND ATROPINE SULFATE 2.5; .025 MG/1; MG/1
2 TABLET ORAL 4 TIMES DAILY PRN
Qty: 240 TABLET | Refills: 2 | Status: SHIPPED | OUTPATIENT
Start: 2024-10-23 | End: 2024-11-22

## 2024-10-23 ASSESSMENT — PATIENT HEALTH QUESTIONNAIRE - PHQ9
SUM OF ALL RESPONSES TO PHQ QUESTIONS 1-9: 0
1. LITTLE INTEREST OR PLEASURE IN DOING THINGS: NOT AT ALL
SUM OF ALL RESPONSES TO PHQ QUESTIONS 1-9: 0
2. FEELING DOWN, DEPRESSED OR HOPELESS: NOT AT ALL
SUM OF ALL RESPONSES TO PHQ QUESTIONS 1-9: 0
SUM OF ALL RESPONSES TO PHQ9 QUESTIONS 1 & 2: 0
SUM OF ALL RESPONSES TO PHQ QUESTIONS 1-9: 0

## 2024-10-23 NOTE — PROGRESS NOTES
MA Rooming Questions  Patient: Esha Paz  MRN: [id number]    Date: 10/23/2024        1. Do you have any new issues?   no         2. Do you need any refills on medications?    no    3. Have you had any imaging done since your last visit?   yes - labs 10/16    4. Have you been hospitalized or seen in the emergency room since your last visit here?   yes - 7/24    5. Did the patient have a depression screening completed today? Yes    PHQ-9 Total Score: 0 (10/23/2024 10:29 AM)       PHQ-9 Given to (if applicable):               PHQ-9 Score (if applicable):                     [] Positive     []  Negative              Does question #9 need addressed (if applicable)                     [] Yes    []  No               Bhumika Haile, CMA

## 2024-10-23 NOTE — TELEPHONE ENCOUNTER
Patient left message requesting a refill for meclizine to be [facility] [address]. Pending RX to Provider to be sent to pharmacy.

## 2024-10-25 ENCOUNTER — HOSPITAL ENCOUNTER (OUTPATIENT)
Dept: CT IMAGING | Age: 74
Discharge: HOME | End: 2024-10-25
Attending: ORTHOPAEDIC SURGERY
Payer: MEDICARE

## 2024-10-25 DIAGNOSIS — G89.29 CHRONIC PAIN OF RIGHT KNEE: ICD-10-CM

## 2024-10-25 DIAGNOSIS — M25.561 CHRONIC PAIN OF RIGHT KNEE: ICD-10-CM

## 2024-10-25 DIAGNOSIS — M17.11 PRIMARY OSTEOARTHRITIS OF RIGHT KNEE: ICD-10-CM

## 2024-10-25 PROCEDURE — 73700 CT LOWER EXTREMITY W/O DYE: CPT

## 2024-11-04 DIAGNOSIS — M17.11 PRIMARY OSTEOARTHRITIS OF ONE KNEE, RIGHT: Primary | ICD-10-CM

## 2024-11-05 ENCOUNTER — CLINICAL DOCUMENTATION (OUTPATIENT)
Dept: ONCOLOGY | Age: 74
End: 2024-11-05

## 2024-11-05 DIAGNOSIS — C90.00 MULTIPLE MYELOMA NOT HAVING ACHIEVED REMISSION (HCC): ICD-10-CM

## 2024-11-05 RX ORDER — LENALIDOMIDE 10 MG/1
10 CAPSULE ORAL DAILY
Qty: 28 CAPSULE | Refills: 0 | Status: ACTIVE | OUTPATIENT
Start: 2024-11-05

## 2024-11-05 NOTE — PROGRESS NOTES
Revlimid 10 mg chemo capsules reordered and e-scribed to [facility] pharmacy. Prescriber survey completed and new auth # [id number] obtained through [employer] portal. Auth valid for 30 days and attached to RX.

## 2024-11-12 NOTE — PROGRESS NOTES
Left message for patient to come to [facility] on 11/20/2024 between the hours of 0730 and 1430 for her PAT. Ask patient to call me back and let me know she got my message.

## 2024-11-17 ASSESSMENT — PROMIS GLOBAL HEALTH SCALE
IN GENERAL, HOW WOULD YOU RATE YOUR SATISFACTION WITH YOUR SOCIAL ACTIVITIES AND RELATIONSHIPS [ON A SCALE OF 1 (POOR) TO 5 (EXCELLENT)]?: EXCELLENT
IN THE PAST 7 DAYS, HOW OFTEN HAVE YOU BEEN BOTHERED BY EMOTIONAL PROBLEMS, SUCH AS FEELING ANXIOUS, DEPRESSED, OR IRRITABLE [ON A SCALE FROM 1 (NEVER) TO 5 (ALWAYS)]?: RARELY
IN THE PAST 7 DAYS, HOW WOULD YOU RATE YOUR PAIN ON AVERAGE [ON A SCALE FROM 0 (NO PAIN) TO 10 (WORST IMAGINABLE PAIN)]?: 5
HOW IS THE PROMIS V1.1 BEING ADMINISTERED?: ELECTRONIC
IN THE PAST 7 DAYS, HOW WOULD YOU RATE YOUR FATIGUE ON AVERAGE [ON A SCALE FROM 1 (NONE) TO 5 (VERY SEVERE)]?: MILD
IN GENERAL, PLEASE RATE HOW WELL YOU CARRY OUT YOUR USUAL SOCIAL ACTIVITIES (INCLUDES ACTIVITIES AT HOME, AT WORK, AND IN YOUR COMMUNITY, AND RESPONSIBILITIES AS A PARENT, CHILD, SPOUSE, EMPLOYEE, FRIEND, ETC) [ON A SCALE OF 1 (POOR) TO 5 (EXCELLENT)]?: EXCELLENT
IN GENERAL, HOW WOULD YOU RATE YOUR SATISFACTION WITH YOUR SOCIAL ACTIVITIES AND RELATIONSHIPS [ON A SCALE OF 1 (POOR) TO 5 (EXCELLENT)]?: EXCELLENT
IN GENERAL, WOULD YOU SAY YOUR HEALTH IS...[ON A SCALE OF 1 (POOR) TO 5 (EXCELLENT)]: VERY GOOD
IN GENERAL, HOW WOULD YOU RATE YOUR PHYSICAL HEALTH [ON A SCALE OF 1 (POOR) TO 5 (EXCELLENT)]?: VERY GOOD
SUM OF RESPONSES TO QUESTIONS 3, 6, 7, & 8: 18
IN THE PAST 7 DAYS, HOW WOULD YOU RATE YOUR FATIGUE ON AVERAGE [ON A SCALE FROM 1 (NONE) TO 5 (VERY SEVERE)]?: MILD
HOW IS THE PROMIS V1.1 BEING ADMINISTERED?: ELECTRONIC
IN GENERAL, WOULD YOU SAY YOUR QUALITY OF LIFE IS...[ON A SCALE OF 1 (POOR) TO 5 (EXCELLENT)]: EXCELLENT
SUM OF RESPONSES TO QUESTIONS 2, 4, 5, & 10: 19
IN GENERAL, PLEASE RATE HOW WELL YOU CARRY OUT YOUR USUAL SOCIAL ACTIVITIES (INCLUDES ACTIVITIES AT HOME, AT WORK, AND IN YOUR COMMUNITY, AND RESPONSIBILITIES AS A PARENT, CHILD, SPOUSE, EMPLOYEE, FRIEND, ETC) [ON A SCALE OF 1 (POOR) TO 5 (EXCELLENT)]?: EXCELLENT
IN THE PAST 7 DAYS, HOW OFTEN HAVE YOU BEEN BOTHERED BY EMOTIONAL PROBLEMS, SUCH AS FEELING ANXIOUS, DEPRESSED, OR IRRITABLE [ON A SCALE FROM 1 (NEVER) TO 5 (ALWAYS)]?: RARELY
TO WHAT EXTENT ARE YOU ABLE TO CARRY OUT YOUR EVERYDAY PHYSICAL ACTIVITIES SUCH AS WALKING, CLIMBING STAIRS, CARRYING GROCERIES, OR MOVING A CHAIR [ON A SCALE OF 1 (NOT AT ALL) TO 5 (COMPLETELY)]?: COMPLETELY
WHO IS THE PERSON COMPLETING THE PROMIS V1.1 SURVEY?: SELF
IN THE PAST 7 DAYS, HOW WOULD YOU RATE YOUR PAIN ON AVERAGE [ON A SCALE FROM 0 (NO PAIN) TO 10 (WORST IMAGINABLE PAIN)]?: 5
TO WHAT EXTENT ARE YOU ABLE TO CARRY OUT YOUR EVERYDAY PHYSICAL ACTIVITIES SUCH AS WALKING, CLIMBING STAIRS, CARRYING GROCERIES, OR MOVING A CHAIR [ON A SCALE OF 1 (NOT AT ALL) TO 5 (COMPLETELY)]?: COMPLETELY
IN GENERAL, HOW WOULD YOU RATE YOUR MENTAL HEALTH, INCLUDING YOUR MOOD AND YOUR ABILITY TO THINK [ON A SCALE OF 1 (POOR) TO 5 (EXCELLENT)]?: EXCELLENT
WHO IS THE PERSON COMPLETING THE PROMIS V1.1 SURVEY?: SELF

## 2024-11-18 ENCOUNTER — NURSE ONLY (OUTPATIENT)
Dept: ORTHOPEDIC SURGERY | Age: 74
End: 2024-11-18

## 2024-11-18 NOTE — PROGRESS NOTES
ALOK, Right TKA, DOS 12/2/2024    Patient would like to discharge from [facility] with orders to outpatient PT.     All questions and concerns of the patient's have been answered at this time to the best of my ability. Patient is aware they may contact this nurse at her direct number given to them at anytime with questions or concerns moving forward.     CHG bathing soap given to patient with instructions.     Present in appt with patient today is: no one

## 2024-11-19 ENCOUNTER — OFFICE VISIT (OUTPATIENT)
Dept: FAMILY MEDICINE CLINIC | Age: 74
End: 2024-11-19

## 2024-11-19 VITALS
HEIGHT: 64 IN | WEIGHT: 150 LBS | HEART RATE: 76 BPM | BODY MASS INDEX: 25.61 KG/M2 | SYSTOLIC BLOOD PRESSURE: 114 MMHG | OXYGEN SATURATION: 97 % | DIASTOLIC BLOOD PRESSURE: 64 MMHG | TEMPERATURE: 98.6 F

## 2024-11-19 DIAGNOSIS — J06.9 URI WITH COUGH AND CONGESTION: Primary | ICD-10-CM

## 2024-11-19 RX ORDER — METHYLPREDNISOLONE 4 MG/1
TABLET ORAL
Qty: 1 KIT | Refills: 0 | Status: SHIPPED | OUTPATIENT
Start: 2024-11-19

## 2024-11-19 ASSESSMENT — KOOS JR
KOOS JR TOTAL INTERVAL SCORE: 68.284
GOING UP OR DOWN STAIRS: MILD
HOW SEVERE IS YOUR KNEE STIFFNESS AFTER FIRST WAKING IN MORNING: MODERATE
BENDING TO THE FLOOR TO PICK UP OBJECT: MILD
STRAIGHTENING KNEE FULLY: MILD
RISING FROM SITTING: MILD
TWISING OR PIVOTING ON KNEE: MILD

## 2024-11-19 ASSESSMENT — PROMIS GLOBAL HEALTH SCALE
IN THE PAST 7 DAYS, HOW WOULD YOU RATE YOUR FATIGUE ON AVERAGE [ON A SCALE FROM 1 (NONE) TO 5 (VERY SEVERE)]?: MILD
TO WHAT EXTENT ARE YOU ABLE TO CARRY OUT YOUR EVERYDAY PHYSICAL ACTIVITIES SUCH AS WALKING, CLIMBING STAIRS, CARRYING GROCERIES, OR MOVING A CHAIR [ON A SCALE OF 1 (NOT AT ALL) TO 5 (COMPLETELY)]?: COMPLETELY
IN GENERAL, PLEASE RATE HOW WELL YOU CARRY OUT YOUR USUAL SOCIAL ACTIVITIES (INCLUDES ACTIVITIES AT HOME, AT WORK, AND IN YOUR COMMUNITY, AND RESPONSIBILITIES AS A PARENT, CHILD, SPOUSE, EMPLOYEE, FRIEND, ETC) [ON A SCALE OF 1 (POOR) TO 5 (EXCELLENT)]?: EXCELLENT
SUM OF RESPONSES TO QUESTIONS 3, 6, 7, & 8: 17
IN THE PAST 7 DAYS, HOW WOULD YOU RATE YOUR PAIN ON AVERAGE [ON A SCALE FROM 0 (NO PAIN) TO 10 (WORST IMAGINABLE PAIN)]?: 4
IN GENERAL, WOULD YOU SAY YOUR HEALTH IS...[ON A SCALE OF 1 (POOR) TO 5 (EXCELLENT)]: VERY GOOD
IN GENERAL, HOW WOULD YOU RATE YOUR PHYSICAL HEALTH [ON A SCALE OF 1 (POOR) TO 5 (EXCELLENT)]?: VERY GOOD
IN GENERAL, WOULD YOU SAY YOUR QUALITY OF LIFE IS...[ON A SCALE OF 1 (POOR) TO 5 (EXCELLENT)]: EXCELLENT
IN GENERAL, HOW WOULD YOU RATE YOUR SATISFACTION WITH YOUR SOCIAL ACTIVITIES AND RELATIONSHIPS [ON A SCALE OF 1 (POOR) TO 5 (EXCELLENT)]?: EXCELLENT
IN GENERAL, HOW WOULD YOU RATE YOUR MENTAL HEALTH, INCLUDING YOUR MOOD AND YOUR ABILITY TO THINK [ON A SCALE OF 1 (POOR) TO 5 (EXCELLENT)]?: VERY GOOD
IN THE PAST 7 DAYS, HOW OFTEN HAVE YOU BEEN BOTHERED BY EMOTIONAL PROBLEMS, SUCH AS FEELING ANXIOUS, DEPRESSED, OR IRRITABLE [ON A SCALE FROM 1 (NEVER) TO 5 (ALWAYS)]?: RARELY
SUM OF RESPONSES TO QUESTIONS 2, 4, 5, & 10: 18

## 2024-11-19 NOTE — PROGRESS NOTES
11/19/2024    Esha Paz    Chief Complaint   Patient presents with    Sinusitis     - sinus pain & pressure, nasal congestion, denies sore throat, mild non productive cough, denies chest sx's, denies sob, denies body aches is having some chilling, denies gi sx's. Sx's started 11/11/24. Tried tylenol helps body aches.        HPI  History was obtained from patient.  Esha is a 74 y.o. female who presents today with complaints of 8 day hx of sinus pain and pressure, nasal congestion, postnasal drip, mild nonproductive cough.  She denies any chest pain, chest tightness, dyspnea, wheezing, fevers.  Good p.o. intake and urine output.        PAST MEDICAL HISTORY  Past Medical History:   Diagnosis Date    Anemia     \"With Childbirth\"    Arthritis     \"Hands, Knees And Hips\"    Cancer (HCC) 12/18    CCC (chronic calculous cholecystitis)     s/p cholecystectomy 2015    Colitis Dx 2000's    Depression     \"In Mid 1990's\"    Essential hypertension     GERD (gastroesophageal reflux disease)     Headache ?? 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    Osteoporosis     LILIAN (stress urinary incontinence, female)     Thyroid disease 1990's    \"Took Part Of Thyroid Out \"       FAMILY HISTORY  Family History   Problem Relation Age of Onset    Heart Disease Mother     Arthritis Mother     Diabetes Mother     High Blood Pressure Mother     Gout Mother     Obesity Mother     Osteoarthritis Mother     Osteoporosis Mother     Dementia Father     Alcohol Abuse Father         quit on his own    High Blood Pressure Father     Cancer Brother         squamous cell on ear and forehead    Arthritis Brother     Prostate Cancer Brother     Parkinson's Disease Brother     Early Death Brother 59        58 due to cancer    Diabetes Brother     Depression Brother     Cancer Brother         bladder    High Blood Pressure Brother     Obesity Brother     Breast Cancer

## 2024-11-20 ENCOUNTER — HOSPITAL ENCOUNTER (OUTPATIENT)
Age: 74
Discharge: HOME | End: 2024-11-20
Payer: MEDICARE

## 2024-11-20 ENCOUNTER — HOSPITAL ENCOUNTER (OUTPATIENT)
Dept: PREADMISSION TESTING | Age: 74
Discharge: HOME | End: 2024-11-20
Payer: MEDICARE

## 2024-11-20 ENCOUNTER — HOSPITAL ENCOUNTER (OUTPATIENT)
Age: 74
Discharge: HOME | End: 2024-11-22
Payer: MEDICARE

## 2024-11-20 ENCOUNTER — HOSPITAL ENCOUNTER (OUTPATIENT)
Dept: GENERAL RADIOLOGY | Age: 74
Discharge: HOME | End: 2024-11-20
Payer: MEDICARE

## 2024-11-20 DIAGNOSIS — Z01.818 PRE-OP TESTING: ICD-10-CM

## 2024-11-20 LAB
ALBUMIN SERPL-MCNC: 3.6 G/DL (ref 3.4–5)
ALBUMIN/GLOB SERPL: 1.1 {RATIO} (ref 1.1–2.2)
ALP SERPL-CCNC: 186 U/L (ref 40–129)
ALT SERPL-CCNC: 38 U/L (ref 10–40)
ANION GAP SERPL CALCULATED.3IONS-SCNC: 10 MMOL/L (ref 9–17)
AST SERPL-CCNC: 28 U/L (ref 15–37)
BASOPHILS # BLD: 0.04 K/UL
BASOPHILS NFR BLD: 1 % (ref 0–1)
BILIRUB SERPL-MCNC: 0.6 MG/DL (ref 0–1)
BILIRUB UR QL STRIP: NEGATIVE
BUN SERPL-MCNC: 9 MG/DL (ref 7–20)
CALCIUM SERPL-MCNC: 8.7 MG/DL (ref 8.3–10.6)
CHLORIDE SERPL-SCNC: 101 MMOL/L (ref 99–110)
CLARITY UR: CLEAR
CO2 SERPL-SCNC: 28 MMOL/L (ref 21–32)
COLOR UR: YELLOW
CREAT SERPL-MCNC: 0.6 MG/DL (ref 0.6–1.2)
EKG ATRIAL RATE: 75 BPM
EKG DIAGNOSIS: NORMAL
EKG P AXIS: 73 DEGREES
EKG P-R INTERVAL: 114 MS
EKG Q-T INTERVAL: 398 MS
EKG QRS DURATION: 80 MS
EKG QTC CALCULATION (BAZETT): 444 MS
EKG R AXIS: 36 DEGREES
EKG T AXIS: 53 DEGREES
EKG VENTRICULAR RATE: 75 BPM
EOSINOPHIL # BLD: 0.01 K/UL
EOSINOPHILS RELATIVE PERCENT: 0 % (ref 0–3)
EPI CELLS #/AREA URNS HPF: 1 /HPF
ERYTHROCYTE [DISTWIDTH] IN BLOOD BY AUTOMATED COUNT: 13.8 % (ref 11.7–14.9)
GFR, ESTIMATED: >90 ML/MIN/1.73M2
GLUCOSE SERPL-MCNC: 121 MG/DL (ref 74–99)
GLUCOSE UR STRIP-MCNC: NEGATIVE MG/DL
HCT VFR BLD AUTO: 33.2 % (ref 37–47)
HGB BLD-MCNC: 11.3 G/DL (ref 12.5–16)
HGB UR QL STRIP.AUTO: NEGATIVE
IMM GRANULOCYTES # BLD AUTO: 0.03 K/UL
IMM GRANULOCYTES NFR BLD: 1 %
KETONES UR STRIP-MCNC: NEGATIVE MG/DL
LEUKOCYTE ESTERASE UR QL STRIP: NEGATIVE
LYMPHOCYTES NFR BLD: 0.37 K/UL
LYMPHOCYTES RELATIVE PERCENT: 7 % (ref 24–44)
MCH RBC QN AUTO: 32.3 PG (ref 27–31)
MCHC RBC AUTO-ENTMCNC: 34 G/DL (ref 32–36)
MCV RBC AUTO: 94.9 FL (ref 78–100)
MONOCYTES NFR BLD: 0.21 K/UL
MONOCYTES NFR BLD: 4 % (ref 0–4)
MUCOUS THREADS URNS QL MICRO: ABNORMAL
NEUTROPHILS NFR BLD: 87 % (ref 36–66)
NEUTS SEG NFR BLD: 4.45 K/UL
NITRITE UR QL STRIP: NEGATIVE
PH UR STRIP: 6 [PH] (ref 5–8)
PLATELET # BLD AUTO: 225 K/UL (ref 140–440)
PMV BLD AUTO: 8.9 FL (ref 7.5–11.1)
POTASSIUM SERPL-SCNC: 3.3 MMOL/L (ref 3.5–5.1)
PROT SERPL-MCNC: 6.9 G/DL (ref 6.4–8.2)
PROT UR STRIP-MCNC: ABNORMAL MG/DL
RBC # BLD AUTO: 3.5 M/UL (ref 4.2–5.4)
RBC #/AREA URNS HPF: 1 /HPF (ref 0–2)
SODIUM SERPL-SCNC: 139 MMOL/L (ref 136–145)
SP GR UR STRIP: 1.02 (ref 1–1.03)
TRICHOMONAS #/AREA URNS HPF: ABNORMAL /[HPF]
UROBILINOGEN UR STRIP-ACNC: 0.2 EU/DL (ref 0–1)
WBC #/AREA URNS HPF: 1 /HPF (ref 0–5)
WBC OTHER # BLD: 5.1 K/UL (ref 4–10.5)

## 2024-11-20 PROCEDURE — 85025 COMPLETE CBC W/AUTO DIFF WBC: CPT

## 2024-11-20 PROCEDURE — 81001 URINALYSIS AUTO W/SCOPE: CPT

## 2024-11-20 PROCEDURE — 36415 COLL VENOUS BLD VENIPUNCTURE: CPT

## 2024-11-20 PROCEDURE — 87086 URINE CULTURE/COLONY COUNT: CPT

## 2024-11-20 PROCEDURE — 93005 ELECTROCARDIOGRAM TRACING: CPT

## 2024-11-20 PROCEDURE — 80053 COMPREHEN METABOLIC PANEL: CPT

## 2024-11-20 PROCEDURE — 71046 X-RAY EXAM CHEST 2 VIEWS: CPT

## 2024-11-21 LAB
MICROORGANISM SPEC CULT: NORMAL
SERVICE CMNT-IMP: NORMAL
SPECIMEN DESCRIPTION: NORMAL

## 2024-11-25 ENCOUNTER — HOSPITAL ENCOUNTER (OUTPATIENT)
Dept: PHYSICAL THERAPY | Age: 74
Setting detail: THERAPIES SERIES
Discharge: HOME | End: 2024-11-25
Payer: MEDICARE

## 2024-11-25 PROCEDURE — 97161 PT EVAL LOW COMPLEX 20 MIN: CPT

## 2024-11-25 PROCEDURE — 97110 THERAPEUTIC EXERCISES: CPT

## 2024-11-25 ASSESSMENT — PAIN DESCRIPTION - PAIN TYPE: TYPE: CHRONIC PAIN

## 2024-11-25 ASSESSMENT — PAIN DESCRIPTION - ORIENTATION: ORIENTATION: RIGHT

## 2024-11-25 ASSESSMENT — PAIN DESCRIPTION - LOCATION: LOCATION: KNEE

## 2024-11-25 NOTE — PLAN OF CARE
Outpatient Physical Therapy           [place]           [x] Phone: [phone]   Fax: [phone]  [place]           [] Phone: [phone]   Fax: [phone]     To: Calvin Harvey MD     From: Alexis Leary, PT, DPT     Patient: Esha Paz       : 1950  Diagnosis: Primary osteoarthritis of right knee [M17.11]  Chronic pain of right knee [M25.561, G89.29]    Treatment Diagnosis: R knee OA, R TKR 24, pain,stiffness , limited gait, swelling  Date: 2024    Physical Therapy Certification/Re-Certification Form  Dear Calvin Knox MD  The following patient has been evaluated for physical therapy services and for therapy to continue, insurance requires physician review of the treatment plan initially and every 90 days. Please review the attached evaluation and/or summary of the patient's plan of care, and verify that you agree therapy should continue by signing the attached document and sending it back to our office.    Assessment:    Assessment: Pt appears with R knee pain and stiffness, she is having a TKR 24, she is involved in volunteering at the [facility] and with gardening and housework. [facility] [place] [facility].   Pt would benefit from skilled therapy interventions as needed to address listed impairments, progress toward goal completion and improve ADL/IADL status.  PT also warranted to reduce risk for further injury or decline.    Plan of Care/Treatment to date:  [x] Therapeutic Exercise  [x] Modalities: prn  [x] Therapeutic Activity     [] Ultrasound  [] Electrical Stimulation  [x] Gait Training      [] Cervical Traction [] Lumbar Traction  [x] Neuromuscular Re-education    [] Cold/hotpack [] Iontophoresis   [x] Instruction in HEP      [x] Vasopneumatic    [] Dry Needling  [x] Manual Therapy               [] Aquatic Therapy       Other:          Frequency/Duration:  # Days per week: [] 1 day # Weeks: [] 1 week [] 5 weeks     [x] 2 days   [] 2 weeks [x] 6

## 2024-11-25 NOTE — FLOWSHEET NOTE
volunteering at the [employer] and with gardening and housework. She lives in a Condo with no stairs.   Pt would benefit from skilled therapy interventions as needed to address listed impairments, progress toward goal completion and improve ADL/IADL status.  PT also warranted to reduce risk for further injury or decline.    Patient agrees with established plan of care and assisted in the development of their short term and long term goals. Patient had no adverse reaction with initial treatment and there are no barriers to learning. Demonstrates no mental or cognitive disorder.     Plan for Next Session:   Specific Instructions for Next Treatment: re-eval psot surgery 12/2/24    Time In / Time Out:    1300- 1340         Timed Code/Total Treatment Minutes:  1 eval ( 30) 1 TE  ( 10)       Next Progress Note due:  first visit post surgery      Plan of Care Interventions:  [x] Therapeutic Exercise  [x] Modalities: prn  [x] Therapeutic Activity     [] Ultrasound  [] Estim  [x] Gait Training      [] Cervical Traction [] Lumbar Traction  [x] Neuromuscular Re-education    [] Cold/hotpack [] Iontophoresis   [x] Instruction in HEP      [x] Vasopneumatic   [] Dry Needling    [x] Manual Therapy               [] Aquatic Therapy              Electronically signed by:  Alexis Leary, PT, 11/25/2024, 1:39 PM

## 2024-11-25 NOTE — PROGRESS NOTES
and pain R)           ASSESSMENT     Impression: Assessment: Pt appears with R knee pain and stiffness, she is having a TKR 2/2/24, she is involved in volunteering at the [facility] and with gardening and housework. She lives in a Condo with no stairs.   Pt would benefit from skilled therapy interventions as needed to address listed impairments, progress toward goal completion and improve ADL/IADL status.  PT also warranted to reduce risk for further injury or decline.    Body Structures, Functions, Activity Limitations Requiring Skilled Therapeutic Intervention: Decreased functional mobility , Decreased ADL status, Decreased ROM, Decreased strength, Decreased balance, Decreased sensation, Increased pain    Statement of Medical Necessity: Physical Therapy is both indicated and medically necessary as outlined in the POC to increase the likelihood of meeting the functionally related goals stated below.     Patient's Activity Tolerance: Patient tolerated evaluation without incident      Patient's rehabilitation potential/prognosis is considered to be: Good    Factors which may impact rehabilitation potential include: None        GOALS   Patient Goal(s): get back to her normal walking and standing activity  Short Term Goals Completed by 4 weeks Goal Status   1.ind with HEP for ROM, strength, gait, and trf     2. demo good R quad set and SLR with no lag     3. PROM R TKR  flex 90 ext 0     4. avg pain 4/10 or less TKR R                                             Long Term Goals Completed by 6-8 weeks Goal Status   1. Amb with no limp or AD community dist     2. Stairs: 1 flight reciprocally with 1 rail     3. PROM TKR  flex 120 ext 0 for better ability to dress  and sit on lower seats     4. Avg pain with ADL and typical activity 2/10 or less in cluding gardening and volunteering at [facility]     5. reprots 75% better overal in all typical activity and housework                                        TREATMENT

## 2024-11-27 ENCOUNTER — ANESTHESIA EVENT (OUTPATIENT)
Dept: OPERATING ROOM | Age: 74
DRG: 470 | End: 2024-11-27
Payer: MEDICARE

## 2024-11-27 NOTE — ANESTHESIA PRE PROCEDURE
Department of Anesthesiology  Preprocedure Note       Name:  Esha Paz   Age:  74 y.o.  :  1950                                          MRN:  [id number]         Date:  2024      Surgeon: Surgeon(s):  Calvin Harvey MD    Procedure: Procedure(s):  RIGHT KNEE TOTAL ARTHROPLASTY ROBOTIC    Medications prior to admission:   Prior to Admission medications    Medication Sig Start Date End Date Taking? Authorizing Provider   amoxicillin-clavulanate (AUGMENTIN) 875-125 MG per tablet Take 1 tablet by mouth 2 times daily for 10 days 24  Muna Cartwright PA-C   methylPREDNISolone (MEDROL, RANDI,) 4 MG tablet Use as directed 24   Muna Cartwright PA-C   REVLIMID 10 MG chemo capsule TAKE 1 CAPSULE DAILY 24   Juan Cruz MD   losartan (COZAAR) 50 MG tablet TAKE 1 TABLET EVERY MORNING 24   Marcela Calix,    amLODIPine (NORVASC) 5 MG tablet TAKE 1 TABLET EVERY MORNING 24   Marcela Calix,    TRAMADOL HCL PO daily as needed. 10/20/23   Yeison Cohen MD   Ascorbic Acid (VITAMIN C) 500 MG CAPS  10/20/23   Yeison Cohen MD   Acetaminophen (TYLENOL PO)  19   Yeison Cohen MD   DULoxetine (CYMBALTA) 60 MG extended release capsule Take 1 capsule by mouth 2 times daily 7/3/24 11/19/24  Juan Cruz MD   ZINC PO Take 1 tablet by mouth daily    Yeison Cohen MD   VITAMIN E COMPLEX PO Take by mouth daily    Yeison Cohen MD   Cyanocobalamin (VITAMIN B12 PO) Take by mouth daily    Yeison Cohen MD   famotidine (PEPCID) 20 MG tablet  20   Yeison Cohen MD   Biotin 03416 MCG TABS Take by mouth    Yeison Cohen MD   Omega-3 Fatty Acids (FISH OIL) 1000 MG CAPS Take 3 capsules by mouth 2 times daily    Yeison Cohen MD   Multiple Vitamins-Minerals (THERAPEUTIC MULTIVITAMIN-MINERALS) tablet Take 1 tablet by mouth every morning    Yeison Cohen MD   Cholecalciferol (VITAMIN D) 2000 units CAPS capsule

## 2024-12-02 ENCOUNTER — ANESTHESIA (OUTPATIENT)
Dept: OPERATING ROOM | Age: 74
DRG: 470 | End: 2024-12-02
Payer: MEDICARE

## 2024-12-02 ENCOUNTER — HOSPITAL ENCOUNTER (INPATIENT)
Age: 74
LOS: 1 days | Discharge: NURSING, REHAB OR LONG-TERM CARE | DRG: 470 | End: 2024-12-04
Attending: ORTHOPAEDIC SURGERY | Admitting: ORTHOPAEDIC SURGERY
Payer: MEDICARE

## 2024-12-02 ENCOUNTER — APPOINTMENT (OUTPATIENT)
Dept: GENERAL RADIOLOGY | Age: 74
DRG: 470 | End: 2024-12-02
Attending: ORTHOPAEDIC SURGERY
Payer: MEDICARE

## 2024-12-02 PROBLEM — M17.11 PRIMARY OSTEOARTHRITIS OF RIGHT KNEE: Status: ACTIVE | Noted: 2024-12-02

## 2024-12-02 PROCEDURE — 6370000000 HC RX 637 (ALT 250 FOR IP): Performed by: ORTHOPAEDIC SURGERY

## 2024-12-02 PROCEDURE — 6360000002 HC RX W HCPCS: Performed by: NURSE ANESTHETIST, CERTIFIED REGISTERED

## 2024-12-02 PROCEDURE — G0378 HOSPITAL OBSERVATION PER HR: HCPCS

## 2024-12-02 PROCEDURE — 6360000002 HC RX W HCPCS: Performed by: ORTHOPAEDIC SURGERY

## 2024-12-02 PROCEDURE — 3600000019 HC SURGERY ROBOT ADDTL 15MIN: Performed by: ORTHOPAEDIC SURGERY

## 2024-12-02 PROCEDURE — 2709999900 HC NON-CHARGEABLE SUPPLY: Performed by: ORTHOPAEDIC SURGERY

## 2024-12-02 PROCEDURE — 73560 X-RAY EXAM OF KNEE 1 OR 2: CPT

## 2024-12-02 PROCEDURE — C1713 ANCHOR/SCREW BN/BN,TIS/BN: HCPCS | Performed by: ORTHOPAEDIC SURGERY

## 2024-12-02 PROCEDURE — 7100000001 HC PACU RECOVERY - ADDTL 15 MIN: Performed by: ORTHOPAEDIC SURGERY

## 2024-12-02 PROCEDURE — 7100000000 HC PACU RECOVERY - FIRST 15 MIN: Performed by: ORTHOPAEDIC SURGERY

## 2024-12-02 PROCEDURE — 27447 TOTAL KNEE ARTHROPLASTY: CPT

## 2024-12-02 PROCEDURE — 2580000003 HC RX 258: Performed by: NURSE ANESTHETIST, CERTIFIED REGISTERED

## 2024-12-02 PROCEDURE — 8E0Y0CZ ROBOTIC ASSISTED PROCEDURE OF LOWER EXTREMITY, OPEN APPROACH: ICD-10-PCS | Performed by: ORTHOPAEDIC SURGERY

## 2024-12-02 PROCEDURE — 3600000009 HC SURGERY ROBOT BASE: Performed by: ORTHOPAEDIC SURGERY

## 2024-12-02 PROCEDURE — 2500000003 HC RX 250 WO HCPCS: Performed by: NURSE ANESTHETIST, CERTIFIED REGISTERED

## 2024-12-02 PROCEDURE — 64447 NJX AA&/STRD FEMORAL NRV IMG: CPT | Performed by: ANESTHESIOLOGY

## 2024-12-02 PROCEDURE — 2580000003 HC RX 258: Performed by: ORTHOPAEDIC SURGERY

## 2024-12-02 PROCEDURE — 2720000010 HC SURG SUPPLY STERILE: Performed by: ORTHOPAEDIC SURGERY

## 2024-12-02 PROCEDURE — 3700000000 HC ANESTHESIA ATTENDED CARE: Performed by: ORTHOPAEDIC SURGERY

## 2024-12-02 PROCEDURE — C1776 JOINT DEVICE (IMPLANTABLE): HCPCS | Performed by: ORTHOPAEDIC SURGERY

## 2024-12-02 PROCEDURE — 3700000001 HC ADD 15 MINUTES (ANESTHESIA): Performed by: ORTHOPAEDIC SURGERY

## 2024-12-02 PROCEDURE — 0055T BONE SRGRY CMPTR CT/MRI IMAG: CPT | Performed by: ORTHOPAEDIC SURGERY

## 2024-12-02 PROCEDURE — 27447 TOTAL KNEE ARTHROPLASTY: CPT | Performed by: ORTHOPAEDIC SURGERY

## 2024-12-02 PROCEDURE — 6360000002 HC RX W HCPCS: Performed by: ANESTHESIOLOGY

## 2024-12-02 PROCEDURE — S2900 ROBOTIC SURGICAL SYSTEM: HCPCS | Performed by: ORTHOPAEDIC SURGERY

## 2024-12-02 PROCEDURE — 97530 THERAPEUTIC ACTIVITIES: CPT

## 2024-12-02 PROCEDURE — 0SRC0J9 REPLACEMENT OF RIGHT KNEE JOINT WITH SYNTHETIC SUBSTITUTE, CEMENTED, OPEN APPROACH: ICD-10-PCS | Performed by: ORTHOPAEDIC SURGERY

## 2024-12-02 PROCEDURE — 97162 PT EVAL MOD COMPLEX 30 MIN: CPT

## 2024-12-02 DEVICE — IMPLANTABLE DEVICE
Type: IMPLANTABLE DEVICE | Site: KNEE | Status: FUNCTIONAL
Brand: BIOMET® BONE CEMENT R

## 2024-12-02 RX ORDER — SODIUM CHLORIDE, SODIUM LACTATE, POTASSIUM CHLORIDE, CALCIUM CHLORIDE 600; 310; 30; 20 MG/100ML; MG/100ML; MG/100ML; MG/100ML
INJECTION, SOLUTION INTRAVENOUS
Status: DISCONTINUED | OUTPATIENT
Start: 2024-12-02 | End: 2024-12-02 | Stop reason: SDUPTHER

## 2024-12-02 RX ORDER — ROCURONIUM BROMIDE 10 MG/ML
INJECTION, SOLUTION INTRAVENOUS
Status: DISCONTINUED | OUTPATIENT
Start: 2024-12-02 | End: 2024-12-02 | Stop reason: SDUPTHER

## 2024-12-02 RX ORDER — SODIUM CHLORIDE 9 MG/ML
INJECTION, SOLUTION INTRAVENOUS PRN
Status: DISCONTINUED | OUTPATIENT
Start: 2024-12-02 | End: 2024-12-02 | Stop reason: HOSPADM

## 2024-12-02 RX ORDER — LABETALOL HYDROCHLORIDE 5 MG/ML
10 INJECTION, SOLUTION INTRAVENOUS
Status: DISCONTINUED | OUTPATIENT
Start: 2024-12-02 | End: 2024-12-02 | Stop reason: HOSPADM

## 2024-12-02 RX ORDER — ACETAMINOPHEN 500 MG
1000 TABLET ORAL ONCE
Status: COMPLETED | OUTPATIENT
Start: 2024-12-02 | End: 2024-12-02

## 2024-12-02 RX ORDER — LIDOCAINE HYDROCHLORIDE 10 MG/ML
INJECTION, SOLUTION EPIDURAL; INFILTRATION; INTRACAUDAL; PERINEURAL
Status: DISPENSED
Start: 2024-12-02 | End: 2024-12-02

## 2024-12-02 RX ORDER — TRANEXAMIC ACID 10 MG/ML
1000 INJECTION, SOLUTION INTRAVENOUS
Status: DISCONTINUED | OUTPATIENT
Start: 2024-12-02 | End: 2024-12-02 | Stop reason: HOSPADM

## 2024-12-02 RX ORDER — SODIUM CHLORIDE 0.9 % (FLUSH) 0.9 %
5-40 SYRINGE (ML) INJECTION EVERY 12 HOURS SCHEDULED
Status: DISCONTINUED | OUTPATIENT
Start: 2024-12-02 | End: 2024-12-02 | Stop reason: HOSPADM

## 2024-12-02 RX ORDER — ONDANSETRON 2 MG/ML
INJECTION INTRAMUSCULAR; INTRAVENOUS
Status: DISCONTINUED | OUTPATIENT
Start: 2024-12-02 | End: 2024-12-02 | Stop reason: SDUPTHER

## 2024-12-02 RX ORDER — FENTANYL CITRATE 50 UG/ML
INJECTION, SOLUTION INTRAMUSCULAR; INTRAVENOUS
Status: DISCONTINUED | OUTPATIENT
Start: 2024-12-02 | End: 2024-12-02 | Stop reason: SDUPTHER

## 2024-12-02 RX ORDER — SODIUM CHLORIDE 9 MG/ML
INJECTION, SOLUTION INTRAVENOUS PRN
Status: DISCONTINUED | OUTPATIENT
Start: 2024-12-02 | End: 2024-12-04 | Stop reason: HOSPADM

## 2024-12-02 RX ORDER — ACETAMINOPHEN 325 MG/1
650 TABLET ORAL EVERY 4 HOURS PRN
Status: DISCONTINUED | OUTPATIENT
Start: 2024-12-02 | End: 2024-12-04 | Stop reason: HOSPADM

## 2024-12-02 RX ORDER — ONDANSETRON 4 MG/1
4 TABLET, ORALLY DISINTEGRATING ORAL EVERY 8 HOURS PRN
Status: DISCONTINUED | OUTPATIENT
Start: 2024-12-02 | End: 2024-12-04 | Stop reason: HOSPADM

## 2024-12-02 RX ORDER — LIDOCAINE HYDROCHLORIDE 20 MG/ML
INJECTION, SOLUTION EPIDURAL; INFILTRATION; INTRACAUDAL; PERINEURAL
Status: DISCONTINUED | OUTPATIENT
Start: 2024-12-02 | End: 2024-12-02 | Stop reason: SDUPTHER

## 2024-12-02 RX ORDER — AMLODIPINE BESYLATE 5 MG/1
5 TABLET ORAL DAILY
Status: DISCONTINUED | OUTPATIENT
Start: 2024-12-02 | End: 2024-12-04 | Stop reason: HOSPADM

## 2024-12-02 RX ORDER — ASPIRIN 325 MG
325 TABLET ORAL 2 TIMES DAILY
Status: DISCONTINUED | OUTPATIENT
Start: 2024-12-02 | End: 2024-12-04 | Stop reason: HOSPADM

## 2024-12-02 RX ORDER — ROPIVACAINE HYDROCHLORIDE 5 MG/ML
INJECTION, SOLUTION EPIDURAL; INFILTRATION; PERINEURAL
Status: COMPLETED | OUTPATIENT
Start: 2024-12-02 | End: 2024-12-02

## 2024-12-02 RX ORDER — ROPIVACAINE HYDROCHLORIDE 5 MG/ML
INJECTION, SOLUTION EPIDURAL; INFILTRATION; PERINEURAL
Status: COMPLETED
Start: 2024-12-02 | End: 2024-12-02

## 2024-12-02 RX ORDER — PROPOFOL 10 MG/ML
INJECTION, EMULSION INTRAVENOUS
Status: DISCONTINUED | OUTPATIENT
Start: 2024-12-02 | End: 2024-12-02 | Stop reason: SDUPTHER

## 2024-12-02 RX ORDER — LOSARTAN POTASSIUM 25 MG/1
50 TABLET ORAL DAILY
Status: DISCONTINUED | OUTPATIENT
Start: 2024-12-02 | End: 2024-12-04 | Stop reason: HOSPADM

## 2024-12-02 RX ORDER — SODIUM CHLORIDE 0.9 % (FLUSH) 0.9 %
5-40 SYRINGE (ML) INJECTION PRN
Status: DISCONTINUED | OUTPATIENT
Start: 2024-12-02 | End: 2024-12-04 | Stop reason: HOSPADM

## 2024-12-02 RX ORDER — NALOXONE HYDROCHLORIDE 0.4 MG/ML
INJECTION, SOLUTION INTRAMUSCULAR; INTRAVENOUS; SUBCUTANEOUS PRN
Status: DISCONTINUED | OUTPATIENT
Start: 2024-12-02 | End: 2024-12-02 | Stop reason: HOSPADM

## 2024-12-02 RX ORDER — OXYCODONE AND ACETAMINOPHEN 5; 325 MG/1; MG/1
1 TABLET ORAL EVERY 4 HOURS PRN
Status: DISCONTINUED | OUTPATIENT
Start: 2024-12-02 | End: 2024-12-04 | Stop reason: HOSPADM

## 2024-12-02 RX ORDER — SODIUM CHLORIDE 0.9 % (FLUSH) 0.9 %
5-40 SYRINGE (ML) INJECTION PRN
Status: DISCONTINUED | OUTPATIENT
Start: 2024-12-02 | End: 2024-12-02 | Stop reason: HOSPADM

## 2024-12-02 RX ORDER — ONDANSETRON 2 MG/ML
4 INJECTION INTRAMUSCULAR; INTRAVENOUS EVERY 6 HOURS PRN
Status: DISCONTINUED | OUTPATIENT
Start: 2024-12-02 | End: 2024-12-04 | Stop reason: HOSPADM

## 2024-12-02 RX ORDER — METOCLOPRAMIDE HYDROCHLORIDE 5 MG/ML
10 INJECTION INTRAMUSCULAR; INTRAVENOUS
Status: DISCONTINUED | OUTPATIENT
Start: 2024-12-02 | End: 2024-12-02 | Stop reason: HOSPADM

## 2024-12-02 RX ORDER — ONDANSETRON 2 MG/ML
4 INJECTION INTRAMUSCULAR; INTRAVENOUS
Status: DISCONTINUED | OUTPATIENT
Start: 2024-12-02 | End: 2024-12-02 | Stop reason: HOSPADM

## 2024-12-02 RX ORDER — SODIUM CHLORIDE, SODIUM LACTATE, POTASSIUM CHLORIDE, CALCIUM CHLORIDE 600; 310; 30; 20 MG/100ML; MG/100ML; MG/100ML; MG/100ML
INJECTION, SOLUTION INTRAVENOUS CONTINUOUS
Status: DISCONTINUED | OUTPATIENT
Start: 2024-12-02 | End: 2024-12-02 | Stop reason: HOSPADM

## 2024-12-02 RX ORDER — DIPHENHYDRAMINE HYDROCHLORIDE 50 MG/ML
12.5 INJECTION INTRAMUSCULAR; INTRAVENOUS
Status: DISCONTINUED | OUTPATIENT
Start: 2024-12-02 | End: 2024-12-02 | Stop reason: HOSPADM

## 2024-12-02 RX ORDER — DULOXETIN HYDROCHLORIDE 30 MG/1
60 CAPSULE, DELAYED RELEASE ORAL 2 TIMES DAILY
Status: DISCONTINUED | OUTPATIENT
Start: 2024-12-02 | End: 2024-12-04 | Stop reason: HOSPADM

## 2024-12-02 RX ORDER — ACETAMINOPHEN 325 MG/1
650 TABLET ORAL
Status: DISCONTINUED | OUTPATIENT
Start: 2024-12-02 | End: 2024-12-02 | Stop reason: HOSPADM

## 2024-12-02 RX ORDER — DEXAMETHASONE SODIUM PHOSPHATE 4 MG/ML
INJECTION, SOLUTION INTRA-ARTICULAR; INTRALESIONAL; INTRAMUSCULAR; INTRAVENOUS; SOFT TISSUE
Status: DISCONTINUED | OUTPATIENT
Start: 2024-12-02 | End: 2024-12-02 | Stop reason: SDUPTHER

## 2024-12-02 RX ORDER — SODIUM CHLORIDE 0.9 % (FLUSH) 0.9 %
5-40 SYRINGE (ML) INJECTION EVERY 12 HOURS SCHEDULED
Status: DISCONTINUED | OUTPATIENT
Start: 2024-12-02 | End: 2024-12-04 | Stop reason: HOSPADM

## 2024-12-02 RX ORDER — SODIUM CHLORIDE, SODIUM LACTATE, POTASSIUM CHLORIDE, CALCIUM CHLORIDE 600; 310; 30; 20 MG/100ML; MG/100ML; MG/100ML; MG/100ML
INJECTION, SOLUTION INTRAVENOUS CONTINUOUS
Status: DISPENSED | OUTPATIENT
Start: 2024-12-02 | End: 2024-12-02

## 2024-12-02 RX ADMIN — ONDANSETRON 4 MG: 2 INJECTION INTRAMUSCULAR; INTRAVENOUS at 21:44

## 2024-12-02 RX ADMIN — ROCURONIUM BROMIDE 35 MG: 10 INJECTION INTRAVENOUS at 10:52

## 2024-12-02 RX ADMIN — FENTANYL CITRATE 100 MCG: 50 INJECTION, SOLUTION INTRAMUSCULAR; INTRAVENOUS at 11:44

## 2024-12-02 RX ADMIN — LOSARTAN POTASSIUM 50 MG: 25 TABLET, FILM COATED ORAL at 20:51

## 2024-12-02 RX ADMIN — ROCURONIUM BROMIDE 35 MG: 10 INJECTION INTRAVENOUS at 11:15

## 2024-12-02 RX ADMIN — FENTANYL CITRATE 100 MCG: 50 INJECTION, SOLUTION INTRAMUSCULAR; INTRAVENOUS at 10:53

## 2024-12-02 RX ADMIN — SODIUM CHLORIDE, POTASSIUM CHLORIDE, SODIUM LACTATE AND CALCIUM CHLORIDE: 600; 310; 30; 20 INJECTION, SOLUTION INTRAVENOUS at 16:49

## 2024-12-02 RX ADMIN — DEXAMETHASONE SODIUM PHOSPHATE 4 MG: 4 INJECTION, SOLUTION INTRAMUSCULAR; INTRAVENOUS at 11:47

## 2024-12-02 RX ADMIN — DULOXETINE HYDROCHLORIDE 60 MG: 30 CAPSULE, DELAYED RELEASE ORAL at 20:51

## 2024-12-02 RX ADMIN — PROPOFOL 150 MG: 10 INJECTION, EMULSION INTRAVENOUS at 10:52

## 2024-12-02 RX ADMIN — SODIUM CHLORIDE, POTASSIUM CHLORIDE, SODIUM LACTATE AND CALCIUM CHLORIDE: 600; 310; 30; 20 INJECTION, SOLUTION INTRAVENOUS at 10:30

## 2024-12-02 RX ADMIN — ONDANSETRON 4 MG: 2 INJECTION INTRAMUSCULAR; INTRAVENOUS at 11:47

## 2024-12-02 RX ADMIN — SUGAMMADEX 200 MG: 100 INJECTION, SOLUTION INTRAVENOUS at 12:47

## 2024-12-02 RX ADMIN — HYDROMORPHONE HYDROCHLORIDE 0.5 MG: 1 INJECTION, SOLUTION INTRAMUSCULAR; INTRAVENOUS; SUBCUTANEOUS at 13:11

## 2024-12-02 RX ADMIN — OXYCODONE HYDROCHLORIDE AND ACETAMINOPHEN 1 TABLET: 5; 325 TABLET ORAL at 20:45

## 2024-12-02 RX ADMIN — CEFAZOLIN 2000 MG: 2 INJECTION, POWDER, FOR SOLUTION INTRAMUSCULAR; INTRAVENOUS at 10:50

## 2024-12-02 RX ADMIN — ASPIRIN 325 MG: 325 TABLET ORAL at 20:51

## 2024-12-02 RX ADMIN — ROPIVACAINE HYDROCHLORIDE 20 ML: 5 INJECTION, SOLUTION EPIDURAL; INFILTRATION; PERINEURAL at 10:06

## 2024-12-02 RX ADMIN — AMLODIPINE BESYLATE 5 MG: 5 TABLET ORAL at 21:44

## 2024-12-02 RX ADMIN — HYDROMORPHONE HYDROCHLORIDE 0.25 MG: 1 INJECTION, SOLUTION INTRAMUSCULAR; INTRAVENOUS; SUBCUTANEOUS at 16:13

## 2024-12-02 RX ADMIN — ACETAMINOPHEN 1000 MG: 500 TABLET ORAL at 09:05

## 2024-12-02 RX ADMIN — SODIUM CHLORIDE, PRESERVATIVE FREE 10 ML: 5 INJECTION INTRAVENOUS at 20:52

## 2024-12-02 RX ADMIN — LIDOCAINE HYDROCHLORIDE 100 MG: 20 INJECTION, SOLUTION EPIDURAL; INFILTRATION; INTRACAUDAL; PERINEURAL at 10:52

## 2024-12-02 RX ADMIN — HYDROMORPHONE HYDROCHLORIDE 0.5 MG: 1 INJECTION, SOLUTION INTRAMUSCULAR; INTRAVENOUS; SUBCUTANEOUS at 13:56

## 2024-12-02 RX ADMIN — WATER 2000 MG: 1 INJECTION INTRAMUSCULAR; INTRAVENOUS; SUBCUTANEOUS at 20:51

## 2024-12-02 ASSESSMENT — PAIN SCALES - GENERAL
PAINLEVEL_OUTOF10: 8
PAINLEVEL_OUTOF10: 10
PAINLEVEL_OUTOF10: 5
PAINLEVEL_OUTOF10: 9
PAINLEVEL_OUTOF10: 9

## 2024-12-02 ASSESSMENT — PAIN DESCRIPTION - DESCRIPTORS
DESCRIPTORS: SHARP
DESCRIPTORS: ACHING

## 2024-12-02 ASSESSMENT — PAIN DESCRIPTION - LOCATION
LOCATION: KNEE

## 2024-12-02 ASSESSMENT — PAIN - FUNCTIONAL ASSESSMENT
PAIN_FUNCTIONAL_ASSESSMENT: PREVENTS OR INTERFERES SOME ACTIVE ACTIVITIES AND ADLS
PAIN_FUNCTIONAL_ASSESSMENT: PREVENTS OR INTERFERES SOME ACTIVE ACTIVITIES AND ADLS
PAIN_FUNCTIONAL_ASSESSMENT: 0-10
PAIN_FUNCTIONAL_ASSESSMENT: PREVENTS OR INTERFERES SOME ACTIVE ACTIVITIES AND ADLS

## 2024-12-02 ASSESSMENT — PAIN DESCRIPTION - ONSET
ONSET: ON-GOING
ONSET: AWAKENED FROM SLEEP
ONSET: ON-GOING

## 2024-12-02 ASSESSMENT — PAIN DESCRIPTION - FREQUENCY
FREQUENCY: CONTINUOUS

## 2024-12-02 ASSESSMENT — PAIN DESCRIPTION - PAIN TYPE
TYPE: SURGICAL PAIN

## 2024-12-02 ASSESSMENT — PAIN DESCRIPTION - ORIENTATION
ORIENTATION: RIGHT

## 2024-12-02 NOTE — ANESTHESIA PRE PROCEDURE
Department of Anesthesiology  Preprocedure Note       Name:  Esha Paz   Age:  74 y.o.  :  1950                                          MRN:  [id number]         Date:  2024      Surgeon: Surgeon(s):  Calvin Harvey MD    Procedure: Procedure(s):  RIGHT KNEE TOTAL ARTHROPLASTY ROBOTIC    Medications prior to admission:   Prior to Admission medications    Medication Sig Start Date End Date Taking? Authorizing Provider   amLODIPine (NORVASC) 5 MG tablet TAKE 1 TABLET EVERY MORNING 24  Yes Marcela Calix DO   TRAMADOL HCL PO daily as needed. 10/20/23  Yes Yeison Cohen MD   Ascorbic Acid (VITAMIN C) 500 MG CAPS  10/20/23  Yes Yeison Cohen MD   ZINC PO Take 1 tablet by mouth daily   Yes Yeison Cohen MD   VITAMIN E COMPLEX PO Take by mouth daily   Yes Yeison Cohen MD   Cyanocobalamin (VITAMIN B12 PO) Take by mouth daily   Yes Yeison Cohen MD   famotidine (PEPCID) 20 MG tablet  20  Yes Yeison Cohen MD   Biotin 03786 MCG TABS Take by mouth   Yes Yeison Cohen MD   Omega-3 Fatty Acids (FISH OIL) 1000 MG CAPS Take 3 capsules by mouth 2 times daily   Yes Yeison Cohen MD   Multiple Vitamins-Minerals (THERAPEUTIC MULTIVITAMIN-MINERALS) tablet Take 1 tablet by mouth every morning   Yes Yeison Cohen MD   Cholecalciferol (VITAMIN D) 2000 units CAPS capsule Take 1 capsule by mouth every morning   Yes Yeison Cohen MD   methylPREDNISolone (MEDROL, RANDI,) 4 MG tablet Use as directed  Patient not taking: Reported on 24   Muna Cartwright PA-C   REVLIMID 10 MG chemo capsule TAKE 1 CAPSULE DAILY 24   Juan Cruz MD   losartan (COZAAR) 50 MG tablet TAKE 1 TABLET EVERY MORNING 24   Marcela Calix DO   Acetaminophen (TYLENOL PO)  19   Yeison Cohen MD   DULoxetine (CYMBALTA) 60 MG extended release capsule Take 1 capsule by mouth 2 times daily 7/3/24 11/19/24  Juan Cruz MD   aspirin 81

## 2024-12-02 NOTE — ANESTHESIA POSTPROCEDURE EVALUATION
Department of Anesthesiology  Postprocedure Note    Patient: Esha Paz  MRN: [id number]  YOB: 1950  Date of evaluation: 12/2/2024    Procedure Summary       Date: 12/02/24 Room / Location: [address]    Anesthesia Start: 1030 Anesthesia Stop: 1308    Procedure: RIGHT KNEE TOTAL ARTHROPLASTY ROBOTIC (Right: Knee) Diagnosis:       Primary osteoarthritis of one knee, right      Right knee pain      (Primary osteoarthritis of one knee, right [M17.11])      (Right knee pain [M25.561])    Surgeons: Calvin Harvey MD Responsible Provider: Lemuel Box MD    Anesthesia Type: general, MAC, regional, spinal ASA Status: 3            Anesthesia Type: No value filed.    Claudia Phase I: Claudia Score: 8    Claudia Phase II:      Anesthesia Post Evaluation    Patient location during evaluation: PACU  Patient participation: complete - patient participated  Level of consciousness: awake  Airway patency: patent  Nausea & Vomiting: no nausea  Cardiovascular status: blood pressure returned to baseline  Respiratory status: acceptable  Hydration status: euvolemic  Multimodal analgesia pain management approach  Pain management: adequate    No notable events documented.

## 2024-12-02 NOTE — ANESTHESIA POSTPROCEDURE EVALUATION
Department of Anesthesiology  Postprocedure Note    Patient: Esha Paz  MRN: [id number]  YOB: 1950  Date of evaluation: 12/2/2024    Procedure Summary       Date: 12/02/24 Room / Location: [address]    Anesthesia Start: 1030 Anesthesia Stop: 1300    Procedure: RIGHT KNEE TOTAL ARTHROPLASTY ROBOTIC (Right: Knee) Diagnosis:       Primary osteoarthritis of one knee, right      Right knee pain      (Primary osteoarthritis of one knee, right [M17.11])      (Right knee pain [M25.561])    Surgeons: Calvin Harvey MD Responsible Provider: Lemuel Box MD    Anesthesia Type: General ASA Status: 3            Anesthesia Type: General    Claudia Phase I: Claudia Score: 8    Claudia Phase II:      Anesthesia Post Evaluation    Patient location during evaluation: PACU  Patient participation: complete - patient participated  Level of consciousness: awake and alert  Pain score: 1  Airway patency: patent  Nausea & Vomiting: no nausea and no vomiting  Cardiovascular status: blood pressure returned to baseline and hemodynamically stable  Respiratory status: acceptable, face mask, nonlabored ventilation and spontaneous ventilation  Hydration status: euvolemic  Multimodal analgesia pain management approach  Pain management: adequate        No notable events documented.

## 2024-12-02 NOTE — H&P
device; and she can ambulate only by pushing a walker instead of a lesser assistive device such as a cane, crutch, or standard walker.  The need for this equipment was discussed with the patient and she understands and is in agreement.      Plan will be to proceed with a robotic assisted right total knee replacement.  She will be on aspirin postoperatively for DVT prophylaxis       History and Physical exam note from the office visit is copied above from epic.     I have reviewed the note and examined the patient and find no relevant changes.     I have reviewed with the patient and/or family the risks, benefits, and alternatives to the procedure as well as expected postoperative course    Calvin Harvey MD

## 2024-12-02 NOTE — OP NOTE
Operative Note      Patient: Esha Paz  YOB: 1950  MRN: [id number]    Date of Procedure: 12/2/2024    Pre-Op Diagnosis Codes:      * Primary osteoarthritis of one knee, right [M17.11]     * Right knee pain [M25.561]    Post-Op Diagnosis: Same       Procedure(s):  RIGHT KNEE TOTAL ARTHROPLASTY ROBOTIC    Surgeon(s):  Calvin Harvey MD    Assistant:   Physician Assistant: Alexis Zhao PA-C Greg Mann was present for the entirety of the procedure and vital for the performance of the procedure. Waqas Zhao assisted with positioning, prepping, draping of the patient before the procedure and instrument manipulation, extremity repositioning during the procedure as well as wound closure, dressing application and brace application after the procedure. Please note that no intern, resident, or other hospital staff was available to assist during the surgery    Anesthesia: Spinal    Estimated Blood Loss (mL): less than 100     Complications: None    Specimens:   * No specimens in log *    Implants:  Implant Name Type Inv. Item Serial No.  Lot No. LRB No. Used Action   CEMENT BNE 40GM HI VISC RADPQ FOR REV SURG - [id number]  CEMENT BNE 40GM HI VISC RADPQ FOR REV SURG  [employer] [id number] Right 1 Implanted   CEMENT BNE 40GM HI VISC RADPQ FOR REV SURG - [id number]  CEMENT BNE 40GM HI VISC RADPQ FOR REV SURG  [employer] [id number] Right 1 Implanted   BASEPLATE TIB SZ 4 UNIV KNEE TRITANIUM TOT STBL KARIME - [id number]  BASEPLATE TIB SZ 4 UNIV KNEE TRITANIUM TOT STBL KARIME  [employer] [facility] [id number] 1 Implanted   IMPLANT PATELLAR [id number] X3 ASYMMETRIC TRIATHLON - [id number]  IMPLANT PATELLAR [id number] X3 ASYMMETRIC TRIATHLON  [employer] [facility] [id number] Right 1 Implanted   COMPONENT FEM SZ 4 R ANT KNEE CRUCE RET KARIME REFERENCING - [id number]  COMPONENT FEM SZ 4 R ANT KNEE CRUCE RET [employer] REFERENCING

## 2024-12-02 NOTE — ANESTHESIA PROCEDURE NOTES
Peripheral Block    Patient location during procedure: PACU  Reason for block: procedure for pain, post-op pain management and at surgeon's request  Start time: 12/2/2024 10:06 AM  End time: 12/2/2024 10:08 AM  Staffing  Performed: resident/CRNA   Anesthesiologist: Lemuel Box MD  Resident/CRNA: Ritchie Randle APRN - CRNA  Performed by: Ritchie Randle APRN - CRNA  Authorized by: Ritchie Randle APRN - CRNA    Preanesthetic Checklist  Completed: patient identified, IV checked, site marked, risks and benefits discussed, surgical/procedural consents, equipment checked, pre-op evaluation, timeout performed, anesthesia consent given, oxygen available, monitors applied/VS acknowledged, fire risk safety assessment completed and verbalized and blood product R/B/A discussed and consented  Peripheral Block   Patient position: supine  Prep: ChloraPrep  Provider prep: mask and sterile gloves  Patient monitoring: cardiac monitor, continuous pulse ox, responsive to questions, IV access and frequent blood pressure checks  Block type: Femoral  Adductor canal  Laterality: right  Injection technique: single-shot  Guidance: ultrasound guided    Needle   Needle type: insulated echogenic nerve stimulator needle   Needle gauge: 20 G  Needle localization: ultrasound guidance  Needle length: 4inch.  Assessment   Injection assessment: negative aspiration for heme, no paresthesia on injection, local visualized surrounding nerve on ultrasound and no intravascular symptoms  Paresthesia pain: none  Slow fractionated injection: yes  Hemodynamics: stable  Outcomes: uncomplicated and patient tolerated procedure well    Medications Administered  ropivacaine (NAROPIN) injection 0.5% - Perineural   20 mL - 12/2/2024 10:06:00 AM

## 2024-12-03 DIAGNOSIS — C90.00 MULTIPLE MYELOMA NOT HAVING ACHIEVED REMISSION (HCC): ICD-10-CM

## 2024-12-03 PROCEDURE — 6360000002 HC RX W HCPCS: Performed by: ORTHOPAEDIC SURGERY

## 2024-12-03 PROCEDURE — 97166 OT EVAL MOD COMPLEX 45 MIN: CPT

## 2024-12-03 PROCEDURE — G0378 HOSPITAL OBSERVATION PER HR: HCPCS

## 2024-12-03 PROCEDURE — 97535 SELF CARE MNGMENT TRAINING: CPT

## 2024-12-03 PROCEDURE — 97110 THERAPEUTIC EXERCISES: CPT

## 2024-12-03 PROCEDURE — 6370000000 HC RX 637 (ALT 250 FOR IP): Performed by: ORTHOPAEDIC SURGERY

## 2024-12-03 PROCEDURE — 2580000003 HC RX 258: Performed by: ORTHOPAEDIC SURGERY

## 2024-12-03 PROCEDURE — 94761 N-INVAS EAR/PLS OXIMETRY MLT: CPT

## 2024-12-03 PROCEDURE — 97116 GAIT TRAINING THERAPY: CPT

## 2024-12-03 PROCEDURE — 1200000000 HC SEMI PRIVATE

## 2024-12-03 PROCEDURE — 97530 THERAPEUTIC ACTIVITIES: CPT

## 2024-12-03 RX ORDER — LENALIDOMIDE 10 MG/1
10 CAPSULE ORAL DAILY
Qty: 28 CAPSULE | Refills: 0 | Status: ON HOLD | OUTPATIENT
Start: 2024-12-03

## 2024-12-03 RX ORDER — FAMOTIDINE 20 MG/1
20 TABLET, FILM COATED ORAL 2 TIMES DAILY PRN
Status: DISCONTINUED | OUTPATIENT
Start: 2024-12-03 | End: 2024-12-04 | Stop reason: HOSPADM

## 2024-12-03 RX ADMIN — HYDROMORPHONE HYDROCHLORIDE 0.5 MG: 1 INJECTION, SOLUTION INTRAMUSCULAR; INTRAVENOUS; SUBCUTANEOUS at 10:55

## 2024-12-03 RX ADMIN — LOSARTAN POTASSIUM 50 MG: 25 TABLET, FILM COATED ORAL at 08:08

## 2024-12-03 RX ADMIN — ASPIRIN 325 MG: 325 TABLET ORAL at 19:52

## 2024-12-03 RX ADMIN — SODIUM CHLORIDE, PRESERVATIVE FREE 10 ML: 5 INJECTION INTRAVENOUS at 19:54

## 2024-12-03 RX ADMIN — WATER 2000 MG: 1 INJECTION INTRAMUSCULAR; INTRAVENOUS; SUBCUTANEOUS at 12:12

## 2024-12-03 RX ADMIN — AMLODIPINE BESYLATE 5 MG: 5 TABLET ORAL at 08:09

## 2024-12-03 RX ADMIN — DULOXETINE HYDROCHLORIDE 60 MG: 30 CAPSULE, DELAYED RELEASE ORAL at 08:08

## 2024-12-03 RX ADMIN — OXYCODONE HYDROCHLORIDE AND ACETAMINOPHEN 1 TABLET: 5; 325 TABLET ORAL at 19:52

## 2024-12-03 RX ADMIN — ACETAMINOPHEN 650 MG: 325 TABLET ORAL at 20:16

## 2024-12-03 RX ADMIN — OXYCODONE HYDROCHLORIDE AND ACETAMINOPHEN 1 TABLET: 5; 325 TABLET ORAL at 04:21

## 2024-12-03 RX ADMIN — HYDROMORPHONE HYDROCHLORIDE 0.5 MG: 1 INJECTION, SOLUTION INTRAMUSCULAR; INTRAVENOUS; SUBCUTANEOUS at 06:11

## 2024-12-03 RX ADMIN — OXYCODONE HYDROCHLORIDE AND ACETAMINOPHEN 1 TABLET: 5; 325 TABLET ORAL at 09:23

## 2024-12-03 RX ADMIN — ASPIRIN 325 MG: 325 TABLET ORAL at 08:08

## 2024-12-03 RX ADMIN — HYDROMORPHONE HYDROCHLORIDE 0.5 MG: 1 INJECTION, SOLUTION INTRAMUSCULAR; INTRAVENOUS; SUBCUTANEOUS at 01:53

## 2024-12-03 RX ADMIN — OXYCODONE HYDROCHLORIDE AND ACETAMINOPHEN 1 TABLET: 5; 325 TABLET ORAL at 14:51

## 2024-12-03 RX ADMIN — WATER 2000 MG: 1 INJECTION INTRAMUSCULAR; INTRAVENOUS; SUBCUTANEOUS at 04:15

## 2024-12-03 RX ADMIN — DULOXETINE HYDROCHLORIDE 60 MG: 30 CAPSULE, DELAYED RELEASE ORAL at 19:52

## 2024-12-03 RX ADMIN — SODIUM CHLORIDE, PRESERVATIVE FREE 10 ML: 5 INJECTION INTRAVENOUS at 08:09

## 2024-12-03 ASSESSMENT — PAIN DESCRIPTION - ONSET
ONSET: ON-GOING

## 2024-12-03 ASSESSMENT — PAIN DESCRIPTION - ORIENTATION
ORIENTATION: RIGHT

## 2024-12-03 ASSESSMENT — PAIN SCALES - GENERAL
PAINLEVEL_OUTOF10: 7
PAINLEVEL_OUTOF10: 7
PAINLEVEL_OUTOF10: 9
PAINLEVEL_OUTOF10: 10
PAINLEVEL_OUTOF10: 8
PAINLEVEL_OUTOF10: 7
PAINLEVEL_OUTOF10: 4
PAINLEVEL_OUTOF10: 7
PAINLEVEL_OUTOF10: 10
PAINLEVEL_OUTOF10: 6

## 2024-12-03 ASSESSMENT — PAIN DESCRIPTION - FREQUENCY
FREQUENCY: CONTINUOUS

## 2024-12-03 ASSESSMENT — PAIN DESCRIPTION - DESCRIPTORS
DESCRIPTORS: JABBING
DESCRIPTORS: ACHING
DESCRIPTORS: BURNING
DESCRIPTORS: BURNING
DESCRIPTORS: SHARP
DESCRIPTORS: ACHING
DESCRIPTORS: BURNING

## 2024-12-03 ASSESSMENT — PAIN - FUNCTIONAL ASSESSMENT
PAIN_FUNCTIONAL_ASSESSMENT: ACTIVITIES ARE NOT PREVENTED
PAIN_FUNCTIONAL_ASSESSMENT: PREVENTS OR INTERFERES SOME ACTIVE ACTIVITIES AND ADLS
PAIN_FUNCTIONAL_ASSESSMENT: PREVENTS OR INTERFERES SOME ACTIVE ACTIVITIES AND ADLS
PAIN_FUNCTIONAL_ASSESSMENT: ACTIVITIES ARE NOT PREVENTED
PAIN_FUNCTIONAL_ASSESSMENT: PREVENTS OR INTERFERES SOME ACTIVE ACTIVITIES AND ADLS
PAIN_FUNCTIONAL_ASSESSMENT: PREVENTS OR INTERFERES SOME ACTIVE ACTIVITIES AND ADLS

## 2024-12-03 ASSESSMENT — PAIN DESCRIPTION - LOCATION
LOCATION: KNEE

## 2024-12-03 ASSESSMENT — PAIN DESCRIPTION - PAIN TYPE
TYPE: SURGICAL PAIN

## 2024-12-03 NOTE — CARE COORDINATION
CM informed per [facility] that for ARU they will need an OT eval. CM sent a PS to Dr Harvey.  1316 CM received a order from Dr Harvey. CM placed order for OT eval and white board. LH

## 2024-12-03 NOTE — PLAN OF CARE
Problem: Discharge Planning  Goal: Discharge to home or other facility with appropriate resources  12/3/2024 0920 by Cheyenne Cardenas RN  Outcome: Progressing  12/3/2024 0238 by Mandy Contreras RN  Outcome: Progressing     Problem: Pain  Goal: Verbalizes/displays adequate comfort level or baseline comfort level  12/3/2024 0920 by Cheyenne Cardenas RN  Outcome: Progressing  12/3/2024 0238 by Mandy Contreras RN  Outcome: Progressing  Flowsheets (Taken 12/2/2024 1311 by Lacey Stephens RN)  Verbalizes/displays adequate comfort level or baseline comfort level:   Assess pain using appropriate pain scale   Administer analgesics based on type and severity of pain and evaluate response   Implement non-pharmacological measures as appropriate and evaluate response     Problem: Safety - Adult  Goal: Free from fall injury  12/3/2024 0920 by Cheyenne Cardenas RN  Outcome: Progressing  12/3/2024 0238 by Mandy Contreras RN  Outcome: Progressing

## 2024-12-03 NOTE — TELEPHONE ENCOUNTER
Revlimid 10 mg chemo capsules reordered and sent to [facility] pharmacy. Prescriber survey completed and new auth # [id number]  obtained through [employer] portal. Auth valid for 30 days and attached to RX.

## 2024-12-03 NOTE — PLAN OF CARE
Problem: Discharge Planning  Goal: Discharge to home or other facility with appropriate resources  Outcome: Progressing     Problem: Pain  Goal: Verbalizes/displays adequate comfort level or baseline comfort level  Outcome: Progressing  Flowsheets (Taken 12/2/2024 1311 by Lacey Stephens RN)  Verbalizes/displays adequate comfort level or baseline comfort level:   Assess pain using appropriate pain scale   Administer analgesics based on type and severity of pain and evaluate response   Implement non-pharmacological measures as appropriate and evaluate response     Problem: Safety - Adult  Goal: Free from fall injury  Outcome: Progressing

## 2024-12-03 NOTE — CARE COORDINATION
12/03/24 1013   Service Assessment   Patient Orientation Alert and Oriented;Person;Place;Situation   Cognition Alert   History Provided By Patient   Primary Caregiver Self   Support Systems Friends/Neighbors;Family Members   Patient's Healthcare Decision Maker is: Legal Next of Kin   PCP Verified by CM Yes   Last Visit to PCP Within last 6 months   Prior Functional Level Independent in ADLs/IADLs   Current Functional Level Assistance with the following:;Mobility;Shopping;Housework;Bathing   Can patient return to prior living arrangement Unknown at present   Ability to make needs known: Good   Family able to assist with home care needs: Yes   Would you like for me to discuss the discharge plan with any other family members/significant others, and if so, who? No   Condition of Participation: Discharge Planning   The Patient and/or Patient Representative was provided with a Choice of Provider? Patient   The Patient and/Or Patient Representative agree with the Discharge Plan? Yes     CM into see pt to initiate a safe discharge plan. Cm  introduced self and explained role of CM. Pt is kind, alert and oriented. Pt lives alone in one floor home with 1STE. Pt was independent for all her ADL's PTA. Pt has a PCP and insurance. Pt is able to afford prescriptions/ groceries. Pt is supported by family and friends if needed. Pt DME includes a walker, cane, shower seat and grab bars in BR. CM discussed discharge options. PT rec is for ARU. CM discussed and pt would like to go to ARU. CM updated Dr Harvey. CM gave referral to [facility] for ARU pending determination.   CM provided card and encouraged to call for any needs or concern.   CM is available if any needs arise.

## 2024-12-04 ENCOUNTER — HOSPITAL ENCOUNTER (INPATIENT)
Age: 74
LOS: 7 days | Discharge: HOME | DRG: 560 | End: 2024-12-11
Attending: PHYSICAL MEDICINE & REHABILITATION | Admitting: PHYSICAL MEDICINE & REHABILITATION
Payer: MEDICARE

## 2024-12-04 VITALS
BODY MASS INDEX: 24.75 KG/M2 | RESPIRATION RATE: 18 BRPM | WEIGHT: 145 LBS | DIASTOLIC BLOOD PRESSURE: 69 MMHG | HEIGHT: 64 IN | TEMPERATURE: 98.1 F | SYSTOLIC BLOOD PRESSURE: 116 MMHG | OXYGEN SATURATION: 96 % | HEART RATE: 79 BPM

## 2024-12-04 DIAGNOSIS — Z96.652 STATUS POST LEFT KNEE REPLACEMENT: Primary | ICD-10-CM

## 2024-12-04 PROBLEM — Z96.651 STATUS POST RIGHT KNEE REPLACEMENT: Status: ACTIVE | Noted: 2024-12-04

## 2024-12-04 LAB
ALBUMIN SERPL-MCNC: 3.1 G/DL (ref 3.4–5)
ALBUMIN/GLOB SERPL: 1.2 {RATIO} (ref 1.1–2.2)
ALP SERPL-CCNC: 140 U/L (ref 40–129)
ALT SERPL-CCNC: 14 U/L (ref 10–40)
ANION GAP SERPL CALCULATED.3IONS-SCNC: 8 MMOL/L (ref 9–17)
AST SERPL-CCNC: 22 U/L (ref 15–37)
BASOPHILS # BLD: 0.03 K/UL
BASOPHILS NFR BLD: 0 % (ref 0–1)
BILIRUB SERPL-MCNC: 0.7 MG/DL (ref 0–1)
BUN SERPL-MCNC: 11 MG/DL (ref 7–20)
CALCIUM SERPL-MCNC: 8.7 MG/DL (ref 8.3–10.6)
CHLORIDE SERPL-SCNC: 97 MMOL/L (ref 99–110)
CO2 SERPL-SCNC: 32 MMOL/L (ref 21–32)
CREAT SERPL-MCNC: 0.7 MG/DL (ref 0.6–1.2)
EOSINOPHIL # BLD: 0.02 K/UL
EOSINOPHILS RELATIVE PERCENT: 0 % (ref 0–3)
ERYTHROCYTE [DISTWIDTH] IN BLOOD BY AUTOMATED COUNT: 14.2 % (ref 11.7–14.9)
GFR, ESTIMATED: 84 ML/MIN/1.73M2
GLUCOSE SERPL-MCNC: 176 MG/DL (ref 74–99)
HCT VFR BLD AUTO: 26.4 % (ref 37–47)
HGB BLD-MCNC: 8.7 G/DL (ref 12.5–16)
IMM GRANULOCYTES # BLD AUTO: 0.03 K/UL
IMM GRANULOCYTES NFR BLD: 0 %
LYMPHOCYTES NFR BLD: 0.75 K/UL
LYMPHOCYTES RELATIVE PERCENT: 11 % (ref 24–44)
MCH RBC QN AUTO: 31.8 PG (ref 27–31)
MCHC RBC AUTO-ENTMCNC: 33 G/DL (ref 32–36)
MCV RBC AUTO: 96.4 FL (ref 78–100)
MONOCYTES NFR BLD: 0.53 K/UL
MONOCYTES NFR BLD: 8 % (ref 0–4)
NEUTROPHILS NFR BLD: 80 % (ref 36–66)
NEUTS SEG NFR BLD: 5.34 K/UL
PLATELET # BLD AUTO: 144 K/UL (ref 140–440)
PMV BLD AUTO: 9.5 FL (ref 7.5–11.1)
POTASSIUM SERPL-SCNC: 2.7 MMOL/L (ref 3.5–5.1)
PROT SERPL-MCNC: 5.8 G/DL (ref 6.4–8.2)
RBC # BLD AUTO: 2.74 M/UL (ref 4.2–5.4)
SODIUM SERPL-SCNC: 137 MMOL/L (ref 136–145)
WBC OTHER # BLD: 6.7 K/UL (ref 4–10.5)

## 2024-12-04 PROCEDURE — 94761 N-INVAS EAR/PLS OXIMETRY MLT: CPT

## 2024-12-04 PROCEDURE — 6370000000 HC RX 637 (ALT 250 FOR IP): Performed by: NURSE PRACTITIONER

## 2024-12-04 PROCEDURE — 6370000000 HC RX 637 (ALT 250 FOR IP): Performed by: ORTHOPAEDIC SURGERY

## 2024-12-04 PROCEDURE — 2580000003 HC RX 258: Performed by: ORTHOPAEDIC SURGERY

## 2024-12-04 PROCEDURE — 97110 THERAPEUTIC EXERCISES: CPT

## 2024-12-04 PROCEDURE — 94150 VITAL CAPACITY TEST: CPT

## 2024-12-04 PROCEDURE — 97530 THERAPEUTIC ACTIVITIES: CPT

## 2024-12-04 PROCEDURE — 82306 VITAMIN D 25 HYDROXY: CPT

## 2024-12-04 PROCEDURE — 36415 COLL VENOUS BLD VENIPUNCTURE: CPT

## 2024-12-04 PROCEDURE — 6360000002 HC RX W HCPCS: Performed by: NURSE PRACTITIONER

## 2024-12-04 PROCEDURE — 80053 COMPREHEN METABOLIC PANEL: CPT

## 2024-12-04 PROCEDURE — 1280000000 HC REHAB R&B

## 2024-12-04 PROCEDURE — 2580000003 HC RX 258: Performed by: NURSE PRACTITIONER

## 2024-12-04 PROCEDURE — 97116 GAIT TRAINING THERAPY: CPT

## 2024-12-04 PROCEDURE — 85025 COMPLETE CBC W/AUTO DIFF WBC: CPT

## 2024-12-04 RX ORDER — OXYCODONE AND ACETAMINOPHEN 5; 325 MG/1; MG/1
1 TABLET ORAL EVERY 4 HOURS PRN
Status: CANCELLED | OUTPATIENT
Start: 2024-12-04

## 2024-12-04 RX ORDER — SODIUM CHLORIDE 9 MG/ML
INJECTION, SOLUTION INTRAVENOUS PRN
Status: DISCONTINUED | OUTPATIENT
Start: 2024-12-04 | End: 2024-12-11

## 2024-12-04 RX ORDER — VITAMIN B COMPLEX
2000 TABLET ORAL EVERY MORNING
Status: CANCELLED | OUTPATIENT
Start: 2024-12-05

## 2024-12-04 RX ORDER — AMLODIPINE BESYLATE 5 MG/1
5 TABLET ORAL DAILY
Status: DISCONTINUED | OUTPATIENT
Start: 2024-12-05 | End: 2024-12-11 | Stop reason: HOSPADM

## 2024-12-04 RX ORDER — ASPIRIN 325 MG
325 TABLET ORAL 2 TIMES DAILY
Status: DISCONTINUED | OUTPATIENT
Start: 2024-12-04 | End: 2024-12-04 | Stop reason: SDUPTHER

## 2024-12-04 RX ORDER — FAMOTIDINE 20 MG/1
20 TABLET, FILM COATED ORAL 2 TIMES DAILY PRN
Status: CANCELLED | OUTPATIENT
Start: 2024-12-04

## 2024-12-04 RX ORDER — DULOXETIN HYDROCHLORIDE 30 MG/1
60 CAPSULE, DELAYED RELEASE ORAL 2 TIMES DAILY
Status: DISCONTINUED | OUTPATIENT
Start: 2024-12-04 | End: 2024-12-11 | Stop reason: HOSPADM

## 2024-12-04 RX ORDER — UBIDECARENONE 75 MG
100 CAPSULE ORAL DAILY
Status: DISCONTINUED | OUTPATIENT
Start: 2024-12-04 | End: 2024-12-04

## 2024-12-04 RX ORDER — M-VIT,TX,IRON,MINS/CALC/FOLIC 27MG-0.4MG
1 TABLET ORAL EVERY MORNING
Status: DISCONTINUED | OUTPATIENT
Start: 2024-12-05 | End: 2024-12-11 | Stop reason: HOSPADM

## 2024-12-04 RX ORDER — M-VIT,TX,IRON,MINS/CALC/FOLIC 27MG-0.4MG
1 TABLET ORAL EVERY MORNING
Status: DISCONTINUED | OUTPATIENT
Start: 2024-12-05 | End: 2024-12-04 | Stop reason: HOSPADM

## 2024-12-04 RX ORDER — SODIUM CHLORIDE 0.9 % (FLUSH) 0.9 %
5-40 SYRINGE (ML) INJECTION EVERY 12 HOURS SCHEDULED
Status: CANCELLED | OUTPATIENT
Start: 2024-12-04

## 2024-12-04 RX ORDER — MAGNESIUM SULFATE IN WATER 40 MG/ML
2000 INJECTION, SOLUTION INTRAVENOUS PRN
Status: DISCONTINUED | OUTPATIENT
Start: 2024-12-04 | End: 2024-12-04 | Stop reason: HOSPADM

## 2024-12-04 RX ORDER — SODIUM CHLORIDE 9 MG/ML
INJECTION, SOLUTION INTRAVENOUS PRN
Status: CANCELLED | OUTPATIENT
Start: 2024-12-04

## 2024-12-04 RX ORDER — AMLODIPINE BESYLATE 5 MG/1
5 TABLET ORAL DAILY
Status: CANCELLED | OUTPATIENT
Start: 2024-12-05

## 2024-12-04 RX ORDER — SODIUM CHLORIDE 0.9 % (FLUSH) 0.9 %
5-40 SYRINGE (ML) INJECTION PRN
Status: DISCONTINUED | OUTPATIENT
Start: 2024-12-04 | End: 2024-12-10

## 2024-12-04 RX ORDER — VITAMIN B COMPLEX
2000 TABLET ORAL EVERY MORNING
Status: DISCONTINUED | OUTPATIENT
Start: 2024-12-05 | End: 2024-12-04 | Stop reason: HOSPADM

## 2024-12-04 RX ORDER — LOSARTAN POTASSIUM 25 MG/1
50 TABLET ORAL DAILY
Status: CANCELLED | OUTPATIENT
Start: 2024-12-05

## 2024-12-04 RX ORDER — MAGNESIUM SULFATE IN WATER 40 MG/ML
2000 INJECTION, SOLUTION INTRAVENOUS PRN
Status: DISCONTINUED | OUTPATIENT
Start: 2024-12-04 | End: 2024-12-09

## 2024-12-04 RX ORDER — POTASSIUM CHLORIDE 7.45 MG/ML
10 INJECTION INTRAVENOUS PRN
Status: CANCELLED | OUTPATIENT
Start: 2024-12-04 | End: 2024-12-11

## 2024-12-04 RX ORDER — LOSARTAN POTASSIUM 25 MG/1
50 TABLET ORAL DAILY
Status: DISCONTINUED | OUTPATIENT
Start: 2024-12-05 | End: 2024-12-11 | Stop reason: HOSPADM

## 2024-12-04 RX ORDER — SODIUM CHLORIDE 0.9 % (FLUSH) 0.9 %
5-40 SYRINGE (ML) INJECTION EVERY 12 HOURS SCHEDULED
Status: DISCONTINUED | OUTPATIENT
Start: 2024-12-04 | End: 2024-12-11

## 2024-12-04 RX ORDER — FAMOTIDINE 20 MG/1
20 TABLET, FILM COATED ORAL 2 TIMES DAILY PRN
Status: DISCONTINUED | OUTPATIENT
Start: 2024-12-04 | End: 2024-12-11 | Stop reason: HOSPADM

## 2024-12-04 RX ORDER — POTASSIUM CHLORIDE 7.45 MG/ML
10 INJECTION INTRAVENOUS PRN
Status: DISCONTINUED | OUTPATIENT
Start: 2024-12-04 | End: 2024-12-09

## 2024-12-04 RX ORDER — POTASSIUM CHLORIDE 7.45 MG/ML
10 INJECTION INTRAVENOUS PRN
Status: DISCONTINUED | OUTPATIENT
Start: 2024-12-04 | End: 2024-12-04 | Stop reason: HOSPADM

## 2024-12-04 RX ORDER — SODIUM CHLORIDE 0.9 % (FLUSH) 0.9 %
5-40 SYRINGE (ML) INJECTION PRN
Status: CANCELLED | OUTPATIENT
Start: 2024-12-04

## 2024-12-04 RX ORDER — LANOLIN ALCOHOL/MO/W.PET/CERES
1000 CREAM (GRAM) TOPICAL DAILY
Status: DISCONTINUED | OUTPATIENT
Start: 2024-12-04 | End: 2024-12-04 | Stop reason: HOSPADM

## 2024-12-04 RX ORDER — ZINC SULFATE 50(220)MG
50 CAPSULE ORAL DAILY
Status: CANCELLED | OUTPATIENT
Start: 2024-12-04

## 2024-12-04 RX ORDER — ASPIRIN 325 MG
325 TABLET ORAL 2 TIMES DAILY
Status: DISCONTINUED | OUTPATIENT
Start: 2024-12-04 | End: 2024-12-11 | Stop reason: HOSPADM

## 2024-12-04 RX ORDER — POTASSIUM CHLORIDE 1500 MG/1
40 TABLET, EXTENDED RELEASE ORAL PRN
Status: CANCELLED | OUTPATIENT
Start: 2024-12-04 | End: 2024-12-11

## 2024-12-04 RX ORDER — ZINC SULFATE 50(220)MG
50 CAPSULE ORAL DAILY
Status: DISCONTINUED | OUTPATIENT
Start: 2024-12-04 | End: 2024-12-04 | Stop reason: HOSPADM

## 2024-12-04 RX ORDER — LANOLIN ALCOHOL/MO/W.PET/CERES
1000 CREAM (GRAM) TOPICAL DAILY
Status: DISCONTINUED | OUTPATIENT
Start: 2024-12-05 | End: 2024-12-11 | Stop reason: HOSPADM

## 2024-12-04 RX ORDER — POTASSIUM CHLORIDE 1500 MG/1
40 TABLET, EXTENDED RELEASE ORAL PRN
Status: DISCONTINUED | OUTPATIENT
Start: 2024-12-04 | End: 2024-12-09

## 2024-12-04 RX ORDER — POLYETHYLENE GLYCOL 3350 17 G/17G
17 POWDER, FOR SOLUTION ORAL DAILY PRN
Status: DISCONTINUED | OUTPATIENT
Start: 2024-12-04 | End: 2024-12-11 | Stop reason: HOSPADM

## 2024-12-04 RX ORDER — DULOXETIN HYDROCHLORIDE 30 MG/1
60 CAPSULE, DELAYED RELEASE ORAL 2 TIMES DAILY
Status: CANCELLED | OUTPATIENT
Start: 2024-12-04

## 2024-12-04 RX ORDER — ZINC SULFATE 50(220)MG
50 CAPSULE ORAL DAILY
Status: DISCONTINUED | OUTPATIENT
Start: 2024-12-05 | End: 2024-12-11 | Stop reason: HOSPADM

## 2024-12-04 RX ORDER — LANOLIN ALCOHOL/MO/W.PET/CERES
1000 CREAM (GRAM) TOPICAL DAILY
Status: CANCELLED | OUTPATIENT
Start: 2024-12-04

## 2024-12-04 RX ORDER — ACETAMINOPHEN 325 MG/1
650 TABLET ORAL EVERY 4 HOURS PRN
Status: DISCONTINUED | OUTPATIENT
Start: 2024-12-04 | End: 2024-12-11

## 2024-12-04 RX ORDER — M-VIT,TX,IRON,MINS/CALC/FOLIC 27MG-0.4MG
1 TABLET ORAL EVERY MORNING
Status: CANCELLED | OUTPATIENT
Start: 2024-12-05

## 2024-12-04 RX ORDER — POTASSIUM CHLORIDE 1500 MG/1
40 TABLET, EXTENDED RELEASE ORAL PRN
Status: DISCONTINUED | OUTPATIENT
Start: 2024-12-04 | End: 2024-12-04 | Stop reason: HOSPADM

## 2024-12-04 RX ORDER — MAGNESIUM SULFATE IN WATER 40 MG/ML
2000 INJECTION, SOLUTION INTRAVENOUS PRN
Status: CANCELLED | OUTPATIENT
Start: 2024-12-04

## 2024-12-04 RX ORDER — ASPIRIN 325 MG
325 TABLET ORAL 2 TIMES DAILY
Status: CANCELLED | OUTPATIENT
Start: 2024-12-04

## 2024-12-04 RX ORDER — OXYCODONE AND ACETAMINOPHEN 5; 325 MG/1; MG/1
1 TABLET ORAL EVERY 4 HOURS PRN
Status: DISCONTINUED | OUTPATIENT
Start: 2024-12-04 | End: 2024-12-11

## 2024-12-04 RX ORDER — VITAMIN B COMPLEX
2000 TABLET ORAL EVERY MORNING
Status: DISCONTINUED | OUTPATIENT
Start: 2024-12-05 | End: 2024-12-11 | Stop reason: HOSPADM

## 2024-12-04 RX ADMIN — POTASSIUM CHLORIDE 10 MEQ: 7.46 INJECTION, SOLUTION INTRAVENOUS at 20:13

## 2024-12-04 RX ADMIN — ZINC SULFATE 220 MG (50 MG) CAPSULE 50 MG: CAPSULE at 14:27

## 2024-12-04 RX ADMIN — POTASSIUM CHLORIDE 10 MEQ: 7.46 INJECTION, SOLUTION INTRAVENOUS at 19:13

## 2024-12-04 RX ADMIN — Medication 1000 MCG: at 14:27

## 2024-12-04 RX ADMIN — SODIUM CHLORIDE 225 ML: 9 INJECTION, SOLUTION INTRAVENOUS at 19:13

## 2024-12-04 RX ADMIN — SODIUM CHLORIDE, PRESERVATIVE FREE 10 ML: 5 INJECTION INTRAVENOUS at 20:19

## 2024-12-04 RX ADMIN — SODIUM CHLORIDE, PRESERVATIVE FREE 10 ML: 5 INJECTION INTRAVENOUS at 09:35

## 2024-12-04 RX ADMIN — DULOXETINE HYDROCHLORIDE 60 MG: 30 CAPSULE, DELAYED RELEASE ORAL at 09:34

## 2024-12-04 RX ADMIN — ASPIRIN 325 MG: 325 TABLET ORAL at 20:14

## 2024-12-04 RX ADMIN — POTASSIUM CHLORIDE 10 MEQ: 7.46 INJECTION, SOLUTION INTRAVENOUS at 23:54

## 2024-12-04 RX ADMIN — POTASSIUM CHLORIDE 10 MEQ: 7.46 INJECTION, SOLUTION INTRAVENOUS at 21:20

## 2024-12-04 RX ADMIN — DULOXETINE HYDROCHLORIDE 60 MG: 30 CAPSULE, DELAYED RELEASE ORAL at 20:14

## 2024-12-04 RX ADMIN — OXYCODONE HYDROCHLORIDE AND ACETAMINOPHEN 1 TABLET: 5; 325 TABLET ORAL at 05:29

## 2024-12-04 RX ADMIN — ASPIRIN 325 MG: 325 TABLET ORAL at 09:34

## 2024-12-04 RX ADMIN — POTASSIUM CHLORIDE 10 MEQ: 7.46 INJECTION, SOLUTION INTRAVENOUS at 22:30

## 2024-12-04 ASSESSMENT — PAIN DESCRIPTION - FREQUENCY: FREQUENCY: CONTINUOUS

## 2024-12-04 ASSESSMENT — PAIN DESCRIPTION - ONSET: ONSET: ON-GOING

## 2024-12-04 ASSESSMENT — PAIN DESCRIPTION - LOCATION: LOCATION: KNEE

## 2024-12-04 ASSESSMENT — PAIN SCALES - GENERAL
PAINLEVEL_OUTOF10: 3
PAINLEVEL_OUTOF10: 6

## 2024-12-04 ASSESSMENT — PAIN DESCRIPTION - ORIENTATION: ORIENTATION: RIGHT

## 2024-12-04 ASSESSMENT — PAIN DESCRIPTION - PAIN TYPE: TYPE: SURGICAL PAIN

## 2024-12-04 NOTE — PROGRESS NOTES
4 Eyes Skin Assessment     NAME:  Esha Paz  YOB: 1950  MEDICAL RECORD NUMBER:  [id number]    The patient is being assessed for  Post-Op Surgical    I agree that at least one RN has performed a thorough Head to Toe Skin Assessment on the patient. ALL assessment sites listed below have been assessed.      Areas assessed by both nurses:    Head, Face, Ears, Shoulders, Back, Chest, Arms, Elbows, Hands, Sacrum. Buttock, Coccyx, Ischium, and Legs. Feet and Heels        Does the Patient have a Wound? No noted wound(s)       Hero Prevention initiated by RN: Yes  Wound Care Orders initiated by RN: No    Pressure Injury (Stage 3,4, Unstageable, DTI, NWPT, and Complex wounds) if present, place Wound referral order by RN under : No    New Ostomies, if present place, Ostomy referral order under : No     Nurse 1 eSignature: Electronically signed by Ravi Loera LPN on 12/4/24 at 6:32 PM EST    **SHARE this note so that the co-signing nurse can place an eSignature**    Nurse 2 eSignature: Electronically signed by Ofelia Swain RN on 12/4/24 at 6:49 PM EST

## 2024-12-04 NOTE — PLAN OF CARE
Problem: Discharge Planning  Goal: Discharge to home or other facility with appropriate resources  12/4/2024 1102 by Ana Lott LPN  Outcome: Progressing  12/4/2024 0222 by Mandy Contreras RN  Outcome: Progressing     Problem: Pain  Goal: Verbalizes/displays adequate comfort level or baseline comfort level  12/4/2024 1102 by Ana Lott LPN  Outcome: Progressing  12/4/2024 0222 by Mandy Contreras RN  Outcome: Progressing  Flowsheets (Taken 12/3/2024 1945)  Verbalizes/displays adequate comfort level or baseline comfort level: Assess pain using appropriate pain scale     Problem: Safety - Adult  Goal: Free from fall injury  12/4/2024 1102 by Ana Lott LPN  Outcome: Progressing  12/4/2024 0222 by Mandy Contreras RN  Outcome: Progressing

## 2024-12-04 NOTE — PROGRESS NOTES
12/02/24 1438   Encounter Summary   Encounter Overview/Reason Loneliness/Social Isolation   Encounter Code  Assessment by  services   Service Provided For Patient   Referral/Consult From Patient;Nurse   Support System Family members   Last Encounter  12/02/24  (Loneliness refer, follow-up for continues support)   Complexity of Encounter Low   Begin Time 1432   End Time  1438   Total Time Calculated 6 min   Spiritual/Emotional needs   Type Spiritual Support;Emotional Distress   Assessment/Intervention/Outcome   Assessment Calm   Intervention Sustaining Presence/Ministry of presence   Outcome Coping   Plan and Referrals   Plan/Referrals Continue Support (comment)       
  Doing well postoperatively.    Pain controlled    Objective:   Patient Vitals for the past 4 hrs:   BP Temp Temp src Pulse Resp SpO2   12/03/24 0808 (!) 122/51 -- -- -- -- --   12/03/24 0800 (!) 122/49 98.1 °F (36.7 °C) Oral 76 16 95 %   12/03/24 0600 -- 98.2 °F (36.8 °C) Oral 78 16 96 %         Physical Exam:     The patient is awake and alert  Resting comfortably in bed    Operative extremity:    The dressing is clean dry and intact.  Incision is intact with Dermabond dressing.  No erythema, drainage, or induration.  Calf is soft and nontender.  Sensation and motor function intact distally      LABS   CBC: No results for input(s): \"WBC\", \"HGB\", \"PLT\" in the last 72 hours.    Postoperative x-rays show well aligned prosthesis with no complications.    Assessment and Plan     1.  POD # 1 total knee replacement    1:  Physical therapy consult for mobilization   -Weight-bear as tolerated, progress as tolerated  2:  DVT prophylaxis   -Aspirin twice a day for 2 weeks  3:  Continue Pain Control   -Oral medications as needed, IV medication only for breakthrough pain  4.  Medical management per hospitalist service  5:  D/C Planning:    -Discharge planning for possible placement.  Patient prefers ARU if possible.  Will submit for approval  -Follow-up in 2 weeks for x-rays and wound check.         Calvin Harvey MD     
  Physical Therapy Treatment Note  Name: Esha Paz MRN: [id number] :   1950   Date:  12/3/2024   Admission Date: 2024 Room:  [address] [place] [address]   Restrictions/Precautions:  general, fall  Communication with other providers:  Hand off with OT    Subjective:  Patient states:  Pt reports stiffness in right knee after sitting up in chair for lunch  Pain:   Location, Type, Intensity (0/10 to 10/10):  7/10 right knee  Objective:    Observation: Pt sitting up in chair upon arrival. Pt pleasant and agreeable to second therapy treatment.  Objective Measures:  85* right knee flexion AAROM  Treatment, including education/measures:  Therapeutic Activity Training:   Therapeutic activity training was instructed today.  Cues were given for safety, sequence, UE/LE placement, awareness, and balance.    Activities performed today included bed mobility training, sup-sit, sit-stand, SPT.    Bed Mobility: SBA Pt uses hands to lift RLE into bed  Sit to stand transfer: CGA from recliner, EOB and raised toilet  Stand Pivot transfer: CGA    Sitting balance:CGA at EOB with feet on floor.  Standing balance:CGA with FWW     Therapeutic Exercise Training:   Long sitting: AAROM right knee flexion with use of gait belt 10x10\", SAQ 10x10\"    Gait Training:  Cues were given for safety, sequence, device management, balance, posture, awareness, path.    Amb  2 x 50 ft, CGA, FWW. Increased step length compared to ambulation for AM tx, pt exhibiting more of a step through pattern than step to pattern.     Education:   Pt. Educated on importance of out of bed activity, safe functional mobility, and walker management.     Assessment / Impression:    Pt. Left bed at end of session, all needs met, phone and call light in reach, bed/personal alarm active, and nursing updated. Ice used before and after tx.    Patient's tolerance of treatment:  good   Adverse Reaction: none  Significant change in status and impact:  none  Barriers to improvement:  
.Surgery @ [facility] on 12/2/24 you will be called 11/27/24 with times    NOTHING TO EAT OR DRINK AFTER MIDNIGHT DAY OF SURGERY    1. Enter thru the hospital main entrance on day of surgery, check in at the Information Desk. If you arrive prior to 6:00am, enter thru the ER entrance.    2. Follow the directions as prescribed by the doctor for your procedure and medications.         Morning of surgery take:  Amlodipine & Pepcid with sips of water         Stop vitamins, supplements and NSAIDS:  11/25/24    3. Check with your Doctor regarding stopping blood thinners and follow their instructions.    4. Do not smoke, vape or use chewing tobacco morning of surgery. Do not drink any alcoholic beverages 24 hours prior to surgery.       This includes NA Beer. No street drugs 7 days prior to surgery.    5. If you have dentures, contacts of glasses they will be removed before going to the OR; please bring a case.    6. Please bring picture ID, insurance card, paperwork from the doctor’s office (H & P, Consent, & card for implantable devices).    7. Take a shower with an antibacterial soap the night before surgery and the morning of surgery. Do not put anything on your skin      After your morning shower.  No shaving 2 days prior to surgery.    8. You will need a responsible adult to drive you home and check on you after surgery.    
11/27/24 - Notified patient surgery @ [facility] on  12/2/24 @ 1000, arrival 0800. NPO status and morning medications reviewed. Understanding verbalized.  
1300: Pt arrived to the PACU. Monitors applied and alarms on. Report from Blanca SULLIVAN and Efren AVILES. X 1 incision site. TONY due to dressing. Cryotherapy in place.   1311: pt medicated for pain, 0.5 mg Dilaudid given  1320: pt alert and awake, warm blankets applied.   1330: xray called  1356: pt medicated for pain, 0.5 mg Dilaudid given  1358: xray at bedside  
4 Eyes Skin Assessment     NAME:  Esha Paz  YOB: 1950  MEDICAL RECORD NUMBER:  [id number]    The patient is being assessed for  Shift Handoff    I agree that at least one RN has performed a thorough Head to Toe Skin Assessment on the patient. ALL assessment sites listed below have been assessed.      Areas assessed by both nurses:    Head, Face, Ears, Shoulders, Back, Chest, Arms, Elbows, Hands, Sacrum. Buttock, Coccyx, Ischium, and Legs. Feet and Heels        Does the Patient have a Wound? Yes wound(s) were present on assessment. LDA wound assessment was Initiated and completed by RN  Surgical incision right knee with a dressing.       Hero Prevention initiated by RN: No  Wound Care Orders initiated by RN: No    Pressure Injury (Stage 3,4, Unstageable, DTI, NWPT, and Complex wounds) if present, place Wound referral order by RN under : No    New Ostomies, if present place, Ostomy referral order under : No     Nurse 1 eSignature: Electronically signed by Mandy Contreras RN on 12/4/24 at 6:36 AM EST    **SHARE this note so that the co-signing nurse can place an eSignature**    Nurse 2 eSignature: Electronically signed by Sheeba Augustine RN on 12/4/24 at 7:00 AM EST    
Occupational Therapy    [facility] ACUTE CARE OCCUPATIONAL THERAPY EVALUATION    Esha Paz, 1950, 1129/1129-A, 12/3/2024    Discharge Recommendation: Encourage intensive OT services at discharge, typically 3 hours/day, 5 days/week    Subjective:  Patient states: \"This is my first joint replacement and it's going about how I expected it would.\"  Pain: Pt reported 8/10 surgical pain in Rt knee at rest  Communication with other providers: NATE Quinn  Restrictions: General Precautions, Fall Risk, WBAT Rt LE, Bed exit alarm    Home Setup/Prior level of function:  Social/Functional History  Lives With: Alone  Type of Home: Condo  Home Layout: One level  Home Access: Stairs to enter without rails  Entrance Stairs - Number of Steps: 1 threshold  Bathroom Shower/Tub: Walk-in shower  Bathroom Toilet: Handicap height  Bathroom Equipment: Grab bars in shower, Built-in shower seat  Home Equipment: Walker - Rolling, Cane  Has the patient had two or more falls in the past year or any fall with injury in the past year?: Yes (Falling in garden)  Receives Help From: Family, Friend(s)  ADL Assistance: Independent  Homemaking Assistance: Independent  Ambulation Assistance: Independent (PRN cane/RW use for pain)  Transfer Assistance: Independent  Active : Yes  Mode of Transportation: Car  Occupation: Retired   Leisure: Gardening    Examination:  Observation: Supine in bed upon arrival. Pleasant and agreeable to evaluation. Son present and supportive.  Vision: WFL  Hearing: WFL  Vitals: Stable vitals throughout session on room air    Body Systems and functions:  ROM: WFL all joints in BL UEs  Strength: WFL all major muscle groups BL UEs  Sensation: WFL in BL UEs (See PT evaluation for LE assessment)  Tone: Normal  Coordination: WFL for ADLs  Perception: WNL    Activities of Daily Living (ADLs):  Feeding: Independent   Grooming: CGA (completed hand hygiene, facial hygiene, grooming task of combing hair, and oral 
Patient has been approved for ARU and discharge readmit orders were placed.  
Patient is resting comfortably in bed.  She made good progress with ambulation and therapy this morning.    Right lower extremity:  Dressing is clean dry and intact.  Sensation motor function is intact distally.  Good active range of motion in knee flexion on exam today.    Postoperative day #2 right total knee replacement    -Currently ARU is pending approval.  Okay to discharge from orthopedic perspective if approval goes through today.    Continue PT OT    Continue pain medication as needed  
Physical Therapy    Physical Therapy Treatment Note  Name: Esha Paz MRN: [id number] :   1950   Date:  2024   Admission Date: 2024 Room:  [address] [place] [address]   Restrictions/Precautions:          general, fall   Communication with other providers:  Hand off with Nurse    Subjective:  Patient states: Pt. Agreeable to work with therapy.    Pain:  (0/10 to 10/10):  5/10 R Knee   Objective:    Observation: Pt. Up in recliner upon arrival to room   Objective Measures:  A&O x4  Treatment, including education/measures:  Therapeutic Activity Training:   Therapeutic activity training was instructed today.  Cues were given for safety, sequence, UE/LE placement, awareness, and balance.    Activities performed today included balance, sit-stand.    Sit to stand transfer: CGA from Recliner, commode     Sitting balance:SBA on Commode  Standing balance:CGA with FWW     Gait Training:  Cues were given for safety, sequence, device management, balance, posture, awareness, path.    Amb x20+20+40ft, CGA, FWW. Pt. With step to and step through gait pattern. Pt. Requires increased time to complete.    Education:   Pt. Educated on importance of out of bed activity, safe functional mobility, and walker management.     Assessment / Impression:    Pt. Left up in recliner at end of session, all needs met, phone and call light in reach, bed/personal alarm active, and nursing updated.     Patient's tolerance of treatment:  Great    Adverse Reaction: none  Significant change in status and impact:  none  Barriers to improvement:  none  Plan for Next Session:    Cont per POC and progress as able.     Timed Code Treatment Minutes: 29 Minutes  PT Individual Minutes  Time In: 1001  Time Out: 1030  Minutes: 29              Previously filed items:  Social/Functional History  Lives With: Alone  Type of Home: Condo  Home Layout: One level  Home Access: Stairs to enter without rails  Entrance Stairs - Number of Steps: 1 threshold  Bathroom 
Physical Therapy    Physical Therapy Treatment Note  Name: Esha Paz MRN: [id number] :   1950   Date:  2024   Admission Date: 2024 Room:  [address] [place] [address]   Restrictions/Precautions:          general, fall   Communication with other providers:  Hand off with Nurse    Subjective:  Patient states: Pt. Agreeable to work with therapy.    Pain:  (0/10 to 10/10):  5/10 R Knee   Objective:    Observation: Pt. Up in recliner upon arrival to room   Objective Measures:  A&O x4  Treatment, including education/measures:  Therapeutic Activity Training:   Therapeutic activity training was instructed today.  Cues were given for safety, sequence, UE/LE placement, awareness, and balance.    Activities performed today included balance, sit-stand.    Sit to supine: Mini with RLE   Sit to stand transfer: CGA from Recliner, commode     Sitting balance:SBA on Commode  Standing balance:CGA with FWW     Gait Training:  Cues were given for safety, sequence, device management, balance, posture, awareness, path.    Amb for a total of 75ft, CGA- SBA, FWW. Pt. With step to and step through gait pattern. Pt. Requires increased time to complete and multiple standing resting breaks.     Therapeutic Exercise:  Cues were given for technique, safety, recruitment, and rationale.  Cues were verbal and/or tactile.  Supine exercise completed, 1x10 reps, AROM  Ankle pumps, Glut set, Quad set, heel slide, SLR(AAROM) , SAQ    Education:   Pt. Educated on importance of out of bed activity, safe functional mobility, and walker management.     Assessment / Impression:    Pt. Left up in recliner at end of session, all needs met, phone and call light in reach, bed/personal alarm active, and nursing updated.     Patient's tolerance of treatment:  Great    Adverse Reaction: none  Significant change in status and impact:  none  Barriers to improvement:  none  Plan for Next Session:    Cont per POC and progress as able.     Timed Code Treatment 
Physical therapy at bedside working with pt.   
good   Adverse Reaction: none  Significant change in status and impact:  none  Barriers to improvement:  none  Plan for Next Session:    Continue POC. Increase R knee ROM and ambulation as able.    Time in/out   0955/1053  Tx time   58 minutes      Previously filed items:  Social/Functional History  Lives With: Alone  Type of Home: Condo  Home Layout: One level  Home Access: Stairs to enter without rails  Entrance Stairs - Number of Steps: 1 threshold  Bathroom Shower/Tub: Walk-in shower  Bathroom Toilet: Handicap height  Bathroom Equipment: Grab bars in shower, Built-in shower seat  Home Equipment: Walker - Rolling, Cane  Has the patient had two or more falls in the past year or any fall with injury in the past year?: Yes (Falling in garden)  Receives Help From: Family, Friend(s)  ADL Assistance: Independent  Homemaking Assistance: Independent  Ambulation Assistance: Independent (PRN cane/RW use for pain)  Transfer Assistance: Independent  Active : Yes  Mode of Transportation: Car        Short Term Goals  Time Frame for Short Term Goals: 1 week  Short Term Goal 1: Pt will complete supine <> sit Margarita  Short Term Goal 2: Pt will complete sit <> stand Margarita  Short Term Goal 3: Pt will ambulate 150ft with LRAD Margarita  Short Term Goal 4: Pt will complete light dynamic standing activity x3 minutes with single UE support, Margarita  Short Term Goal 5: Pt will complete 1 step SBA    Electronically signed by:    Donna Tobias, JON  12/3/2024, 2:31 PM

## 2024-12-04 NOTE — PROGRESS NOTES
interview  Comatose - could not be aroused  [x]  0.  Behavior not present  []  1.  Behavior continuously present, does not fluctuate  []  2.  Behavior present, fluctuates (comes and goes, changes in severity)    Mood    \"Over the last 2 weeks, have you been bothered by any of the following problems?\" 1. Symptom Presence    0 = No  1 = Yes  9 = No Response 2. Symptom Frequency    0 = Never or 1 day  1 = 2-6 days (several days)  2 = 7-11 days (half or more of the days)  3 = 12-14 days (nearly every day)  **Leave blank if 'No Reponse'**      Enter scores in boxes    Column 1 Column 2   Little interest or pleasure in doing things   [x] 0.  No  [] 1.  Yes  [] 9. No Response [x] 0.  Never or one day  [] 1.  2-6 days (several days)  [] 2.  7-11 days (half or more of days)  [] 3.  12-14 days (nearly every day)     Feeling down, depressed, or hopeless   [x] 0.  No  [] 1.  Yes  [] 9. No Response [x] 0.  Never or one day  [] 1.  2-6 days (several days)  [] 2.  7-11 days (half or more of days)  [] 3.  12-14 days (nearly every day)   **If Question A or B in column 2 is coded “2” or “3”, CONTINUE asking the questions below in box.  If not, SKIP down to \"social isolation\" question and continue**     Trouble falling or staying asleep, or sleeping too much   [] 0.  No  [] 1.  Yes  [] 9. No Response [] 0.  Never or one day  [] 1.  2-6 days (several days)  [] 2.  7-11 days (half or more of days)  [] 3.  12-14 days (nearly every day   Feeling tired or having little energy   [] 0.  No  [] 1.  Yes  [] 9. No Response [] 0.  Never or one day  [] 1.  2-6 days (several days)  [] 2.  7-11 days (half or more of days)  [] 3.  12-14 days (nearly every day   Poor appetite or overeating     [] 0.  No  [] 1.  Yes  [] 9. No Response [] 0.  Never or one day  [] 1.  2-6 days (several days)  [] 2.  7-11 days (half or more of days)  [] 3.  12-14 days (nearly every day   Feeling bad about yourself - or that you are a failure or have let yourself or your  family down   [] 0.  No  [] 1.  Yes  [] 9. No Response [] 0.  Never or one day  [] 1.  2-6 days (several days)  [] 2.  7-11 days (half or more of days)  [] 3.  12-14 days (nearly every day   Trouble concentrating on things, such as reading the newspaper or watching television   [] 0.  No  [] 1.  Yes  [] 9. No Response [] 0.  Never or one day  [] 1.  2-6 days (several days)  [] 2.  7-11 days (half or more of days)  [] 3.  12-14 days (nearly every day   Moving or speaking so slowly that other people could have noticed.  Or the opposite- being so fidgety or restless that you have been moving around a lot more than usual.   [] 0.  No  [] 1.  Yes  [] 9. No Response [] 0.  Never or one day  [] 1.  2-6 days (several days)  [] 2.  7-11 days (half or more of days)  [] 3.  12-14 days (nearly every day   Thoughts that you would be better off dead, or of hurting yourself in some way.   [] 0.  No  [] 1.  Yes  [] 9. No Response [] 0.  Never or one day  [] 1.  2-6 days (several days)  [] 2.  7-11 days (half or more of days)  [] 3.  12-14 days (nearly every day     Social Isolation  \"How often do you feel lonely or isolated from those around you?\"  [x] 0.  Never  [] 1.  Rarely  [] 2.  Sometimes  [] 3.  Often  [] 4.  Always  [] 8.  Patient unable to respond    Pain Effect on Sleep  \"Over the past 5 days, how much of the time has pain made it hard for you to sleep at night?\"  [x]  0.  Does not apply - I have not had any pain or hurting in the past 5 days  []  1.  Rarely or not at all  []  2.  Occasionally  []  3.  Frequently  []  4.  Almost constantly  []  8.  Unable to answer  **If the patient answers \"0.  Does not apply\" to this question, skip the next two \"Pain\" questions**      Pain Interference with Therapy Activities  \"Over the past 5 days, how often have you limited your participation in rehabilitation therapy sessions due to pain?\"  []  0.  Does not apply - I have not received rehabilitation therapy in the past 5 days  []  1.

## 2024-12-04 NOTE — CARE COORDINATION
Met with patient and discussed ARU.  Explained to patient the required 3 hours of therapy a day.  Also explained the average length of stay is 11 days, could be longer or shorter depending on recommendations of therapy and Dr. Del Toro.  Patient expresses her understanding and states she's agreeable to admit to ARU.      Patient meets criteria and is approved to come to ARU.   Patient able to admit once medically stable and after ARU Medical Director and  sign the pre-admission screen (PAS).    Notified MD of approval and bed available on ARU today.

## 2024-12-04 NOTE — CARE COORDINATION
Was notified after admission set in place that patients labs were obtained and patients K is 2.7 and is requiring potassium chloride IVPB and magnesium sulfate IVPB.      Discussed with ARU nurse manager and ARU MD regarding patient needing these medications and asked if ARU able to still admit patient with these ordered.     Per attendings on acute patient is medically ready and can have meds given on ARU if ARU MD okay with it.     ARU MD states patient is able to admit knowing these meds will be given of ARU.  Nurse manager states meds have given before on ARU.     Notified attending that patient can admit with these meds.

## 2024-12-04 NOTE — PLAN OF CARE
ARU Interdisciplinary Plan of Care (IPOC)  [address] [place]  [address]  [phone]  Fax: [phone]    Esha Paz    : 1950  [id number]  MRN: [id number]   PHYSICIAN:  Chaim Chirinos MD  Primary Active Problems:   Active Hospital Problems    Diagnosis Date Noted    S/P TKR (total knee replacement), left [Z96.652] 2024     Rehabilitation Diagnosis:     Unilateral primary osteoarthritis, right knee [M17.11]  S/P TKR (total knee replacement), left [Z96.652]      CARE PLAN   NURSING:  Esha Paz while on this unit will:     Bowel and Bladder   [] Be continent of bowel and bladder      [x] Have an adequate number of bowel movements   [] Urinate with no urinary retention >300ml in bladder   [] Bladder Scan: (details)   [] Complete bladder protocol with middleton removal   [] Initiate Bladder Program to toilet every ___ hours   [] Initiate Bowel Program to toilet every ___hours   [] Bladder training    [] Bowel training  Pulmonary   [x] Maintain O2 SATs at 92% or greater  Pain Management   [x] Have pain managed while on ARU        [] Be pain free by discharge    [x] Medication Management and Education  Maintenance of Skin Integrity/Wound Management   [x] Have no skin breakdown while on ARU   [] Have improved skin integrity via wound measurements   [x] Have no signs/symptoms of infection via infection protection and monitoring at the          wound site  Fall Prevention   [x] Be free from injury during hospitalization via fall prevention measures     [x] Disease management and Education  Precautions   [] Weight Bearing Precautions   [] Swallowing Precautions   [x] Monitoring of Risks of Complications   [] DVT Prophylaxis    [x] Fluid/electrolyte/Nutrition Management    [x] Complete education with patient/family with understanding demonstrated for          in-room safety with transfers to bed, toilet, wheelchair, shower as

## 2024-12-04 NOTE — CONSULTS
requesting to return to sitting d/t dizziness, nausea, and sweating, returned to supine and RN notified.     Gait: NT d/t dizziness and nausea  Educated pt on PT POC, role of PT, WBAT RLE, discharge, ice man, importance of OOB mobility, DME    [facility] 6 Clicks Inpatient Mobility:  AM-PAC Inpatient Mobility Raw Score : 17    Safety: patient left in bed with RN present, call light within reach, gait belt used.    Assessment:  Patient is a 74 year old female who presents with primary OA of right knee, and is s/p right knee total arthroplasty performed 12/2. Upon discharge, recommend Facility for intensive rehabilitation, anticipate 3 hours per day and 5 days per week. If patient makes expected progress throughout admission and demonstrates increased tolerance to ambulation, pt may discharge home with OP PT, RW use, and initial 24/7 supervision/assist.. At baseline, patient is independent with all ADLs, IADLs, and gross mobility with PRN cane/walker use for knee pain. They performed below their baseline with impaired gait, strength, balance, and activity tolerance. They would benefit from continued therapy to address their deficits, reduce fall risk, decrease burden of care, and restore PLOF.    Complexity: Moderate  Prognosis: Good, no significant barriers to participation at this time.    General Plan: 2 times a day 7 days a week  Equipment: continue to assess    Goals:  Short Term Goals  Time Frame for Short Term Goals: 1 week  Short Term Goal 1: Pt will complete supine <> sit Margarita  Short Term Goal 2: Pt will complete sit <> stand Margarita  Short Term Goal 3: Pt will ambulate 150ft with LRAD Margarita  Short Term Goal 4: Pt will complete light dynamic standing activity x3 minutes with single UE support, Margarita  Short Term Goal 5: Pt will complete 1 step SBA       Treatment plan:  Bed mobility, transfers, balance, gait, TA, TX, stairs    Recommendations for NURSING mobility: STS CGA, gait belt    Time:   Time in: 1708  Time out: 
(Early 1970's); Dental surgery; Endoscopy, colon, diagnostic (\"Once\" 1990's); Hysterectomy, vaginal (1985); bladder suspension (1985); Tonsillectomy (1970's); lymph node biopsy (Last Done In 2000); Breast surgery (Right, 1980's); Foot surgery (Left, 1962 Or 1963); Cholecystectomy, laparoscopic (03/06/2015); Colonoscopy (\"Two\" Last Done 2000's); Colonoscopy (10/05/2018); pr colonoscopy flx dx w/collj spec when pfrmd (N/A, 10/05/2018); Hysterectomy; and Upper gastrointestinal endoscopy (don't remember).  Allergies:   Allergies   Allergen Reactions    Latex Rash and Swelling    Adhesive Tape Rash     \"Tears Skin , Paper Tape Is OK To Use\"  Skin breakdown       Lisinopril-Hydrochlorothiazide Swelling    Other      \"Allergic To Poinsetta Holley Causing Nasal Congestion\"    Sulfa Antibiotics Other (See Comments)     \"Flu Like Symptoms\"  Flu-like symptoms      Naproxen      \"Dr. Torre told me not to take NSAIDS\"    Hydrochlorothiazide W-Triamterene Nausea And Vomiting     Fam HX: family history includes Alcohol Abuse in her father; Arthritis in her brother and mother; Breast Cancer in her maternal aunt and another family member; Cancer in her brother and brother; Dementia in her father; Depression in her brother; Diabetes in her brother and mother; Early Death (age of onset: 59) in her brother; Gout in her mother; Heart Disease in her mother; High Blood Pressure in her brother, father, and mother; Kidney Disease in her son; Obesity in her brother and mother; Osteoarthritis in her mother; Osteoporosis in her mother; Other in her son; Parkinson's Disease in her brother; Prostate Cancer in her brother.  Soc HX:   Social History     Socioeconomic History    Marital status:      Spouse name: None    Number of children: None    Years of education: None    Highest education level: None   Tobacco Use    Smoking status: Never    Smokeless tobacco: Never   Vaping Use    Vaping status: Never Used   Substance and Sexual 
  Component Value Date/Time    LABA1C 5.1 02/01/2024 08:12 AM     TSH: No results found for: \"TSH\"  Troponin:   Lab Results   Component Value Date/Time    TROPONINT <0.010 08/29/2020 03:51 PM    TROPONINT <0.010 04/26/2018 10:05 PM    TROPONINT <0.010 04/26/2018 04:43 PM     Lactic Acid: No results for input(s): \"LACTA\" in the last 72 hours.  BNP: No results for input(s): \"PROBNP\" in the last 72 hours.  UA:  Lab Results   Component Value Date/Time    NITRU NEGATIVE 11/20/2024 09:09 AM    COLORU Yellow 11/20/2024 09:09 AM    PHUR 6.0 11/20/2024 09:09 AM    WBCUA 1 11/20/2024 09:09 AM    RBCUA 1 11/20/2024 09:09 AM    RBCUA 1 08/29/2020 06:50 PM    MUCUS RARE 11/20/2024 09:09 AM    TRICHOMONAS None seen 11/20/2024 09:09 AM    BACTERIA NEGATIVE 08/29/2020 06:50 PM    CLARITYU SLIGHTLY CLOUDY 08/29/2020 06:50 PM    LEUKOCYTESUR NEGATIVE 11/20/2024 09:09 AM    UROBILINOGEN 0.2 11/20/2024 09:09 AM    BILIRUBINUR NEGATIVE 11/20/2024 09:09 AM    BLOODU SMALL 08/29/2020 06:50 PM    GLUCOSEU NEGATIVE 11/20/2024 09:09 AM    KETUA NEGATIVE 11/20/2024 09:09 AM     Urine Cultures: No results found for: \"LABURIN\"  Blood Cultures: No results found for: \"BC\"  No results found for: \"BLOODCULT2\"  Organism: No results found for: \"ORG\"    Personally reviewed Lab Studies, Imaging, and discussed with Dr. Isaias Costa.     Electronically signed by SHANNON LOERA CNP on 12/4/2024 at 1:45 PM

## 2024-12-04 NOTE — PLAN OF CARE
Problem: Discharge Planning  Goal: Discharge to home or other facility with appropriate resources  Outcome: Progressing     Problem: Pain  Goal: Verbalizes/displays adequate comfort level or baseline comfort level  Outcome: Progressing  Flowsheets (Taken 12/3/2024 1945)  Verbalizes/displays adequate comfort level or baseline comfort level: Assess pain using appropriate pain scale     Problem: Safety - Adult  Goal: Free from fall injury  Outcome: Progressing     Problem: ABCDS Injury Assessment  Goal: Absence of physical injury  Outcome: Progressing

## 2024-12-04 NOTE — DISCHARGE INSTRUCTIONS
You may remove the stocking on the leg and shower 4 days after surgery.  You may shower with the gauze dressing in place and remove it after showering and replace with a new gauze dressing as needed.    You can leave the incision open to air after 5 days if no drainage.   Remove the clear tape 2 weeks after surgery or at your follow-up visit.      You can rewrap the Ace wrap to help control swelling of support of the knee as needed    Use ice as needed to help with pain and swelling.      Begin physical therapy as an outpatient within a few days after discharge.      Taper off of the narcotic pain medication as pain allows.  You may substitute a dose of Tylenol or ibuprofen in place of the narcotic pain medication as the pain improves.    Take your blood thinner as prescribed to help prevent blood clots in the leg.    Practice range of motion exercises multiple times a day as instructed by physical therapy.  You should practice fully straightening and stretching the back of the knee as well as practice fully bending and stretching the front of the knee.    Call the office if there is any concern for wound healing problems, drainage, severe swelling, discoloration, or other medical issues.

## 2024-12-05 LAB
25(OH)D3 SERPL-MCNC: 16.6 NG/ML (ref 30–150)
ANION GAP SERPL CALCULATED.3IONS-SCNC: 6 MMOL/L (ref 9–17)
BASOPHILS # BLD: 0.03 K/UL
BASOPHILS NFR BLD: 1 % (ref 0–1)
BUN SERPL-MCNC: 8 MG/DL (ref 7–20)
CALCIUM SERPL-MCNC: 7.9 MG/DL (ref 8.3–10.6)
CHLORIDE SERPL-SCNC: 99 MMOL/L (ref 99–110)
CO2 SERPL-SCNC: 34 MMOL/L (ref 21–32)
CREAT SERPL-MCNC: 0.5 MG/DL (ref 0.6–1.2)
EOSINOPHIL # BLD: 0.03 K/UL
EOSINOPHILS RELATIVE PERCENT: 1 % (ref 0–3)
ERYTHROCYTE [DISTWIDTH] IN BLOOD BY AUTOMATED COUNT: 13.7 % (ref 11.7–14.9)
FERRITIN SERPL-MCNC: 282 NG/ML (ref 15–150)
FOLATE SERPL-MCNC: 12 NG/ML (ref 4.8–24.2)
GFR, ESTIMATED: >90 ML/MIN/1.73M2
GLUCOSE SERPL-MCNC: 112 MG/DL (ref 74–99)
HCT VFR BLD AUTO: 21.7 % (ref 37–47)
HGB BLD-MCNC: 7.4 G/DL (ref 12.5–16)
IMM GRANULOCYTES # BLD AUTO: 0.03 K/UL
IMM GRANULOCYTES NFR BLD: 1 %
IRON SATN MFR SERPL: 9 % (ref 15–50)
IRON SERPL-MCNC: 20 UG/DL (ref 37–145)
LYMPHOCYTES NFR BLD: 0.71 K/UL
LYMPHOCYTES RELATIVE PERCENT: 12 % (ref 24–44)
MAGNESIUM SERPL-MCNC: 1.9 MG/DL (ref 1.8–2.4)
MCH RBC QN AUTO: 32.2 PG (ref 27–31)
MCHC RBC AUTO-ENTMCNC: 34.1 G/DL (ref 32–36)
MCV RBC AUTO: 94.3 FL (ref 78–100)
MONOCYTES NFR BLD: 0.42 K/UL
MONOCYTES NFR BLD: 7 % (ref 0–4)
NEUTROPHILS NFR BLD: 79 % (ref 36–66)
NEUTS SEG NFR BLD: 4.53 K/UL
PLATELET # BLD AUTO: 121 K/UL (ref 140–440)
PMV BLD AUTO: 9 FL (ref 7.5–11.1)
POTASSIUM SERPL-SCNC: 3.3 MMOL/L (ref 3.5–5.1)
RBC # BLD AUTO: 2.3 M/UL (ref 4.2–5.4)
SODIUM SERPL-SCNC: 139 MMOL/L (ref 136–145)
TIBC SERPL-MCNC: 213 UG/DL (ref 260–445)
UNSATURATED IRON BINDING CAPACITY: 193 UG/DL (ref 110–370)
VIT B12 SERPL-MCNC: 828 PG/ML (ref 211–911)
WBC OTHER # BLD: 5.8 K/UL (ref 4–10.5)

## 2024-12-05 PROCEDURE — 6370000000 HC RX 637 (ALT 250 FOR IP): Performed by: NURSE PRACTITIONER

## 2024-12-05 PROCEDURE — 82728 ASSAY OF FERRITIN: CPT

## 2024-12-05 PROCEDURE — 6370000000 HC RX 637 (ALT 250 FOR IP): Performed by: ORTHOPAEDIC SURGERY

## 2024-12-05 PROCEDURE — 97530 THERAPEUTIC ACTIVITIES: CPT

## 2024-12-05 PROCEDURE — 36415 COLL VENOUS BLD VENIPUNCTURE: CPT

## 2024-12-05 PROCEDURE — 6360000002 HC RX W HCPCS: Performed by: NURSE PRACTITIONER

## 2024-12-05 PROCEDURE — 94664 DEMO&/EVAL PT USE INHALER: CPT

## 2024-12-05 PROCEDURE — 97166 OT EVAL MOD COMPLEX 45 MIN: CPT

## 2024-12-05 PROCEDURE — 89220 SPUTUM SPECIMEN COLLECTION: CPT

## 2024-12-05 PROCEDURE — 97535 SELF CARE MNGMENT TRAINING: CPT

## 2024-12-05 PROCEDURE — 1280000000 HC REHAB R&B

## 2024-12-05 PROCEDURE — 94761 N-INVAS EAR/PLS OXIMETRY MLT: CPT

## 2024-12-05 PROCEDURE — 82607 VITAMIN B-12: CPT

## 2024-12-05 PROCEDURE — 80048 BASIC METABOLIC PNL TOTAL CA: CPT

## 2024-12-05 PROCEDURE — 6370000000 HC RX 637 (ALT 250 FOR IP): Performed by: PHYSICAL MEDICINE & REHABILITATION

## 2024-12-05 PROCEDURE — 82746 ASSAY OF FOLIC ACID SERUM: CPT

## 2024-12-05 PROCEDURE — 97110 THERAPEUTIC EXERCISES: CPT

## 2024-12-05 PROCEDURE — 2580000003 HC RX 258: Performed by: NURSE PRACTITIONER

## 2024-12-05 PROCEDURE — 83735 ASSAY OF MAGNESIUM: CPT

## 2024-12-05 PROCEDURE — 97167 OT EVAL HIGH COMPLEX 60 MIN: CPT

## 2024-12-05 PROCEDURE — 85025 COMPLETE CBC W/AUTO DIFF WBC: CPT

## 2024-12-05 PROCEDURE — 97162 PT EVAL MOD COMPLEX 30 MIN: CPT

## 2024-12-05 PROCEDURE — 83540 ASSAY OF IRON: CPT

## 2024-12-05 PROCEDURE — 94150 VITAL CAPACITY TEST: CPT

## 2024-12-05 PROCEDURE — 83550 IRON BINDING TEST: CPT

## 2024-12-05 PROCEDURE — 97116 GAIT TRAINING THERAPY: CPT

## 2024-12-05 RX ADMIN — ASPIRIN 325 MG: 325 TABLET ORAL at 20:16

## 2024-12-05 RX ADMIN — OXYCODONE HYDROCHLORIDE AND ACETAMINOPHEN 1 TABLET: 5; 325 TABLET ORAL at 10:16

## 2024-12-05 RX ADMIN — ASPIRIN 325 MG: 325 TABLET ORAL at 10:16

## 2024-12-05 RX ADMIN — POTASSIUM CHLORIDE 10 MEQ: 7.46 INJECTION, SOLUTION INTRAVENOUS at 01:08

## 2024-12-05 RX ADMIN — AMLODIPINE BESYLATE 5 MG: 5 TABLET ORAL at 10:16

## 2024-12-05 RX ADMIN — Medication 1000 MCG: at 10:16

## 2024-12-05 RX ADMIN — LOSARTAN POTASSIUM 50 MG: 25 TABLET, FILM COATED ORAL at 10:16

## 2024-12-05 RX ADMIN — DULOXETINE HYDROCHLORIDE 60 MG: 30 CAPSULE, DELAYED RELEASE ORAL at 10:16

## 2024-12-05 RX ADMIN — SODIUM CHLORIDE, PRESERVATIVE FREE 10 ML: 5 INJECTION INTRAVENOUS at 20:14

## 2024-12-05 RX ADMIN — SODIUM CHLORIDE, PRESERVATIVE FREE 10 ML: 5 INJECTION INTRAVENOUS at 10:19

## 2024-12-05 RX ADMIN — ZINC SULFATE 220 MG (50 MG) CAPSULE 50 MG: CAPSULE at 10:19

## 2024-12-05 RX ADMIN — OXYCODONE HYDROCHLORIDE AND ACETAMINOPHEN 1 TABLET: 5; 325 TABLET ORAL at 02:14

## 2024-12-05 RX ADMIN — Medication 2000 UNITS: at 10:16

## 2024-12-05 RX ADMIN — DULOXETINE HYDROCHLORIDE 60 MG: 30 CAPSULE, DELAYED RELEASE ORAL at 20:16

## 2024-12-05 RX ADMIN — POTASSIUM CHLORIDE 40 MEQ: 1500 TABLET, EXTENDED RELEASE ORAL at 06:09

## 2024-12-05 RX ADMIN — Medication 1 TABLET: at 10:16

## 2024-12-05 RX ADMIN — OXYCODONE HYDROCHLORIDE AND ACETAMINOPHEN 1 TABLET: 5; 325 TABLET ORAL at 19:28

## 2024-12-05 ASSESSMENT — PAIN DESCRIPTION - ONSET
ONSET: ON-GOING

## 2024-12-05 ASSESSMENT — PAIN SCALES - GENERAL
PAINLEVEL_OUTOF10: 0
PAINLEVEL_OUTOF10: 7
PAINLEVEL_OUTOF10: 3
PAINLEVEL_OUTOF10: 6
PAINLEVEL_OUTOF10: 8

## 2024-12-05 ASSESSMENT — PAIN DESCRIPTION - FREQUENCY
FREQUENCY: INTERMITTENT
FREQUENCY: CONTINUOUS
FREQUENCY: INTERMITTENT

## 2024-12-05 ASSESSMENT — PAIN DESCRIPTION - ORIENTATION
ORIENTATION: RIGHT
ORIENTATION: POSTERIOR

## 2024-12-05 ASSESSMENT — PAIN - FUNCTIONAL ASSESSMENT
PAIN_FUNCTIONAL_ASSESSMENT: ACTIVITIES ARE NOT PREVENTED
PAIN_FUNCTIONAL_ASSESSMENT: PREVENTS OR INTERFERES SOME ACTIVE ACTIVITIES AND ADLS
PAIN_FUNCTIONAL_ASSESSMENT: ACTIVITIES ARE NOT PREVENTED
PAIN_FUNCTIONAL_ASSESSMENT: PREVENTS OR INTERFERES SOME ACTIVE ACTIVITIES AND ADLS

## 2024-12-05 ASSESSMENT — PAIN DESCRIPTION - PAIN TYPE
TYPE: SURGICAL PAIN
TYPE: ACUTE PAIN
TYPE: SURGICAL PAIN

## 2024-12-05 ASSESSMENT — PAIN DESCRIPTION - DESCRIPTORS
DESCRIPTORS: ACHING
DESCRIPTORS: ACHING;DISCOMFORT
DESCRIPTORS: ACHING
DESCRIPTORS: DISCOMFORT

## 2024-12-05 ASSESSMENT — PAIN DESCRIPTION - LOCATION
LOCATION: KNEE
LOCATION: KNEE
LOCATION: NECK
LOCATION: KNEE

## 2024-12-05 NOTE — H&P
Physical Medicine and Rehabilitation H&P    Name:  Esha Paz Date/Time of Admission: 2024  3:46 PM    CSN: [id number] Attending Provider: Chaim Chirinos MD   Room/Bed:  1027/1027-A : 1950 Age: 74 y.o.   Date: 2024 Time: 10:19 AM     ADMITTING DIAGNOSIS/REASON FOR REHAB ADMISSION:   Left knee replacement  Left knee pain  Left leg weakness  Hypokalemia    COMORBID DIAGNOSES IMPACTING REHAB:  Hypertension, history of multiple myeloma, GERD, depression, acute on chronic anemia, hyperlipidemia, osteoporosis    CHIEF COMPLAINT:  I had my left knee replaced    HPI  Esha Paz is a 74 y.o. female who is admitted to the [facility] Acute Rehabilitation Unit. Esha Paz presented to [facility] on 2024 for an elective left total knee arthroplasty with Dr. Harvey on the same day.  She did well postoperatively, her blood pressure was a bit soft on  and BP meds were held.  Her potassium was 2.7 and she was given IV replacement.  She has some acute on chronic anemia secondary to surgical blood loss.  Preop hemoglobin was 11.3 and fell to 8.7.  Supplements given.  Patient was otherwise medically stable and evaluated by therapy.    Esha Paz was evaluated by therapy and was found to have significant functional deficits warranting IRF admission. IRF admission was deemed to be reasonable and necessary. Esha Paz was admitted to [facility]'s Acute Rehabilitation Unit on 2024  3:46 PM.     Now, she reports having some right knee pain, the medications help.  She reports her right leg remains weak.  She reports eating well.  She did not sleep well last night.  She denies any chest pain or shortness of breath.  She had a recent BM.  She denies any dysuria.  She feels ready to work in therapy.. Discussed goals and process of inpatient rehab and the patient voiced understanding.     FUNCTIONAL STATUS  Pre-admission: Independent with ADLs, did use a cane for community mobility.   Having some acute on chronic anemia postoperatively, also hypokalemic    Strengths for the patient: Was independent before, has social support.  In good state of health overall.  Single knee replacement    Limitations/barriers for the patient: Lives alone.     Comorbid Conditions:  Hypertension  GERD  Depression  Acute on chronic anemia  Hyperlipidemia  Osteoporosis      PLAN  Functional Impairments - Acute inpatient rehabilitation to proceed with PT/ minutes 5 out of every 7 days. Work on gait, transfers, ADLs, iADLs, safety awareness, equipment management, medication management, patient/family teaching, pain management.  Caregiver training will be offered. Expected length of stay prior to a supervised level of function for discharge home with a walker and C OT/PT is 10 days  Left TKR - WBAT, manage pain, therapy as above, increased range of motion, aspirin for DVT prophylaxis, monitor incision, follow-up with Dr. Harvey  Pain management - decent control so far, continue ice, had PRN Tylenol and Percocet  HTN - BP was a bit soft on acute, patient remains on losartan and Norvasc currently, may need to hold intermittently  Acute on chronic anemia - has history of multiple myeloma, postoperative hemoglobin was 8.7, this is fallen to 7.4 on 12/5, will recheck CBC in a.m., continue supplements  Multiple Myeloma - follows with Dr. Cruz, on maintenance Revlimid, this was held 10 days before surgery and is to be resumed 2 weeks after surgery, patient should be discharged to home by about the time of resume  Hypokalemia - post-op potassium was 2.7, given IV repletion, back to 3.3 on 12/5, recheck BMP in a.m.  Mood - history of depression, remains on Cymbalta  GERD - has Pepcid as needed  Bowel/Bladder - at risk for opioid-induced constipation.  Continue MiraLAX, monitor for constipation.  DVT PPx - on  BID per Ortho, early/progressive ambulation.   Disposition - Follow-up with PCP, orthopedics, and oncology.

## 2024-12-05 NOTE — PROGRESS NOTES
Physical Therapy  [facility] ARU PHYSICAL THERAPY EVALUATION    Chart Review:  Past Medical History:   Diagnosis Date    Anemia     \"With Childbirth\"    Arthritis     \"Hands, Knees And Hips\"    Cancer (HCC) 12/18    CCC (chronic calculous cholecystitis)     s/p cholecystectomy 2015    Colitis Dx 2000's    Depression     \"In Mid 1990's\"    Essential hypertension     GERD (gastroesophageal reflux disease)     Headache ?? 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    Osteoporosis     LILIAN (stress urinary incontinence, female)     Thyroid disease 1990's    \"Took Part Of Thyroid Out \"     Past Surgical History:   Procedure Laterality Date    BLADDER SUSPENSION  1985    Done During Vaginal Hysterectomy    BREAST SURGERY Right 1980's    Benign Area Removed Nipple Area Right Breast    CHOLECYSTECTOMY, LAPAROSCOPIC  03/06/2015    COLONOSCOPY  \"Two\" Last Done 2000's    COLONOSCOPY  10/05/2018    Sigmoid diverticulosis, Grade 1 Internal hemorrhoids    DENTAL SURGERY      Teeth Extracted In Past    ENDOSCOPY, COLON, DIAGNOSTIC  \"Once\" 1990's    EYE SURGERY Right 05/24/2013    Cataract With Lens Implant    EYE SURGERY Left 01/17/2014    Cataract With Lens Implant    FOOT SURGERY Left 1962 Or 1963    I & D Left Foot After Stepping On A Nail    HYSTERECTOMY (CERVIX STATUS UNKNOWN)      approx. 1986    HYSTERECTOMY, VAGINAL  1985    Bladder Suspension Also Done    LYMPH NODE BIOPSY  Last Done In 2000    \"Had Five Lymph Node Biopsies Done, Arm Pit Areas\", Benign    [place] COLONOSCOPY FLX DX W/COLLJ SPEC WHEN PFRMD N/A 10/05/2018    COLONOSCOPY DIAGNOSTIC OR SCREENING performed by Yogi Torre MD at [facility] OR    THYROID SURGERY  1990's    \"Took Part Of The Thyroid Out\"    TONSILLECTOMY  1970's    TOTAL KNEE ARTHROPLASTY Right 12/2/2024    RIGHT KNEE TOTAL ARTHROPLASTY ROBOTIC performed by Calvin Harvey MD at [facility] OR    UPPER GASTROINTESTINAL ENDOSCOPY  don't

## 2024-12-05 NOTE — DISCHARGE SUMMARY
DISCHARGE SUMMARY:  Calvin Harvey MD    Date of Admission:      12/2/2024  Date of Discharge:    12/4/2024    Admission Diagnosis   Primary osteoarthritis of one knee, right [M17.11]  Right knee pain [M25.561]  Primary osteoarthritis of right knee [M17.11]   Discharge Diagnosis   Same    Procedure:    Right Total knee replacement 12/2/2024    Consultations:  IP CONSULT TO HOSPITALIST  IP CONSULT TO CASE MANAGEMENT  IP CONSULT TO HOME CARE NEEDS    Brief history and hospital stay.    Esha Paz is a 74 y.o. female who underwent total knee replacement procedure without complication.  Esha Paz was admitted to the floor following surgery.    Appropriate consults were obtained as outlined in progress notes. The patient’s diet was advanced as tolerated.  The patient remained hemodynamically stable and neurovascularly intact in the lower extremity throughout the hospital course.    On the day of discharge the patient's calf remained soft and showed no evidence of DVT.      Physical therapy and occupational therapy were consulted.  The patient progressed well with PT/OT throughout the stay.      DVT prophylaxis was initiated and will continue for 2 weeks.    The patients pain was controlled initially with IV pain medications and then was transitioned to oral pain medications prior to discharge    The patient was discharged to Rehab and will continue to follow the precautions outlined to them by us and PT/OT.     Condition on Discharge: Stable    Medications       Medication List        ASK your doctor about these medications      amLODIPine 5 MG tablet  Commonly known as: NORVASC  TAKE 1 TABLET EVERY MORNING     aspirin 81 MG tablet     Biotin 30738 MCG Tabs     DULoxetine 60 MG extended release capsule  Commonly known as: CYMBALTA  Take 1 capsule by mouth 2 times daily     famotidine 20 MG tablet  Commonly known as: PEPCID     fish oil 1000 MG capsule     losartan 50 MG

## 2024-12-05 NOTE — CARE COORDINATION
Case Management Admission Note    Patient:Esha Paz      :1950  MRN:[id number]  Rehab Dx/Hx: Unilateral primary osteoarthritis, right knee [M17.11]  S/P TKR (total knee replacement), left [Z96.652]    Chief Complaint:   Past Medical History:   Diagnosis Date    Anemia     \"With Childbirth\"    Arthritis     \"Hands, Knees And Hips\"    Cancer (HCC)     CCC (chronic calculous cholecystitis)     s/p cholecystectomy     Colitis Dx     Depression     \"In Mid \"    Essential hypertension     GERD (gastroesophageal reflux disease)     Headache ?? occasionally    Hyperlipidemia     Motion sickness     Neuropathy     due to chemo drug    Osteoarthritis early ?    Osteoporosis     LILIAN (stress urinary incontinence, female)     Thyroid disease     \"Took Part Of Thyroid Out \"     Past Surgical History:   Procedure Laterality Date    BLADDER SUSPENSION  1985    Done During Vaginal Hysterectomy    BREAST SURGERY Right     Benign Area Removed Nipple Area Right Breast    CHOLECYSTECTOMY, LAPAROSCOPIC  2015    COLONOSCOPY  \"Two\" Last Done     COLONOSCOPY  10/05/2018    Sigmoid diverticulosis, Grade 1 Internal hemorrhoids    DENTAL SURGERY      Teeth Extracted In Past    ENDOSCOPY, COLON, DIAGNOSTIC  \"Once\"     EYE SURGERY Right 2013    Cataract With Lens Implant    EYE SURGERY Left 2014    Cataract With Lens Implant    FOOT SURGERY Left  Or     I & D Left Foot After Stepping On A Nail    HYSTERECTOMY (CERVIX STATUS UNKNOWN)      approx.     HYSTERECTOMY, VAGINAL  1985    Bladder Suspension Also Done    LYMPH NODE BIOPSY  Last Done In     \"Had Five Lymph Node Biopsies Done, Arm Pit Areas\", Benign    [place] COLONOSCOPY FLX DX W/COLLJ SPEC WHEN PFRMD N/A 10/05/2018    COLONOSCOPY DIAGNOSTIC OR SCREENING performed by Yogi Torre MD at [facility] OR    THYROID SURGERY      \"Took Part Of The Thyroid Out\"    TONSILLECTOMY      TOTAL KNEE

## 2024-12-05 NOTE — PROGRESS NOTES
Occupational Therapy                              [facility] ARU OCCUPATIONAL THERAPY EVALUATION    Chart Review:  Past Medical History:   Diagnosis Date    Anemia     \"With Childbirth\"    Arthritis     \"Hands, Knees And Hips\"    Cancer (HCC) 12/18    CCC (chronic calculous cholecystitis)     s/p cholecystectomy 2015    Colitis Dx 2000's    Depression     \"In Mid 1990's\"    Essential hypertension     GERD (gastroesophageal reflux disease)     Headache ?? 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    Osteoporosis     LILIAN (stress urinary incontinence, female)     Thyroid disease 1990's    \"Took Part Of Thyroid Out \"     Past Surgical History:   Procedure Laterality Date    BLADDER SUSPENSION  1985    Done During Vaginal Hysterectomy    BREAST SURGERY Right 1980's    Benign Area Removed Nipple Area Right Breast    CHOLECYSTECTOMY, LAPAROSCOPIC  03/06/2015    COLONOSCOPY  \"Two\" Last Done 2000's    COLONOSCOPY  10/05/2018    Sigmoid diverticulosis, Grade 1 Internal hemorrhoids    DENTAL SURGERY      Teeth Extracted In Past    ENDOSCOPY, COLON, DIAGNOSTIC  \"Once\" 1990's    EYE SURGERY Right 05/24/2013    Cataract With Lens Implant    EYE SURGERY Left 01/17/2014    Cataract With Lens Implant    FOOT SURGERY Left 1962 Or 1963    I & D Left Foot After Stepping On A Nail    HYSTERECTOMY (CERVIX STATUS UNKNOWN)      approx. 1986    HYSTERECTOMY, VAGINAL  1985    Bladder Suspension Also Done    LYMPH NODE BIOPSY  Last Done In 2000    \"Had Five Lymph Node Biopsies Done, Arm Pit Areas\", Benign    [place] COLONOSCOPY FLX DX W/COLLJ SPEC WHEN PFRMD N/A 10/05/2018    COLONOSCOPY DIAGNOSTIC OR SCREENING performed by Yogi Torre MD at [facility] OR    THYROID SURGERY  1990's    \"Took Part Of The Thyroid Out\"    TONSILLECTOMY  1970's    TOTAL KNEE ARTHROPLASTY Right 12/2/2024    RIGHT KNEE TOTAL ARTHROPLASTY ROBOTIC performed by Calvin Harvey MD at [facility] OR    UPPER

## 2024-12-06 LAB
ANION GAP SERPL CALCULATED.3IONS-SCNC: 5 MMOL/L (ref 9–17)
BASOPHILS # BLD: 0.04 K/UL
BASOPHILS NFR BLD: 1 % (ref 0–1)
BUN SERPL-MCNC: 10 MG/DL (ref 7–20)
CALCIUM SERPL-MCNC: 7.9 MG/DL (ref 8.3–10.6)
CHLORIDE SERPL-SCNC: 100 MMOL/L (ref 99–110)
CO2 SERPL-SCNC: 34 MMOL/L (ref 21–32)
CREAT SERPL-MCNC: 0.5 MG/DL (ref 0.6–1.2)
EOSINOPHIL # BLD: 0.04 K/UL
EOSINOPHILS RELATIVE PERCENT: 1 % (ref 0–3)
ERYTHROCYTE [DISTWIDTH] IN BLOOD BY AUTOMATED COUNT: 13.8 % (ref 11.7–14.9)
GFR, ESTIMATED: >90 ML/MIN/1.73M2
GLUCOSE SERPL-MCNC: 94 MG/DL (ref 74–99)
HCT VFR BLD AUTO: 21.7 % (ref 37–47)
HGB BLD-MCNC: 7.3 G/DL (ref 12.5–16)
IMM GRANULOCYTES # BLD AUTO: 0.02 K/UL
IMM GRANULOCYTES NFR BLD: 0 %
LYMPHOCYTES NFR BLD: 0.76 K/UL
LYMPHOCYTES RELATIVE PERCENT: 13 % (ref 24–44)
MAGNESIUM SERPL-MCNC: 1.8 MG/DL (ref 1.8–2.4)
MCH RBC QN AUTO: 32.4 PG (ref 27–31)
MCHC RBC AUTO-ENTMCNC: 33.6 G/DL (ref 32–36)
MCV RBC AUTO: 96.4 FL (ref 78–100)
MONOCYTES NFR BLD: 0.39 K/UL
MONOCYTES NFR BLD: 6 % (ref 0–4)
NEUTROPHILS NFR BLD: 79 % (ref 36–66)
NEUTS SEG NFR BLD: 4.85 K/UL
PLATELET # BLD AUTO: 149 K/UL (ref 140–440)
PMV BLD AUTO: 9.4 FL (ref 7.5–11.1)
POTASSIUM SERPL-SCNC: 4 MMOL/L (ref 3.5–5.1)
RBC # BLD AUTO: 2.25 M/UL (ref 4.2–5.4)
SODIUM SERPL-SCNC: 139 MMOL/L (ref 136–145)
WBC OTHER # BLD: 6.1 K/UL (ref 4–10.5)

## 2024-12-06 PROCEDURE — 97110 THERAPEUTIC EXERCISES: CPT

## 2024-12-06 PROCEDURE — 6370000000 HC RX 637 (ALT 250 FOR IP): Performed by: PHYSICAL MEDICINE & REHABILITATION

## 2024-12-06 PROCEDURE — 97530 THERAPEUTIC ACTIVITIES: CPT

## 2024-12-06 PROCEDURE — 1280000000 HC REHAB R&B

## 2024-12-06 PROCEDURE — 80048 BASIC METABOLIC PNL TOTAL CA: CPT

## 2024-12-06 PROCEDURE — 83735 ASSAY OF MAGNESIUM: CPT

## 2024-12-06 PROCEDURE — 2580000003 HC RX 258: Performed by: NURSE PRACTITIONER

## 2024-12-06 PROCEDURE — 97535 SELF CARE MNGMENT TRAINING: CPT

## 2024-12-06 PROCEDURE — 99232 SBSQ HOSP IP/OBS MODERATE 35: CPT | Performed by: PHYSICAL MEDICINE & REHABILITATION

## 2024-12-06 PROCEDURE — 85025 COMPLETE CBC W/AUTO DIFF WBC: CPT

## 2024-12-06 PROCEDURE — 6370000000 HC RX 637 (ALT 250 FOR IP): Performed by: ORTHOPAEDIC SURGERY

## 2024-12-06 PROCEDURE — 94150 VITAL CAPACITY TEST: CPT

## 2024-12-06 PROCEDURE — 36415 COLL VENOUS BLD VENIPUNCTURE: CPT

## 2024-12-06 PROCEDURE — 94761 N-INVAS EAR/PLS OXIMETRY MLT: CPT

## 2024-12-06 PROCEDURE — 97116 GAIT TRAINING THERAPY: CPT

## 2024-12-06 RX ADMIN — ASPIRIN 325 MG: 325 TABLET ORAL at 09:12

## 2024-12-06 RX ADMIN — DULOXETINE HYDROCHLORIDE 60 MG: 30 CAPSULE, DELAYED RELEASE ORAL at 09:12

## 2024-12-06 RX ADMIN — Medication 2000 UNITS: at 09:13

## 2024-12-06 RX ADMIN — LOSARTAN POTASSIUM 50 MG: 25 TABLET, FILM COATED ORAL at 09:12

## 2024-12-06 RX ADMIN — OXYCODONE HYDROCHLORIDE AND ACETAMINOPHEN 1 TABLET: 5; 325 TABLET ORAL at 05:53

## 2024-12-06 RX ADMIN — Medication 1000 MCG: at 09:13

## 2024-12-06 RX ADMIN — AMLODIPINE BESYLATE 5 MG: 5 TABLET ORAL at 09:13

## 2024-12-06 RX ADMIN — DULOXETINE HYDROCHLORIDE 60 MG: 30 CAPSULE, DELAYED RELEASE ORAL at 22:14

## 2024-12-06 RX ADMIN — ASPIRIN 325 MG: 325 TABLET ORAL at 22:14

## 2024-12-06 RX ADMIN — ZINC SULFATE 220 MG (50 MG) CAPSULE 50 MG: CAPSULE at 09:12

## 2024-12-06 RX ADMIN — OXYCODONE HYDROCHLORIDE AND ACETAMINOPHEN 1 TABLET: 5; 325 TABLET ORAL at 18:35

## 2024-12-06 RX ADMIN — SODIUM CHLORIDE, PRESERVATIVE FREE 10 ML: 5 INJECTION INTRAVENOUS at 09:13

## 2024-12-06 RX ADMIN — OXYCODONE HYDROCHLORIDE AND ACETAMINOPHEN 1 TABLET: 5; 325 TABLET ORAL at 11:13

## 2024-12-06 RX ADMIN — Medication 1 TABLET: at 09:13

## 2024-12-06 ASSESSMENT — PAIN SCALES - WONG BAKER
WONGBAKER_NUMERICALRESPONSE: NO HURT
WONGBAKER_NUMERICALRESPONSE: HURTS LITTLE MORE
WONGBAKER_NUMERICALRESPONSE: NO HURT

## 2024-12-06 ASSESSMENT — PAIN DESCRIPTION - PAIN TYPE
TYPE: SURGICAL PAIN
TYPE: ACUTE PAIN

## 2024-12-06 ASSESSMENT — PAIN DESCRIPTION - ORIENTATION
ORIENTATION: RIGHT

## 2024-12-06 ASSESSMENT — PAIN DESCRIPTION - FREQUENCY: FREQUENCY: INTERMITTENT

## 2024-12-06 ASSESSMENT — PAIN DESCRIPTION - LOCATION
LOCATION: KNEE
LOCATION: KNEE
LOCATION: KNEE;LEG
LOCATION: KNEE

## 2024-12-06 ASSESSMENT — PAIN DESCRIPTION - DESCRIPTORS
DESCRIPTORS: ACHING;DISCOMFORT
DESCRIPTORS: ACHING
DESCRIPTORS: ACHING;DISCOMFORT
DESCRIPTORS: ACHING;DISCOMFORT

## 2024-12-06 ASSESSMENT — PAIN - FUNCTIONAL ASSESSMENT
PAIN_FUNCTIONAL_ASSESSMENT: PREVENTS OR INTERFERES SOME ACTIVE ACTIVITIES AND ADLS
PAIN_FUNCTIONAL_ASSESSMENT: ACTIVITIES ARE NOT PREVENTED
PAIN_FUNCTIONAL_ASSESSMENT: ACTIVITIES ARE NOT PREVENTED

## 2024-12-06 ASSESSMENT — PAIN SCALES - GENERAL
PAINLEVEL_OUTOF10: 9
PAINLEVEL_OUTOF10: 6
PAINLEVEL_OUTOF10: 5
PAINLEVEL_OUTOF10: 3
PAINLEVEL_OUTOF10: 4
PAINLEVEL_OUTOF10: 0
PAINLEVEL_OUTOF10: 5
PAINLEVEL_OUTOF10: 0

## 2024-12-06 ASSESSMENT — PAIN DESCRIPTION - ONSET: ONSET: ON-GOING

## 2024-12-06 NOTE — PATIENT CARE CONFERENCE
ACUTE REHAB TEAM CONFERENCE SUMMARY   [facility]    NAME: Esha Paz  : 1950 ADMIT DATE: 2024    Rehab Admitting Dx:Unilateral primary osteoarthritis, right knee [M17.11]  S/P TKR (total knee replacement), left [Z96.652]  Patient Comorbid Conditions:   Active Hospital Problems    Diagnosis Date Noted    S/P TKR (total knee replacement), left [Z96.652] 2024     Date: 2024    CASE MANAGEMENT  Current issues/needs regarding patient and family discharge status: ***  Patient and family goal: ***    PHYSICAL THERAPY (Updated in QI)  Short Term Goals  Time Frame for Short Term Goals: 1 week  Short Term Goal 1: Pt will perform bed mobility with independence  Short Term Goal 2: Pt will perform all functional transfers with mod I  Short Term Goal 3: Pt will ambulate with 2ww 300' on level surface and at least 10' on unlevel surface with mod I  Short Term Goal 4: Pt will negotiate curb step with 2ww and 12 steps with B rail with mod I  Short Term Goal 5: Pt will  light object from floor with 2ww and reacher with mod I          Impairments/deficits, barriers:    Body Structures, Functions, Activity Limitations Requiring Skilled Therapeutic Intervention: Decreased functional mobility , Decreased ROM, Decreased balance, Decreased sensation, Decreased endurance, Decreased safe awareness, Decreased strength, Decreased high-level IADLs, Increased pain, Decreased posture     Therapy Prognosis: Good  Decision Making: Medium Complexity  Clinical Presentation: evolving characteristics  Equipment needed at discharge:***      PT IRF-DENISA scores since initial assessment  Bed Mobility:   Roll Left and Right  Assistance Needed: Independent  Comment: with rails  CARE Score: 6  Discharge Goal: Independent    Sit to Lying  Assistance Needed: Partial/moderate assistance  Comment: min assist RLE  CARE Score: 3  Discharge Goal: Independent    Lying to Sitting on Side of Bed  Assistance

## 2024-12-06 NOTE — PROGRESS NOTES
Occupational Therapy  Physical Rehabilitation: OCCUPATIONAL THERAPY     [x] daily progress note       [] discharge       Patient Name:  Esha Paz   :  1950 MRN: [id number]  Room:  [address] [place] [address] Date of Admission: 2024  Rehabilitation Diagnosis:   Unilateral primary osteoarthritis, right knee [M17.11]  S/P TKR (total knee replacement), left [Z96.652]       Date 2024       Day of ARU Week:  3   Time IN/OUT 3105-1294   Individual Tx Minutes 60   Group Tx Minutes    Co-Treat Minutes    Concurrent Tx Minutes    TOTAL Tx Time Mins 60   Variance Time    Variance Reason []   Refusal due to:     []   Medical hold/reason:    []   Illness   []   Off Unit for test/procedure  []   Extra time needed to complete task  []   Therapeutic need  []   Make up mins were attempted but pt unable to complete due to (specify)  []   Other (specify):   Restrictions Restrictions/Precautions: Fall Risk, General Precautions (WBAT RLE)         Communication with other providers: [x]   OK to see per nursing:     []   Spoke with team member regarding:      Subjective observations and cognitive status: Pt sitting up in recliner on approach; pleasant and agreeable to therapy.      Pain level/location:    /10       Location:    Discharge recommendations  Anticipated discharge date:  TBD  Destination: [x]home alone   []home alone w assist prn   [] home w/ family    [] Continuous supervision       []SNF    [] Assisted living     [] Other:   Continued therapy: [x][facility]HC OT  []OUTPATIENT  OT   [] No Further OT  Equipment needs:   Esha Paz requires a 3 in 1 bedside commode due to being unable to use the toilet within the home and confined to a single room.      Toileting:   SUP for clothing management and bladder hygiene        Toilet Transfers:   SUP c RW to C frame over toilet   Device Used:    []   Standard Toilet         []   Grab Bars           [x]  Bedside Commode frame over toilet       []   Elevated Toilet          []   Other:   training, Group Therapy    Electronically signed by   TRACY Chairez,  12/6/2024, 8:20 AM

## 2024-12-06 NOTE — PROGRESS NOTES
Esha Paz    : 1950  Acct #: [id number]  MRN: [id number]              PM&R Progress Note      Admitting diagnosis: ***    Comorbid diagnoses impacting rehabilitation: ***    Chief complaint: ***    Prior (baseline) level of function: Independent.    Current level of function:         Current  IRF-DENISA and Goals:   Occupational Therapy:    Short Term Goals  Time Frame for Short Term Goals: STGs=LTGs :   Long Term Goals  Time Frame for Long Term Goals : 7 days or until d/c  Long Term Goal 1: Pt will complete grooming tasks Ind  Long Term Goal 2: Pt will complete total body bathing mod I  Long Term Goal 3: Pt will complete UB dressing Ind  Long Term Goal 4: Pt will complete LB dressing mod I using AE PRN  Long Term Goal 5: Pt will doff/don footwear mod I  Additional Goals?: Yes  Long Term Goal 6: Pt will complete toileting mod I  Long Term Goal 7: Pt will complete functional transfers (bed, chair, toilet, shower) c DME PRN and mod I  Long Term Goal 8: Pt will perform therex/therax to facilitate increased strength/endurance/ax tolerance (c emphasis on dynamic standing balance/tolerance >8 mins, BUE endurance) c SBA  Long Term Goal 9: Pt will complete simple homemaking tasks c DME PRN and mod I :                                       Eating: Eating  Assistance Needed: Independent  Comment: able to open packages/containers, feed self  CARE Score: 6  Discharge Goal: Independent       Oral Hygiene: Oral Hygiene  Assistance Needed: Supervision or touching assistance  Comment: supervision in stance at sink  CARE Score: 4  Discharge Goal: Independent    UB/LB Bathing: Shower/Bathe Self  Assistance Needed: Supervision or touching assistance  Comment: completed majority of full shower seated, CGA in stance to bathe perineal area  CARE Score: 4  Discharge Goal: Independent    UB Dressing: Upper Body Dressing  Assistance Needed: Supervision or touching assistance  Comment: to don long sleeve shirt  CARE Score:

## 2024-12-06 NOTE — PROGRESS NOTES
Spiritual Health History and Assessment/Progress Note  [facility]    Spiritual/Emotional Needs,  ,  ,      Name: Esha Paz MRN: [id number]    Age: 74 y.o.     Sex: female   Language: English   Yazidism: Nazarene   S/P TKR (total knee replacement), left     Date: 12/6/2024            Total Time Calculated: 13 min              Spiritual Assessment began in [facility] ARU        Referral/Consult From: Clergy/   Encounter Overview/Reason: Spiritual/Emotional Needs  Service Provided For: Patient    Alysa, Belief, Meaning:   Patient identifies as spiritual, is connected with a alysa tradition or spiritual practice, and has beliefs or practices that help with coping during difficult times  Family/Friends No family/friends present      Importance and Influence:  Patient has spiritual/personal beliefs that influence decisions regarding their health  Family/Friends No family/friends present    Community:  Patient is connected with a spiritual community and feels well-supported. Support system includes: Children, Alysa Community, and Friends  Family/Friends No family/friends present    Assessment and Plan of Care:     Patient Interventions include: Facilitated expression of thoughts and feelings  Family/Friends Interventions include: No family/friends present    Patient Plan of Care: Spiritual Care available upon further referral  Family/Friends Plan of Care: No family/friends present    Electronically signed by Chaplain Arlene on 12/6/2024 at 1:54 PM

## 2024-12-06 NOTE — PLAN OF CARE
Problem: Discharge Planning  Goal: Discharge to home or other facility with appropriate resources  12/6/2024 1036 by Ravi Loera LPN  Outcome: Progressing     Problem: Safety - Adult  Goal: Free from fall injury  12/6/2024 1036 by Ravi Loera LPN  Outcome: Progressing     Problem: ABCDS Injury Assessment  Goal: Absence of physical injury  12/6/2024 1036 by Ravi Loera LPN  Outcome: Progressing     Problem: Pain  Goal: Verbalizes/displays adequate comfort level or baseline comfort level  12/6/2024 1036 by Ravi Loera LPN  Outcome: Progressing

## 2024-12-06 NOTE — PROGRESS NOTES
Physical Therapy  [x] daily progress note       [] discharge       Patient Name:  Esha Paz   :  1950 MRN: [id number]  Room:  [address] [place] [address] Date of Admission: 2024  Rehabilitation Diagnosis:   Unilateral primary osteoarthritis, right knee [M17.11]  S/P TKR (total knee replacement), left [Z96.652]       Date 2024       Day of ARU Week:  3   Time IN/OUT 7317-8599  5848-3174   Individual Tx Minutes 120   Group Tx Minutes    Co-Treat Minutes    Concurrent Tx Minutes    TOTAL Tx Time Mins 120   Variance Time    Variance Reason []   Refusal due to:     []   Medical hold/reason:    []   Illness   []   Off Unit for test/procedure  []   Extra time needed to complete task  []   Therapeutic need  []   Make up mins were attempted but pt unable to complete due to (specify)  []   Other (specify):   Restrictions Restrictions/Precautions  Restrictions/Precautions: Fall Risk, General Precautions (WBAT RLE)      Communication with other providers: [x]   OK to see per nursing:     []   Spoke with team member regarding:      Subjective observations and cognitive status: Pt received in recliner, pleasant and agreeable to treatment. Pt reports sleeping better last night ans states she feels good today. Pt vitals were within normal range at beginning of session. Extra time was taken d/t nursing administering meds at am and pm sessions. Flexion AROM of R knee 84*, PROM 91*, pt reports pain increase to 8/10 during PROM. Extension AROM -3. Pt demos good tolerance of treatment today, especially gait/stairs. Verbalizes discomfort w/ROM/strengthening but was able to tolerate, has positive attitude toward progress and goals.      Pain level/location:   4 /10       Location: R knee   Discharge recommendations  Anticipated discharge date:  tbd  Destination: []home alone   [x]home alone with assist PRN     [] home w/ family      [] Continuous supervision  []SNF    [] Assisted living     [] Other:   Continued therapy: [x]C PT   completed this date:     []   Nu-step:  Time:        Level:         #Steps:       []   Rebounder:    []  Seated     []  Standing        []   Balance training         []   Postural training    [x]   Supine ther ex (reps/sets): R knee flexion stretch w/gait belt 10-30 sec x6, R knee extension w/roll stretch 60 sec, R quad set w/roll x10, ankle pumps x20, buttock squeezes x20, SAQ R x20, B sides SLR x10   []   Seated ther ex (reps/sets):     []   Standing ther ex (reps/sets):     []   Picking up object from floor (standing):                   []   Reacher used   []   Other:   []   Other:    Comments:      Patient/Caregiver Education and Training:   [x]   Bed Mobility/Transfer technique/safety  [x]   Gait technique/sequencing  [x]   Proper use of assistive device  [x]   Advanced mobility safety and technique  []   Reinforced patient's precautions/mobility while maintaining precautions  []   Postural awareness  []   Family training      Treatment Plan for Next Session: Progress gait consistency/distance, stairs, functional mobility, balance, car transfer, progress towards independence w/QI goals, R knee ROM/strengthening      Treatment/Activity Tolerance:   [x] Tolerated treatment with no adverse effects    [] Patient limited by fatigue  [] Patient limited by pain   [] Patient limited by medical complications:    [] Adverse reaction to Tx:   [] Significant change in status    Safety:       [x]  bed alarm set    [x]  chair alarm set    []  Pt refused alarms                []  Telesitter activated      [x]  Gait belt used during tx session      []other:         Number of Minutes/Billable Intervention  Gait Training 60   Therapeutic Exercise 30   Neuro Re-Ed    Therapeutic Activity 30   Wheelchair Propulsion    Group    Other:    TOTAL 120         Social History  Social/Functional History  Lives With: Alone  Type of Home: House  Home Layout: One level  Home Access: Stairs to enter without rails  Entrance Stairs - Number of

## 2024-12-06 NOTE — PROGRESS NOTES
Spiritual Health History and Assessment/Progress Note  [facility]    Spiritual/Emotional Needs,  ,  ,      Name: Esha Paz MRN: [id number]    Age: 74 y.o.     Sex: female   Language: English   Baptist: Nazarene   S/P TKR (total knee replacement), left     Date: 12/6/2024            Total Time Calculated: 5 min              [facility]        Referral/Consult From: Clergy/   Encounter Overview/Reason: Spiritual/Emotional Needs  Service Provided For: Patient    Alysa, Belief, Meaning:   Patient identifies as spiritual, is connected with a aylsa tradition or spiritual practice, and has beliefs or practices that help with coping during difficult times  Family/Friends No family/friends present      Importance and Influence:  Patient has spiritual/personal beliefs that influence decisions regarding their health  Family/Friends No family/friends present    Community:  Patient feels well-supported. Support system includes: Alysa Community  Family/Friends No family/friends present    Assessment and Plan of Care:     Patient Interventions include: Facilitated expression of thoughts and feelings  Family/Friends Interventions include: No family/friends present    Patient Plan of Care: Spiritual Care available upon further referral  Family/Friends Plan of Care: No family/friends present    Electronically signed by Chaplain Arlene on 12/6/2024 at 11:33 AM

## 2024-12-07 LAB
ANION GAP SERPL CALCULATED.3IONS-SCNC: 5 MMOL/L (ref 9–17)
BASOPHILS # BLD: 0.02 K/UL
BASOPHILS NFR BLD: 0 % (ref 0–1)
BUN SERPL-MCNC: 13 MG/DL (ref 7–20)
CALCIUM SERPL-MCNC: 8.1 MG/DL (ref 8.3–10.6)
CHLORIDE SERPL-SCNC: 99 MMOL/L (ref 99–110)
CO2 SERPL-SCNC: 33 MMOL/L (ref 21–32)
CREAT SERPL-MCNC: 0.6 MG/DL (ref 0.6–1.2)
EOSINOPHIL # BLD: 0.09 K/UL
EOSINOPHILS RELATIVE PERCENT: 2 % (ref 0–3)
ERYTHROCYTE [DISTWIDTH] IN BLOOD BY AUTOMATED COUNT: 13.9 % (ref 11.7–14.9)
GFR, ESTIMATED: >90 ML/MIN/1.73M2
GLUCOSE SERPL-MCNC: 115 MG/DL (ref 74–99)
HCT VFR BLD AUTO: 22.1 % (ref 37–47)
HGB BLD-MCNC: 7.2 G/DL (ref 12.5–16)
IMM GRANULOCYTES # BLD AUTO: 0.03 K/UL
IMM GRANULOCYTES NFR BLD: 1 %
LYMPHOCYTES NFR BLD: 0.71 K/UL
LYMPHOCYTES RELATIVE PERCENT: 16 % (ref 24–44)
MAGNESIUM SERPL-MCNC: 1.8 MG/DL (ref 1.8–2.4)
MCH RBC QN AUTO: 31.9 PG (ref 27–31)
MCHC RBC AUTO-ENTMCNC: 32.6 G/DL (ref 32–36)
MCV RBC AUTO: 97.8 FL (ref 78–100)
MONOCYTES NFR BLD: 0.32 K/UL
MONOCYTES NFR BLD: 7 % (ref 0–4)
NEUTROPHILS NFR BLD: 74 % (ref 36–66)
NEUTS SEG NFR BLD: 3.32 K/UL
PLATELET # BLD AUTO: 166 K/UL (ref 140–440)
PMV BLD AUTO: 9.1 FL (ref 7.5–11.1)
POTASSIUM SERPL-SCNC: 3.9 MMOL/L (ref 3.5–5.1)
RBC # BLD AUTO: 2.26 M/UL (ref 4.2–5.4)
SODIUM SERPL-SCNC: 137 MMOL/L (ref 136–145)
WBC OTHER # BLD: 4.5 K/UL (ref 4–10.5)

## 2024-12-07 PROCEDURE — 97530 THERAPEUTIC ACTIVITIES: CPT

## 2024-12-07 PROCEDURE — 83735 ASSAY OF MAGNESIUM: CPT

## 2024-12-07 PROCEDURE — 6370000000 HC RX 637 (ALT 250 FOR IP): Performed by: PHYSICAL MEDICINE & REHABILITATION

## 2024-12-07 PROCEDURE — 97116 GAIT TRAINING THERAPY: CPT

## 2024-12-07 PROCEDURE — 2580000003 HC RX 258: Performed by: NURSE PRACTITIONER

## 2024-12-07 PROCEDURE — 80048 BASIC METABOLIC PNL TOTAL CA: CPT

## 2024-12-07 PROCEDURE — 94761 N-INVAS EAR/PLS OXIMETRY MLT: CPT

## 2024-12-07 PROCEDURE — 85025 COMPLETE CBC W/AUTO DIFF WBC: CPT

## 2024-12-07 PROCEDURE — 36415 COLL VENOUS BLD VENIPUNCTURE: CPT

## 2024-12-07 PROCEDURE — 97112 NEUROMUSCULAR REEDUCATION: CPT

## 2024-12-07 PROCEDURE — 97535 SELF CARE MNGMENT TRAINING: CPT

## 2024-12-07 PROCEDURE — 1280000000 HC REHAB R&B

## 2024-12-07 PROCEDURE — 97110 THERAPEUTIC EXERCISES: CPT

## 2024-12-07 PROCEDURE — 6370000000 HC RX 637 (ALT 250 FOR IP): Performed by: ORTHOPAEDIC SURGERY

## 2024-12-07 RX ORDER — LANOLIN ALCOHOL/MO/W.PET/CERES
400 CREAM (GRAM) TOPICAL DAILY
Status: DISCONTINUED | OUTPATIENT
Start: 2024-12-08 | End: 2024-12-11 | Stop reason: HOSPADM

## 2024-12-07 RX ORDER — POTASSIUM CHLORIDE 1500 MG/1
10 TABLET, EXTENDED RELEASE ORAL 2 TIMES DAILY
Status: DISCONTINUED | OUTPATIENT
Start: 2024-12-08 | End: 2024-12-11 | Stop reason: HOSPADM

## 2024-12-07 RX ADMIN — AMLODIPINE BESYLATE 5 MG: 5 TABLET ORAL at 07:55

## 2024-12-07 RX ADMIN — Medication 2000 UNITS: at 07:56

## 2024-12-07 RX ADMIN — SODIUM CHLORIDE, PRESERVATIVE FREE 10 ML: 5 INJECTION INTRAVENOUS at 07:57

## 2024-12-07 RX ADMIN — ZINC SULFATE 220 MG (50 MG) CAPSULE 50 MG: CAPSULE at 07:53

## 2024-12-07 RX ADMIN — ASPIRIN 325 MG: 325 TABLET ORAL at 20:08

## 2024-12-07 RX ADMIN — Medication 1000 MCG: at 07:54

## 2024-12-07 RX ADMIN — OXYCODONE HYDROCHLORIDE AND ACETAMINOPHEN 1 TABLET: 5; 325 TABLET ORAL at 07:55

## 2024-12-07 RX ADMIN — OXYCODONE HYDROCHLORIDE AND ACETAMINOPHEN 1 TABLET: 5; 325 TABLET ORAL at 18:24

## 2024-12-07 RX ADMIN — ASPIRIN 325 MG: 325 TABLET ORAL at 07:54

## 2024-12-07 RX ADMIN — OXYCODONE HYDROCHLORIDE AND ACETAMINOPHEN 1 TABLET: 5; 325 TABLET ORAL at 13:13

## 2024-12-07 RX ADMIN — Medication 1 TABLET: at 07:55

## 2024-12-07 RX ADMIN — DULOXETINE HYDROCHLORIDE 60 MG: 30 CAPSULE, DELAYED RELEASE ORAL at 07:54

## 2024-12-07 RX ADMIN — LOSARTAN POTASSIUM 50 MG: 25 TABLET, FILM COATED ORAL at 07:53

## 2024-12-07 RX ADMIN — DULOXETINE HYDROCHLORIDE 60 MG: 30 CAPSULE, DELAYED RELEASE ORAL at 20:07

## 2024-12-07 ASSESSMENT — PAIN SCALES - WONG BAKER
WONGBAKER_NUMERICALRESPONSE: HURTS EVEN MORE
WONGBAKER_NUMERICALRESPONSE: NO HURT
WONGBAKER_NUMERICALRESPONSE: NO HURT
WONGBAKER_NUMERICALRESPONSE: HURTS LITTLE MORE
WONGBAKER_NUMERICALRESPONSE: HURTS A LITTLE BIT

## 2024-12-07 ASSESSMENT — PAIN SCALES - GENERAL
PAINLEVEL_OUTOF10: 7
PAINLEVEL_OUTOF10: 1
PAINLEVEL_OUTOF10: 0
PAINLEVEL_OUTOF10: 6
PAINLEVEL_OUTOF10: 4
PAINLEVEL_OUTOF10: 0
PAINLEVEL_OUTOF10: 3

## 2024-12-07 ASSESSMENT — PAIN DESCRIPTION - LOCATION
LOCATION: KNEE

## 2024-12-07 ASSESSMENT — PAIN DESCRIPTION - ORIENTATION
ORIENTATION: LEFT
ORIENTATION: RIGHT
ORIENTATION: LEFT
ORIENTATION: LEFT

## 2024-12-07 ASSESSMENT — PAIN DESCRIPTION - FREQUENCY: FREQUENCY: INTERMITTENT

## 2024-12-07 ASSESSMENT — PAIN DESCRIPTION - DESCRIPTORS
DESCRIPTORS: ACHING

## 2024-12-07 ASSESSMENT — PAIN DESCRIPTION - PAIN TYPE: TYPE: SURGICAL PAIN

## 2024-12-07 ASSESSMENT — PAIN - FUNCTIONAL ASSESSMENT: PAIN_FUNCTIONAL_ASSESSMENT: ACTIVITIES ARE NOT PREVENTED

## 2024-12-07 ASSESSMENT — PAIN DESCRIPTION - ONSET: ONSET: ON-GOING

## 2024-12-07 NOTE — PROGRESS NOTES
Physical Rehabilitation: OCCUPATIONAL THERAPY     [x] daily progress note       [] discharge       Patient Name:  Esha Paz   :  1950 MRN: [id number]  Room:  [address] [place] [address] Date of Admission: 2024  Rehabilitation Diagnosis:   Unilateral primary osteoarthritis, right knee [M17.11]  S/P TKR (total knee replacement), left [Z96.652]       Date 2024       Day of ARU Week:  4   Time IN/OUT 1963-1627   Individual Tx Minutes 60   Group Tx Minutes    Co-Treat Minutes    Concurrent Tx Minutes    TOTAL Tx Time Mins 60   Variance Time    Variance Reason []   Refusal due to:     []   Medical hold/reason:    []   Illness   []   Off Unit for test/procedure  []   Extra time needed to complete task  []   Therapeutic need  []   Make up mins were attempted but pt unable to complete due to (specify)  []   Other (specify):   Restrictions Restrictions/Precautions  Restrictions/Precautions: Fall Risk, General Precautions (WBAT RLE)      Communication with other providers: [x]   OK to see per nursing:     []   Spoke with team member regarding:      Subjective observations and cognitive status: \"I have a little pain in my  right knee.\"       Pain level/location:  5  /10       Location: R knee   Discharge recommendations  Anticipated discharge date:     Destination: []home alone   []home alone w assist prn [] home w/ family    [] Continuous supervision       []SNF            [] Assisted living     [] Other:   Continued therapy: []HHC OT  []OUTPATIENT  OT   [] No Further OT  Equipment needs:     (HIT F2 to transition between stars)    Eating/Feeding:         Extremity Used:        []    R UE []    L UE         Dentures: []   N/A    []    Partial []    Full  Adaptive Equipment Used:        Grooming:    independent while standing at sink to comb hair & brush teeth x5 min.  Oral Hygiene:   independent to brush teeth with fair balance while standing at sink.      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)  Additional Comments: sleeps in flat bed    Objective                                                                                    Goals:  (Update in navigator)  Short Term Goals  Time Frame for Short Term Goals: STGs=LTGs:  Long Term Goals  Time Frame for Long Term Goals : 7 days or until d/c  Long Term Goal 1: Pt will complete grooming tasks Ind  Long Term Goal 2: Pt will complete total body bathing mod I  Long Term Goal 3: Pt will complete UB dressing Ind  Long Term Goal 4: Pt will complete LB dressing mod I using AE PRN  Long Term Goal 5: Pt will doff/don footwear mod I  Additional Goals?: Yes  Long Term Goal 6: Pt will complete toileting mod I  Long Term Goal 7: Pt will complete functional transfers (bed, chair, toilet, shower) c DME PRN and mod I  Long Term Goal 8: Pt will perform therex/therax to facilitate increased strength/endurance/ax tolerance (c emphasis on dynamic standing balance/tolerance >8 mins, BUE endurance) c SBA  Long Term Goal 9: Pt will complete simple homemaking tasks c DME PRN and mod I:        Plan of Care                                                                              Times per week: 5 days per week for a minimum of 60 minutes/day plus group as appropriate for 60 minutes.  Treatment to include Occupational Therapy Plan  Current Treatment Recommendations: Strengthening, Balance training, Functional mobility training, Endurance training, Pain management, Safety education & training, Patient/Caregiver education & training, Equipment evaluation, education, & procurement, Self-Care / ADL, Home management training, Group Therapy    Electronically signed by   Cindy SERRATO

## 2024-12-07 NOTE — PROGRESS NOTES
shower, Built-in shower seat, Toilet raiser (molded plastic riser without handles(does not use but has available))  Bathroom Accessibility: Walker accessible  Home Equipment: Walker - Rolling, Cane, Reacher  Has the patient had two or more falls in the past year or any fall with injury in the past year?: No (fall in June exacerbated the pain but no injury)  Receives Help From: Family, Friend(s)  Prior Level of Assist for ADLs: Independent  Prior Level of Assist for Homemaking: Independent  Homemaking Responsibilities: Yes  Meal Prep Responsibility: Primary  Laundry Responsibility: Primary  Cleaning Responsibility: Primary  Bill Paying/Finance Responsibility: Primary  Shopping Responsibility: Primary  Dependent Care Responsibility: Primary  Health Care Management: Primary  Prior Level of Assist for Ambulation: Independent community 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)  Additional Comments: sleeps in flat bed    Objective                                                                                    Goals:  (Update in navigator)  Short Term Goals  Time Frame for Short Term Goals: 1 week  Short Term Goal 1: Pt will perform bed mobility with independence  Short Term Goal 2: Pt will perform all functional transfers with mod I  Short Term Goal 3: Pt will ambulate with 2ww 300' on level surface and at least 10' on unlevel surface with mod I  Short Term Goal 4: Pt will negotiate curb step with 2ww and 12 steps with B rail with mod I  Short Term Goal 5: Pt will  light object from floor with 2ww and reacher with mod I:   :        Plan of Care                                                                              Times per week: 5 days per week for  a minimum of 60 minutes/day plus group as appropriate for 60 minutes.  Treatment to include Current Treatment Recommendations: Strengthening, ROM, Balance training, Functional mobility training, Transfer training, IADL training, Endurance training, Gait training, Stair training, Neuromuscular re-education, Manual, Home exercise program, Pain management, Safety education & training, Patient/Caregiver education & training, Equipment evaluation, education, & procurement, Positioning, Modalities, Group Therapy, Therapeutic activities    Electronically signed by   Shelby Vines PTA, [id number]  12/7/2024, 8:17 AM

## 2024-12-08 VITALS
TEMPERATURE: 97.7 F | RESPIRATION RATE: 18 BRPM | DIASTOLIC BLOOD PRESSURE: 71 MMHG | SYSTOLIC BLOOD PRESSURE: 110 MMHG | HEART RATE: 76 BPM | WEIGHT: 156.97 LBS | HEIGHT: 64 IN | OXYGEN SATURATION: 100 % | BODY MASS INDEX: 26.8 KG/M2

## 2024-12-08 PROCEDURE — 6370000000 HC RX 637 (ALT 250 FOR IP): Performed by: PHYSICAL MEDICINE & REHABILITATION

## 2024-12-08 PROCEDURE — 1280000000 HC REHAB R&B

## 2024-12-08 PROCEDURE — 94761 N-INVAS EAR/PLS OXIMETRY MLT: CPT

## 2024-12-08 PROCEDURE — 6370000000 HC RX 637 (ALT 250 FOR IP): Performed by: ORTHOPAEDIC SURGERY

## 2024-12-08 PROCEDURE — 94150 VITAL CAPACITY TEST: CPT

## 2024-12-08 PROCEDURE — 2580000003 HC RX 258: Performed by: NURSE PRACTITIONER

## 2024-12-08 RX ADMIN — ASPIRIN 325 MG: 325 TABLET ORAL at 20:12

## 2024-12-08 RX ADMIN — OXYCODONE HYDROCHLORIDE AND ACETAMINOPHEN 1 TABLET: 5; 325 TABLET ORAL at 06:06

## 2024-12-08 RX ADMIN — Medication 1000 MCG: at 09:29

## 2024-12-08 RX ADMIN — DULOXETINE HYDROCHLORIDE 60 MG: 30 CAPSULE, DELAYED RELEASE ORAL at 20:12

## 2024-12-08 RX ADMIN — Medication 1 TABLET: at 09:28

## 2024-12-08 RX ADMIN — POTASSIUM CHLORIDE 10 MEQ: 1500 TABLET, EXTENDED RELEASE ORAL at 20:12

## 2024-12-08 RX ADMIN — Medication 400 MG: at 09:28

## 2024-12-08 RX ADMIN — ZINC SULFATE 220 MG (50 MG) CAPSULE 50 MG: CAPSULE at 09:29

## 2024-12-08 RX ADMIN — Medication 2000 UNITS: at 09:28

## 2024-12-08 RX ADMIN — LOSARTAN POTASSIUM 50 MG: 25 TABLET, FILM COATED ORAL at 09:28

## 2024-12-08 RX ADMIN — OXYCODONE HYDROCHLORIDE AND ACETAMINOPHEN 1 TABLET: 5; 325 TABLET ORAL at 15:14

## 2024-12-08 RX ADMIN — SODIUM CHLORIDE, PRESERVATIVE FREE 10 ML: 5 INJECTION INTRAVENOUS at 12:47

## 2024-12-08 RX ADMIN — AMLODIPINE BESYLATE 5 MG: 5 TABLET ORAL at 09:28

## 2024-12-08 RX ADMIN — DULOXETINE HYDROCHLORIDE 60 MG: 30 CAPSULE, DELAYED RELEASE ORAL at 09:28

## 2024-12-08 RX ADMIN — POTASSIUM CHLORIDE 10 MEQ: 1500 TABLET, EXTENDED RELEASE ORAL at 09:28

## 2024-12-08 RX ADMIN — ASPIRIN 325 MG: 325 TABLET ORAL at 09:28

## 2024-12-08 ASSESSMENT — PAIN - FUNCTIONAL ASSESSMENT: PAIN_FUNCTIONAL_ASSESSMENT: ACTIVITIES ARE NOT PREVENTED

## 2024-12-08 ASSESSMENT — PAIN DESCRIPTION - ORIENTATION
ORIENTATION: RIGHT

## 2024-12-08 ASSESSMENT — PAIN DESCRIPTION - LOCATION
LOCATION: LEG;KNEE
LOCATION: KNEE
LOCATION: KNEE

## 2024-12-08 ASSESSMENT — PAIN DESCRIPTION - DESCRIPTORS
DESCRIPTORS: DULL;DISCOMFORT
DESCRIPTORS: ACHING
DESCRIPTORS: ACHING

## 2024-12-08 ASSESSMENT — PAIN SCALES - GENERAL
PAINLEVEL_OUTOF10: 2
PAINLEVEL_OUTOF10: 5
PAINLEVEL_OUTOF10: 4
PAINLEVEL_OUTOF10: 2

## 2024-12-09 PROCEDURE — 97530 THERAPEUTIC ACTIVITIES: CPT

## 2024-12-09 PROCEDURE — 1280000000 HC REHAB R&B

## 2024-12-09 PROCEDURE — 99232 SBSQ HOSP IP/OBS MODERATE 35: CPT | Performed by: PHYSICAL MEDICINE & REHABILITATION

## 2024-12-09 PROCEDURE — 6370000000 HC RX 637 (ALT 250 FOR IP): Performed by: PHYSICAL MEDICINE & REHABILITATION

## 2024-12-09 PROCEDURE — 97110 THERAPEUTIC EXERCISES: CPT

## 2024-12-09 PROCEDURE — 97116 GAIT TRAINING THERAPY: CPT

## 2024-12-09 PROCEDURE — 6370000000 HC RX 637 (ALT 250 FOR IP): Performed by: ORTHOPAEDIC SURGERY

## 2024-12-09 PROCEDURE — 97535 SELF CARE MNGMENT TRAINING: CPT

## 2024-12-09 PROCEDURE — 94761 N-INVAS EAR/PLS OXIMETRY MLT: CPT

## 2024-12-09 RX ORDER — DIPHENOXYLATE HYDROCHLORIDE AND ATROPINE SULFATE 2.5; .025 MG/1; MG/1
1 TABLET ORAL 4 TIMES DAILY PRN
Status: DISCONTINUED | OUTPATIENT
Start: 2024-12-09 | End: 2024-12-11 | Stop reason: HOSPADM

## 2024-12-09 RX ADMIN — Medication 1 TABLET: at 08:15

## 2024-12-09 RX ADMIN — POTASSIUM CHLORIDE 10 MEQ: 1500 TABLET, EXTENDED RELEASE ORAL at 20:06

## 2024-12-09 RX ADMIN — Medication 2000 UNITS: at 08:15

## 2024-12-09 RX ADMIN — Medication 400 MG: at 08:15

## 2024-12-09 RX ADMIN — DULOXETINE HYDROCHLORIDE 60 MG: 30 CAPSULE, DELAYED RELEASE ORAL at 20:06

## 2024-12-09 RX ADMIN — DULOXETINE HYDROCHLORIDE 60 MG: 30 CAPSULE, DELAYED RELEASE ORAL at 08:15

## 2024-12-09 RX ADMIN — AMLODIPINE BESYLATE 5 MG: 5 TABLET ORAL at 08:15

## 2024-12-09 RX ADMIN — OXYCODONE HYDROCHLORIDE AND ACETAMINOPHEN 1 TABLET: 5; 325 TABLET ORAL at 05:35

## 2024-12-09 RX ADMIN — ASPIRIN 325 MG: 325 TABLET ORAL at 20:06

## 2024-12-09 RX ADMIN — ZINC SULFATE 220 MG (50 MG) CAPSULE 50 MG: CAPSULE at 08:14

## 2024-12-09 RX ADMIN — OXYCODONE HYDROCHLORIDE AND ACETAMINOPHEN 1 TABLET: 5; 325 TABLET ORAL at 11:28

## 2024-12-09 RX ADMIN — Medication 1000 MCG: at 08:15

## 2024-12-09 RX ADMIN — POTASSIUM CHLORIDE 10 MEQ: 1500 TABLET, EXTENDED RELEASE ORAL at 08:14

## 2024-12-09 RX ADMIN — ASPIRIN 325 MG: 325 TABLET ORAL at 08:15

## 2024-12-09 ASSESSMENT — PAIN DESCRIPTION - DESCRIPTORS
DESCRIPTORS: ACHING;DISCOMFORT
DESCRIPTORS: DISCOMFORT
DESCRIPTORS: ACHING

## 2024-12-09 ASSESSMENT — PAIN DESCRIPTION - ORIENTATION
ORIENTATION: RIGHT

## 2024-12-09 ASSESSMENT — PAIN SCALES - GENERAL
PAINLEVEL_OUTOF10: 1
PAINLEVEL_OUTOF10: 6
PAINLEVEL_OUTOF10: 6
PAINLEVEL_OUTOF10: 4

## 2024-12-09 ASSESSMENT — PAIN DESCRIPTION - ONSET
ONSET: ON-GOING
ONSET: ON-GOING

## 2024-12-09 ASSESSMENT — PAIN DESCRIPTION - LOCATION
LOCATION: KNEE

## 2024-12-09 ASSESSMENT — PAIN DESCRIPTION - PAIN TYPE
TYPE: SURGICAL PAIN
TYPE: SURGICAL PAIN

## 2024-12-09 ASSESSMENT — PAIN - FUNCTIONAL ASSESSMENT
PAIN_FUNCTIONAL_ASSESSMENT: PREVENTS OR INTERFERES SOME ACTIVE ACTIVITIES AND ADLS

## 2024-12-09 ASSESSMENT — PAIN DESCRIPTION - FREQUENCY
FREQUENCY: INTERMITTENT
FREQUENCY: INTERMITTENT

## 2024-12-09 NOTE — PROGRESS NOTES
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)  Additional Comments: sleeps in flat bed    Objective                                                                                    Goals:  (Update in navigator)  Short Term Goals  Time Frame for Short Term Goals: 1 week  Short Term Goal 1: Pt will perform bed mobility with independence  Short Term Goal 2: Pt will perform all functional transfers with mod I  Short Term Goal 3: Pt will ambulate with 2ww 300' on level surface and at least 10' on unlevel surface with mod I  Short Term Goal 4: Pt will negotiate curb step with 2ww and 12 steps with B rail with mod I  Short Term Goal 5: Pt will  light object from floor with 2ww and reacher with mod I:   :        Plan of Care                                                                              Times per week: 5 days per week for a minimum of 60 minutes/day plus group as appropriate for 60 minutes.  Treatment to include Current Treatment Recommendations: Strengthening, ROM, Balance training, Functional mobility training, Transfer training, IADL training, Endurance training, Gait training, Stair training, Neuromuscular re-education, Manual, Home exercise program, Pain management, Safety education & training, Patient/Caregiver education & training, Equipment evaluation, education, & procurement, Positioning, Modalities, Group Therapy, Therapeutic activities    Electronically signed by   Therapy treatment intervention and documentation provided by Rachelle Casas Student Physical Therapist Assistant   Treatment observed and documentation reviewed by: Steph Vazquez PTA, CI   12/9/2024, 12:50 PM

## 2024-12-09 NOTE — PROGRESS NOTES
Occupational Therapy    Physical Rehabilitation: OCCUPATIONAL THERAPY     [x] daily progress note       [] discharge       Patient Name:  Esha Paz   :  1950 MRN: [id number]  Room:  [address] [place] [address] Date of Admission: 2024  Rehabilitation Diagnosis:   Unilateral primary osteoarthritis, right knee [M17.11]  S/P TKR (total knee replacement), left [Z96.652]       Date 2024       Day of ARU Week:  6   Time IN/OUT 8864-4222  5740-1841   Individual Tx Minutes 65+55   Group Tx Minutes    Co-Treat Minutes    Concurrent Tx Minutes    TOTAL Tx Time Mins 120   Variance Time    Variance Reason []   Refusal due to:     []   Medical hold/reason:    []   Illness   []   Off Unit for test/procedure  []   Extra time needed to complete task  []   Therapeutic need  []   Make up mins were attempted but pt unable to complete due to (specify)  []   Other (specify):   Restrictions Restrictions/Precautions: Fall Risk, General Precautions (WBAT RLE)         Communication with other providers: [x]   OK to see per nursing:     []   Spoke with team member regarding:      Subjective observations and cognitive status: Pt finishing up with PT on approach; pleasant and agreeable to therapy.      Pain level/location:    /10       Location:    Discharge recommendations  Anticipated discharge date:  TBD  Destination: [x]home alone   []home alone w assist prn   [] home w/ family    [] Continuous supervision       []SNF    [] Assisted living     [] Other:   Continued therapy: [x]HHC OT  []OUTPATIENT  OT   [] No Further OT  Equipment needs:   Esha Paz requires a 3 in 1 bedside commode due to being unable to use the toilet within the home and confined to a single room.        ADLs:    Eating: Eating  Assistance Needed: Independent  Comment: able to open packages/containers, feed self  CARE Score: 6  Discharge Goal: Independent       Oral Hygiene: Oral Hygiene  Assistance Needed: Independent  Comment: IND seated at sink  CARE Score:

## 2024-12-10 LAB
ANION GAP SERPL CALCULATED.3IONS-SCNC: 7 MMOL/L (ref 9–17)
BUN SERPL-MCNC: 14 MG/DL (ref 7–20)
CALCIUM SERPL-MCNC: 8.8 MG/DL (ref 8.3–10.6)
CHLORIDE SERPL-SCNC: 104 MMOL/L (ref 99–110)
CO2 SERPL-SCNC: 32 MMOL/L (ref 21–32)
CREAT SERPL-MCNC: 0.6 MG/DL (ref 0.6–1.2)
ERYTHROCYTE [DISTWIDTH] IN BLOOD BY AUTOMATED COUNT: 14.1 % (ref 11.7–14.9)
GFR, ESTIMATED: >90 ML/MIN/1.73M2
GLUCOSE SERPL-MCNC: 107 MG/DL (ref 74–99)
HCT VFR BLD AUTO: 24.1 % (ref 37–47)
HGB BLD-MCNC: 7.7 G/DL (ref 12.5–16)
MAGNESIUM SERPL-MCNC: 1.8 MG/DL (ref 1.8–2.4)
MCH RBC QN AUTO: 31.6 PG (ref 27–31)
MCHC RBC AUTO-ENTMCNC: 32 G/DL (ref 32–36)
MCV RBC AUTO: 98.8 FL (ref 78–100)
PLATELET # BLD AUTO: 212 K/UL (ref 140–440)
PMV BLD AUTO: 9.2 FL (ref 7.5–11.1)
POTASSIUM SERPL-SCNC: 4.2 MMOL/L (ref 3.5–5.1)
RBC # BLD AUTO: 2.44 M/UL (ref 4.2–5.4)
SODIUM SERPL-SCNC: 143 MMOL/L (ref 136–145)
WBC OTHER # BLD: 4.2 K/UL (ref 4–10.5)

## 2024-12-10 PROCEDURE — 97535 SELF CARE MNGMENT TRAINING: CPT

## 2024-12-10 PROCEDURE — 85027 COMPLETE CBC AUTOMATED: CPT

## 2024-12-10 PROCEDURE — 36415 COLL VENOUS BLD VENIPUNCTURE: CPT

## 2024-12-10 PROCEDURE — 6370000000 HC RX 637 (ALT 250 FOR IP): Performed by: PHYSICAL MEDICINE & REHABILITATION

## 2024-12-10 PROCEDURE — 94150 VITAL CAPACITY TEST: CPT

## 2024-12-10 PROCEDURE — 99232 SBSQ HOSP IP/OBS MODERATE 35: CPT | Performed by: PHYSICAL MEDICINE & REHABILITATION

## 2024-12-10 PROCEDURE — 80048 BASIC METABOLIC PNL TOTAL CA: CPT

## 2024-12-10 PROCEDURE — 1280000000 HC REHAB R&B

## 2024-12-10 PROCEDURE — 6370000000 HC RX 637 (ALT 250 FOR IP): Performed by: ORTHOPAEDIC SURGERY

## 2024-12-10 PROCEDURE — 83735 ASSAY OF MAGNESIUM: CPT

## 2024-12-10 PROCEDURE — 97530 THERAPEUTIC ACTIVITIES: CPT

## 2024-12-10 PROCEDURE — 97110 THERAPEUTIC EXERCISES: CPT

## 2024-12-10 PROCEDURE — 97116 GAIT TRAINING THERAPY: CPT

## 2024-12-10 RX ADMIN — POTASSIUM CHLORIDE 10 MEQ: 1500 TABLET, EXTENDED RELEASE ORAL at 09:04

## 2024-12-10 RX ADMIN — Medication 400 MG: at 09:05

## 2024-12-10 RX ADMIN — Medication 2000 UNITS: at 09:06

## 2024-12-10 RX ADMIN — OXYCODONE HYDROCHLORIDE AND ACETAMINOPHEN 1 TABLET: 5; 325 TABLET ORAL at 04:44

## 2024-12-10 RX ADMIN — ASPIRIN 325 MG: 325 TABLET ORAL at 09:02

## 2024-12-10 RX ADMIN — DIPHENOXYLATE HYDROCHLORIDE AND ATROPINE SULFATE 1 TABLET: 2.5; .025 TABLET ORAL at 09:11

## 2024-12-10 RX ADMIN — DULOXETINE HYDROCHLORIDE 60 MG: 30 CAPSULE, DELAYED RELEASE ORAL at 09:02

## 2024-12-10 RX ADMIN — ACETAMINOPHEN 650 MG: 325 TABLET ORAL at 16:41

## 2024-12-10 RX ADMIN — ASPIRIN 325 MG: 325 TABLET ORAL at 20:38

## 2024-12-10 RX ADMIN — OXYCODONE HYDROCHLORIDE AND ACETAMINOPHEN 1 TABLET: 5; 325 TABLET ORAL at 20:39

## 2024-12-10 RX ADMIN — DULOXETINE HYDROCHLORIDE 60 MG: 30 CAPSULE, DELAYED RELEASE ORAL at 20:38

## 2024-12-10 RX ADMIN — Medication 1000 MCG: at 09:03

## 2024-12-10 RX ADMIN — OXYCODONE HYDROCHLORIDE AND ACETAMINOPHEN 1 TABLET: 5; 325 TABLET ORAL at 09:03

## 2024-12-10 RX ADMIN — Medication 1 TABLET: at 09:05

## 2024-12-10 RX ADMIN — ZINC SULFATE 220 MG (50 MG) CAPSULE 50 MG: CAPSULE at 09:02

## 2024-12-10 RX ADMIN — POTASSIUM CHLORIDE 10 MEQ: 1500 TABLET, EXTENDED RELEASE ORAL at 20:38

## 2024-12-10 ASSESSMENT — PAIN DESCRIPTION - FREQUENCY
FREQUENCY: INTERMITTENT

## 2024-12-10 ASSESSMENT — PAIN DESCRIPTION - LOCATION
LOCATION: KNEE

## 2024-12-10 ASSESSMENT — PAIN SCALES - WONG BAKER
WONGBAKER_NUMERICALRESPONSE: HURTS EVEN MORE
WONGBAKER_NUMERICALRESPONSE: HURTS LITTLE MORE

## 2024-12-10 ASSESSMENT — PAIN DESCRIPTION - PAIN TYPE
TYPE: SURGICAL PAIN

## 2024-12-10 ASSESSMENT — PAIN DESCRIPTION - ORIENTATION
ORIENTATION: RIGHT

## 2024-12-10 ASSESSMENT — PAIN DESCRIPTION - DESCRIPTORS
DESCRIPTORS: ACHING
DESCRIPTORS: DISCOMFORT
DESCRIPTORS: ACHING
DESCRIPTORS: DISCOMFORT

## 2024-12-10 ASSESSMENT — PAIN DESCRIPTION - ONSET
ONSET: ON-GOING

## 2024-12-10 ASSESSMENT — PAIN SCALES - GENERAL
PAINLEVEL_OUTOF10: 5
PAINLEVEL_OUTOF10: 0
PAINLEVEL_OUTOF10: 6
PAINLEVEL_OUTOF10: 5
PAINLEVEL_OUTOF10: 6
PAINLEVEL_OUTOF10: 3
PAINLEVEL_OUTOF10: 5
PAINLEVEL_OUTOF10: 4

## 2024-12-10 NOTE — PLAN OF CARE
Problem: Discharge Planning  Goal: Discharge to home or other facility with appropriate resources  12/10/2024 1047 by Sienna Wang LPN  Outcome: Progressing  12/9/2024 2219 by Karlie Cardenas, RN  Outcome: Progressing     Problem: Safety - Adult  Goal: Free from fall injury  12/10/2024 1047 by Sienna Wang LPN  Outcome: Progressing  12/9/2024 2219 by Karlie Cardenas, RN  Outcome: Progressing     Problem: ABCDS Injury Assessment  Goal: Absence of physical injury  12/10/2024 1047 by Sienna Wang LPN  Outcome: Progressing  12/9/2024 2219 by Karlie Cardenas RN  Outcome: Progressing     Problem: Pain  Goal: Verbalizes/displays adequate comfort level or baseline comfort level  12/10/2024 1047 by Sienna Wang LPN  Outcome: Progressing  12/9/2024 2219 by Karlie Cardenas, RN  Outcome: Progressing

## 2024-12-10 NOTE — DISCHARGE INSTRUCTIONS
Your information:  Name: Esha Paz  : 1950    Your instructions:    Pt is discharging to home with referral to [facility] Out[facility]  [address] [id number]   [phone]    Pt will discharge home with medical supplies from   [facility]  [address] [place] [address]  [phone]    [facility] Meals to [facility] [place]   [phone]    What to do after you leave the hospital:    Recommended diet: {diet:59962}    Recommended activity: {discharge activity:83645}    The following personal items were collected during your admission and were returned to you:    Belongings  Dental Appliances: None  Vision - Corrective Lenses: Eyeglasses  Hearing Aid: None  Clothing: Footwear, Jacket/Coat, Pajamas, Pants, Shirt, Shorts, Socks, Undergarments, At bedside  Jewelry: None  Electronic Devices: Cell Phone, At bedside  Weapons (Notify Protective Services/Security): None  Home Medications: None  Valuables Given To: Patient  Provide Name(s) of Who Valuable(s) Were Given To: patient    Information obtained by:  By signing below, I understand that if any problems occur once I leave the hospital I am to contact ***.  I understand and acknowledge receipt of the instructions indicated above.    no

## 2024-12-10 NOTE — DISCHARGE INSTR - ACTIVITY
Pt is discharging to home with referral to [facility] Out[facility]  [address] [place] [address]   [phone]    Pt will discharge home with medical supplies from   [facility]  [address] [place] [address]  [phone]    [facility] Meals to [facility]   [phone]

## 2024-12-10 NOTE — PROGRESS NOTES
Occupational Therapy    Physical Rehabilitation: OCCUPATIONAL THERAPY     [] daily progress note       [x] discharge       Patient Name:  Esha Paz   :  1950 MRN: [id number]  Room:  [address] [place] [address] Date of Admission: 2024  Rehabilitation Diagnosis:   Unilateral primary osteoarthritis, right knee [M17.11]  S/P TKR (total knee replacement), left [Z96.652]       Date 12/10/2024       Day of ARU Week:  7   Time IN/OUT 4569-4529  1589-5689   Individual Tx Minutes 60+60   Group Tx Minutes    Co-Treat Minutes    Concurrent Tx Minutes    TOTAL Tx Time Mins 120   Variance Time    Variance Reason []   Refusal due to:     []   Medical hold/reason:    []   Illness   []   Off Unit for test/procedure  []   Extra time needed to complete task  []   Therapeutic need  []   Make up mins were attempted but pt unable to complete due to (specify)  []   Other (specify):   Restrictions Restrictions/Precautions: Fall Risk, General Precautions (WBAT RLE)         Communication with other providers: [x]   OK to see per nursing:     []   Spoke with team member regarding:      Subjective observations and cognitive status: Pt sitting up in recliner on approach; pleasant and agreeable to therapy.      Pain level/location:    5/10       Location: R knee    Discharge recommendations  Anticipated discharge date:  TBD  Destination: [x]home alone   []home alone w assist prn   [] home w/ family    [] Continuous supervision       []SNF    [] Assisted living     [] Other:   Continued therapy: [x]HHC OT  []OUTPATIENT  OT   [] No Further OT  Equipment needs:   Esha Paz requires a 3 in 1 bedside commode due to being unable to use the toilet within the home and confined to a single room.        ADLs:    Eating: Eating  Assistance Needed: Independent  Comment: able to open packages/containers. feed self  CARE Score: 6  Discharge Goal: Independent       Oral Hygiene: Oral Hygiene  Assistance Needed: Independent  Comment: Seated at sink IND  CARE

## 2024-12-10 NOTE — PROGRESS NOTES
Physical Therapy  [x] daily progress note       [x] discharge       Patient Name:  Esha Paz   :  1950 MRN: [id number]  Room:  [address] [place] [address] Date of Admission: 2024  Rehabilitation Diagnosis:   Unilateral primary osteoarthritis, right knee [M17.11]  S/P TKR (total knee replacement), left [Z96.652]       Date 12/10/2024       Day of ARU Week:  7   Time IN/OUT 1100/1200   Individual Tx Minutes 60   Group Tx Minutes    Co-Treat Minutes    Concurrent Tx Minutes    TOTAL Tx Time Mins 60   Variance Time    Variance Time []   Refusal due to:     []   Medical hold/reason:    []   Illness   []   Off Unit for test/procedure  []   Extra time needed to complete task  []   Therapeutic need  []   Other (specify):   Restrictions Restrictions/Precautions  Restrictions/Precautions: Fall Risk, General Precautions (WBAT RLE)      Interdisciplinary communication [x]   Cleared for therapy per nursing     []   RN notified about issues during session  []   RN updated on pt performance  []   Spoke with   []   Spoke with OT  []   Spoke with MD  []   Other:    Subjective observations and cognitive status:   Pt in recliner, agreeable to therapy   Pain level/location:   3 /10       Location: knee   Discharge recommendations  Anticipated discharge date:    Destination: []home alone   [x]home alone with assist PRN     [] home w/ family      [] Continuous supervision  []SNF    [] Assisted living     [] Other:  Continued therapy: []HHC PT  [x]OUTPATIENT  PT   [] No Further PT  []SNF PT  Caregiver training recommended: []Yes  [] No   Equipment needs: 2ww   PT  QI  Bed Mobility:   Sit to Lying  Assistance Needed: Independent  Comment: No bed features  CARE Score: 6  Discharge Goal: Independent  Roll Left and Right  Assistance Needed: Independent  Comment: No bed features  CARE Score: 6  Discharge Goal: Independent  Lying to Sitting on Side of Bed  Assistance Needed: Independent  Comment: No bed features  CARE Score:

## 2024-12-10 NOTE — CARE COORDINATION
KT met with patient and provided written communication following Care Conference. KT informed patient of recommendations for 2WW, BSC, Outpatient PT. Patient reported that her son brought her 2WW to her room on [address] and now it is missing. She has reported it to nursing. Provided patient with a list of outpatient agencies in her area. Patient chose [facility]. Requested referral for Meals to [facility] that will be completed at discharge. Patient verbalized understanding. Whiteboard updated.      Discussed discharge with patient and provided a copy of the Important Message from Medicare form signed upon admission. Patient verbalized understanding.     D/C Plan:  Estimated Date: Dec 11  DME: BSC ([facility])  Outpatient:  PT ([facility])  To:  Home alone (family will transport)    [facility]     Order for outpatient PT was faxed to [facility].     Received a call from [facility]. Patient received a 2WW November 2024. They will deliver the BSC to the patient's room 12/11/2024.

## 2024-12-10 NOTE — PROGRESS NOTES
Esha Paz    : 1950  [id number]  MRN: [id number]              PM&R Progress Note      Admitting diagnosis: Primary osteoarthritis left knee ([facility] 8.61)     Comorbid diagnoses impacting rehabilitation: Uncontrolled pain, leg weakness, gait disturbance, hypokalemia, essential hypertension, depression with anxiety, GERD, mixed hyperlipidemia, multiple myeloma     Chief complaint: No chills or cough.  Minimal dizziness with activity.    Prior (baseline) level of function: Independent.    Current level of function:         Current  IRF-DENISA and Goals:   Occupational Therapy:    Short Term Goals  Time Frame for Short Term Goals: STGs=LTGs :   Long Term Goals  Time Frame for Long Term Goals : 7 days or until d/c  Long Term Goal 1: Pt will complete grooming tasks Ind  Long Term Goal 2: Pt will complete total body bathing mod I  Long Term Goal 3: Pt will complete UB dressing Ind  Long Term Goal 4: Pt will complete LB dressing mod I using AE PRN  Long Term Goal 5: Pt will doff/don footwear mod I  Additional Goals?: Yes  Long Term Goal 6: Pt will complete toileting mod I  Long Term Goal 7: Pt will complete functional transfers (bed, chair, toilet, shower) c DME PRN and mod I  Long Term Goal 8: Pt will perform therex/therax to facilitate increased strength/endurance/ax tolerance (c emphasis on dynamic standing balance/tolerance >8 mins, BUE endurance) c SBA  Long Term Goal 9: Pt will complete simple homemaking tasks c DME PRN and mod I :                                       Eating: Eating  Assistance Needed: Independent  Comment: able to open packages/containers, feed self  CARE Score: 6  Discharge Goal: Independent       Oral Hygiene: Oral Hygiene  Assistance Needed: Independent  Comment: IND seated at sink  CARE Score: 6  Discharge Goal: Independent    UB/LB Bathing: Shower/Bathe Self  Assistance Needed: Independent  Comment: Pt completed full shower with majority seated, IND in stance to bathe perineal

## 2024-12-11 VITALS
BODY MASS INDEX: 25.44 KG/M2 | TEMPERATURE: 98.1 F | SYSTOLIC BLOOD PRESSURE: 132 MMHG | HEIGHT: 64 IN | OXYGEN SATURATION: 100 % | HEART RATE: 76 BPM | WEIGHT: 149.03 LBS | DIASTOLIC BLOOD PRESSURE: 63 MMHG | RESPIRATION RATE: 16 BRPM

## 2024-12-11 PROBLEM — E87.6 HYPOKALEMIA: Status: ACTIVE | Noted: 2024-12-11

## 2024-12-11 PROBLEM — F41.1 GENERALIZED ANXIETY DISORDER: Status: ACTIVE | Noted: 2024-12-11

## 2024-12-11 PROBLEM — M17.12 PRIMARY OSTEOARTHRITIS OF LEFT KNEE: Status: ACTIVE | Noted: 2024-12-11

## 2024-12-11 PROBLEM — R26.9 GAIT DISTURBANCE: Status: ACTIVE | Noted: 2024-12-11

## 2024-12-11 PROBLEM — D62 ACUTE BLOOD LOSS ANEMIA: Status: ACTIVE | Noted: 2024-12-11

## 2024-12-11 PROBLEM — R52 UNCONTROLLED PAIN: Status: ACTIVE | Noted: 2024-12-11

## 2024-12-11 PROBLEM — R29.898 LEG WEAKNESS, BILATERAL: Status: ACTIVE | Noted: 2024-12-11

## 2024-12-11 PROCEDURE — 94761 N-INVAS EAR/PLS OXIMETRY MLT: CPT

## 2024-12-11 PROCEDURE — 6370000000 HC RX 637 (ALT 250 FOR IP): Performed by: ORTHOPAEDIC SURGERY

## 2024-12-11 PROCEDURE — 6370000000 HC RX 637 (ALT 250 FOR IP): Performed by: PHYSICAL MEDICINE & REHABILITATION

## 2024-12-11 PROCEDURE — 99239 HOSP IP/OBS DSCHRG MGMT >30: CPT | Performed by: PHYSICAL MEDICINE & REHABILITATION

## 2024-12-11 RX ORDER — LANOLIN ALCOHOL/MO/W.PET/CERES
400 CREAM (GRAM) TOPICAL DAILY
Qty: 30 TABLET | Refills: 0 | Status: SHIPPED | OUTPATIENT
Start: 2024-12-12

## 2024-12-11 RX ORDER — ASPIRIN 325 MG
325 TABLET ORAL 2 TIMES DAILY
COMMUNITY
Start: 2024-12-11 | End: 2024-12-25

## 2024-12-11 RX ORDER — OXYCODONE AND ACETAMINOPHEN 5; 325 MG/1; MG/1
1 TABLET ORAL EVERY 6 HOURS PRN
Status: DISCONTINUED | OUTPATIENT
Start: 2024-12-11 | End: 2024-12-11 | Stop reason: HOSPADM

## 2024-12-11 RX ORDER — OXYCODONE AND ACETAMINOPHEN 5; 325 MG/1; MG/1
1 TABLET ORAL EVERY 6 HOURS PRN
Qty: 10 TABLET | Refills: 0 | Status: SHIPPED | OUTPATIENT
Start: 2024-12-11 | End: 2024-12-18

## 2024-12-11 RX ORDER — POTASSIUM CHLORIDE 750 MG/1
10 TABLET, EXTENDED RELEASE ORAL 2 TIMES DAILY
Qty: 60 TABLET | Refills: 0 | Status: SHIPPED | OUTPATIENT
Start: 2024-12-11

## 2024-12-11 RX ORDER — ACETAMINOPHEN 325 MG/1
650 TABLET ORAL EVERY 6 HOURS PRN
Status: DISCONTINUED | OUTPATIENT
Start: 2024-12-11 | End: 2024-12-11 | Stop reason: HOSPADM

## 2024-12-11 RX ADMIN — Medication 2000 UNITS: at 10:24

## 2024-12-11 RX ADMIN — Medication 400 MG: at 10:24

## 2024-12-11 RX ADMIN — DULOXETINE HYDROCHLORIDE 60 MG: 30 CAPSULE, DELAYED RELEASE ORAL at 10:24

## 2024-12-11 RX ADMIN — Medication 1000 MCG: at 10:24

## 2024-12-11 RX ADMIN — LOSARTAN POTASSIUM 50 MG: 25 TABLET, FILM COATED ORAL at 10:24

## 2024-12-11 RX ADMIN — ASPIRIN 325 MG: 325 TABLET ORAL at 10:24

## 2024-12-11 RX ADMIN — Medication 1 TABLET: at 10:24

## 2024-12-11 RX ADMIN — POTASSIUM CHLORIDE 10 MEQ: 1500 TABLET, EXTENDED RELEASE ORAL at 10:24

## 2024-12-11 RX ADMIN — OXYCODONE HYDROCHLORIDE AND ACETAMINOPHEN 1 TABLET: 5; 325 TABLET ORAL at 04:48

## 2024-12-11 RX ADMIN — DIPHENOXYLATE HYDROCHLORIDE AND ATROPINE SULFATE 1 TABLET: 2.5; .025 TABLET ORAL at 09:19

## 2024-12-11 RX ADMIN — ZINC SULFATE 220 MG (50 MG) CAPSULE 50 MG: CAPSULE at 10:24

## 2024-12-11 RX ADMIN — AMLODIPINE BESYLATE 5 MG: 5 TABLET ORAL at 10:24

## 2024-12-11 ASSESSMENT — PAIN SCALES - GENERAL
PAINLEVEL_OUTOF10: 3
PAINLEVEL_OUTOF10: 1
PAINLEVEL_OUTOF10: 3

## 2024-12-11 ASSESSMENT — PAIN - FUNCTIONAL ASSESSMENT: PAIN_FUNCTIONAL_ASSESSMENT: ACTIVITIES ARE NOT PREVENTED

## 2024-12-11 ASSESSMENT — PAIN DESCRIPTION - LOCATION
LOCATION: KNEE
LOCATION: KNEE

## 2024-12-11 ASSESSMENT — PAIN DESCRIPTION - DESCRIPTORS
DESCRIPTORS: ACHING;DISCOMFORT
DESCRIPTORS: DISCOMFORT

## 2024-12-11 ASSESSMENT — PAIN SCALES - WONG BAKER: WONGBAKER_NUMERICALRESPONSE: HURTS A LITTLE BIT

## 2024-12-11 ASSESSMENT — PAIN DESCRIPTION - PAIN TYPE: TYPE: SURGICAL PAIN

## 2024-12-11 ASSESSMENT — PAIN DESCRIPTION - ORIENTATION
ORIENTATION: RIGHT
ORIENTATION: RIGHT

## 2024-12-11 ASSESSMENT — PAIN DESCRIPTION - FREQUENCY: FREQUENCY: INTERMITTENT

## 2024-12-11 ASSESSMENT — PAIN DESCRIPTION - ONSET: ONSET: ON-GOING

## 2024-12-11 NOTE — PROGRESS NOTES
Esha Paz    : 1950  [id number]  MRN: [id number]              PM&R Progress Note      Admitting diagnosis: Primary osteoarthritis left knee ([facility] 8.61)     Comorbid diagnoses impacting rehabilitation: Uncontrolled pain, leg weakness, gait disturbance, hypokalemia, essential hypertension, depression with anxiety, GERD, mixed hyperlipidemia, multiple myeloma     Chief complaint: \"When can I go home\".  Gradual improvement in pain of her knee.    Prior (baseline) level of function: Independent.    Current level of function:         Current  IRF-DENISA and Goals:   Occupational Therapy:    Short Term Goals  Time Frame for Short Term Goals: STGs=LTGs :   Long Term Goals  Time Frame for Long Term Goals : 7 days or until d/c  Long Term Goal 1: Pt will complete grooming tasks Ind  Long Term Goal 2: Pt will complete total body bathing mod I  Long Term Goal 3: Pt will complete UB dressing Ind  Long Term Goal 4: Pt will complete LB dressing mod I using AE PRN  Long Term Goal 5: Pt will doff/don footwear mod I  Additional Goals?: Yes  Long Term Goal 6: Pt will complete toileting mod I  Long Term Goal 7: Pt will complete functional transfers (bed, chair, toilet, shower) c DME PRN and mod I  Long Term Goal 8: Pt will perform therex/therax to facilitate increased strength/endurance/ax tolerance (c emphasis on dynamic standing balance/tolerance >8 mins, BUE endurance) c SBA  Long Term Goal 9: Pt will complete simple homemaking tasks c DME PRN and mod I :                                       Eating: Eating  Assistance Needed: Independent  Comment: able to open packages/containers. feed self  CARE Score: 6  Discharge Goal: Independent       Oral Hygiene: Oral Hygiene  Assistance Needed: Independent  Comment: Seated at sink IND  CARE Score: 6  Discharge Goal: Independent    UB/LB Bathing: Shower/Bathe Self  Assistance Needed: Independent  Comment: Pt completed sinkside ADL IND  CARE Score: 6  Discharge Goal:

## 2024-12-11 NOTE — PROGRESS NOTES
shower) c DME PRN and mod I  Long Term Goal 8: Pt will perform therex/therax to facilitate increased strength/endurance/ax tolerance (c emphasis on dynamic standing balance/tolerance >8 mins, BUE endurance) c SBA  Long Term Goal 9: Pt will complete simple homemaking tasks c DME PRN and mod I :                                       Eating: Eating  Assistance Needed: Independent  Comment: able to open packages/containers. feed self  CARE Score: 6  Discharge Goal: Independent       Oral Hygiene: Oral Hygiene  Assistance Needed: Independent  Comment: Seated at sink IND  CARE Score: 6  Discharge Goal: Independent    UB/LB Bathing: Shower/Bathe Self  Assistance Needed: Independent  Comment: Pt completed sinkside ADL IND  CARE Score: 6  Discharge Goal: Independent    UB Dressing: Upper Body Dressing  Assistance Needed: Independent  Comment: Pt demo'd good carryover for safety when retrieving clothing; Pt able to doff/don shirt IND  CARE Score: 6  Discharge Goal: Independent         LB Dressing: Lower Body Dressing  Assistance Needed: Independent  Comment: Pt demo'd good carryover for safety when retrieving clothing; pt able to thread BLEs into underwear and shorts and manage up over hips IND  CARE Score: 6  Discharge Goal: Independent    Donning and Ajo Footwear: Putting On/Taking Off Footwear  Assistance Needed: Independent  Comment: able to doff/don socks and personal shoes  CARE Score: 6  Discharge Goal: Independent      Toileting: Toileting Hygiene  Assistance needed: Independent  Comment: Mod I c clothing management and bladder hygiene  CARE Score: 6  Discharge Goal: Independent      Toilet Transfers:  Toilet Transfer  Assistance needed: Independent  Comment: Mod I c RW, BSC frame over toilet  CARE Score: 6  Discharge Goal: Independent    Physical Therapy:   Short Term Goals  Time Frame for Short Term Goals: 1 week  Short Term Goal 1: Pt will perform bed mobility with independence  Short Term Goal 2: Pt will

## 2024-12-11 NOTE — PLAN OF CARE
Problem: Discharge Planning  Goal: Discharge to home or other facility with appropriate resources  12/11/2024 1142 by Collette La LPN  Outcome: Completed  12/11/2024 1137 by Collette La LPN  Outcome: Progressing  12/11/2024 0647 by Melodie Patrick LPN  Outcome: Progressing     Problem: Safety - Adult  Goal: Free from fall injury  12/11/2024 1142 by Collette La LPN  Outcome: Completed  12/11/2024 1137 by Collette La LPN  Outcome: Progressing  12/11/2024 0647 by Melodie Patrick LPN  Outcome: Progressing     Problem: ABCDS Injury Assessment  Goal: Absence of physical injury  12/11/2024 1142 by Collette La LPN  Outcome: Completed  12/11/2024 1137 by Collette La LPN  Outcome: Progressing  12/11/2024 0647 by Melodie Patrick LPN  Outcome: Progressing     Problem: Pain  Goal: Verbalizes/displays adequate comfort level or baseline comfort level  12/11/2024 1142 by Collette La LPN  Outcome: Completed  12/11/2024 1137 by Collette La LPN  Outcome: Progressing  12/11/2024 0647 by Melodie Patrick LPN  Outcome: Progressing

## 2024-12-11 NOTE — CARE COORDINATION
ARU  Discharge Summary    D/C Date: 12/11/2024    Patient discharged to: Home alone    Transported by: Family    Referrals made to: [facility], [facility], [facility] (spoke with Celina)    Additional information: Follow up appointments scheduled with Muna Cartwright PA-C Wednesday Dec 18, 2024 9:00 AM, Alexis Zhao PA-C Thursday Dec 26, 2024 9:00 AM, Juan Cruz MD Thursday Jan 23, 2025 10:30 AM. Patient notified, AVS updated.     Caregiver training: none requested    Discharge BIMS completed: [x]      IMM:  [x]       Discharge Assessment: At the time of discharge   [x] PT & OT Notes 12/10 -Patient fully participated in the program and made good progress towards established goals.

## 2024-12-11 NOTE — PROGRESS NOTES
noticed.  Or the opposite- being so fidgety or restless that you have been moving around a lot more than usual.   [] 0.  No  [] 1.  Yes  [] 9. No Response [] 0.  Never or one day  [] 1.  2-6 days (several days)  [] 2.  7-11 days (half or more of days)  [] 3.  12-14 days (nearly every day   Thoughts that you would be better off dead, or of hurting yourself in some way.   [] 0.  No  [] 1.  Yes  [] 9. No Response [] 0.  Never or one day  [] 1.  2-6 days (several days)  [] 2.  7-11 days (half or more of days)  [] 3.  12-14 days (nearly every day     Social Isolation  \"How often do you feel lonely or isolated from those around you?\"  [x] 0.  Never  [] 1.  Rarely  [] 2.  Sometimes  [] 3.  Often  [] 4.  Always  [] 8.  Patient unable to respond    Pain Effect on Sleep  \"Over the past 5 days, how much of the time has pain made it hard for you to sleep at night?\"  [x]  0.  Does not apply - I have not had any pain or hurting in the past 5 days  []  1.  Rarely or not at all  []  2.  Occasionally  []  3.  Frequently  []  4.  Almost constantly  []  8.  Unable to answer  **If the patient answers \"0.  Does not apply\" to this question, skip the next two \"Pain Effect...\" questions**      Pain Interference with Therapy Activities  \"Over the past 5 days, how often have you limited your participation in rehabilitation therapy sessions due to pain?\"  []  1.  Rarely or not at all  []  2.  Occasionally  []  3.  Frequently  []  4.  Almost constantly  []  8.  Unable to answer    Pain Interference with Day-to-Day Activities:  \"Over the past 5 days, how often have you limited your day-to-day activities (excluding rehabilitation therapy session)?\"  []  1.  Rarely or not at all  []  2.  Occasionally  []  3.  Frequently  []  4.  Almost constantly  []  8.  Unable to answer    Verification that the patient's paper prescriptions were provided to the patient at time of discharge   (NEW QUESTION)  [] Yes, the prescriptions were provided to the

## 2024-12-11 NOTE — PLAN OF CARE
Problem: Discharge Planning  Goal: Discharge to home or other facility with appropriate resources  12/11/2024 1137 by Collette La LPN  Outcome: Progressing  12/11/2024 0647 by Melodie Patrick LPN  Outcome: Progressing     Problem: Safety - Adult  Goal: Free from fall injury  12/11/2024 1137 by Collette La LPN  Outcome: Progressing  12/11/2024 0647 by Melodie Patrick LPN  Outcome: Progressing     Problem: ABCDS Injury Assessment  Goal: Absence of physical injury  12/11/2024 1137 by Collette La LPN  Outcome: Progressing  12/11/2024 0647 by Melodie Partick LPN  Outcome: Progressing     Problem: Pain  Goal: Verbalizes/displays adequate comfort level or baseline comfort level  12/11/2024 1137 by Collette La LPN  Outcome: Progressing  12/11/2024 0647 by Melodie Patrick LPN  Outcome: Progressing

## 2024-12-11 NOTE — DISCHARGE SUMMARY
Patient Name: Esha Paz  Patient :  1950  Patient MRN:   [id number]      Admission Date:  2024  Discharge Date: 2024    Admitting diagnosis: Primary osteoarthritis left knee ([facility] 8.61)     Comorbid diagnoses impacting rehabilitation: Uncontrolled pain, leg weakness, gait disturbance, hypokalemia, essential hypertension, depression with anxiety, GERD, mixed hyperlipidemia, multiple myeloma     Discharging diagnosis: Primary osteoarthritis left knee (IG 8.61)     Comorbid diagnoses impacting rehabilitation: Uncontrolled pain, leg weakness, gait disturbance, hypokalemia, essential hypertension, depression with anxiety, GERD, mixed hyperlipidemia, multiple myeloma      Esha Paz is a 74 y.o. female who is admitted to the [facility] Acute Rehabilitation Unit. Esha Paz presented to [facility] on 2024 for an elective left total knee arthroplasty with Dr. Harvey on the same day.  She did well postoperatively, her blood pressure was a bit soft on  and BP meds were held.  Her potassium was 2.7 and she was given IV replacement.  She has some acute on chronic anemia secondary to surgical blood loss.  Preop hemoglobin was 11.3 and fell to 8.7.  Supplements given.     Prior (baseline) level of function: Independent.    Current level of function:         Current  IRF-DENISA and Goals:   Occupational Therapy:    Short Term Goals  Time Frame for Short Term Goals: STGs=LTGs :   Long Term Goals  Time Frame for Long Term Goals : 7 days or until d/c  Long Term Goal 1: Pt will complete grooming tasks Ind  Long Term Goal 2: Pt will complete total body bathing mod I  Long Term Goal 3: Pt will complete UB dressing Ind  Long Term Goal 4: Pt will complete LB dressing mod I using AE PRN  Long Term Goal 5: Pt will doff/don footwear mod I  Additional Goals?: Yes  Long Term Goal 6: Pt will complete toileting mod I  Long Term Goal 7: Pt will complete functional transfers (bed, chair, toilet,

## 2024-12-12 ENCOUNTER — HOSPITAL ENCOUNTER (OUTPATIENT)
Dept: PHYSICAL THERAPY | Age: 74
Discharge: HOME | End: 2024-12-12
Payer: MEDICARE

## 2024-12-12 ENCOUNTER — CARE COORDINATION (OUTPATIENT)
Dept: CASE MANAGEMENT | Age: 74
End: 2024-12-12

## 2024-12-12 DIAGNOSIS — M17.11 PRIMARY OSTEOARTHRITIS OF ONE KNEE, RIGHT: Primary | ICD-10-CM

## 2024-12-12 PROCEDURE — 97016 VASOPNEUMATIC DEVICE THERAPY: CPT

## 2024-12-12 PROCEDURE — 97110 THERAPEUTIC EXERCISES: CPT

## 2024-12-12 PROCEDURE — 1111F DSCHRG MED/CURRENT MED MERGE: CPT | Performed by: STUDENT IN AN ORGANIZED HEALTH CARE EDUCATION/TRAINING PROGRAM

## 2024-12-12 PROCEDURE — 97164 PT RE-EVAL EST PLAN CARE: CPT

## 2024-12-12 NOTE — CARE COORDINATION
Oncology [phone]    1/23/2025 10:45 AM DILLON RODRÍGUEZ ONC NURSE Infusion Therapy [phone]    2/25/2025 9:30 AM Alexis Zhao PA-C Orthopedic Surgery [phone]    3/6/2025 10:00 AM (Arrive by 9:45 AM) aMrcela Calix,  [facility] [phone]    8/12/2025 2:00 PM SRMX FPS AWV LPN [facility] [phone]            Care Transition Nurse provided contact information.  Plan for follow-up on next business day.  based on severity of symptoms and risk factors.  Plan for next call: f/u on asa recommendation    Maria C Sainz RN

## 2024-12-12 NOTE — PROGRESS NOTES
ability. She does remain with significant swelling and that is limiting her progress and quad control. She is walking with a walker and tolerated therapy well today.   She reports 3/10 pain post session      Patient agrees with established plan of care and assisted in the development of their short term and long term goals. Patient had no adverse reaction with initial treatment and there are no barriers to learning. Demonstrates no mental or cognitive disorder.       Goal Status:  [] Achieved [] Partially Achieved  [x] Not Achieved     Patient goals: get back to her normal walking and standing activity  Short term goals  Time Frame for Short term goals: 4 weeks  1.ind with HEP for ROM, strength, gait, and trf  2. demo good R quad set and SLR with no lag  3. PROM R TKR  flex 90 ext 0  4. avg pain 4/10 or less TKR R  Long Term Goals  Time Frame for Long Term Goals: 6-8 weeks  1. Amb with no limp or AD community dist  2. Stairs: 1 flight reciprocally with 1 rail  3. PROM TKR  flex 120 ext 0 for better ability to dress  and sit on lower seats  4. Avg pain with ADL and typical activity 2/10 or less in cluding gardening and [facility]ering [facility]  5. reprots 75% better overal in all typical activity and housework      Frequency/Duration:  # Days per week: [] 1 day # Weeks: [] 1 week [] 4 weeks [x] 8 weeks     [x] 2 days   [] 2 weeks [] 5 weeks [] 10 weeks     [] 3 days   [] 3 weeks [x] 6 weeks [] 12 weeks       Rehab Potential: [] Excellent [x] Good [] Fair  [] Poor         Patient Status: [x] Continue per initial plan of Care     [] Patient now discharged     [x] Additional visits requested, Please re-certify for additional visits:      Requested frequency/duration:  2/week for 6-8weeks    If we are requesting more visits, we fully anticipate the patient's condition is expected to improve within the treatment timeframe we are requesting.    Electronically signed by:  Alexis Leary, PT, DPT, 12/12/2024, 7:11

## 2024-12-12 NOTE — FLOWSHEET NOTE
5 min hold    Manual Treatments:  start PROM R knee , STM , mobes next visit      Modalities:  Patient received vasocompression on their R knee for pain and inflammation for 10 min on low pressure. Patient had negative skin reaction afterwards.     Girth measurements around extremity were pre:48.5 cm/ post 48.0 cm  .        Communication with other providers:  Cert sent to Calvin Harvey MD , 11/25/2024. Re-eval sent  12/12/24      Assessment:  (Response towards treatment session) (Pain Rating)   Pt appears post TKR 12/2/24 and is progressing very well with ROM and functional ability. She does remain with significant swelling and that is limiting her progress and quad control. She is walking with a walker and tolerated therapy well today.   She reports 3/10 pain post session     Patient agrees with established plan of care and assisted in the development of their short term and long term goals. Patient had no adverse reaction with initial treatment and there are no barriers to learning. Demonstrates no mental or cognitive disorder.     Plan for Next Session: progress as tolerated , ROM , strength , gait , manual , proprioception, vaso prn       Time In / Time Out:     810- 855         Timed Code/Total Treatment Minutes:  1 re-eval ( 15) 1 TE  ( 20)   vaso ( 10 )       Next Progress Note due:  1/12/25      Plan of Care Interventions:  [x] Therapeutic Exercise  [x] Modalities: prn  [x] Therapeutic Activity     [] Ultrasound  [] Estim  [x] Gait Training      [] Cervical Traction [] Lumbar Traction  [x] Neuromuscular Re-education    [] Cold/hotpack [] Iontophoresis   [x] Instruction in HEP      [x] Vasopneumatic   [] Dry Needling    [x] Manual Therapy               [] Aquatic Therapy              Electronically signed by:  Alexis Leary, PT, 12/12/2024, 7:06 AM

## 2024-12-13 ENCOUNTER — CARE COORDINATION (OUTPATIENT)
Dept: CASE MANAGEMENT | Age: 74
End: 2024-12-13

## 2024-12-13 NOTE — CARE COORDINATION
of symptoms and risk factors.  Plan for next call: post-op pain?, incision site?, swelling?, rom?; f/u pcp appt--any rx changes?    Maria C Sainz RN

## 2024-12-13 NOTE — CARE COORDINATION
Care Transitions Note    Follow Up Call     Patient: Esha Paz                          Patient : 1950   MRN: [id number]                             Reason for Admission: OA Rt Knee s/p TKR 24   Discharge Date: 24     RURS: Readmission Risk Score: 17.2  Facility: [facility]  24-24    Care Summary Note: Dr Harvey advised the following re: Asa dosage--81 mg twice a day would be appropriate. Have her double the dose for 2 weeks after surgery and then return to her once a day dose after that.     Attempt to reach for Care Transition follow up call unsuccessful. HIPAA compliant messages left for patient requesting call back. Movie Mouth message sent as well.      Follow Up Appointment:   Future Appointments         Provider Specialty Dept Phone    2024  3:15 PM Brayan Sebastian Physical Therapy [phone]    2024 9:00 AM Muna Cartwright PA-C Family [facility] [phone]    2024 9:00 AM Loli Patrick PT Physical Therapy [phone]    2024 9:45 AM Brayan Sebastian Physical Therapy [phone]    2024 9:00 AM Alexis Zhao PA-C Orthopedic Surgery [phone]    2024 10:30 AM Alexis Leary PT Physical Therapy [phone]    2024 10:30 AM Kam Troncoso PT Physical Therapy [phone]    2024 10:30 AM Kirsten Saleh PTA Physical Therapy [phone]    1/3/2025 11:15 AM Alexis Leary PT Physical Therapy [phone]    2025 10:30 AM Kirsten Saleh PTA Physical Therapy [phone]    1/10/2025 10:30 AM Alexis Leary PT Physical Therapy [phone]    2025 9:40 AM Calvin Harvey MD Orthopedic Surgery [phone]    2025 10:30 AM RONALD MENA [facility] Infusion Therapy [phone]    2025 10:30 AM Juan Cruz MD Oncology [phone]    2025 10:45 AM MARCOS, MED ONC NURSE Infusion Therapy [phone]    2025 9:30 AM Alexis Zhao PA-C Orthopedic Surgery [phone]    3/6/2025 10:00 AM

## 2024-12-14 ENCOUNTER — HOSPITAL ENCOUNTER (OUTPATIENT)
Dept: PHYSICAL THERAPY | Age: 74
Discharge: HOME | End: 2024-12-14
Payer: MEDICARE

## 2024-12-14 PROCEDURE — 97016 VASOPNEUMATIC DEVICE THERAPY: CPT

## 2024-12-14 PROCEDURE — 97140 MANUAL THERAPY 1/> REGIONS: CPT

## 2024-12-14 PROCEDURE — 97110 THERAPEUTIC EXERCISES: CPT

## 2024-12-14 NOTE — FLOWSHEET NOTE
Treatments:  PROM/MOBS to the right knee joint complex with TPR/MFR to palpated hypertonicity     Modalities:  Patient received vasocompression on their R knee for pain and inflammation for 15 min on low pressure. Patient had negative skin reaction afterwards.     Girth measurements around extremity were pre:48.5 cm/ post 48.0 cm    Communication with other providers:  Cert sent to Calvin Harvey MD , 11/25/2024. Re-eval sent  12/12/24    Assessment:  (Response towards treatment session) (Pain Rating) Pt slowly and steadily progressing with her ROM - now up to 113° PROM flexion and she was able to achieve full extension AROM too.  Swelling persists.  Pt will continue to benefit from skilled PT services to restore normal ROM and function.  End pain: 4/10     Pt appears post TKR 12/2/24 and is progressing very well with ROM and functional ability. She does remain with significant swelling and that is limiting her progress and quad control. She is walking with a walker and tolerated therapy well today.        Patient agrees with established plan of care and assisted in the development of their short term and long term goals. Patient had no adverse reaction with initial treatment and there are no barriers to learning. Demonstrates no mental or cognitive disorder.     Plan for Next Session: progress as tolerated , ROM , strength , gait , manual , proprioception, vaso prn     Time In / Time Out:     1510/1610    Timed Code/Total Treatment Minutes:  MAN X 35' X 2;   TE X 10' X 1;   GR X 15' X 1     Next Progress Note due:  1/12/25    Plan of Care Interventions:  [x] Therapeutic Exercise  [x] Modalities: prn  [x] Therapeutic Activity     [] Ultrasound  [] Estim  [x] Gait Training      [] Cervical Traction [] Lumbar Traction  [x] Neuromuscular Re-education    [] Cold/hotpack [] Iontophoresis   [x] Instruction in HEP      [x] Vasopneumatic   [] Dry Needling    [x] Manual Therapy               [] Aquatic Therapy

## 2024-12-18 ENCOUNTER — OFFICE VISIT (OUTPATIENT)
Dept: FAMILY MEDICINE CLINIC | Age: 74
End: 2024-12-18

## 2024-12-18 VITALS
HEART RATE: 80 BPM | BODY MASS INDEX: 25.44 KG/M2 | OXYGEN SATURATION: 97 % | SYSTOLIC BLOOD PRESSURE: 120 MMHG | WEIGHT: 149 LBS | HEIGHT: 64 IN | DIASTOLIC BLOOD PRESSURE: 88 MMHG

## 2024-12-18 DIAGNOSIS — Z96.651 STATUS POST TOTAL RIGHT KNEE REPLACEMENT: ICD-10-CM

## 2024-12-18 DIAGNOSIS — E87.6 HYPOKALEMIA: ICD-10-CM

## 2024-12-18 DIAGNOSIS — D64.9 ACUTE ON CHRONIC ANEMIA: ICD-10-CM

## 2024-12-18 DIAGNOSIS — Z09 HOSPITAL DISCHARGE FOLLOW-UP: Primary | ICD-10-CM

## 2024-12-18 DIAGNOSIS — I10 ESSENTIAL HYPERTENSION: ICD-10-CM

## 2024-12-18 NOTE — PROGRESS NOTES
facility-administered medications for this visit.       ALLERGIES  Allergies   Allergen Reactions    Latex Rash and Swelling    Adhesive Tape Rash     \"Tears Skin , Paper Tape Is OK To Use\"  Skin breakdown       Lisinopril-Hydrochlorothiazide Swelling    Other      \"Allergic To Poinsetta Holley Causing Nasal Congestion\"    Sulfa Antibiotics Other (See Comments)     \"Flu Like Symptoms\"  Flu-like symptoms      Naproxen      \"Dr. Torre told me not to take NSAIDS\"    Hydrochlorothiazide W-Triamterene Nausea And Vomiting       PHYSICAL EXAM    /88 (Site: Left Upper Arm, Position: Sitting, Cuff Size: Medium Adult)   Pulse 80   Ht 1.626 m (5' 4\")   Wt 67.6 kg (149 lb)   SpO2 97%   BMI 25.58 kg/m²     Constitutional:  Well developed, well nourished.  No acute distress.  HENT:  Normocephalic, atraumatic  Eyes:  conjunctiva normal, no discharge, no scleral icterus  Cardiovascular:  Normal heart rate, normal rhythm, no murmurs, gallops or rubs  Thorax & Lungs:  Normal breath sounds, no respiratory distress, no wheezing, no rales, no rhonchi  Skin/Musculoskeletal: Edema right knee.  Incision site is healing well.  No drainage on Telfa bandage.  Bandage replaced.  Neurologic:  Alert & oriented   Psychiatric:  Affect normal, mood normal    ASSESSMENT & PLAN    Esha was seen today for follow-up from hospital.    Diagnoses and all orders for this visit:    Hospital discharge follow-up    Status post total right knee replacement  Doing well postop.  Continue to follow Dr. Harvey    Acute on chronic anemia  Patient politely declines CBC, CMP today.  Prefers to wait until she has labs drawn by Dr. Cruz    Hypokalemia  Patient politely declines CBC, CMP today.  Prefers to wait until she has labs drawn by Dr. Cruz    Essential hypertension  Stable.       Medications Discontinued During This Encounter   Medication Reason    aspirin 325 MG tablet LIST CLEANUP        No follow-ups on file.     Plan of care reviewed with patient

## 2024-12-19 ENCOUNTER — HOSPITAL ENCOUNTER (OUTPATIENT)
Dept: PHYSICAL THERAPY | Age: 74
Discharge: HOME | End: 2024-12-19
Payer: MEDICARE

## 2024-12-19 PROCEDURE — 97110 THERAPEUTIC EXERCISES: CPT

## 2024-12-19 PROCEDURE — 97140 MANUAL THERAPY 1/> REGIONS: CPT

## 2024-12-19 NOTE — FLOWSHEET NOTE
progress toward goal completion and improve ADL/IADL status.  PT also warranted to reduce risk for further injury or decline.      Subjective: Pt arrives to tx session reporting 0/10 pain; does report some discomfort. States partial compliance with HEP, is doing bending and straightening exercises.      Any changes in Ambulatory Summary Sheet?  None      Objective:      ( 12/19/24: Flexion 116°, ext -1° of hyper. )  12/14/24 - PROM right knee flexion at edge of bed after manual interventions = 113°; right knee extension AROM after manual interventions = 0°  ( 12/12/24  RTKR PROM flex 99, ext : lacking 1, Quad set, : fair  SLR: mild lag ,  Gait: FWW, step throughout pattern decent heel strike and swing.  Sit to stand: L weight shift and UE use required,  girth suprapatellar  R 41.0 cm L 48.0 cm  , joint line  R 40.5 cm L 48.5 cm )        Exercises: (No more than 4 columns)  R TKR  12/2/24  Exercise/Equipment 1# 11/25/24 2# 12/12/24 12/14/24  #3 12/19/24 #4            WARM UP          nustep    L5 6'          TABLE       Quad set  *10 *20 1x10 3\" hold R    Glute set *10 *20     AP *10 *20     Heel slide *10 *20 OP 1x10 5\"hold R 2x10 3\"   Propped knee ext 2 min      SLR   AA 2*1 0  2x10   SAQ  *15  2x10 2#   Quad sets   5x5\"hold    Manual therapy   See below See below   Bridging     2x10   LAQ    2x10   HS curls    2x10 RTB                    STANDING                                                             PROPRIOCEPTION                                          MODALITIES                       Other Therapeutic Activities/Education: reviewed progress and HEP    Home Exercise Program:    Access Code: [email or web address]  URL: [email or web address]  Date: 11/25/2024  Prepared by: Alexis Leary    Exercises  - Supine Quad Set  - 3 x daily - 7 x weekly - 1 sets - 10 reps  - Supine Heel Slide with Strap  - 3 x daily - 7 x weekly - 1 sets - 10 reps  - Supine Gluteal Sets  - 3 x daily - 7 x weekly - 1 sets - 10 reps  -

## 2024-12-20 ENCOUNTER — CARE COORDINATION (OUTPATIENT)
Dept: CASE MANAGEMENT | Age: 74
End: 2024-12-20

## 2024-12-20 NOTE — CARE COORDINATION
Care Transitions Note    Follow Up Call     Dx: OA Rt Knee s/p TKR 12/2/24     Attempted to reach patient for transitions of care follow up.  Unable to reach patient.      Outreach Attempts:   HIPAA compliant voicemail left for patient.     Follow Up Appointment:   Future Appointments         Provider Specialty Dept Phone    12/21/2024  9:45 AM Brayan Sebastian Physical Therapy [phone]    12/26/2024 9:00 AM Alexis Zhao PA-C Orthopedic Surgery [phone]    12/26/2024 10:30 AM Alexis Leary, PT Physical Therapy [phone]    12/28/2024 10:30 AM Kam Troncoso PT Physical Therapy [phone]    12/31/2024 10:30 AM Kirsten Saleh, hospitals Physical Therapy [phone]    1/3/2025 11:15 AM Alexis Leary, PT Physical Therapy [phone]    1/7/2025 10:30 AM Kirsten Saleh PTA Physical Therapy [phone]    1/10/2025 10:30 AM Alexis Leary, PT Physical Therapy [phone]    1/14/2025 9:40 AM Calvin Harvey MD Orthopedic Surgery [phone]    1/16/2025 10:30 AM MARCOS MENA [facility] Infusion Therapy [phone]    1/23/2025 10:30 AM Juan Cruz MD Oncology [phone]    1/23/2025 10:45 AM SRMZ, MED ONC NURSE Infusion Therapy [phone]    2/25/2025 9:30 AM Alexis Zhao PA-C Orthopedic Surgery [phone]    3/6/2025 10:00 AM (Arrive by 9:45 AM) Marcela Calix DO Family Medicine [phone]    8/12/2025 2:00 PM SRMX FPS AWV LPN Family Medicine [phone]            Plan for follow-up call in 2-5 days based on severity of symptoms and risk factors. Plan for next call: symptom management-.  self management-.    Mercedes Steward LPN

## 2024-12-21 ENCOUNTER — HOSPITAL ENCOUNTER (OUTPATIENT)
Dept: PHYSICAL THERAPY | Age: 74
Discharge: HOME | End: 2024-12-21
Payer: MEDICARE

## 2024-12-21 PROCEDURE — 97140 MANUAL THERAPY 1/> REGIONS: CPT

## 2024-12-21 PROCEDURE — 97110 THERAPEUTIC EXERCISES: CPT

## 2024-12-21 NOTE — FLOWSHEET NOTE
reviewed progress and HEP    Home Exercise Program:    Access Code: [email or web address]  URL: [email or web address]  Date: 11/25/2024  Prepared by: Alexis Leary    Exercises  - Supine Quad Set  - 3 x daily - 7 x weekly - 1 sets - 10 reps  - Supine Heel Slide with Strap  - 3 x daily - 7 x weekly - 1 sets - 10 reps  - Supine Gluteal Sets  - 3 x daily - 7 x weekly - 1 sets - 10 reps  - Supine Ankle Pumps  - 3 x daily - 7 x weekly - 1 sets - 10 reps  - Seated Knee Extension Stretch with Chair  - 2 x daily - 7 x weekly - 1 sets - 1 reps - 5 min hold    Manual Treatments:  PROM/MOBS to the right knee joint complex with TPR/MFR to palpated hypertonicity    Modalities: None    Communication with other providers:  Cert sent to Calvin Harvey MD , 11/25/2024. Re-eval sent  12/12/24    Assessment:  Pt continues to progress very well overall with her ROM - now actively able to demo 125° flexion and 2° hyperextension!  Pt is able to perform all listed interventions with no increased complaints of pain and we discussed proper technique for advancing to a cane and cessation of walker use.  Pt does very well overall and is a pleasure to work with in the clinic today.  Pt would benefit from skilled therapy interventions as needed to address listed impairments, progress toward goal completion and improve ADL/IADL status.   End pain: 0/10     Plan for Next Session: progress as tolerated , ROM , strength , gait , manual , proprioception, vaso prn     Time In / Time Out:   9041/1035    Timed Code/Total Treatment Minutes:   TE X 40' X 3;   MAN X 30' X 2     Next Progress Note due:  1/12/25    Plan of Care Interventions:  [x] Therapeutic Exercise  [x] Modalities: prn  [x] Therapeutic Activity     [] Ultrasound  [] Estim  [x] Gait Training      [] Cervical Traction [] Lumbar Traction  [x] Neuromuscular Re-education    [] Cold/hotpack [] Iontophoresis   [x] Instruction in HEP      [x] Vasopneumatic   [] Dry Needling    [x] Manual Therapy

## 2024-12-24 ENCOUNTER — CARE COORDINATION (OUTPATIENT)
Dept: CASE MANAGEMENT | Age: 74
End: 2024-12-24

## 2024-12-24 NOTE — CARE COORDINATION
Care Transitions Note    Follow Up Call     Patient: Esha Paz                          Patient : 1950   MRN: [id number]                             Reason for Admission: OA Rt Knee s/p TKR 24   Discharge Date: 24     RURS: Readmission Risk Score: 17.2  Facility: [facility]  24-24    Patient Current Location:  Home: [address] [place] [address]    Care Transition Nurse contacted patient by telephone. Verified name and  as identifiers.    Additional needs identified to be addressed with provider   No needs identified         Method of communication with provider: none.    Care Summary Note: Reports doing very well s/p 24 rt tkr; states \"wonderful!\". Was seen by LATOYA QUIROZ on 24 as scheduled with no new orders. Post-op appt 24 w/ HILDA QUIROZ. Reports very minimal post-op pain rt knee. Reports decreased swelling. Reports incision intact, no erythema/drainage/bleeding or open areas. Advised patient ok to keep incision open to air now. Reports using RICE and ice machine prn. Reports taking Asa as directed and denies reviewed symptoms of claudication. Denies rx needs. Reports appetite, fluid intake good w/ b&b wnl. Remains active w/ [facility] Out[facility] Therapy. Reports she was graduated to cane by PT. Reports gait steady.     Plan of care updates since last contact:  Review of patient management of conditions/medications: as above     Medication Review:  No changes since last call.     Remote Patient Monitoring:  No qualifying dx.    Assessments:  Care Transitions Subsequent and Final Call    Schedule Follow Up Appointment with PCP: Completed  Subsequent and Final Calls  Do you have any ongoing symptoms?: No  Have your medications changed?: No  Do you have any questions related to your medications?: No  Do you currently have any active services?: Yes  Are you currently active with any services?: Outpatient/Community Services  Do you have any needs or concerns that I can

## 2024-12-26 ENCOUNTER — TELEPHONE (OUTPATIENT)
Dept: ORTHOPEDIC SURGERY | Age: 74
End: 2024-12-26

## 2024-12-26 ENCOUNTER — OFFICE VISIT (OUTPATIENT)
Dept: ORTHOPEDIC SURGERY | Age: 74
End: 2024-12-26

## 2024-12-26 ENCOUNTER — HOSPITAL ENCOUNTER (OUTPATIENT)
Dept: PHYSICAL THERAPY | Age: 74
Discharge: HOME | End: 2024-12-26
Payer: MEDICARE

## 2024-12-26 VITALS — HEIGHT: 64 IN | OXYGEN SATURATION: 99 % | HEART RATE: 85 BPM | BODY MASS INDEX: 25.58 KG/M2

## 2024-12-26 DIAGNOSIS — Z96.651 STATUS POST TOTAL RIGHT KNEE REPLACEMENT: Primary | ICD-10-CM

## 2024-12-26 PROCEDURE — 99024 POSTOP FOLLOW-UP VISIT: CPT

## 2024-12-26 PROCEDURE — 97140 MANUAL THERAPY 1/> REGIONS: CPT

## 2024-12-26 PROCEDURE — 97110 THERAPEUTIC EXERCISES: CPT

## 2024-12-26 NOTE — PROGRESS NOTES
Patient returns to office for 3 week post op. Pt states she has no pain. Pt states physical therapy is going well. Pt states she is up moving around with no issues. Pt has no concerns at this time.

## 2024-12-26 NOTE — FLOWSHEET NOTE
Outpatient Physical Therapy  [place]           [x] Phone: [phone]   Fax: [phone]  [place]           [] Phone: [phone]   Fax: [phone]        Physical Therapy Daily Treatment Note  Date:  2024    Patient Name:  Esha Paz    :  1950  MRN: [id number]  Restrictions/Precautions: Restrictions/Precautions: Fall Risk; General Precautions (WBAT RLE)        Diagnosis:   Primary osteoarthritis of right knee [M17.11]  Chronic pain of right knee [M25.561, G89.29]    Date of Injury/Surgery:   Treatment Diagnosis:  R knee OA, R TKR 24, pain,stiffness , limited gait, swelling  Insurance/Certification information: medicare [facility]  Referring Physician:  Calvin Harvey MD     PCP: Marcela Calix DO  Next Doctor Visit:  24 surgery  Plan of care signed (Y/N):  no  Outcome Measure:  KOOS : 14  pts  Visit# / total visits:   6 / 15-  Pain level:      0/10  with mild stiffness           Goals:     Patient goals: get back to her normal walking and standing activity  Short term goals  Time Frame for Short term goals: 4 weeks  1.ind with HEP for ROM, strength, gait, and trf  2. demo good R quad set and SLR with no lag  3. PROM R TKR  flex 90 ext 0  4. avg pain 4/10 or less TKR R  Long Term Goals  Time Frame for Long Term Goals: 6-8 weeks  1. Amb with no limp or AD community dist  2. Stairs: 1 flight reciprocally with 1 rail  3. PROM TKR  flex 120 ext 0 for better ability to dress  and sit on lower seats  4. Avg pain with ADL and typical activity 2/10 or less in cluding gardening and [facility]ering [facility]  5. reprots 75% better overal in all typical activity and housework     Summary of Evaluation:  Assessment: Pt appears with R knee pain and stiffness, she is having a TKR 24, she is involved in volunteering at the [facility] and with gardening and housework. She lives in a Condo with no stairs.   Pt would benefit from skilled therapy interventions as needed to address

## 2024-12-26 NOTE — TELEPHONE ENCOUNTER
Patient called to ask if she has been cleared to drive. Patient states she is no longer taken her narcotic pain medication and PT has stated she is strong enough to maneuver and stop her car effectively. This nurse advised her it is safe to drive as long as she feels safe and strong enough and without taking narcotic pain medication.

## 2024-12-28 ENCOUNTER — HOSPITAL ENCOUNTER (OUTPATIENT)
Dept: PHYSICAL THERAPY | Age: 74
Discharge: HOME | End: 2024-12-28
Payer: MEDICARE

## 2024-12-28 PROCEDURE — 97110 THERAPEUTIC EXERCISES: CPT

## 2024-12-28 PROCEDURE — 97140 MANUAL THERAPY 1/> REGIONS: CPT

## 2024-12-28 NOTE — FLOWSHEET NOTE
4   FOAM   Stand 30 sec   Romberg 30 sec   Foam march   30 sec x 2   Foam tandem      MODALITIES                    Other Therapeutic Activities/Education: reviewed progress and HEP    Home Exercise Program:    Access Code: [email or web address]  URL: [email or web address]  Date: 11/25/2024  Prepared by: Alexis Leary    Exercises  - Supine Quad Set  - 3 x daily - 7 x weekly - 1 sets - 10 reps  - Supine Heel Slide with Strap  - 3 x daily - 7 x weekly - 1 sets - 10 reps  - Supine Gluteal Sets  - 3 x daily - 7 x weekly - 1 sets - 10 reps  - Supine Ankle Pumps  - 3 x daily - 7 x weekly - 1 sets - 10 reps  - Seated Knee Extension Stretch with Chair  - 2 x daily - 7 x weekly - 1 sets - 1 reps - 5 min hold    Manual Treatments:  PROM/MOBS to the right knee joint complex with TPR/MFR to palpated hypertonicity    Modalities: None    Communication with other providers:  Cert sent to Calvin Harvey MD , 11/25/2024. Re-eval sent  12/12/24    Assessment:  Pt appears to be tolerating therapy very well. Demonstrates good mobility, rom and gait.  Pt would benefit from skilled therapy interventions as needed to address listed impairments, progress toward goal completion and improve ADL/IADL status.   End pain:  0/10     Plan for Next Session: progress as tolerated , ROM , strength , gait , manual , proprioception, vaso prn     Time In / Time Out:     1030/1115    Timed Code/Total Treatment Minutes:      45/45    Next Progress Note due:  1/12/25    Plan of Care Interventions:  [x] Therapeutic Exercise  [x] Modalities: prn  [x] Therapeutic Activity     [] Ultrasound  [] Estim  [x] Gait Training      [] Cervical Traction [] Lumbar Traction  [x] Neuromuscular Re-education    [] Cold/hotpack [] Iontophoresis   [x] Instruction in HEP      [x] Vasopneumatic   [] Dry Needling    [x] Manual Therapy               [] Aquatic Therapy            Electronically signed by:  Kam Troncoso, PT    12/28/2024, 9:53 AM

## 2024-12-31 ENCOUNTER — HOSPITAL ENCOUNTER (OUTPATIENT)
Dept: PHYSICAL THERAPY | Age: 74
Discharge: HOME | End: 2024-12-31
Payer: MEDICARE

## 2024-12-31 PROCEDURE — 97530 THERAPEUTIC ACTIVITIES: CPT

## 2024-12-31 PROCEDURE — 97110 THERAPEUTIC EXERCISES: CPT

## 2024-12-31 NOTE — FLOWSHEET NOTE
Outpatient Physical Therapy  [place]           [x] Phone: [phone]   Fax: [phone]  [place]           [] Phone: [phone]   Fax: [phone]        Physical Therapy Daily Treatment Note  Date:  2024    Patient Name:  Esha Paz    :  1950  MRN: [id number]  Restrictions/Precautions: Restrictions/Precautions: Fall Risk; General Precautions (WBAT RLE)        Diagnosis:   Primary osteoarthritis of right knee [M17.11]  Chronic pain of right knee [M25.561, G89.29]    Date of Injury/Surgery:   Treatment Diagnosis:  R knee OA, R TKR 24, pain,stiffness , limited gait, swelling  Insurance/Certification information: medicare [facility]  Referring Physician:  Calvin Harvey MD     PCP: Marcela Calix DO  Next Doctor Visit:  24 surgery  Plan of care signed (Y/N):  no  Outcome Measure:  KOOS : 14  pts  Visit# / total visits:   8 / 15-  Pain level:      0/10  with mild stiffness           Goals:     Patient goals: get back to her normal walking and standing activity  Short term goals  Time Frame for Short term goals: 4 weeks  1.ind with HEP for ROM, strength, gait, and trf  2. demo good R quad set and SLR with no lag  3. PROM R TKR  flex 90 ext 0  4. avg pain 4/10 or less TKR R  Long Term Goals  Time Frame for Long Term Goals: 6-8 weeks  1. Amb with no limp or AD community dist  2. Stairs: 1 flight reciprocally with 1 rail  3. PROM TKR  flex 120 ext 0 for better ability to dress  and sit on lower seats  4. Avg pain with ADL and typical activity 2/10 or less in cluding gardening and volunteering at [facility]  5. reprots 75% better overal in all typical activity and housework     Summary of Evaluation:  Assessment: Pt appears with R knee pain and stiffness, she is having a TKR 24, she is involved in volunteering at the [facility] and with gardening and housework. She lives in a Condo with no stairs.   Pt would benefit from skilled therapy interventions as needed to address

## 2025-01-03 ENCOUNTER — HOSPITAL ENCOUNTER (OUTPATIENT)
Dept: PHYSICAL THERAPY | Age: 75
Discharge: HOME | End: 2025-01-03
Payer: MEDICARE

## 2025-01-03 PROCEDURE — 97110 THERAPEUTIC EXERCISES: CPT

## 2025-01-03 PROCEDURE — 97140 MANUAL THERAPY 1/> REGIONS: CPT

## 2025-01-03 PROCEDURE — 97530 THERAPEUTIC ACTIVITIES: CPT

## 2025-01-03 NOTE — FLOWSHEET NOTE
Outpatient Physical Therapy  [place]           [x] Phone: [phone]   Fax: [phone]  [place]           [] Phone: [phone]   Fax: [phone]        Physical Therapy Daily Treatment Note  Date:  1/3/2025    Patient Name:  Esha Paz    :  1950  MRN: [id number]  Restrictions/Precautions: Restrictions/Precautions: Fall Risk; General Precautions (WBAT RLE)        Diagnosis:   Primary osteoarthritis of right knee [M17.11]  Chronic pain of right knee [M25.561, G89.29]    Date of Injury/Surgery:   Treatment Diagnosis:  R knee OA, R TKR 24, pain,stiffness , limited gait, swelling  Insurance/Certification information: medicare [facility]  Referring Physician:  Calvin Harvey MD     PCP: Marcela Calix DO  Next Doctor Visit:  24 surgery  Plan of care signed (Y/N):  no  Outcome Measure:  KOOS : 14  pts  Visit# / total visits:   9 / 15-  Pain level:       0/10  with mild stiffness             Goals:     Patient goals: get back to her normal walking and standing activity  Short term goals  Time Frame for Short term goals: 4 weeks  1.ind with HEP for ROM, strength, gait, and trf  2. demo good R quad set and SLR with no lag  3. PROM R TKR  flex 90 ext 0  4. avg pain 4/10 or less TKR R  Long Term Goals  Time Frame for Long Term Goals: 6-8 weeks  1. Amb with no limp or AD community dist  2. Stairs: 1 flight reciprocally with 1 rail  3. PROM TKR  flex 120 ext 0 for better ability to dress  and sit on lower seats  4. Avg pain with ADL and typical activity 2/10 or less in cluding gardening and volunteering at [facility]  5. reprots 75% better overal in all typical activity and housework     Summary of Evaluation:  Assessment: Pt appears with R knee pain and stiffness, she is having a TKR 24, she is involved in volunteering at the [facility] and with gardening and housework. She lives in a Condo with no stairs.   Pt would benefit from skilled therapy interventions as needed to address

## 2025-01-06 ENCOUNTER — CARE COORDINATION (OUTPATIENT)
Dept: CASE MANAGEMENT | Age: 75
End: 2025-01-06

## 2025-01-06 NOTE — CARE COORDINATION
Care Transitions Note    Follow Up Call     Patient Current Location:  Home: [address] [id number]    N Care Coordinator contacted the patient by telephone. Verified name and  as identifiers.    Additional needs identified to be addressed with provider   No needs identified                 Method of communication with provider: none.    Care Summary Note: LPN CC spoke with patient. States she is wonderful. Denies pain, swelling. Incisions CDI, healing well. ROM almost to goal. Working with OP PT to further improve balance. No longer requires assistive devices to ambulate. Appetite good. Denies problems with bowels or bladder. Denies medication changes. Denies needs. Next post op .     Thank you,   Jess Felipe, AB Care Coordinator   [facility]  Remote Patient Monitoring, Care Transitions, Ambulatory Care Management  [phone]    Plan of care updates since last contact:  Review of patient management of conditions/medications:         Advance Care Planning:   Does patient have an Advance Directive: reviewed and current.    Medication Review:  No changes since last call.     Remote Patient Monitoring:  Offered patient enrollment in the Remote Patient Monitoring (RPM) program for in-home monitoring: Yes, but did not enroll at this time: controlled chronic disease management.    Assessments:  Care Transitions Subsequent and Final Call    Subsequent and Final Calls  Do you have any ongoing symptoms?: No  Have your medications changed?: No  Do you have any questions related to your medications?: No  Do you currently have any active services?: No  Are you currently active with any services?: Outpatient/Community Services  Do you have any needs or concerns that I can assist you with?: No  Identified Barriers: None  Care Transitions Interventions   Home Care Waiver: Declined Physical Therapy: Completed       Transportation Support: Declined    Meals on Wheels: Declined Registered

## 2025-01-07 ENCOUNTER — HOSPITAL ENCOUNTER (OUTPATIENT)
Dept: PHYSICAL THERAPY | Age: 75
Discharge: HOME | End: 2025-01-07
Payer: MEDICARE

## 2025-01-07 PROCEDURE — 97112 NEUROMUSCULAR REEDUCATION: CPT

## 2025-01-07 PROCEDURE — 97110 THERAPEUTIC EXERCISES: CPT

## 2025-01-07 NOTE — FLOWSHEET NOTE
Code/Total Treatment Minutes:    45'  (1) NEURO  (2) TE    Next Progress Note due:  1/10/25    Plan of Care Interventions:  [x] Therapeutic Exercise  [x] Modalities: prn  [x] Therapeutic Activity     [] Ultrasound  [] Estim  [x] Gait Training      [] Cervical Traction [] Lumbar Traction  [x] Neuromuscular Re-education    [] Cold/hotpack [] Iontophoresis   [x] Instruction in HEP      [x] Vasopneumatic   [] Dry Needling    [x] Manual Therapy               [] Aquatic Therapy            Electronically signed by:  Poly Grissom PT DPT  1/7/2025, 9:52 AM

## 2025-01-08 NOTE — PROGRESS NOTES
12/26/2024   Chief Complaint   Patient presents with    Post-Op Check     3 week post RT TKA 12/2/24        History of Present Illness:                             Esha Paz is a 74 y.o. female returning to the office today for continued postoperative care regarding her right total knee arthroplasty.  Patient states she is doing well with her healing process.  She denies any signs of infection from her incision area.  She states she has been able to tolerate gentle range of motion and walking with very minimal discomfort.  She is overall happy with her progress and has no concerns at today's visit.    Patient returns to office for 3 week post op. Pt states she has no pain. Pt states physical therapy is going well. Pt states she is up moving around with no issues. Pt has no concerns at this time.     Medical History  Patient's medications, allergies, past medical, surgical, social and family histories were reviewed and updated as appropriate.      Review of Systems                                            Examination:  General Exam:  Vitals: Pulse 85   Ht 1.626 m (5' 4\")   SpO2 99%   BMI 25.58 kg/m²    Physical Exam  Musculoskeletal:      Comments: Right knee exam: Patient right knee appears with a well-approximated anterior incision that shows no signs of infection such as erythema, fluctuance, drainage, or surrounding temperature changes.  Patient exhibits mild tenderness to palpation in the soft tissues adjacent to the incision area.  Patient able to perform full knee extension and 110 degrees of knee flexion during active and passive maneuvers.  No joint laxity with varus or valgus stress.  Popliteal pulse 2+, Homans' sign negative.  Sensation to light touch intact throughout all surfaces of the right lower extremity.       Diagnostic testing:  X-rays reviewed in office, I independently reviewed the films in the office today:     Imaging results from 12/26/2024:  FINDINGS: Satisfactory alignment of

## 2025-01-10 ENCOUNTER — HOSPITAL ENCOUNTER (OUTPATIENT)
Dept: PHYSICAL THERAPY | Age: 75
Discharge: HOME | End: 2025-01-10

## 2025-01-10 NOTE — DISCHARGE SUMMARY
weeks  1.ind with HEP for ROM, strength, gait, and trf   met  2. demo good R quad set and SLR with no lag   met  3. PROM R TKR  flex 90 ext 0   met  4. avg pain 4/10 or less TKR R  met  Long Term Goals  Time Frame for Long Term Goals: 6-8 weeks  1. Amb with no limp or AD community dist  met  2. Stairs: 1 flight reciprocally with 1 rail   met  3. PROM TKR  flex 120 ext 0 for better ability to dress  and sit on lower seats    met  4. Avg pain with ADL and typical activity 2/10 or less in cluding gardening and volunteering at [facility]  met  5. reprots 75% better overal in all typical activity and housework   met      Frequency/Duration:  # Days per week: [] 1 day # Weeks: [] 1 week [] 4 weeks [] 8 weeks     [] 2 days   [] 2 weeks [] 5 weeks [] 10 weeks     [] 3 days   [] 3 weeks [] 6 weeks [] 12 weeks       Rehab Potential: [] Excellent [x] Good [] Fair  [] Poor         Patient Status: [] Continue per initial plan of Care     [x] Patient now discharged     [] Additional visits requested, Please re-certify for additional visits:      If we are requesting more visits, we fully anticipate the patient's condition is expected to improve within the treatment timeframe we are requesting.    Electronically signed by:  Alexis Leary PT, DPT, 1/10/2025, 10:57 AM    If you have any questions or concerns, please don't hesitate to call.  Thank you for your referral.    Physician Signature:______________________ Date:______ Time: ________  By signing above, therapist’s plan is approved by physician

## 2025-01-10 NOTE — FLOWSHEET NOTE
Outpatient Physical Therapy  [place]           [x] Phone: [phone]   Fax: [phone]  [place]           [] Phone: [phone]   Fax: [phone]        Physical Therapy Daily Treatment Note  Date:  1/10/2025    Patient Name:  Esha Paz    :  1950  MRN: [id number]  Restrictions/Precautions: --  Diagnosis:   Primary osteoarthritis of right knee [M17.11]  Chronic pain of right knee [M25.561, G89.29]    Date of Injury/Surgery:   Treatment Diagnosis:  R knee OA, R TKR 24, pain,stiffness , limited gait, swelling  Insurance/Certification information: medicare [facility]  Referring Physician:  Calvin Harvey MD     PCP: Marcela Calix DO  Next Doctor Visit:  24 surgery  Plan of care signed (Y/N):  no  Outcome Measure:  KOOS : 0  pts  Visit# / total visits:   11/15-  Pain level:        0/10    Goals:     Patient goals: get back to her normal walking and standing activity  Short term goals  Time Frame for Short term goals: 4 weeks  1.ind with HEP for ROM, strength, gait, and trf   met  2. demo good R quad set and SLR with no lag   met  3. PROM R TKR  flex 90 ext 0   met  4. avg pain 4/10 or less TKR R  met  Long Term Goals  Time Frame for Long Term Goals: 6-8 weeks  1. Amb with no limp or AD community dist  met  2. Stairs: 1 flight reciprocally with 1 rail   met  3. PROM TKR  flex 120 ext 0 for better ability to dress  and sit on lower seats    met  4. Avg pain with ADL and typical activity 2/10 or less in cluding gardening and volunteering at [facility]  met  5. reprots 75% better overal in all typical activity and housework   met     Summary of Evaluation:  Assessment: Pt appears with R knee pain and stiffness, she is having a TKR 24, she is involved in volunteering at the [employer] and with gardening and housework. She lives in a Condo with no stairs.   Pt would benefit from skilled therapy interventions as needed to address listed impairments, progress toward goal

## 2025-01-11 ASSESSMENT — PROMIS GLOBAL HEALTH SCALE
HOW IS THE PROMIS V1.1 BEING ADMINISTERED?: ELECTRONIC
TO WHAT EXTENT ARE YOU ABLE TO CARRY OUT YOUR EVERYDAY PHYSICAL ACTIVITIES SUCH AS WALKING, CLIMBING STAIRS, CARRYING GROCERIES, OR MOVING A CHAIR [ON A SCALE OF 1 (NOT AT ALL) TO 5 (COMPLETELY)]?: COMPLETELY
IN GENERAL, PLEASE RATE HOW WELL YOU CARRY OUT YOUR USUAL SOCIAL ACTIVITIES (INCLUDES ACTIVITIES AT HOME, AT WORK, AND IN YOUR COMMUNITY, AND RESPONSIBILITIES AS A PARENT, CHILD, SPOUSE, EMPLOYEE, FRIEND, ETC) [ON A SCALE OF 1 (POOR) TO 5 (EXCELLENT)]?: EXCELLENT
IN THE PAST 7 DAYS, HOW WOULD YOU RATE YOUR PAIN ON AVERAGE [ON A SCALE FROM 0 (NO PAIN) TO 10 (WORST IMAGINABLE PAIN)]?: 0 NO PAIN
IN GENERAL, PLEASE RATE HOW WELL YOU CARRY OUT YOUR USUAL SOCIAL ACTIVITIES (INCLUDES ACTIVITIES AT HOME, AT WORK, AND IN YOUR COMMUNITY, AND RESPONSIBILITIES AS A PARENT, CHILD, SPOUSE, EMPLOYEE, FRIEND, ETC) [ON A SCALE OF 1 (POOR) TO 5 (EXCELLENT)]?: EXCELLENT
HOW IS THE PROMIS V1.1 BEING ADMINISTERED?: ELECTRONIC
IN GENERAL, HOW WOULD YOU RATE YOUR SATISFACTION WITH YOUR SOCIAL ACTIVITIES AND RELATIONSHIPS [ON A SCALE OF 1 (POOR) TO 5 (EXCELLENT)]?: EXCELLENT
SUM OF RESPONSES TO QUESTIONS 3, 6, 7, & 8: 14
IN GENERAL, WOULD YOU SAY YOUR HEALTH IS...[ON A SCALE OF 1 (POOR) TO 5 (EXCELLENT)]: VERY GOOD
SUM OF RESPONSES TO QUESTIONS 2, 4, 5, & 10: 18
IN THE PAST 7 DAYS, HOW WOULD YOU RATE YOUR PAIN ON AVERAGE [ON A SCALE FROM 0 (NO PAIN) TO 10 (WORST IMAGINABLE PAIN)]?: 0 NO PAIN
IN THE PAST 7 DAYS, HOW OFTEN HAVE YOU BEEN BOTHERED BY EMOTIONAL PROBLEMS, SUCH AS FEELING ANXIOUS, DEPRESSED, OR IRRITABLE [ON A SCALE FROM 1 (NEVER) TO 5 (ALWAYS)]?: RARELY
IN GENERAL, HOW WOULD YOU RATE YOUR PHYSICAL HEALTH [ON A SCALE OF 1 (POOR) TO 5 (EXCELLENT)]?: VERY GOOD
IN GENERAL, WOULD YOU SAY YOUR QUALITY OF LIFE IS...[ON A SCALE OF 1 (POOR) TO 5 (EXCELLENT)]: VERY GOOD
WHO IS THE PERSON COMPLETING THE PROMIS V1.1 SURVEY?: SELF
IN THE PAST 7 DAYS, HOW OFTEN HAVE YOU BEEN BOTHERED BY EMOTIONAL PROBLEMS, SUCH AS FEELING ANXIOUS, DEPRESSED, OR IRRITABLE [ON A SCALE FROM 1 (NEVER) TO 5 (ALWAYS)]?: RARELY
IN GENERAL, HOW WOULD YOU RATE YOUR SATISFACTION WITH YOUR SOCIAL ACTIVITIES AND RELATIONSHIPS [ON A SCALE OF 1 (POOR) TO 5 (EXCELLENT)]?: EXCELLENT
WHO IS THE PERSON COMPLETING THE PROMIS V1.1 SURVEY?: SELF
IN GENERAL, HOW WOULD YOU RATE YOUR MENTAL HEALTH, INCLUDING YOUR MOOD AND YOUR ABILITY TO THINK [ON A SCALE OF 1 (POOR) TO 5 (EXCELLENT)]?: EXCELLENT
TO WHAT EXTENT ARE YOU ABLE TO CARRY OUT YOUR EVERYDAY PHYSICAL ACTIVITIES SUCH AS WALKING, CLIMBING STAIRS, CARRYING GROCERIES, OR MOVING A CHAIR [ON A SCALE OF 1 (NOT AT ALL) TO 5 (COMPLETELY)]?: COMPLETELY

## 2025-01-14 ENCOUNTER — OFFICE VISIT (OUTPATIENT)
Dept: ORTHOPEDIC SURGERY | Age: 75
End: 2025-01-14

## 2025-01-14 VITALS — BODY MASS INDEX: 25.58 KG/M2 | HEIGHT: 64 IN | OXYGEN SATURATION: 92 % | HEART RATE: 75 BPM

## 2025-01-14 DIAGNOSIS — Z96.651 STATUS POST RIGHT KNEE REPLACEMENT: Primary | ICD-10-CM

## 2025-01-14 PROCEDURE — 99024 POSTOP FOLLOW-UP VISIT: CPT | Performed by: ORTHOPAEDIC SURGERY

## 2025-01-14 RX ORDER — MECLIZINE HCL 12.5 MG 12.5 MG/1
TABLET ORAL
COMMUNITY
Start: 2024-12-28

## 2025-01-14 NOTE — PROGRESS NOTES
Patient returns to the office with a 6 week post op of her right TKA. Pt stated she is very pleased with the knee and the progress she has made.

## 2025-01-16 ENCOUNTER — HOSPITAL ENCOUNTER (OUTPATIENT)
Dept: INFUSION THERAPY | Age: 75
Discharge: HOME | End: 2025-01-16
Payer: MEDICARE

## 2025-01-16 DIAGNOSIS — C90.00 MULTIPLE MYELOMA NOT HAVING ACHIEVED REMISSION (HCC): Chronic | ICD-10-CM

## 2025-01-16 LAB
ALBUMIN SERPL-MCNC: 3.9 G/DL (ref 3.4–5)
ALBUMIN/GLOB SERPL: 1.6 {RATIO} (ref 1.1–2.2)
ALP SERPL-CCNC: 112 U/L (ref 40–129)
ALT SERPL-CCNC: 19 U/L (ref 10–40)
ANION GAP SERPL CALCULATED.3IONS-SCNC: 9 MMOL/L (ref 9–17)
AST SERPL-CCNC: 24 U/L (ref 15–37)
BASOPHILS # BLD: 0.02 K/UL
BASOPHILS NFR BLD: 1 % (ref 0–1)
BILIRUB SERPL-MCNC: 0.8 MG/DL (ref 0–1)
BUN SERPL-MCNC: 6 MG/DL (ref 7–20)
CALCIUM SERPL-MCNC: 8.9 MG/DL (ref 8.3–10.6)
CHLORIDE SERPL-SCNC: 104 MMOL/L (ref 99–110)
CO2 SERPL-SCNC: 29 MMOL/L (ref 21–32)
CREAT SERPL-MCNC: 0.7 MG/DL (ref 0.6–1.2)
EOSINOPHIL # BLD: 0.03 K/UL
EOSINOPHILS RELATIVE PERCENT: 1 % (ref 0–3)
ERYTHROCYTE [DISTWIDTH] IN BLOOD BY AUTOMATED COUNT: 14.9 % (ref 11.7–14.9)
ERYTHROCYTE [SEDIMENTATION RATE] IN BLOOD BY WESTERGREN METHOD: 3 MM/HR (ref 0–30)
GFR, ESTIMATED: 84 ML/MIN/1.73M2
GLUCOSE SERPL-MCNC: 90 MG/DL (ref 74–99)
HCT VFR BLD AUTO: 33.6 % (ref 37–47)
HGB BLD-MCNC: 10.8 G/DL (ref 12.5–16)
IGA SERPL-MCNC: 394 MG/DL (ref 70–400)
IGG SERPL-MCNC: 1063 MG/DL (ref 700–1600)
IGM SERPL-MCNC: 30 MG/DL (ref 40–230)
LDH SERPL-CCNC: 168 U/L (ref 100–190)
LYMPHOCYTES NFR BLD: 0.89 K/UL
LYMPHOCYTES RELATIVE PERCENT: 30 % (ref 24–44)
MCH RBC QN AUTO: 31.3 PG (ref 27–31)
MCHC RBC AUTO-ENTMCNC: 32.1 G/DL (ref 32–36)
MCV RBC AUTO: 97.4 FL (ref 78–100)
MONOCYTES NFR BLD: 0.29 K/UL
MONOCYTES NFR BLD: 10 % (ref 0–4)
NEUTROPHILS NFR BLD: 58 % (ref 36–66)
NEUTS SEG NFR BLD: 1.72 K/UL
PLATELET # BLD AUTO: 242 K/UL (ref 140–440)
PMV BLD AUTO: 8.8 FL (ref 7.5–11.1)
POTASSIUM SERPL-SCNC: 3.8 MMOL/L (ref 3.5–5.1)
PROT SERPL-MCNC: 6.3 G/DL (ref 6.4–8.2)
RBC # BLD AUTO: 3.45 M/UL (ref 4.2–5.4)
SODIUM SERPL-SCNC: 142 MMOL/L (ref 136–145)
WBC OTHER # BLD: 3 K/UL (ref 4–10.5)

## 2025-01-16 PROCEDURE — 36415 COLL VENOUS BLD VENIPUNCTURE: CPT

## 2025-01-16 PROCEDURE — 80053 COMPREHEN METABOLIC PANEL: CPT

## 2025-01-16 PROCEDURE — 83615 LACTATE (LD) (LDH) ENZYME: CPT

## 2025-01-16 PROCEDURE — 86334 IMMUNOFIX E-PHORESIS SERUM: CPT

## 2025-01-16 PROCEDURE — 82232 ASSAY OF BETA-2 PROTEIN: CPT

## 2025-01-16 PROCEDURE — 85652 RBC SED RATE AUTOMATED: CPT

## 2025-01-16 PROCEDURE — 82784 ASSAY IGA/IGD/IGG/IGM EACH: CPT

## 2025-01-16 PROCEDURE — 85025 COMPLETE CBC W/AUTO DIFF WBC: CPT

## 2025-01-16 PROCEDURE — 84155 ASSAY OF PROTEIN SERUM: CPT

## 2025-01-16 PROCEDURE — 83521 IG LIGHT CHAINS FREE EACH: CPT

## 2025-01-16 PROCEDURE — 84165 PROTEIN E-PHORESIS SERUM: CPT

## 2025-01-17 LAB
COMMENT: ABNORMAL
KAPPA FREE LIGHT CHAIN: 27.1 MG/L (ref 2.37–20.73)
KAPPA/LAMBDA RATIO: 0.87 (ref 0.22–1.74)
LAMBDA FREE LIGHT CHAIN: 31 MG/L (ref 4.23–27.69)

## 2025-01-17 NOTE — PROGRESS NOTES
1/14/2025   Chief Complaint   Patient presents with    Post-Op Check     Right TKA, DOS: 12/2/24        History of Present Illness:                             Esha Paz is a 75 y.o. female who returns today for follow-up of her right total knee replacement.  She is very pleased with her progress and rehabilitation.  Her knee is healing well and she is pleased with the progress and pain relief.  No complaints or concerns today.    Patient returns to the office with a 6 week post op of her right TKA. Pt stated she is very pleased with the knee and the progress she has made.        Medical History  Patient's medications, allergies, past medical, surgical, social and family histories were reviewed and updated as appropriate.      Review of Systems                                            Examination:  General Exam:  Vitals: Pulse 75   Ht 1.626 m (5' 4\")   SpO2 92%   BMI 25.58 kg/m²    Physical Exam     Right Lower Extremity:    The incision is well-healed.  No erythema, drainage, or induration.  Minimal resolving soft tissue swelling present throughout the soft tissues of the knee.  Range of motion is present from full extension to 120 degrees of flexion.  Calf is soft and nontender.  Negative Homans sign.  Sensation and motor function is intact at the knee and ankle.      Diagnostic testing:  X-rays reviewed in office, I independently reviewed the films in the office today:   Previous x-rays show well aligned total knee replacement with no complication      Office Procedures:  No orders of the defined types were placed in this encounter.      Assessment and Plan  Right total knee replacement    The patient is healing well and making steady improvements in the knee with physical therapy.    I have recommended continuing with rehabilitation as a home program.  The patient understands importance of continuing with regular and aggressive stretching exercises and understands how to continue advancing

## 2025-01-18 LAB
BETA-2 MICROGLOBULIN: 2.1 MG/L
MISCELLANEOUS LAB TEST RESULT: NORMAL
MISCELLANEOUS LAB TEST RESULT: NORMAL
TEST NAME: NORMAL
TEST NAME: NORMAL

## 2025-01-22 LAB
MISCELLANEOUS LAB TEST RESULT: NORMAL
TEST NAME: NORMAL

## 2025-01-23 LAB
MISCELLANEOUS LAB TEST RESULT: NORMAL
TEST NAME: NORMAL

## 2025-01-24 ENCOUNTER — OFFICE VISIT (OUTPATIENT)
Dept: ONCOLOGY | Age: 75
End: 2025-01-24

## 2025-01-24 ENCOUNTER — HOSPITAL ENCOUNTER (OUTPATIENT)
Dept: INFUSION THERAPY | Age: 75
Discharge: HOME | End: 2025-01-24
Payer: MEDICARE

## 2025-01-24 VITALS
HEIGHT: 64 IN | WEIGHT: 151.8 LBS | BODY MASS INDEX: 25.92 KG/M2 | TEMPERATURE: 98 F | HEART RATE: 85 BPM | SYSTOLIC BLOOD PRESSURE: 146 MMHG | OXYGEN SATURATION: 98 % | RESPIRATION RATE: 16 BRPM | DIASTOLIC BLOOD PRESSURE: 55 MMHG

## 2025-01-24 DIAGNOSIS — C90.00 MULTIPLE MYELOMA NOT HAVING ACHIEVED REMISSION (HCC): Primary | Chronic | ICD-10-CM

## 2025-01-24 PROCEDURE — 99211 OFF/OP EST MAY X REQ PHY/QHP: CPT

## 2025-01-24 RX ORDER — MECLIZINE HCL 12.5 MG 12.5 MG/1
12.5 TABLET ORAL DAILY
Qty: 90 TABLET | Refills: 1 | Status: SHIPPED | OUTPATIENT
Start: 2025-01-24

## 2025-01-24 ASSESSMENT — PATIENT HEALTH QUESTIONNAIRE - PHQ9
SUM OF ALL RESPONSES TO PHQ QUESTIONS 1-9: 0
SUM OF ALL RESPONSES TO PHQ QUESTIONS 1-9: 0
2. FEELING DOWN, DEPRESSED OR HOPELESS: NOT AT ALL
SUM OF ALL RESPONSES TO PHQ9 QUESTIONS 1 & 2: 0
SUM OF ALL RESPONSES TO PHQ QUESTIONS 1-9: 0
SUM OF ALL RESPONSES TO PHQ QUESTIONS 1-9: 0
1. LITTLE INTEREST OR PLEASURE IN DOING THINGS: NOT AT ALL

## 2025-01-24 NOTE — PROGRESS NOTES
MA Rooming Questions  Patient: Esha Paz  MRN: [id number]    Date: 1/24/2025        1. Do you have any new issues?   no         2. Do you need any refills on medications?    yes - Meclizine    3. Have you had any imaging done since your last visit?   no    4. Have you been hospitalized or seen in the emergency room since your last visit here?   no    5. Did the patient have a depression screening completed today? Yes    No data recorded     PHQ-9 Given to (if applicable):               PHQ-9 Score (if applicable):                     [] Positive     []  Negative              Does question #9 need addressed (if applicable)                     [] Yes    []  No               Ember Joe CMA      
mammographic evidence of malignancy.    Maintenance chemotherapy with Revlimid was started since August 27, 2019.     On January 24, 2025, she presented to me for follow-up. I have been following her for stage III IgG kappa multiple myeloma and she is status post first line chemotherapy with Velcade, Revlimid and Dexamethasone (received total 4 cycles) and autologous stem cell transplantation.    She has been on maintenance chemotherapy with revlimid since August 27, 2019.     She is here for close monitoring of toxicity and side effects from chemotherapy. She is tolerating maintenance chemotherapy, Revlimid well and she doesn't encounter any major side effects from Revlimid.     We change Revlimid to three weeks on one week off, since she has been having increasing SOB and fatigue. Since her symptoms are resolved now, we resumed back on daily basis since January 14, 2020 until 8/16/22.    Since she has significant diarrhea, we changed revlimid to 3 weeks on 1 week off schecule starting from 8/17/22.     She has normal SPEP and SARABJIT on 1/16/25 blood test. I believe she is in stringent complete remission and I recommend that she continues with current dose of Revlimid for now.     She has been having diarrhea due to revlimid since 3/2022. She has been on lomotil with good control.     I will reevaluate her multiple myeloma again in 3 months and I will see her again after that.    Right knee OA - she is s/p right TKR on 12/2/24 by Dr. Harvey. We held revlimid 10 days before surgery and 2 weeks after surgery.     Depression - due to loss of her . She is on cymbalta. She doesn't need ativan anymore.     Dizziness - recommend her to try meclizine. If not helping, I will consider to add promethazine.     Chemo induced thromboembolism prophylaxis - I recommend her to continue with aspirin 81 mg daily.     Myeloma bone disease - Since she has completed 2 years of therapy, we stopped it after 5/2021 dose. I asked her

## 2025-02-13 ENCOUNTER — CLINICAL DOCUMENTATION (OUTPATIENT)
Dept: ONCOLOGY | Age: 75
End: 2025-02-13

## 2025-02-13 DIAGNOSIS — C90.00 MULTIPLE MYELOMA NOT HAVING ACHIEVED REMISSION (HCC): ICD-10-CM

## 2025-02-13 RX ORDER — LENALIDOMIDE 10 MG/1
10 CAPSULE ORAL DAILY
Qty: 28 CAPSULE | Refills: 0 | Status: ACTIVE | OUTPATIENT
Start: 2025-02-13

## 2025-02-13 NOTE — PROGRESS NOTES
Revlimid 10mg chemo capsules reordered and e-scribed to [facility]. Prescriber survey completed and new auth # [id number] obtained through [employer] portal. Auth valid for 30 days and attached to RX.

## 2025-02-25 ENCOUNTER — HOSPITAL ENCOUNTER (OUTPATIENT)
Age: 75
Discharge: HOME | End: 2025-02-25
Payer: MEDICARE

## 2025-02-25 ENCOUNTER — OFFICE VISIT (OUTPATIENT)
Dept: ORTHOPEDIC SURGERY | Age: 75
End: 2025-02-25

## 2025-02-25 VITALS
BODY MASS INDEX: 25.61 KG/M2 | RESPIRATION RATE: 14 BRPM | OXYGEN SATURATION: 99 % | HEIGHT: 64 IN | HEART RATE: 81 BPM | WEIGHT: 150 LBS

## 2025-02-25 DIAGNOSIS — Z96.651 STATUS POST RIGHT KNEE REPLACEMENT: Primary | ICD-10-CM

## 2025-02-25 LAB
CHOLEST SERPL-MCNC: 133 MG/DL (ref 125–199)
HDLC SERPL-MCNC: 74 MG/DL
LDLC SERPL CALC-MCNC: 37 MG/DL
TRIGL SERPL-MCNC: 112 MG/DL
TSH SERPL DL<=0.05 MIU/L-ACNC: 3.42 UIU/ML (ref 0.27–4.2)

## 2025-02-25 PROCEDURE — 80061 LIPID PANEL: CPT

## 2025-02-25 PROCEDURE — 99024 POSTOP FOLLOW-UP VISIT: CPT

## 2025-02-25 PROCEDURE — 84443 ASSAY THYROID STIM HORMONE: CPT

## 2025-02-25 NOTE — PROGRESS NOTES
2/25/2025   Chief Complaint   Patient presents with    Post-Op Check     Right TKA DOS 12/02/2024        History of Present Illness:       Patient is a 75-year-old female returning to the office today for continued postoperative care regarding her right total knee arthroplasty, DOS 12/2/2024.  Patient states she is doing well with her healing process.  She denies any signs of infection from her incision area.  She is overall happy with the way things are going.  She notices there is 1 exercise during one of her exercise classes that does give her some fits in room for pause but she has been avoiding that lateral and twisting motion altogether and has no complaints today.    Previous HPI:                        Esha Paz is a 75 y.o. female who returns today for follow-up of her right total knee replacement.  She is very pleased with her progress and rehabilitation.  Her knee is healing well and she is pleased with the progress and pain relief.  No complaints or concerns today.    Patient returns to the office with a 6 week post op of her right TKA. Pt stated she is very pleased with the knee and the progress she has made.        Medical History  Patient's medications, allergies, past medical, surgical, social and family histories were reviewed and updated as appropriate.      Review of Systems                                            Examination:  General Exam:  Vitals: Pulse 81   Resp 14   Ht 1.626 m (5' 4\")   Wt 68 kg (150 lb)   SpO2 99%   BMI 25.75 kg/m²    Physical Exam     Right Lower Extremity:    The incision is well-healed.  No erythema, drainage, or induration.  Minimal resolving soft tissue swelling present throughout the soft tissues of the knee.  Range of motion is present from full extension to 130 degrees of flexion.  Calf is soft and nontender.  Negative Homans sign.  Sensation and motor function is intact at the knee and ankle.      Diagnostic testing:  X-rays reviewed in office, I

## 2025-03-06 ENCOUNTER — OFFICE VISIT (OUTPATIENT)
Dept: FAMILY MEDICINE CLINIC | Age: 75
End: 2025-03-06

## 2025-03-06 VITALS
WEIGHT: 153.7 LBS | BODY MASS INDEX: 26.24 KG/M2 | HEART RATE: 72 BPM | HEIGHT: 64 IN | OXYGEN SATURATION: 100 % | SYSTOLIC BLOOD PRESSURE: 136 MMHG | DIASTOLIC BLOOD PRESSURE: 64 MMHG

## 2025-03-06 DIAGNOSIS — Z12.31 ENCOUNTER FOR SCREENING MAMMOGRAM FOR MALIGNANT NEOPLASM OF BREAST: Primary | ICD-10-CM

## 2025-03-06 DIAGNOSIS — M17.11 PRIMARY OSTEOARTHRITIS OF RIGHT KNEE: ICD-10-CM

## 2025-03-06 DIAGNOSIS — M85.89 OSTEOPENIA OF MULTIPLE SITES: Chronic | ICD-10-CM

## 2025-03-06 DIAGNOSIS — C90.00 MULTIPLE MYELOMA NOT HAVING ACHIEVED REMISSION (HCC): Chronic | ICD-10-CM

## 2025-03-06 DIAGNOSIS — I10 ESSENTIAL HYPERTENSION: Chronic | ICD-10-CM

## 2025-03-06 DIAGNOSIS — Z85.89 HISTORY OF SQUAMOUS CELL CARCINOMA: ICD-10-CM

## 2025-03-06 RX ORDER — AMLODIPINE BESYLATE 5 MG/1
TABLET ORAL
Qty: 90 TABLET | Refills: 1 | Status: SHIPPED | OUTPATIENT
Start: 2025-03-06 | End: 2025-03-07 | Stop reason: SDUPTHER

## 2025-03-06 RX ORDER — LOSARTAN POTASSIUM 50 MG/1
TABLET ORAL
Qty: 90 TABLET | Refills: 1 | Status: SHIPPED | OUTPATIENT
Start: 2025-03-06 | End: 2025-03-07 | Stop reason: SDUPTHER

## 2025-03-06 NOTE — PROGRESS NOTES
TOOTH EXTRACTION  Early 1970's    All [place] Teeth Extracted                 CURRENT MEDICATIONS  Current Outpatient Medications   Medication Sig Dispense Refill    meclizine (ANTIVERT) 12.5 MG tablet Take 1 tablet by mouth daily 90 tablet 1    aspirin 81 MG tablet Take 1 tablet by mouth nightly      Ascorbic Acid (VITAMIN C) 500 MG CAPS       Acetaminophen (TYLENOL PO)       DULoxetine (CYMBALTA) 60 MG extended release capsule Take 1 capsule by mouth 2 times daily 180 capsule 3    ZINC PO Take 1 tablet by mouth daily      VITAMIN E COMPLEX PO Take by mouth daily      Cyanocobalamin (VITAMIN B12 PO) Take by mouth daily      famotidine (PEPCID) 20 MG tablet       Biotin 02853 MCG TABS Take by mouth      Omega-3 Fatty Acids (FISH OIL) 1000 MG CAPS Take 3 capsules by mouth 2 times daily      Multiple Vitamins-Minerals (THERAPEUTIC MULTIVITAMIN-MINERALS) tablet Take 1 tablet by mouth every morning      Cholecalciferol (VITAMIN D) 2000 units CAPS capsule Take 1 capsule by mouth every morning      lenalidomide (REVLIMID) 10 MG chemo capsule TAKE 1 CAPSULE DAILY 28 capsule 0    losartan (COZAAR) 50 MG tablet TAKE 1 TABLET EVERY MORNING 90 tablet 1    amLODIPine (NORVASC) 5 MG tablet TAKE 1 TABLET EVERY MORNING 90 tablet 1     No current facility-administered medications for this visit.       ALLERGIES  Allergies   Allergen Reactions    Latex Rash and Swelling    Adhesive Tape Rash     \"Tears Skin , Paper Tape Is OK To Use\"  Skin breakdown       Lisinopril-Hydrochlorothiazide Swelling    Other      \"Allergic To Poinsetta Holley Causing Nasal Congestion\"    Sulfa Antibiotics Other (See Comments)     \"Flu Like Symptoms\"  Flu-like symptoms      Naproxen      \"Dr. Torre told me not to take NSAIDS\"    Hydrochlorothiazide W-Triamterene Nausea And Vomiting       PHYSICAL EXAM    /64 (BP Site: Right Upper Arm, Patient Position: Sitting, BP Cuff Size: Medium Adult)   Pulse 72   Ht 1.626 m (5' 4.02\")   Wt 69.7 kg

## 2025-03-07 DIAGNOSIS — I10 ESSENTIAL HYPERTENSION: Chronic | ICD-10-CM

## 2025-03-07 RX ORDER — LOSARTAN POTASSIUM 50 MG/1
TABLET ORAL
Qty: 90 TABLET | Refills: 1 | Status: SHIPPED | OUTPATIENT
Start: 2025-03-07

## 2025-03-07 RX ORDER — AMLODIPINE BESYLATE 5 MG/1
TABLET ORAL
Qty: 90 TABLET | Refills: 1 | Status: SHIPPED | OUTPATIENT
Start: 2025-03-07

## 2025-03-07 NOTE — TELEPHONE ENCOUNTER
Informed patient about [facility] no longer administering the mail service prescription for  medication  patient stated she would like to get them filled at [facility] on [address]

## 2025-03-11 ENCOUNTER — CLINICAL DOCUMENTATION (OUTPATIENT)
Dept: ONCOLOGY | Age: 75
End: 2025-03-11

## 2025-03-11 DIAGNOSIS — C90.00 MULTIPLE MYELOMA NOT HAVING ACHIEVED REMISSION (HCC): ICD-10-CM

## 2025-03-11 RX ORDER — LENALIDOMIDE 10 MG/1
10 CAPSULE ORAL DAILY
Qty: 28 CAPSULE | Refills: 0 | Status: ACTIVE | OUTPATIENT
Start: 2025-03-11

## 2025-03-11 NOTE — PROGRESS NOTES
Revlimid 10 mg chemo capsules reordered and e-scribed to [facility]. Prescriber survey completed and new auth # [id number] obtained through [employer] portal. Auth valid for 30 days and attached to RX.

## 2025-03-14 ASSESSMENT — ENCOUNTER SYMPTOMS
SHORTNESS OF BREATH: 0
NAUSEA: 0
WHEEZING: 0
SORE THROAT: 0
ABDOMINAL PAIN: 0

## 2025-04-03 DIAGNOSIS — C90.00 MULTIPLE MYELOMA NOT HAVING ACHIEVED REMISSION (HCC): ICD-10-CM

## 2025-04-03 RX ORDER — LENALIDOMIDE 10 MG/1
10 CAPSULE ORAL DAILY
Qty: 28 CAPSULE | Refills: 0 | Status: SHIPPED | OUTPATIENT
Start: 2025-04-03

## 2025-04-11 DIAGNOSIS — C90.00 MULTIPLE MYELOMA NOT HAVING ACHIEVED REMISSION (HCC): ICD-10-CM

## 2025-04-11 RX ORDER — LENALIDOMIDE 10 MG/1
10 CAPSULE ORAL DAILY
Qty: 28 CAPSULE | Refills: 0 | Status: ACTIVE | OUTPATIENT
Start: 2025-04-11

## 2025-04-11 NOTE — PROGRESS NOTES
Revlimid 10 mg chemo capsules reordered and sent to [facility]. Prescriber survey completed and new auth # [id number]  obtained through [employer] portal. Auth valid for 30 days and attached to RX.

## 2025-04-17 ENCOUNTER — HOSPITAL ENCOUNTER (OUTPATIENT)
Dept: INFUSION THERAPY | Age: 75
Discharge: HOME | End: 2025-04-17
Payer: MEDICARE

## 2025-04-17 DIAGNOSIS — C90.00 MULTIPLE MYELOMA NOT HAVING ACHIEVED REMISSION (HCC): Chronic | ICD-10-CM

## 2025-04-17 LAB
ALBUMIN SERPL-MCNC: 4.2 G/DL (ref 3.4–5)
ALBUMIN/GLOB SERPL: 1.5 {RATIO} (ref 1.1–2.2)
ALP SERPL-CCNC: 109 U/L (ref 40–129)
ALT SERPL-CCNC: 17 U/L (ref 10–40)
ANION GAP SERPL CALCULATED.3IONS-SCNC: 11 MMOL/L (ref 9–17)
AST SERPL-CCNC: 24 U/L (ref 15–37)
BASOPHILS # BLD: 0.06 K/UL
BASOPHILS NFR BLD: 2 % (ref 0–1)
BILIRUB SERPL-MCNC: 1.2 MG/DL (ref 0–1)
BUN SERPL-MCNC: 14 MG/DL (ref 7–20)
CALCIUM SERPL-MCNC: 9.4 MG/DL (ref 8.3–10.6)
CHLORIDE SERPL-SCNC: 100 MMOL/L (ref 99–110)
CO2 SERPL-SCNC: 28 MMOL/L (ref 21–32)
CREAT SERPL-MCNC: 0.7 MG/DL (ref 0.6–1.2)
EOSINOPHIL # BLD: 0.11 K/UL
EOSINOPHILS RELATIVE PERCENT: 4 % (ref 0–3)
ERYTHROCYTE [DISTWIDTH] IN BLOOD BY AUTOMATED COUNT: 15.1 % (ref 11.7–14.9)
ERYTHROCYTE [SEDIMENTATION RATE] IN BLOOD BY WESTERGREN METHOD: 5 MM/HR (ref 0–30)
GFR, ESTIMATED: 76 ML/MIN/1.73M2
GLUCOSE SERPL-MCNC: 96 MG/DL (ref 74–99)
HCT VFR BLD AUTO: 36.5 % (ref 37–47)
HGB BLD-MCNC: 12.1 G/DL (ref 12.5–16)
IGA SERPL-MCNC: 431 MG/DL (ref 70–400)
IGG SERPL-MCNC: 1133 MG/DL (ref 700–1600)
IGM SERPL-MCNC: 32 MG/DL (ref 40–230)
LDH SERPL-CCNC: 150 U/L (ref 100–190)
LYMPHOCYTES NFR BLD: 0.67 K/UL
LYMPHOCYTES RELATIVE PERCENT: 22 % (ref 24–44)
MCH RBC QN AUTO: 30.9 PG (ref 27–31)
MCHC RBC AUTO-ENTMCNC: 33.2 G/DL (ref 32–36)
MCV RBC AUTO: 93.1 FL (ref 78–100)
MONOCYTES NFR BLD: 0.26 K/UL
MONOCYTES NFR BLD: 8 % (ref 0–5)
NEUTROPHILS NFR BLD: 65 % (ref 36–66)
NEUTS SEG NFR BLD: 2 K/UL
PLATELET # BLD AUTO: 231 K/UL (ref 140–440)
PMV BLD AUTO: 9.1 FL (ref 7.5–11.1)
POTASSIUM SERPL-SCNC: 3.8 MMOL/L (ref 3.5–5.1)
PROT SERPL-MCNC: 7 G/DL (ref 6.4–8.2)
RBC # BLD AUTO: 3.92 M/UL (ref 4.2–5.4)
SODIUM SERPL-SCNC: 139 MMOL/L (ref 136–145)
WBC OTHER # BLD: 3.1 K/UL (ref 4–10.5)

## 2025-04-17 PROCEDURE — 86334 IMMUNOFIX E-PHORESIS SERUM: CPT

## 2025-04-17 PROCEDURE — 85025 COMPLETE CBC W/AUTO DIFF WBC: CPT

## 2025-04-17 PROCEDURE — 83521 IG LIGHT CHAINS FREE EACH: CPT

## 2025-04-17 PROCEDURE — 84155 ASSAY OF PROTEIN SERUM: CPT

## 2025-04-17 PROCEDURE — 83615 LACTATE (LD) (LDH) ENZYME: CPT

## 2025-04-17 PROCEDURE — 85652 RBC SED RATE AUTOMATED: CPT

## 2025-04-17 PROCEDURE — 82784 ASSAY IGA/IGD/IGG/IGM EACH: CPT

## 2025-04-17 PROCEDURE — 84165 PROTEIN E-PHORESIS SERUM: CPT

## 2025-04-17 PROCEDURE — 36415 COLL VENOUS BLD VENIPUNCTURE: CPT

## 2025-04-17 PROCEDURE — 80053 COMPREHEN METABOLIC PANEL: CPT

## 2025-04-18 LAB
COMMENT: ABNORMAL
KAPPA FREE LIGHT CHAIN: 38.6 MG/L (ref 2.37–20.73)
KAPPA/LAMBDA RATIO: 1.04 (ref 0.22–1.74)
LAMBDA FREE LIGHT CHAIN: 37.1 MG/L (ref 4.23–27.69)

## 2025-04-19 LAB — BETA-2 MICROGLOBULIN: 2.7 MG/L

## 2025-04-24 ENCOUNTER — OFFICE VISIT (OUTPATIENT)
Dept: ONCOLOGY | Age: 75
End: 2025-04-24
Payer: MEDICARE

## 2025-04-24 ENCOUNTER — HOSPITAL ENCOUNTER (OUTPATIENT)
Dept: INFUSION THERAPY | Age: 75
Discharge: HOME | End: 2025-04-24
Payer: MEDICARE

## 2025-04-24 VITALS
TEMPERATURE: 98.2 F | BODY MASS INDEX: 26.63 KG/M2 | HEART RATE: 75 BPM | OXYGEN SATURATION: 96 % | SYSTOLIC BLOOD PRESSURE: 132 MMHG | WEIGHT: 156 LBS | HEIGHT: 64 IN | DIASTOLIC BLOOD PRESSURE: 59 MMHG

## 2025-04-24 DIAGNOSIS — C90.00 MULTIPLE MYELOMA NOT HAVING ACHIEVED REMISSION (HCC): Primary | Chronic | ICD-10-CM

## 2025-04-24 LAB
MISCELLANEOUS LAB TEST RESULT: NORMAL
TEST NAME: NORMAL

## 2025-04-24 PROCEDURE — 1123F ACP DISCUSS/DSCN MKR DOCD: CPT | Performed by: INTERNAL MEDICINE

## 2025-04-24 PROCEDURE — 1090F PRES/ABSN URINE INCON ASSESS: CPT | Performed by: INTERNAL MEDICINE

## 2025-04-24 PROCEDURE — 3075F SYST BP GE 130 - 139MM HG: CPT | Performed by: INTERNAL MEDICINE

## 2025-04-24 PROCEDURE — 1159F MED LIST DOCD IN RCRD: CPT | Performed by: INTERNAL MEDICINE

## 2025-04-24 PROCEDURE — 3017F COLORECTAL CA SCREEN DOC REV: CPT | Performed by: INTERNAL MEDICINE

## 2025-04-24 PROCEDURE — G8399 PT W/DXA RESULTS DOCUMENT: HCPCS | Performed by: INTERNAL MEDICINE

## 2025-04-24 PROCEDURE — 1126F AMNT PAIN NOTED NONE PRSNT: CPT | Performed by: INTERNAL MEDICINE

## 2025-04-24 PROCEDURE — 99212 OFFICE O/P EST SF 10 MIN: CPT

## 2025-04-24 PROCEDURE — G8427 DOCREV CUR MEDS BY ELIG CLIN: HCPCS | Performed by: INTERNAL MEDICINE

## 2025-04-24 PROCEDURE — 1036F TOBACCO NON-USER: CPT | Performed by: INTERNAL MEDICINE

## 2025-04-24 PROCEDURE — 99214 OFFICE O/P EST MOD 30 MIN: CPT | Performed by: INTERNAL MEDICINE

## 2025-04-24 PROCEDURE — 3078F DIAST BP <80 MM HG: CPT | Performed by: INTERNAL MEDICINE

## 2025-04-24 PROCEDURE — G8419 CALC BMI OUT NRM PARAM NOF/U: HCPCS | Performed by: INTERNAL MEDICINE

## 2025-04-24 NOTE — PROGRESS NOTES
Patient Name: Esha Paz  Patient : 1950  Patient MRN: [id number]     Primary Oncologist: Juan Cruz MD  Referring Provider: Marcela Calix DO     Date of Service: 2025      Chief Complaint:   Chief Complaint   Patient presents with    Follow-up     Patient Active Problem List:     Severe anemia     Multiple myeloma not having achieved remission      Drug related polyneuropathy      Osteopenia of multiple sites    HPI:   Esha Paz is a 75-year-old very pleasnat female with medical history significant for hypertension, GERD, depression, anemia, initially presented to me on 2018 to follow up with her monocloal gammopathy.     She initially presented to [facility] on 18 with low hemoglobin. She stated that she had colonoscopy in  by Dr. Torre. She has been tired and fatigue since 2018. She denies weight loss.    She was found to have severe anemia on blood test done in her primary care physician office and she was asked to come to ER. Her base line hemoglobin was 8.4 -9.4 gram since . It was 13 grams in 2017 and on arrival to hospital on 18 was 5.7 grams. She received 2 units of PRBC.    I was called to evaluate her anemia at that time. I recognized that she has worsening macrocytic anemia. She also has mild leukopenia and thrombocytopenia. Her total protein level was significantly elevated (11.9 grams), but she has normal calcium and serum creatinine.    I requested work ups to rule out plasma cell dyscrasias and she was found to have 7900 mg/dl IgG kappa monoclonal gammopathy on serum protein electrophoresis and serum immunofixation. Her beta 2 microglobulin was 7.5 mg./dl, serum kappa 9.34 mg/dl, lambda 0.19 mg/dl, ratio was 49.16. ESR > 120, CRP 3 and .    Bone marrow biopsy was done on 2018 and final pathology showed hypocellular bone marrow [85%], with partially preserved trilineage hematopoiesis and

## 2025-04-24 NOTE — PROGRESS NOTES
MA Rooming Questions  Patient: Esha Paz  MRN: [id number]    Date: 4/24/2025        1. Do you have any new issues?   yes - Patient states pain in right arm for about 3 months. States this pain is similar to the pain on her left arm when she was diagnosed with cancer. Patient states this comes and goes.          2. Do you need any refills on medications?    no    3. Have you had any imaging done since your last visit?   No.     4. Have you been hospitalized or seen in the emergency room since your last visit here?   no    5. Did the patient have a depression screening completed today? No    No data recorded     PHQ-9 Given to (if applicable):               PHQ-9 Score (if applicable):                     [] Positive     []  Negative              Does question #9 need addressed (if applicable)                     [] Yes    []  No               Bertha Coon MA

## 2025-04-25 ENCOUNTER — TELEPHONE (OUTPATIENT)
Dept: ONCOLOGY | Age: 75
End: 2025-04-25

## 2025-04-25 ENCOUNTER — HOSPITAL ENCOUNTER (OUTPATIENT)
Dept: GENERAL RADIOLOGY | Age: 75
Discharge: HOME | End: 2025-04-25
Payer: MEDICARE

## 2025-04-25 DIAGNOSIS — C90.00 MULTIPLE MYELOMA NOT HAVING ACHIEVED REMISSION (HCC): Chronic | ICD-10-CM

## 2025-04-25 LAB
MISCELLANEOUS LAB TEST RESULT: NORMAL
TEST NAME: NORMAL

## 2025-04-25 PROCEDURE — 73060 X-RAY EXAM OF HUMERUS: CPT

## 2025-04-25 NOTE — TELEPHONE ENCOUNTER
4/25/25 - I  left pt vm and let her know the X-ray orders that Dr HERNANDEZ put in do not need to be scheduled. She can walk in to the hospital.

## 2025-04-28 ENCOUNTER — CLINICAL DOCUMENTATION (OUTPATIENT)
Dept: ONCOLOGY | Age: 75
End: 2025-04-28

## 2025-04-28 NOTE — PROGRESS NOTES
This nurse called the patient to advise that her humeral xray was normal and that her immunofixation is also normal, meaning she is still in complete clinical remission per the physician. The patient verbalized understanding and denies further needs at this time.  
15

## 2025-05-07 ENCOUNTER — CLINICAL DOCUMENTATION (OUTPATIENT)
Dept: ONCOLOGY | Age: 75
End: 2025-05-07

## 2025-05-07 DIAGNOSIS — C90.00 MULTIPLE MYELOMA NOT HAVING ACHIEVED REMISSION (HCC): ICD-10-CM

## 2025-05-07 RX ORDER — LENALIDOMIDE 10 MG/1
10 CAPSULE ORAL DAILY
Qty: 28 CAPSULE | Refills: 0 | Status: ACTIVE | OUTPATIENT
Start: 2025-05-07

## 2025-05-07 NOTE — TELEPHONE ENCOUNTER
Patient left message requesting a refill for Revlimid to be sent to [facility]. Pending RX to Provider to be sent to pharmacy.

## 2025-05-07 NOTE — PROGRESS NOTES
Revlimid 10 mg chemo capsules reordered and e-scribed to [facility]. Prescriber survey completed and new auth # [id number] obtained through [employer] portal. Auth valid for 30 days and attached to RX.

## 2025-05-23 ENCOUNTER — HOSPITAL ENCOUNTER (OUTPATIENT)
Dept: WOMENS IMAGING | Age: 75
Discharge: HOME | End: 2025-05-23
Attending: STUDENT IN AN ORGANIZED HEALTH CARE EDUCATION/TRAINING PROGRAM
Payer: MEDICARE

## 2025-05-23 VITALS — WEIGHT: 151 LBS | HEIGHT: 64 IN | BODY MASS INDEX: 25.78 KG/M2

## 2025-05-23 DIAGNOSIS — Z12.31 ENCOUNTER FOR SCREENING MAMMOGRAM FOR MALIGNANT NEOPLASM OF BREAST: ICD-10-CM

## 2025-05-23 PROCEDURE — 77063 BREAST TOMOSYNTHESIS BI: CPT

## 2025-05-27 ENCOUNTER — RESULTS FOLLOW-UP (OUTPATIENT)
Dept: FAMILY MEDICINE CLINIC | Age: 75
End: 2025-05-27

## 2025-05-27 DIAGNOSIS — I99.8 VASCULAR CALCIFICATION: Primary | ICD-10-CM

## 2025-05-27 NOTE — RESULT ENCOUNTER NOTE
Mammo normal except some signs of calcification a sign of CV disease. If ok with patient recommend cardio eval for further evaluation

## 2025-06-05 DIAGNOSIS — C90.00 MULTIPLE MYELOMA NOT HAVING ACHIEVED REMISSION (HCC): ICD-10-CM

## 2025-06-05 RX ORDER — LENALIDOMIDE 10 MG/1
10 CAPSULE ORAL DAILY
Qty: 28 CAPSULE | Refills: 0 | Status: SHIPPED | OUTPATIENT
Start: 2025-06-05 | End: 2025-06-06 | Stop reason: SDUPTHER

## 2025-06-06 DIAGNOSIS — C90.00 MULTIPLE MYELOMA NOT HAVING ACHIEVED REMISSION (HCC): ICD-10-CM

## 2025-06-06 RX ORDER — LENALIDOMIDE 10 MG/1
10 CAPSULE ORAL DAILY
Qty: 28 CAPSULE | Refills: 0 | Status: ACTIVE | OUTPATIENT
Start: 2025-06-06

## 2025-06-06 NOTE — PROGRESS NOTES
Revlimid 10 mg chemo capsules reordered and e-scribed to [facility]. Prescriber survey completed and new auth # [id number] obtained through [employer] portal. Auth valid for 30 days and attached to RX.

## 2025-07-02 DIAGNOSIS — C90.00 MULTIPLE MYELOMA NOT HAVING ACHIEVED REMISSION (HCC): ICD-10-CM

## 2025-07-03 ENCOUNTER — CLINICAL DOCUMENTATION (OUTPATIENT)
Dept: ONCOLOGY | Age: 75
End: 2025-07-03

## 2025-07-03 RX ORDER — LENALIDOMIDE 10 MG/1
10 CAPSULE ORAL DAILY
Qty: 28 CAPSULE | Refills: 0 | Status: ACTIVE | OUTPATIENT
Start: 2025-07-03

## 2025-07-03 NOTE — PROGRESS NOTES
Revlimid 10 mg chemo capsules reordered and sent to [facility]. Prescriber survey completed and new auth # [id number]  obtained through [employer] portal. Auth valid for 30 days and attached to RX.

## 2025-07-07 ENCOUNTER — INITIAL CONSULT (OUTPATIENT)
Dept: CARDIOLOGY CLINIC | Age: 75
End: 2025-07-07
Payer: MEDICARE

## 2025-07-07 VITALS
SYSTOLIC BLOOD PRESSURE: 120 MMHG | HEART RATE: 71 BPM | BODY MASS INDEX: 26.5 KG/M2 | DIASTOLIC BLOOD PRESSURE: 62 MMHG | HEIGHT: 64 IN | WEIGHT: 155.2 LBS

## 2025-07-07 DIAGNOSIS — E78.2 MIXED HYPERLIPIDEMIA: ICD-10-CM

## 2025-07-07 DIAGNOSIS — I10 ESSENTIAL HYPERTENSION: Primary | Chronic | ICD-10-CM

## 2025-07-07 DIAGNOSIS — C90.00 MULTIPLE MYELOMA NOT HAVING ACHIEVED REMISSION (HCC): Chronic | ICD-10-CM

## 2025-07-07 DIAGNOSIS — R06.02 SOB (SHORTNESS OF BREATH) ON EXERTION: ICD-10-CM

## 2025-07-07 DIAGNOSIS — R26.9 GAIT DISTURBANCE: ICD-10-CM

## 2025-07-07 DIAGNOSIS — I25.10 ASCVD (ARTERIOSCLEROTIC CARDIOVASCULAR DISEASE): ICD-10-CM

## 2025-07-07 DIAGNOSIS — F41.1 GENERALIZED ANXIETY DISORDER: ICD-10-CM

## 2025-07-07 DIAGNOSIS — E87.6 HYPOKALEMIA: ICD-10-CM

## 2025-07-07 DIAGNOSIS — R06.02 SHORTNESS OF BREATH: ICD-10-CM

## 2025-07-07 DIAGNOSIS — D47.2 MONOCLONAL GAMMOPATHY: ICD-10-CM

## 2025-07-07 PROCEDURE — G8419 CALC BMI OUT NRM PARAM NOF/U: HCPCS | Performed by: INTERNAL MEDICINE

## 2025-07-07 PROCEDURE — 3078F DIAST BP <80 MM HG: CPT | Performed by: INTERNAL MEDICINE

## 2025-07-07 PROCEDURE — 1159F MED LIST DOCD IN RCRD: CPT | Performed by: INTERNAL MEDICINE

## 2025-07-07 PROCEDURE — 3074F SYST BP LT 130 MM HG: CPT | Performed by: INTERNAL MEDICINE

## 2025-07-07 PROCEDURE — 1123F ACP DISCUSS/DSCN MKR DOCD: CPT | Performed by: INTERNAL MEDICINE

## 2025-07-07 PROCEDURE — 99204 OFFICE O/P NEW MOD 45 MIN: CPT | Performed by: INTERNAL MEDICINE

## 2025-07-07 PROCEDURE — 1090F PRES/ABSN URINE INCON ASSESS: CPT | Performed by: INTERNAL MEDICINE

## 2025-07-07 PROCEDURE — G8427 DOCREV CUR MEDS BY ELIG CLIN: HCPCS | Performed by: INTERNAL MEDICINE

## 2025-07-07 PROCEDURE — 1036F TOBACCO NON-USER: CPT | Performed by: INTERNAL MEDICINE

## 2025-07-07 PROCEDURE — 3017F COLORECTAL CA SCREEN DOC REV: CPT | Performed by: INTERNAL MEDICINE

## 2025-07-07 PROCEDURE — G8399 PT W/DXA RESULTS DOCUMENT: HCPCS | Performed by: INTERNAL MEDICINE

## 2025-07-07 NOTE — PROGRESS NOTES
CARDIOLOGY  NOTE    Chief Complaint: Atherosclerotic cardiovascular disease    HPI:   Esha is a 75 y.o. year old who has Past medical history as noted below.   History of Present Illness  The patient presents for evaluation of atherosclerotic cardiovascular disease.    She recently underwent a mammogram, which was reported as normal. However, the radiologist noted potential calcification in the blood vessels. She experiences shortness of breath when climbing stairs but not during other activities. She reports no chest pain. Her cardiac evaluations have been limited to EKGs. She is not currently on any cholesterol-lowering medication. She is on 2 different blood pressure medications and baby aspirin.    FAMILY HISTORY  - Mother: Certain percentage of heart was dead.       Current Outpatient Medications   Medication Sig Dispense Refill    lenalidomide (REVLIMID) 10 MG chemo capsule TAKE 1 CAPSULE DAILY 28 capsule 0    losartan (COZAAR) 50 MG tablet TAKE 1 TABLET EVERY MORNING 90 tablet 1    amLODIPine (NORVASC) 5 MG tablet TAKE 1 TABLET EVERY MORNING 90 tablet 1    meclizine (ANTIVERT) 12.5 MG tablet Take 1 tablet by mouth daily 90 tablet 1    aspirin 81 MG tablet Take 1 tablet by mouth nightly      Ascorbic Acid (VITAMIN C) 500 MG CAPS       Acetaminophen (TYLENOL PO)       DULoxetine (CYMBALTA) 60 MG extended release capsule Take 1 capsule by mouth 2 times daily 180 capsule 3    ZINC PO Take 1 tablet by mouth daily      VITAMIN E COMPLEX PO Take by mouth daily      Cyanocobalamin (VITAMIN B12 PO) Take by mouth daily      famotidine (PEPCID) 20 MG tablet       Biotin 06293 MCG TABS Take by mouth      Omega-3 Fatty Acids (FISH OIL) 1000 MG CAPS Take 3 capsules by mouth 2 times daily      Multiple Vitamins-Minerals (THERAPEUTIC MULTIVITAMIN-MINERALS) tablet Take 1 tablet by mouth every morning      Cholecalciferol (VITAMIN D) 2000 units CAPS capsule Take 1 capsule by mouth

## 2025-07-07 NOTE — PROGRESS NOTES
CLINICAL STAFF DOCUMENTATION    Dr. Amada Paz  1950  [id number]    Have you had any Chest Pain recently? - No  Have you had any Shortness of Breath - No  Have you had any dizziness - No  Have you had any palpitations recently? - No  Do you have any edema - swelling in No      Do you have a surgery or procedure scheduled in the near future - No    Do use tobacco products? - No  Do you drink alcohol? - Yes occ  Do you use any illicit drugs? - No  Caffeine? - Yes  How much caffeine? .2  cups       Check medication list thoroughly!!! AND RECONCILE OUTSIDE MEDICATIONS  If dose has changed change the entire order not just the MG  BE SURE TO ASK PATIENT IF THEY NEED MEDICATION REFILLS  Verify Pharmacy and update if incorrect    Add to every patient's \"wrap up\" the following dot phrase AFTERVISITCARDIOHEARTHOUSE and ensure we explain this to our patients

## 2025-07-10 ENCOUNTER — HOSPITAL ENCOUNTER (OUTPATIENT)
Dept: INFUSION THERAPY | Age: 75
Discharge: HOME | End: 2025-07-10
Payer: MEDICARE

## 2025-07-10 DIAGNOSIS — C90.00 MULTIPLE MYELOMA NOT HAVING ACHIEVED REMISSION (HCC): Chronic | ICD-10-CM

## 2025-07-10 LAB
ALBUMIN SERPL-MCNC: 4.1 G/DL (ref 3.4–5)
ALBUMIN/GLOB SERPL: 1.4 {RATIO} (ref 1.1–2.2)
ALP SERPL-CCNC: 114 U/L (ref 40–129)
ALT SERPL-CCNC: 14 U/L (ref 10–40)
ANION GAP SERPL CALCULATED.3IONS-SCNC: 13 MMOL/L (ref 9–17)
AST SERPL-CCNC: 23 U/L (ref 15–37)
BASOPHILS # BLD: 0.07 K/UL
BASOPHILS NFR BLD: 2 % (ref 0–1)
BILIRUB SERPL-MCNC: 0.9 MG/DL (ref 0–1)
BUN SERPL-MCNC: 13 MG/DL (ref 7–20)
CALCIUM SERPL-MCNC: 8.8 MG/DL (ref 8.3–10.6)
CHLORIDE SERPL-SCNC: 99 MMOL/L (ref 99–110)
CO2 SERPL-SCNC: 25 MMOL/L (ref 21–32)
CREAT SERPL-MCNC: 0.9 MG/DL (ref 0.6–1.2)
EOSINOPHIL # BLD: 0.11 K/UL
EOSINOPHILS RELATIVE PERCENT: 3 % (ref 0–3)
ERYTHROCYTE [DISTWIDTH] IN BLOOD BY AUTOMATED COUNT: 14.9 % (ref 11.7–14.9)
ERYTHROCYTE [SEDIMENTATION RATE] IN BLOOD BY WESTERGREN METHOD: 14 MM/HR (ref 0–30)
GFR, ESTIMATED: 65 ML/MIN/1.73M2
GLUCOSE SERPL-MCNC: 90 MG/DL (ref 74–99)
HCT VFR BLD AUTO: 37.6 % (ref 37–47)
HGB BLD-MCNC: 12.3 G/DL (ref 12.5–16)
IGA SERPL-MCNC: 423 MG/DL (ref 70–400)
IGG SERPL-MCNC: 1126 MG/DL (ref 700–1600)
IGM SERPL-MCNC: 35 MG/DL (ref 40–230)
LDH SERPL-CCNC: 169 U/L (ref 100–190)
LYMPHOCYTES NFR BLD: 0.83 K/UL
LYMPHOCYTES RELATIVE PERCENT: 20 % (ref 24–44)
MCH RBC QN AUTO: 31.3 PG (ref 27–31)
MCHC RBC AUTO-ENTMCNC: 32.7 G/DL (ref 32–36)
MCV RBC AUTO: 95.7 FL (ref 78–100)
MONOCYTES NFR BLD: 0.26 K/UL
MONOCYTES NFR BLD: 6 % (ref 0–5)
NEUTROPHILS NFR BLD: 70 % (ref 36–66)
NEUTS SEG NFR BLD: 2.95 K/UL
PLATELET # BLD AUTO: 227 K/UL (ref 140–440)
PMV BLD AUTO: 9.4 FL (ref 7.5–11.1)
POTASSIUM SERPL-SCNC: 4.1 MMOL/L (ref 3.5–5.1)
PROT SERPL-MCNC: 7 G/DL (ref 6.4–8.2)
RBC # BLD AUTO: 3.93 M/UL (ref 4.2–5.4)
SODIUM SERPL-SCNC: 137 MMOL/L (ref 136–145)
WBC OTHER # BLD: 4.2 K/UL (ref 4–10.5)

## 2025-07-10 PROCEDURE — 85652 RBC SED RATE AUTOMATED: CPT

## 2025-07-10 PROCEDURE — 80053 COMPREHEN METABOLIC PANEL: CPT

## 2025-07-10 PROCEDURE — 83615 LACTATE (LD) (LDH) ENZYME: CPT

## 2025-07-10 PROCEDURE — 83521 IG LIGHT CHAINS FREE EACH: CPT

## 2025-07-10 PROCEDURE — 84155 ASSAY OF PROTEIN SERUM: CPT

## 2025-07-10 PROCEDURE — 36415 COLL VENOUS BLD VENIPUNCTURE: CPT

## 2025-07-10 PROCEDURE — 86334 IMMUNOFIX E-PHORESIS SERUM: CPT

## 2025-07-10 PROCEDURE — 84165 PROTEIN E-PHORESIS SERUM: CPT

## 2025-07-10 PROCEDURE — 85025 COMPLETE CBC W/AUTO DIFF WBC: CPT

## 2025-07-10 PROCEDURE — 82784 ASSAY IGA/IGD/IGG/IGM EACH: CPT

## 2025-07-11 LAB
COMMENT: ABNORMAL
KAPPA FREE LIGHT CHAIN: 43.4 MG/L (ref 2.37–20.73)
KAPPA/LAMBDA RATIO: 1.07 (ref 0.22–1.74)
LAMBDA FREE LIGHT CHAIN: 40.5 MG/L (ref 4.23–27.69)

## 2025-07-12 LAB — BETA-2 MICROGLOBULIN: 2.7 MG/L

## 2025-07-13 NOTE — PROGRESS NOTES
Patient Name: Esha Paz  Patient : 1950  Patient MRN: [id number]     Primary Oncologist: Juan Cruz MD  Referring Provider: Marcela Calix DO     Date of Service: 2025      Chief Complaint:   Chief Complaint   Patient presents with    Follow-up     Patient Active Problem List:     Severe anemia     Multiple myeloma not having achieved remission      Drug related polyneuropathy      Osteopenia of multiple sites    HPI:   Esha Paz is a 75-year-old very pleasnat female with medical history significant for hypertension, GERD, depression, anemia, initially presented to me on 2018 to follow up with her monocloal gammopathy.     She initially presented to [facility] on 18 with low hemoglobin. She stated that she had colonoscopy in  by Dr. Torre. She has been tired and fatigue since 2018. She denies weight loss.    She was found to have severe anemia on blood test done in her primary care physician office and she was asked to come to ER. Her base line hemoglobin was 8.4 -9.4 gram since . It was 13 grams in 2017 and on arrival to hospital on 18 was 5.7 grams. She received 2 units of PRBC.    I was called to evaluate her anemia at that time. I recognized that she has worsening macrocytic anemia. She also has mild leukopenia and thrombocytopenia. Her total protein level was significantly elevated (11.9 grams), but she has normal calcium and serum creatinine.    I requested work ups to rule out plasma cell dyscrasias and she was found to have 7900 mg/dl IgG kappa monoclonal gammopathy on serum protein electrophoresis and serum immunofixation. Her beta 2 microglobulin was 7.5 mg./dl, serum kappa 9.34 mg/dl, lambda 0.19 mg/dl, ratio was 49.16. ESR > 120, CRP 3 and .    Bone marrow biopsy was done on 2018 and final pathology showed hypocellular bone marrow [85%], with partially preserved trilineage hematopoiesis and

## 2025-07-23 ENCOUNTER — TELEPHONE (OUTPATIENT)
Dept: CARDIOLOGY CLINIC | Age: 75
End: 2025-07-23

## 2025-07-23 NOTE — TELEPHONE ENCOUNTER
Pt called to verify she will be able to drive self to/from appt tomorrow for stress test and ECHO. Notified pt she is able to drive to/from appt. Pt v/u.

## 2025-07-24 ENCOUNTER — HOSPITAL ENCOUNTER (OUTPATIENT)
Age: 75
Discharge: HOME | End: 2025-07-24
Payer: MEDICARE

## 2025-07-24 ENCOUNTER — OFFICE VISIT (OUTPATIENT)
Age: 75
End: 2025-07-24
Payer: MEDICARE

## 2025-07-24 VITALS
TEMPERATURE: 97.1 F | BODY MASS INDEX: 26.63 KG/M2 | DIASTOLIC BLOOD PRESSURE: 70 MMHG | OXYGEN SATURATION: 100 % | SYSTOLIC BLOOD PRESSURE: 139 MMHG | HEIGHT: 64 IN | WEIGHT: 156 LBS | HEART RATE: 78 BPM

## 2025-07-24 DIAGNOSIS — C90.00 MULTIPLE MYELOMA NOT HAVING ACHIEVED REMISSION (HCC): Primary | Chronic | ICD-10-CM

## 2025-07-24 PROCEDURE — 1090F PRES/ABSN URINE INCON ASSESS: CPT | Performed by: INTERNAL MEDICINE

## 2025-07-24 PROCEDURE — 1126F AMNT PAIN NOTED NONE PRSNT: CPT | Performed by: INTERNAL MEDICINE

## 2025-07-24 PROCEDURE — G8399 PT W/DXA RESULTS DOCUMENT: HCPCS | Performed by: INTERNAL MEDICINE

## 2025-07-24 PROCEDURE — 3075F SYST BP GE 130 - 139MM HG: CPT | Performed by: INTERNAL MEDICINE

## 2025-07-24 PROCEDURE — 1123F ACP DISCUSS/DSCN MKR DOCD: CPT | Performed by: INTERNAL MEDICINE

## 2025-07-24 PROCEDURE — 1159F MED LIST DOCD IN RCRD: CPT | Performed by: INTERNAL MEDICINE

## 2025-07-24 PROCEDURE — 99212 OFFICE O/P EST SF 10 MIN: CPT

## 2025-07-24 PROCEDURE — G8427 DOCREV CUR MEDS BY ELIG CLIN: HCPCS | Performed by: INTERNAL MEDICINE

## 2025-07-24 PROCEDURE — 3078F DIAST BP <80 MM HG: CPT | Performed by: INTERNAL MEDICINE

## 2025-07-24 PROCEDURE — G8419 CALC BMI OUT NRM PARAM NOF/U: HCPCS | Performed by: INTERNAL MEDICINE

## 2025-07-24 PROCEDURE — 1036F TOBACCO NON-USER: CPT | Performed by: INTERNAL MEDICINE

## 2025-07-24 PROCEDURE — 3017F COLORECTAL CA SCREEN DOC REV: CPT | Performed by: INTERNAL MEDICINE

## 2025-07-24 PROCEDURE — 99214 OFFICE O/P EST MOD 30 MIN: CPT | Performed by: INTERNAL MEDICINE

## 2025-07-24 RX ORDER — DULOXETIN HYDROCHLORIDE 60 MG/1
60 CAPSULE, DELAYED RELEASE ORAL 2 TIMES DAILY
Qty: 180 CAPSULE | Refills: 3 | Status: SHIPPED | OUTPATIENT
Start: 2025-07-24

## 2025-07-24 RX ORDER — DULOXETIN HYDROCHLORIDE 60 MG/1
60 CAPSULE, DELAYED RELEASE ORAL 2 TIMES DAILY
Qty: 180 CAPSULE | Refills: 3 | Status: SHIPPED | OUTPATIENT
Start: 2025-07-24 | End: 2025-10-22

## 2025-07-24 NOTE — PROGRESS NOTES
MA/LPN Rooming Questions  Patient: Esha Paz  MRN: [id number]    Date: 7/24/2025        1. Do you have any new issues?   no         2. Do you need any refills on medications?    yes - Cymbalta     3. Have you had any imaging done since your last visit?   no    4. Have you been hospitalized or seen in the emergency room since your last visit here?   no    5. Did the patient have a depression screening completed today? No    No data recorded     PHQ-9 Given to (if applicable):               PHQ-9 Score (if applicable):                     [] Positive     []  Negative              Does question #9 need addressed (if applicable)                     [] Yes    []  No               Tere Mcclain MA

## 2025-07-28 ENCOUNTER — TELEPHONE (OUTPATIENT)
Dept: CARDIOLOGY CLINIC | Age: 75
End: 2025-07-28

## 2025-07-30 ENCOUNTER — CLINICAL DOCUMENTATION (OUTPATIENT)
Dept: ONCOLOGY | Age: 75
End: 2025-07-30

## 2025-07-30 DIAGNOSIS — C90.00 MULTIPLE MYELOMA NOT HAVING ACHIEVED REMISSION (HCC): ICD-10-CM

## 2025-07-30 RX ORDER — LENALIDOMIDE 10 MG/1
10 CAPSULE ORAL DAILY
Qty: 28 CAPSULE | Refills: 0 | Status: SHIPPED | OUTPATIENT
Start: 2025-07-30

## 2025-07-30 NOTE — PROGRESS NOTES
Revlimid 10 mg chemo capsules reordered and e-scribed to [facility]. Prescriber survey completed and new auth # [id number] obtained through [employer] portal. Auth valid for 30 days and attached to RX.

## 2025-08-05 ENCOUNTER — OFFICE VISIT (OUTPATIENT)
Dept: CARDIOLOGY CLINIC | Age: 75
End: 2025-08-05
Payer: MEDICARE

## 2025-08-05 ENCOUNTER — TELEPHONE (OUTPATIENT)
Dept: CARDIOLOGY CLINIC | Age: 75
End: 2025-08-05

## 2025-08-05 VITALS
BODY MASS INDEX: 26.94 KG/M2 | WEIGHT: 157.8 LBS | SYSTOLIC BLOOD PRESSURE: 120 MMHG | HEIGHT: 64 IN | DIASTOLIC BLOOD PRESSURE: 60 MMHG | HEART RATE: 64 BPM

## 2025-08-05 DIAGNOSIS — R06.02 SHORTNESS OF BREATH: ICD-10-CM

## 2025-08-05 DIAGNOSIS — E78.2 MIXED HYPERLIPIDEMIA: ICD-10-CM

## 2025-08-05 DIAGNOSIS — R06.02 SOB (SHORTNESS OF BREATH) ON EXERTION: ICD-10-CM

## 2025-08-05 DIAGNOSIS — R52 UNCONTROLLED PAIN: ICD-10-CM

## 2025-08-05 DIAGNOSIS — E87.6 HYPOKALEMIA: ICD-10-CM

## 2025-08-05 DIAGNOSIS — C90.00 MULTIPLE MYELOMA NOT HAVING ACHIEVED REMISSION (HCC): Chronic | ICD-10-CM

## 2025-08-05 DIAGNOSIS — R07.9 CHEST PAIN, UNSPECIFIED TYPE: ICD-10-CM

## 2025-08-05 DIAGNOSIS — I10 ESSENTIAL HYPERTENSION: Chronic | ICD-10-CM

## 2025-08-05 DIAGNOSIS — R94.31 ABNORMAL ELECTROCARDIOGRAPHY: ICD-10-CM

## 2025-08-05 DIAGNOSIS — I25.10 ASCVD (ARTERIOSCLEROTIC CARDIOVASCULAR DISEASE): Primary | ICD-10-CM

## 2025-08-05 PROCEDURE — 3078F DIAST BP <80 MM HG: CPT | Performed by: INTERNAL MEDICINE

## 2025-08-05 PROCEDURE — 3074F SYST BP LT 130 MM HG: CPT | Performed by: INTERNAL MEDICINE

## 2025-08-05 PROCEDURE — 1123F ACP DISCUSS/DSCN MKR DOCD: CPT | Performed by: INTERNAL MEDICINE

## 2025-08-05 PROCEDURE — 1090F PRES/ABSN URINE INCON ASSESS: CPT | Performed by: INTERNAL MEDICINE

## 2025-08-05 PROCEDURE — 1159F MED LIST DOCD IN RCRD: CPT | Performed by: INTERNAL MEDICINE

## 2025-08-05 PROCEDURE — G8399 PT W/DXA RESULTS DOCUMENT: HCPCS | Performed by: INTERNAL MEDICINE

## 2025-08-05 PROCEDURE — 3017F COLORECTAL CA SCREEN DOC REV: CPT | Performed by: INTERNAL MEDICINE

## 2025-08-05 PROCEDURE — 99214 OFFICE O/P EST MOD 30 MIN: CPT | Performed by: INTERNAL MEDICINE

## 2025-08-05 PROCEDURE — 1036F TOBACCO NON-USER: CPT | Performed by: INTERNAL MEDICINE

## 2025-08-05 PROCEDURE — G8427 DOCREV CUR MEDS BY ELIG CLIN: HCPCS | Performed by: INTERNAL MEDICINE

## 2025-08-05 PROCEDURE — G8419 CALC BMI OUT NRM PARAM NOF/U: HCPCS | Performed by: INTERNAL MEDICINE

## 2025-08-05 RX ORDER — AMLODIPINE BESYLATE 10 MG/1
TABLET ORAL
Qty: 90 TABLET | Refills: 4 | Status: SHIPPED | OUTPATIENT
Start: 2025-08-05

## 2025-08-07 ENCOUNTER — HOSPITAL ENCOUNTER (OUTPATIENT)
Dept: LAB | Age: 75
Discharge: HOME | End: 2025-08-07
Payer: MEDICARE

## 2025-08-07 DIAGNOSIS — E78.2 MIXED HYPERLIPIDEMIA: ICD-10-CM

## 2025-08-07 LAB
CHOLEST SERPL-MCNC: 127 MG/DL (ref 125–199)
HDLC SERPL-MCNC: 70 MG/DL
LDLC SERPL CALC-MCNC: 41 MG/DL
TRIGL SERPL-MCNC: 80 MG/DL
TSH SERPL DL<=0.05 MIU/L-ACNC: 3.12 UIU/ML (ref 0.27–4.2)

## 2025-08-07 PROCEDURE — 36415 COLL VENOUS BLD VENIPUNCTURE: CPT

## 2025-08-07 PROCEDURE — 84443 ASSAY THYROID STIM HORMONE: CPT

## 2025-08-07 PROCEDURE — 80061 LIPID PANEL: CPT

## 2025-08-09 SDOH — HEALTH STABILITY: PHYSICAL HEALTH: ON AVERAGE, HOW MANY DAYS PER WEEK DO YOU ENGAGE IN MODERATE TO STRENUOUS EXERCISE (LIKE A BRISK WALK)?: 4 DAYS

## 2025-08-09 SDOH — HEALTH STABILITY: PHYSICAL HEALTH: ON AVERAGE, HOW MANY MINUTES DO YOU ENGAGE IN EXERCISE AT THIS LEVEL?: 30 MIN

## 2025-08-09 ASSESSMENT — PATIENT HEALTH QUESTIONNAIRE - PHQ9
SUM OF ALL RESPONSES TO PHQ QUESTIONS 1-9: 2
1. LITTLE INTEREST OR PLEASURE IN DOING THINGS: SEVERAL DAYS
2. FEELING DOWN, DEPRESSED OR HOPELESS: SEVERAL DAYS
SUM OF ALL RESPONSES TO PHQ QUESTIONS 1-9: 2

## 2025-08-09 ASSESSMENT — LIFESTYLE VARIABLES
HOW OFTEN DO YOU HAVE A DRINK CONTAINING ALCOHOL: MONTHLY OR LESS
HOW OFTEN DO YOU HAVE SIX OR MORE DRINKS ON ONE OCCASION: 1
HOW MANY STANDARD DRINKS CONTAINING ALCOHOL DO YOU HAVE ON A TYPICAL DAY: 1 OR 2
HOW MANY STANDARD DRINKS CONTAINING ALCOHOL DO YOU HAVE ON A TYPICAL DAY: 1
HOW OFTEN DO YOU HAVE A DRINK CONTAINING ALCOHOL: 2

## 2025-08-12 ENCOUNTER — TELEMEDICINE (OUTPATIENT)
Dept: FAMILY MEDICINE CLINIC | Age: 75
End: 2025-08-12
Payer: MEDICARE

## 2025-08-12 DIAGNOSIS — Z00.00 MEDICARE ANNUAL WELLNESS VISIT, SUBSEQUENT: Primary | ICD-10-CM

## 2025-08-12 PROCEDURE — 1159F MED LIST DOCD IN RCRD: CPT | Performed by: STUDENT IN AN ORGANIZED HEALTH CARE EDUCATION/TRAINING PROGRAM

## 2025-08-12 PROCEDURE — G0439 PPPS, SUBSEQ VISIT: HCPCS | Performed by: STUDENT IN AN ORGANIZED HEALTH CARE EDUCATION/TRAINING PROGRAM

## 2025-08-12 PROCEDURE — 1123F ACP DISCUSS/DSCN MKR DOCD: CPT | Performed by: STUDENT IN AN ORGANIZED HEALTH CARE EDUCATION/TRAINING PROGRAM

## 2025-08-12 PROCEDURE — 3017F COLORECTAL CA SCREEN DOC REV: CPT | Performed by: STUDENT IN AN ORGANIZED HEALTH CARE EDUCATION/TRAINING PROGRAM

## 2025-08-12 ASSESSMENT — LIFESTYLE VARIABLES
HOW MANY STANDARD DRINKS CONTAINING ALCOHOL DO YOU HAVE ON A TYPICAL DAY: 1 OR 2
HOW OFTEN DO YOU HAVE A DRINK CONTAINING ALCOHOL: MONTHLY OR LESS

## 2025-08-12 ASSESSMENT — PATIENT HEALTH QUESTIONNAIRE - PHQ9
SUM OF ALL RESPONSES TO PHQ QUESTIONS 1-9: 2
SUM OF ALL RESPONSES TO PHQ QUESTIONS 1-9: 2
2. FEELING DOWN, DEPRESSED OR HOPELESS: SEVERAL DAYS
SUM OF ALL RESPONSES TO PHQ QUESTIONS 1-9: 2
1. LITTLE INTEREST OR PLEASURE IN DOING THINGS: SEVERAL DAYS
SUM OF ALL RESPONSES TO PHQ QUESTIONS 1-9: 2

## 2025-08-26 ENCOUNTER — CLINICAL DOCUMENTATION (OUTPATIENT)
Dept: CARDIOLOGY CLINIC | Age: 75
End: 2025-08-26

## 2025-08-27 ENCOUNTER — CLINICAL DOCUMENTATION (OUTPATIENT)
Dept: ONCOLOGY | Age: 75
End: 2025-08-27

## 2025-08-27 DIAGNOSIS — C90.00 MULTIPLE MYELOMA NOT HAVING ACHIEVED REMISSION (HCC): ICD-10-CM

## 2025-08-27 RX ORDER — LENALIDOMIDE 10 MG/1
10 CAPSULE ORAL DAILY
Qty: 28 CAPSULE | Refills: 0 | Status: SHIPPED | OUTPATIENT
Start: 2025-08-27

## 2025-09-03 RX ORDER — MECLIZINE HCL 12.5 MG 12.5 MG/1
12.5 TABLET ORAL DAILY
Qty: 90 TABLET | Refills: 1 | Status: SHIPPED | OUTPATIENT
Start: 2025-09-03

## (undated) DEVICE — PENCIL ES CRD L10FT HND SWCHING ROCK SWCH W/ EDGE COAT BLDE

## (undated) DEVICE — SYSTEM SKIN CLSR 22CM DERMBND PRINEO

## (undated) DEVICE — KIT DRP FOR RIO ROBOTIC ARM ASST SYS (ORDER MUTLIPLES OF 10 EACH)

## (undated) DEVICE — SOLUTION IV 100ML 0.9% SOD CHL PLAS CONT USP VIAFLX 1 PER

## (undated) DEVICE — KIT TRK KNEE PROC VIZADISC

## (undated) DEVICE — Z INACTIVE NO ACTIVE SUPPLIER APPLICATOR MEDICATED 26 CC TINT HI-LITE ORNG STRL CHLORAPREP

## (undated) DEVICE — HOOD WITH PEEL AWAY FACE SHIELD: Brand: T7PLUS

## (undated) DEVICE — PIN BNE FIX L110MM DIA32MM

## (undated) DEVICE — 3M™ STERI-DRAPE™ U-DRAPE 1015: Brand: STERI-DRAPE™

## (undated) DEVICE — GLOVE ORANGE PI 7 1/2   MSG9075

## (undated) DEVICE — CORD RETRCT SIL - ORDER MULTIPLES OF 10 EACH

## (undated) DEVICE — CLASSIC CLD THER SYS

## (undated) DEVICE — HOOD: Brand: T7PLUS

## (undated) DEVICE — TUBING, SUCTION, 3/16" X 6', STRAIGHT: Brand: MEDLINE

## (undated) DEVICE — BOOT POS LEG DEMAYO

## (undated) DEVICE — NEEDLE HYPO 20GA L1.5IN YEL POLYPR HUB S STL REG BVL STR

## (undated) DEVICE — Z DISCONTINUED USE 2860885 SUTURE STRATAFIX SPRL SZ 1 L14IN ABSRB VLT L48CM CTX 1/2 SXPD2B405

## (undated) DEVICE — KENDALL 500 SERIES DIAPHORETIC FOAM MONITORING ELECTRODE - TEAR DROP SHAPE ( 30/PK): Brand: KENDALL

## (undated) DEVICE — T4 HOOD

## (undated) DEVICE — COVER,TABLE,44X90,STERILE: Brand: MEDLINE

## (undated) DEVICE — LINER SUCT CANSTR 1500CC SEMI RIG W/ POR HYDROPHOBIC SHUT

## (undated) DEVICE — BLADE SURG SAW STD S STL OSC W/ SERR EDGE DISP

## (undated) DEVICE — BW-412T DISP COMBO CLEANING BRUSH: Brand: SINGLE USE COMBINATION CLEANING BRUSH

## (undated) DEVICE — SUTURE VICRYL SZ 2-0 L18IN ABSRB UD CT-1 L36MM 1/2 CIR J839D

## (undated) DEVICE — TOWEL,OR,DSP,ST,BLUE,STD,6/PK,12PK/CS: Brand: MEDLINE

## (undated) DEVICE — JELLY LUBRICATING 3 GM BACTERIOSTATIC

## (undated) DEVICE — KIT INT FIX FEM TIB CKPT MAKOPLASTY

## (undated) DEVICE — BLUNTFILL: Brand: MONOJECT

## (undated) DEVICE — Device

## (undated) DEVICE — ZIMMER® STERILE DISPOSABLE TOURNIQUET CUFF WITH PLC, DUAL PORT, SINGLE BLADDER, 34 IN. (86 CM)

## (undated) DEVICE — Z DISCONTINUED NO SUB IDED HOLDER INSTR 19X11CM 4 PKT W TAPE DISPOSABLE SURGI KIT

## (undated) DEVICE — PAD CLD R HIP REG HOSE NONSTERILE

## (undated) DEVICE — STOCKING,ANTI-EMBOLISM,T-L,XL REG,LF: Brand: MEDLINE

## (undated) DEVICE — 4-PORT MANIFOLD: Brand: NEPTUNE 2

## (undated) DEVICE — SUTURE MONOCRYL ABSORBABLE 3-0 PS-1 18 IN UD MONOCRYL + STRATAFIX SXMP1B102

## (undated) DEVICE — Device: Brand: POWER-FLO®

## (undated) DEVICE — BANDAGE ELASTIC  HONYCMB 6.0INX15YD

## (undated) DEVICE — SYRINGE MED 30ML STD CLR PLAS LUERLOCK TIP N CTRL DISP

## (undated) DEVICE — SYRINGE MEDICAL 3ML CLEAR PLASTIC STANDARD NON CONTROL LUERLOCK TIP DISPOSABLE

## (undated) DEVICE — PIN BNE FIX TEMP L140MM DIA4MM MAKO

## (undated) DEVICE — LINE SAMP O2 6.5FT W/FEMALE CONN F/ADULT CAPNOLINE PLUS

## (undated) DEVICE — ZIMMER® STERILE DISPOSABLE TOURNIQUET CUFF WITH PLC, DUAL PORT, SINGLE BLADDER, 30 IN. (76 CM)

## (undated) DEVICE — 3M™ STERI-DRAPE™ INSTRUMENT POUCH 1018: Brand: STERI-DRAPE™

## (undated) DEVICE — GLOVE ORANGE PI 8   MSG9080

## (undated) DEVICE — DUAL CUT SAGITTAL BLADE